# Patient Record
Sex: MALE | Race: WHITE | NOT HISPANIC OR LATINO | Employment: OTHER | URBAN - METROPOLITAN AREA
[De-identification: names, ages, dates, MRNs, and addresses within clinical notes are randomized per-mention and may not be internally consistent; named-entity substitution may affect disease eponyms.]

---

## 2017-03-14 ENCOUNTER — TELEPHONE (OUTPATIENT)
Dept: FAMILY MEDICINE | Facility: CLINIC | Age: 82
End: 2017-03-14

## 2017-03-14 NOTE — TELEPHONE ENCOUNTER
Reason for Call:  Other call back    Detailed comments: Wife calling, patient is having 2 teeth pulled on 3-29-17.  They are calling to get directions for his blood thinner med and the baby Asprin.  Please call to provide direction.    Phone Number Patient can be reached at: Home number on file [phone] (home)    Best Time: any    Can we leave a detailed message on this number? YES    Call taken on 3/14/2017 at 10:31 AM by Alfreda Wilcox

## 2017-03-15 ENCOUNTER — PRE VISIT (OUTPATIENT)
Dept: CARDIOLOGY | Facility: CLINIC | Age: 82
End: 2017-03-15

## 2017-03-15 NOTE — TELEPHONE ENCOUNTER
[facility]  [phone]    Would you like me to call, or would you like to call to review?    Thanks,  Gaurav Mclaughlin RN

## 2017-03-15 NOTE — TELEPHONE ENCOUNTER
HPI:   Mr Blair is a pleasant 87 yo male with history of Parkinsonism with an ILR in place. He is here with spouse    Mr Blair has had prior falls but none in last year. He had previously a documented cardiac pause, but not associated with falls/collapse. Now ILR is not showing anything of concern. Has perhaps 6-8 mos of operational function left.  ILR checked today     No new CV complaints. Denies OH, lightheadedness, CP or SOBE or HF symptoms. Wife here and concurs    PAST MEDICAL HISTORY:  Past Medical History   Diagnosis Date     Anemia      Anemia due to blood loss, acute      CKD (chronic kidney disease) stage 3, GFR 30-59 ml/min 5/31/2010     Essential hypertension, benign      Other and unspecified hyperlipidemia      Other and unspecified hyperlipidemia      Other specified disorder of skin      Pain in joint, shoulder region      Parkinson disease (H) 9/2/2010     Polyp of vocal cord or larynx      Retinal ischemia      right sided thrombotic plaque     Rosacea      Type II or unspecified type diabetes mellitus without mention of complication, not stated as uncontrolled        CURRENT MEDICATIONS:  Current Outpatient Prescriptions   Medication Sig Dispense Refill     blood glucose monitoring (ACCU-CHEK COMPACT STRIPS) test strip 1 strip by In Vitro route daily 100 each 11     atorvastatin (LIPITOR) 10 MG tablet Take 1 tablet (10 mg) by mouth daily Refill when requested 90 tablet 3     Cyanocobalamin (B-12) 1000 MCG TBCR Take 1,000 mcg by mouth daily 100 tablet 3     doxycycline Monohydrate 100 MG CAPS 1 tablet 2 time daily for Rosacea flares 60 capsule 3     irbesartan (AVAPRO) 300 MG tablet Take 1 tablet (300 mg) by mouth At Bedtime 90 tablet 3     dipyridamole (PERSANTINE) 50 MG tablet Take 2 tablets (100 mg) by mouth 4 times daily 720 tablet 3     sertraline (ZOLOFT) 25 MG tablet Take 1 tablet (25 mg) by mouth daily 90 tablet 3     fluticasone (FLONASE) 50 MCG/ACT nasal spray Spray 1-2 sprays into both  nostrils daily 16 g 5     blood glucose monitoring (SOFTCLIX) lancets Check blood sugar once weekly 100 Box 0     Thiamine HCl 50 MG CAPS Take 1 capsule by mouth daily 100 capsule 3     diclofenac (VOLTAREN) 1 % GEL Apply 4 grams to knees or 2 grams to hands four times daily using enclosed dosing card. 100 g 5     amoxicillin (AMOXIL) 500 MG capsule 4 tablets prior to dental appointment. Use for dental appointments 16 capsule 4     metroNIDAZOLE (METROCREAM) 0.75 % cream Apply topically 2 times daily 45 g 3     acetaminophen (TYLENOL) 500 MG tablet Take 2 tablets (1,000 mg) by mouth 3 times daily 100 tablet 0     Cyanocobalamin (VITAMIN B 12 PO) Take 50 mcg by mouth daily        FOLIC ACID PO Take 1 mg by mouth daily       acetaminophen 650 MG TABS Take 650 mg by mouth every 4 hours as needed. 100 tablet 0     Multiple Vitamin (MULTI VITAMIN  MENS) TABS Take  by mouth. Takes one daily         aspirin 81 MG tablet Take 1 tablet by mouth daily.  3     senna-docusate (SENOKOT-S;PERICOLACE) 8.6-50 MG per tablet Take 1-2 tablets by mouth 2 times daily. (Patient taking differently: Take 1-2 tablets by mouth Takes about 2-3 times weekly) 60 tablet 1     Carbidopa-Levodopa  MG TBCR Take  by mouth. 4 times daily  .        VITAMIN D 1000 UNIT OR TABS 1 TABLET DAILY 100 4       PAST SURGICAL HISTORY:  Past Surgical History   Procedure Laterality Date     Cl aff surgical pathology  6-     Dr. Bowers; left hand     Throat surgery       vocal cord polyps     Incise finger tendon sheath  2008     Dr. Bowers; right AND LEFT hand     Arthroplasty knee  1/31/2012     Procedure:ARTHROPLASTY KNEE; LEFT TOTAL KNEE ARTHROPLASTY (IRASEMA)^; Surgeon:SKIP FAIR; Location: OR     Ent surgery       Arthroscopy shoulder, open rotator cuff repair, combined  4/24/2012     Procedure:COMBINED ARTHROSCOPY SHOULDER, OPEN ROTATOR CUFF REPAIR; RIGHT SHOULDER ARTHROSCOPY OPEN ROTATOR CUFF DEBRIDEMENT, SUBCROMIAL DECOMPRESSION, AND  BICEPS TENODESIS REPAIR; Surgeon:SKIP FAIR; Location:[facility]       ALLERGIES:     Allergies   Allergen Reactions     No Known Drug Allergies        FAMILY HISTORY:  Family History   Problem Relation Age of Onset     Family History Negative       unknown family history per patient   Parents , but no illness recorded    SOCIAL HISTORY:  Social History   Substance Use Topics     Smoking status: Never Smoker     Smokeless tobacco: Never Used     Alcohol use Yes      Comment: couple beers everyday       ROS:   Constitutional: No fever, chills, or sweats. Weight stable. Uses cane   ENT: No visual disturbance, ear ache, epistaxis, sore throat.   Cardiovascular: As per HPI.   Respiratory: No cough, hemoptysis.    GI: No nausea, vomiting, hematemesis, melena, or hematochezia.   : No hematuria.   Integument: Negative.   Psychiatric: Negative.   Hematologic:  Easy bruising, no easy bleeding.  Neuro: Negative apart from Parkinsons and its complications   Endocrinology: No significant heat or cold intolerance   Musculoskeletal: Continuing instability related to Parkinsonism    Exam:  There were no vitals taken for this visit.Extended Vitals not filed for this encounter.    GENERAL APPEARANCE: Pleasant healthy, alert and no distress here with spouse  HEENT: no icterus, no xanthelasmas, normal pupil size and reaction, normal palate, mucosa moist, no central cyanosis  NECK: no adenopathy, no asymmetry, masses, or scars, thyroid normal to palpation and no bruits, JVP not elevated  RESPIRATORY: lungs clear to auscultation - no rales, rhonchi or wheezes, no use of accessory muscles, no retractions, respirations are unlabored, normal respiratory rate  CARDIOVASCULAR: regular rhythm, normal S1 with physiologic split S2, no S3 or S4 and no murmur, click or rub, precordium quiet with normal PMI.  ABDOMEN: soft, non tender, without hepatosplenomegaly, no masses palpable, bowel sounds normal, aorta not enlarged by palpation,  no abdominal bruits  EXTREMITIES: peripheral pulses normal, no edema, no bruits.  NEURO: alert and oriented to person/place/time, normal speech, Parkinsonism gait but normal  affect  VASC:  pulses are normal in volumes and symmetric bilaterally. No bruits are heard.  SKIN: no ecchymoses, no rashes    Labs:  CBC RESULTS:   Lab Results   Component Value Date    WBC 8.2 09/07/2016    RBC 3.31 (L) 09/07/2016    HGB 11.3 (L) 09/07/2016    HCT 35.8 (L) 09/07/2016     (H) 09/07/2016    MCH 34.1 (H) 09/07/2016    MCHC 31.6 09/07/2016    RDW 13.3 09/07/2016     09/07/2016       BMP RESULTS:  Lab Results   Component Value Date     09/07/2016    POTASSIUM 4.6 09/07/2016    CHLORIDE 107 09/07/2016    CO2 24 09/07/2016    ANIONGAP 8 09/07/2016     (H) 09/07/2016    BUN 26 09/07/2016    CR 1.11 09/07/2016    GFRESTIMATED 63 09/07/2016    GFRESTBLACK 76 09/07/2016    BLAISE 8.5 09/07/2016        INR RESULTS:  Lab Results   Component Value Date    INR 1.11 01/31/2012       Procedures:  9/7/16: ILR explant, reimplant    3/31/2016: Echocardiogram; \  Interpretation Summary  Technically difficult study.Extremely difficult acoustic windows despite the  use of contrast for endcardial border definition.  Global and regional left ventricular function is normal with an EF of 60-65%.  Global right ventricular function is normal.  ______________________________________________________________________________           Left Ventricle  Global and regional left ventricular function is normal with an EF of 60-65%.  Left ventricular size is normal. Left ventricular wall thickness is normal.     Right Ventricle  The right ventricle is normal size. Global right ventricular function is  normal.  Atria  Both atria appear normal.     Mitral Valve  The mitral valve is normal.     Aortic Valve  Aortic valve is normal in structure and function.     Tricuspid Valve  The tricuspid valve is normal. Trace tricuspid insufficiency is  present. The  peak velocity of the tricuspid regurgitant jet is not obtainable. Pulmonary  artery systolic pressure cannot be assessed.     Pulmonic Valve  The pulmonic valve is normal.     Vessels  The aorta root is normal. The inferior vena cava was normal in size with  preserved respiratory variability.  Pericardium  No pericardial effusion is present. Prominent epicardial fat is noted.     ______________________________________________________________________________  MMode/2D Measurements & Calculations  Ao root diam: 3.7 cm  LA dimension: 5.1 cm  asc Aorta Diam: 3.3 cm  LA/Ao: 1.4  LVOT diam: 2.0 cm  LVOT area: 3.0 cm2           Doppler Measurements & Calculations  MV E max princess: 67.4 cm/sec  MV A max princess: 98.0 cm/sec  MV E/A: 0.69  MV dec time: 0.25 sec  Ao V2 max: 145.0 cm/sec  Ao max P.4 mmHg  Ao V2 mean: 106.3 cm/sec  Ao mean P.1 mmHg  Ao V2 VTI: 30.3 cm  ASIYA(I,D): 2.6 cm2  ASIYA(V,D): 2.3 cm2  LV V1 max: 112.5 cm/sec  LV V1 VTI: 26.2 cm  SV(LVOT): 78.7 ml  ASIYA Index (cm2/m2): 1.2  Tilt/autonomic Study: 2013   DISCUSSION:  - Autonomic function test: consistent with normal physiologic response except for hypotensive response to couh: but no symptoms and not consistent with history  - TILT table test: No vasovagal syncope.   No orthostatic hypotension.   No POTS.   -EEG to be read separately.   - ILR remains in situ   PLAN: No actionable CV findings. Possibly a vasodepressor faint but inadequate evidence to this point. Will await EEG findings and follow-up in clinic.  MRI brain (2013)   1. No acute infarction or intracranial hemorrhage.  2. Mild to moderate nonspecific white matter changes, likely sequela of chronic small vessel ischemic disease. Old small area of encephalomalacia in the posterior right frontal lobe.  3. No abnormal enhancing lesions intracranially.  4. Head MRA demonstrates no definite aneurysm or stenosis of the major intracranial arteries.  5. Neck MRA demonstrates patent  major cervical arteries.    Echocardiogram (6/8/2013): 1. Normal LV size, wall motion, and systolic function. Stage I diastolic dysfunction 2. Normal RV size and function 3. No significant valvular abnormalities 4. Trivial pericardial effusion    Holter monitor (9/2012): occasional PACs and PVCs      Assessment and Plan:   1.         I very much appreciated the opportunity to see and assess Mr Bart Blair in the clinic today.    Please do not hesitate to contact my office if you have any questions or concerns.      Samuel Gorman MD  Cardiac Arrhythmia Service  [facility]  [phone]    CC  Patient Care Team:  Hiro Stewart MD as PCP - General Aggarwal, Rosalee ONEAL, RN as Nurse Coordinator  HIRO STEWART

## 2017-03-15 NOTE — TELEPHONE ENCOUNTER
Please get phone number for dentist to review procedure planned and full need to hold medications. It would be better to continue at least the aspirin if possible.     Quentin Stewart MD

## 2017-03-16 ENCOUNTER — OFFICE VISIT (OUTPATIENT)
Dept: CARDIOLOGY | Facility: CLINIC | Age: 82
End: 2017-03-16
Attending: INTERNAL MEDICINE
Payer: MEDICARE

## 2017-03-16 VITALS
DIASTOLIC BLOOD PRESSURE: 57 MMHG | HEIGHT: 70 IN | OXYGEN SATURATION: 97 % | BODY MASS INDEX: 31.07 KG/M2 | SYSTOLIC BLOOD PRESSURE: 125 MMHG | WEIGHT: 217 LBS | HEART RATE: 80 BPM

## 2017-03-16 DIAGNOSIS — R55 SYNCOPE, UNSPECIFIED SYNCOPE TYPE: Primary | ICD-10-CM

## 2017-03-16 PROCEDURE — 99213 OFFICE O/P EST LOW 20 MIN: CPT | Mod: 25 | Performed by: INTERNAL MEDICINE

## 2017-03-16 PROCEDURE — 99212 OFFICE O/P EST SF 10 MIN: CPT | Mod: 25,ZF

## 2017-03-16 PROCEDURE — 93291 INTERROG DEV EVAL SCRMS IP: CPT | Mod: ZF

## 2017-03-16 PROCEDURE — 93291 INTERROG DEV EVAL SCRMS IP: CPT | Mod: 26 | Performed by: INTERNAL MEDICINE

## 2017-03-16 ASSESSMENT — PAIN SCALES - GENERAL: PAINLEVEL: NO PAIN (0)

## 2017-03-16 NOTE — TELEPHONE ENCOUNTER
Attempt # 1    Called wife at home number.     Was call answered?  No.  Left message on voicemail with information to call me back.     Kayleen Izaguirre RN   March 16, 2017 10:20 AM  [facility]   [phone]

## 2017-03-16 NOTE — LETTER
3/16/2017      RE: Bart Blair  [address] [address]       Dear Colleague,    Thank you for the opportunity to participate in the care of your patient, Bart Blair, at the [facility] HEART [facility]. Please see a copy of my visit note below.    HPI:   Mr Blair is a pleasant 85 yo male with an ILR in place for palpitations and falls. He also has a history of Parkinsonism with but very slow progression. Is seen by Neurology. He is here today with his spouse.\  Nop falls over last year   He had previously a documented cardiac pause, but not associated with falls/collapse.   Now ILR is not showing any gianni of concern. Had one 7-sec episode of PSVT -- asymptomatic. .  ILR checked today   No new CV complaints. Denies OH, lightheadedness, CP or SOBE or HF symptoms. Wife here and concurs  Had Left knee replace with good result    PAST MEDICAL HISTORY:  Past Medical History   Diagnosis Date     Anemia      Anemia due to blood loss, acute      CKD (chronic kidney disease) stage 3, GFR 30-59 ml/min 5/31/2010     Essential hypertension, benign      Other and unspecified hyperlipidemia      Other and unspecified hyperlipidemia      Other specified disorder of skin      Pain in joint, shoulder region      Parkinson disease (H) 9/2/2010     Polyp of vocal cord or larynx      Retinal ischemia      right sided thrombotic plaque     Rosacea      Type II or unspecified type diabetes mellitus without mention of complication, not stated as uncontrolled        CURRENT MEDICATIONS:  Current Outpatient Prescriptions   Medication Sig Dispense Refill     blood glucose monitoring (ACCU-CHEK COMPACT STRIPS) test strip 1 strip by In Vitro route daily 100 each 11     atorvastatin (LIPITOR) 10 MG tablet Take 1 tablet (10 mg) by mouth daily Refill when requested 90 tablet 3     Cyanocobalamin (B-12) 1000 MCG TBCR Take 1,000 mcg by mouth daily 100 tablet 3     doxycycline Monohydrate  100 MG CAPS 1 tablet 2 time daily for Rosacea flares 60 capsule 3     irbesartan (AVAPRO) 300 MG tablet Take 1 tablet (300 mg) by mouth At Bedtime 90 tablet 3     dipyridamole (PERSANTINE) 50 MG tablet Take 2 tablets (100 mg) by mouth 4 times daily 720 tablet 3     sertraline (ZOLOFT) 25 MG tablet Take 1 tablet (25 mg) by mouth daily 90 tablet 3     fluticasone (FLONASE) 50 MCG/ACT nasal spray Spray 1-2 sprays into both nostrils daily 16 g 5     blood glucose monitoring (SOFTCLIX) lancets Check blood sugar once weekly 100 Box 0     diclofenac (VOLTAREN) 1 % GEL Apply 4 grams to knees or 2 grams to hands four times daily using enclosed dosing card. 100 g 5     amoxicillin (AMOXIL) 500 MG capsule 4 tablets prior to dental appointment. Use for dental appointments 16 capsule 4     metroNIDAZOLE (METROCREAM) 0.75 % cream Apply topically 2 times daily 45 g 3     acetaminophen (TYLENOL) 500 MG tablet Take 2 tablets (1,000 mg) by mouth 3 times daily 100 tablet 0     FOLIC ACID PO Take 1 mg by mouth daily       acetaminophen 650 MG TABS Take 650 mg by mouth every 4 hours as needed. 100 tablet 0     Multiple Vitamin (MULTI VITAMIN  MENS) TABS Take  by mouth. Takes one daily         aspirin 81 MG tablet Take 1 tablet by mouth daily.  3     senna-docusate (SENOKOT-S;PERICOLACE) 8.6-50 MG per tablet Take 1-2 tablets by mouth 2 times daily. (Patient taking differently: Take 1-2 tablets by mouth Takes about 2-3 times weekly) 60 tablet 1     Carbidopa-Levodopa  MG TBCR Take  by mouth. 4 times daily  .        VITAMIN D 1000 UNIT OR TABS 1 TABLET DAILY 100 4     Thiamine HCl 50 MG CAPS Take 1 capsule by mouth daily (Patient not taking: Reported on 3/16/2017) 100 capsule 3       PAST SURGICAL HISTORY:  Past Surgical History   Procedure Laterality Date     Cl aff surgical pathology  6-     Dr. Bowers; left hand     Throat surgery       vocal cord polyps     Incise finger tendon sheath  2008     Dr. Bowers; right AND LEFT hand  "    Arthroplasty knee  2012     Procedure:ARTHROPLASTY KNEE; LEFT TOTAL KNEE ARTHROPLASTY (IRASEMA)^; Surgeon:SKIP FAIR; Location: OR     Ent surgery       Arthroscopy shoulder, open rotator cuff repair, combined  2012     Procedure:COMBINED ARTHROSCOPY SHOULDER, OPEN ROTATOR CUFF REPAIR; RIGHT SHOULDER ARTHROSCOPY OPEN ROTATOR CUFF DEBRIDEMENT, SUBCROMIAL DECOMPRESSION, AND BICEPS TENODESIS REPAIR; Surgeon:SKIP FAIR; Location: [place]       ALLERGIES:     Allergies   Allergen Reactions     No Known Drug Allergies        FAMILY HISTORY:  Family History   Problem Relation Age of Onset     Family History Negative       unknown family history per patient   Parents , but no illness recorded    SOCIAL HISTORY:  Social History   Substance Use Topics     Smoking status: Never Smoker     Smokeless tobacco: Never Used     Alcohol use Yes      Comment: couple beers everyday       ROS:   Constitutional: No fever, chills, or sweats. Weight stable. Uses cane   ENT: No visual disturbance, ear ache, epistaxis, sore throat.   Cardiovascular: As per HPI.   Respiratory: No cough, hemoptysis.    GI: No nausea, vomiting, hematemesis, melena, or hematochezia.   : No hematuria.   Integument: Negative.   Psychiatric: Negative.   Hematologic:  Easy bruising, no easy bleeding.  Neuro: Negative apart from Parkinsons and its complications, hardy[ec minor intention tremor  Endocrinology: No significant heat or cold intolerance   Musculoskeletal: Continuing instability related to Parkinsonism    Exam:  /57 (BP Location: Right arm, Patient Position: Chair, Cuff Size: Adult Large)  Pulse 80  Ht 1.765 m (5' 9.5\")  Wt 98.4 kg (217 lb)  SpO2 97%  BMI 31.59 kg/b1Odmduchm Vitals not filed for this encounter.    GENERAL APPEARANCE: Pleasant alert and no distress ---appropriate for age  HEENT:normal pupil size and reaction, normal palate, mucosa moist, no central cyanosis  NECK: no adenopathy, no asymmetry, masses, " or scars, thyroid normal to palpation and no bruits, JVP not elevated  RESPIRATORY: lungs clear to auscultation -  no use of accessory muscles, no retractions, respirations are unlabored, normal respiratory rate  CARDIOVASCULAR: regular rhythm, normal S1 with physiologic split S2, no S3 or S4 and no murmur, click or rub, precordium quiet with normal PMI.  ABDOMEN: soft, non tender, without hepatosplenomegaly, no masses palpable, bowel sounds normal,   EXTREMITIES: peripheral pulses normal, no edema, no bruits.  NEURO: alert and oriented to person/place/time, normal speech, +/- Parkinsonism gait but normal  Affect. Some intention tremor  VASC:  pulses are normal in volumes and symmetric bilaterally. No bruits are heard.  SKIN: no ecchymoses, no rashes    Labs:  CBC RESULTS:   Lab Results   Component Value Date    WBC 8.2 09/07/2016    RBC 3.31 (L) 09/07/2016    HGB 11.3 (L) 09/07/2016    HCT 35.8 (L) 09/07/2016     (H) 09/07/2016    MCH 34.1 (H) 09/07/2016    MCHC 31.6 09/07/2016    RDW 13.3 09/07/2016     09/07/2016       BMP RESULTS:  Lab Results   Component Value Date     09/07/2016    POTASSIUM 4.6 09/07/2016    CHLORIDE 107 09/07/2016    CO2 24 09/07/2016    ANIONGAP 8 09/07/2016     (H) 09/07/2016    BUN 26 09/07/2016    CR 1.11 09/07/2016    GFRESTIMATED 63 09/07/2016    GFRESTBLACK 76 09/07/2016    BLAISE 8.5 09/07/2016        INR RESULTS:  Lab Results   Component Value Date    INR 1.11 01/31/2012       Procedures:  9/7/16: ILR explant, reimplant    3/31/2016: Echocardiogram; \  Interpretation Summary  Technically difficult study.Extremely difficult acoustic windows despite the  use of contrast for endcardial border definition.  Global and regional left ventricular function is normal with an EF of 60-65%.  Global right ventricular function is normal.  ______________________________________________________________________________           Left Ventricle  Global and regional left  ventricular function is normal with an EF of 60-65%.  Left ventricular size is normal. Left ventricular wall thickness is normal.     Right Ventricle  The right ventricle is normal size. Global right ventricular function is  normal.  Atria  Both atria appear normal.     Mitral Valve  The mitral valve is normal.     Aortic Valve  Aortic valve is normal in structure and function.     Tricuspid Valve  The tricuspid valve is normal. Trace tricuspid insufficiency is present. The  peak velocity of the tricuspid regurgitant jet is not obtainable. Pulmonary  artery systolic pressure cannot be assessed.     Pulmonic Valve  The pulmonic valve is normal.     Vessels  The aorta root is normal. The inferior vena cava was normal in size with  preserved respiratory variability.  Pericardium  No pericardial effusion is present. Prominent epicardial fat is noted.     ______________________________________________________________________________  MMode/2D Measurements & Calculations  Ao root diam: 3.7 cm  LA dimension: 5.1 cm  asc Aorta Diam: 3.3 cm  LA/Ao: 1.4  LVOT diam: 2.0 cm  LVOT area: 3.0 cm2           Doppler Measurements & Calculations  MV E max princess: 67.4 cm/sec  MV A max princess: 98.0 cm/sec  MV E/A: 0.69  MV dec time: 0.25 sec  Ao V2 max: 145.0 cm/sec  Ao max P.4 mmHg  Ao V2 mean: 106.3 cm/sec  Ao mean P.1 mmHg  Ao V2 VTI: 30.3 cm  ASIYA(I,D): 2.6 cm2  ASIYA(V,D): 2.3 cm2  LV V1 max: 112.5 cm/sec  LV V1 VTI: 26.2 cm  SV(LVOT): 78.7 ml  ASIYA Index (cm2/m2): 1.2  Tilt/autonomic Study: 2013   DISCUSSION:  - Autonomic function test: consistent with normal physiologic response except for hypotensive response to couh: but no symptoms and not consistent with history  - TILT table test: No vasovagal syncope.   No orthostatic hypotension.   No POTS.   -EEG to be read separately.   - ILR remains in situ   PLAN: No actionable CV findings. Possibly a vasodepressor faint but inadequate evidence to this point. Will await EEG  findings and follow-up in clinic.  MRI brain (6/7/2013)   1. No acute infarction or intracranial hemorrhage.  2. Mild to moderate nonspecific white matter changes, likely sequela of chronic small vessel ischemic disease. Old small area of encephalomalacia in the posterior right frontal lobe.  3. No abnormal enhancing lesions intracranially.  4. Head MRA demonstrates no definite aneurysm or stenosis of the major intracranial arteries.  5. Neck MRA demonstrates patent major cervical arteries.    Echocardiogram (6/8/2013): 1. Normal LV size, wall motion, and systolic function. Stage I diastolic dysfunction 2. Normal RV size and function 3. No significant valvular abnormalities 4. Trivial pericardial effusion    Holter monitor (9/2012): occasional PACs and PVCs      Assessment and Plan:   1. Clinically CV stable...one episode of asympt SVT.   2. Neuro as above    No Cv Rx change. RTC for ILR in 6 mos, and continue remotes    RTC ([place]) 1 year    I very much appreciated the opportunity to see and assess Mr Bart Blair in the clinic today.    Please do not hesitate to contact my office if you have any questions or concerns.      Samuel Gorman MD  Cardiac Arrhythmia Service  [facility]  [phone]    CC  Patient Care Team:  Chuy Stewart MD as PCP - General

## 2017-03-16 NOTE — PATIENT INSTRUCTIONS
You will be scheduled for a follow up visit as instructed.    If you have any questions regarding to your visit please contact your care team:     Cardiology  Telephone Number    Rosalee Aggarwal [phone]   Or send a message to your provider via my chart.   For scheduling appts or procedures:    Catrina Alfaro     [phone] or  [phone]   For the Device Clinic (Pacemakers and ICD's)   RN's :   Esther Burgos  During business hours: [phone]    After business hours:   [phone]- select option 4 and ask for job code [phone].    You can also contact us using My Chart. If you need assistance in setting this up, please contact our office or ask at your follow up visit.     If you need a medication refill please contact your pharmacy. Please allow 3 business days for your refill to be completed.     As always, Thank you for trusting us with your health care needs!

## 2017-03-16 NOTE — MR AVS SNAPSHOT
After Visit Summary   3/16/2017    Bart Bliar    MRN: [id number]           Patient Information     Date Of Birth          2/14/1931        Visit Information        Provider Department      3/16/2017 11:30 AM Samuel Gorman MD [facility]        Care Instructions    You will be scheduled for a follow up visit as instructed.    If you have any questions regarding to your visit please contact your care team:     Cardiology  Telephone Number    Rosalee Alessia HANLEY [phone]   Or send a message to your provider via my chart.   For scheduling appts or procedures:    Catrina Alfaro     [phone] or  [phone]   For the Device Clinic (Pacemakers and ICD's)   RN's :   Esther Burgos  During business hours: [phone]    After business hours:   [phone]- select option 4 and ask for job code [phone].    You can also contact us using My Chart. If you need assistance in setting this up, please contact our office or ask at your follow up visit.     If you need a medication refill please contact your pharmacy. Please allow 3 business days for your refill to be completed.     As always, Thank you for trusting us with your health care needs!        Follow-ups after your visit        Follow-up notes from your care team     Return in about 1 year (around 3/16/2018) for ILR check and kenton. .      Your next 10 appointments already scheduled     Sep 13, 2017 10:00 AM CDT   (Arrive by 9:45 AM)   Implantable Loop Recorder with Uc Cv Device 1   [facility] ([facility])    [address] [address] [phone]              Who to contact     If you have questions or need follow up information about today's clinic visit or your schedule please contact [facility] directly at [phone].  Normal or non-critical lab and imaging results will be communicated to you by MyChart, letter or phone within 4 business days  "after the clinic has received the results. If you do not hear from us within 7 days, please contact the clinic through GameOn or phone. If you have a critical or abnormal lab result, we will notify you by phone as soon as possible.  Submit refill requests through GameOn or call your pharmacy and they will forward the refill request to us. Please allow 3 business days for your refill to be completed.          Additional Information About Your Visit        GameOn Information     GameOn gives you secure access to your electronic health record. If you see a primary care provider, you can also send messages to your care team and make appointments. If you have questions, please call your primary care clinic.  If you do not have a primary care provider, please call [phone] and they will assist you.        Care EveryWhere ID     This is your Care EveryWhere ID. This could be used by other organizations to access your [place] medical records  [id number]        Your Vitals Were     Pulse Height Pulse Oximetry BMI (Body Mass Index)          80 1.765 m (5' 9.5\") 97% 31.59 kg/m2         Blood Pressure from Last 3 Encounters:   03/16/17 125/57   11/22/16 108/50   09/07/16 152/78    Weight from Last 3 Encounters:   03/16/17 98.4 kg (217 lb)   11/22/16 96.6 kg (213 lb)   09/07/16 97.5 kg (215 lb)              Today, you had the following     No orders found for display         Today's Medication Changes          These changes are accurate as of: 3/16/17 11:59 AM.  If you have any questions, ask your nurse or doctor.               These medicines have changed or have updated prescriptions.        Dose/Directions    senna-docusate 8.6-50 MG per tablet   Commonly known as:  SENOKOT-S;PERICOLACE   This may have changed:    - when to take this  - additional instructions   Used for:  S/P total knee replacement        Dose:  1-2 tablet   Take 1-2 tablets by mouth 2 times daily.   Quantity:  60 tablet   Refills:  1             "    Primary Care Provider Office Phone # Fax #    Chuy Stewart [phone]       [address] [address]        Thank you!     Thank you for choosing [facility]  for your care. Our goal is always to provide you with excellent care. Hearing back from our patients is one way we can continue to improve our services. Please take a few minutes to complete the written survey that you may receive in the mail after your visit with us. Thank you!             Your Updated Medication List - Protect others around you: Learn how to safely use, store and throw away your medicines at www.disposemymeds.org.          This list is accurate as of: 3/16/17 11:59 AM.  Always use your most recent med list.                   Brand Name Dispense Instructions for use    * ACETAMINOPHEN 8 HOUR 650 MG 8 hour tablet   Generic drug:  acetaminophen     100 tablet    Take 650 mg by mouth every 4 hours as needed.       * TYLENOL 500 MG tablet   Generic drug:  acetaminophen     100 tablet    Take 2 tablets (1,000 mg) by mouth 3 times daily       amoxicillin 500 MG capsule    AMOXIL    16 capsule    4 tablets prior to dental appointment. Use for dental appointments       aspirin 81 MG tablet      Take 1 tablet by mouth daily.       atorvastatin 10 MG tablet    LIPITOR    90 tablet    Take 1 tablet (10 mg) by mouth daily Refill when requested       B-12 1000 MCG Tbcr     100 tablet    Take 1,000 mcg by mouth daily       blood glucose monitoring lancets     100 Box    Check blood sugar once weekly       blood glucose monitoring test strip    ACCU-CHEK COMPACT STRIPS    100 each    1 strip by In Vitro route daily       carbidopa-levodopa  MG Tbcr      Take  by mouth. 4 times daily  .       cholecalciferol 1000 UNIT tablet    vitamin D    100    1 TABLET DAILY       diclofenac 1 % Gel topical gel    VOLTAREN    100 g    Apply 4 grams to knees or 2 grams to hands  four times daily using enclosed dosing card.       dipyridamole 50 MG tablet    PERSANTINE    720 tablet    Take 2 tablets (100 mg) by mouth 4 times daily       doxycycline Monohydrate 100 MG Caps     60 capsule    1 tablet 2 time daily for Rosacea flares       fluticasone 50 MCG/ACT spray    FLONASE    16 g    Spray 1-2 sprays into both nostrils daily       FOLIC ACID PO      Take 1 mg by mouth daily       irbesartan 300 MG tablet    AVAPRO    90 tablet    Take 1 tablet (300 mg) by mouth At Bedtime       metroNIDAZOLE 0.75 % cream    METROCREAM    45 g    Apply topically 2 times daily       MULTI vitamin  MENS Tabs      Take  by mouth. Takes one daily       senna-docusate 8.6-50 MG per tablet    SENOKOT-S;PERICOLACE    60 tablet    Take 1-2 tablets by mouth 2 times daily.       sertraline 25 MG tablet    ZOLOFT    90 tablet    Take 1 tablet (25 mg) by mouth daily       Thiamine HCl 50 MG Caps     100 capsule    Take 1 capsule by mouth daily       * Notice:  This list has 2 medication(s) that are the same as other medications prescribed for you. Read the directions carefully, and ask your doctor or other care provider to review them with you.

## 2017-03-16 NOTE — PROGRESS NOTES
Pt seen in clinic for evaluation and iterative programming of his Medtronic implanted loop recorder.  He looks well and he does not offer any complaints.  He has a routine follow up to see Dr Gorman today in clinic.  His loop recorder shows 1 tachy episode, 160 bpm that lasted 7 sec, the stored EGM shows a narrow rhythm and pt was asymptomatic, the episode was back in Sept 2017.  No other episodes have been recorded.  His heart rate histograms show fair heart rate variation and his loop recorder battery is good.  We will plan to have him send a transmission in 3 mos and RTC in 6 mos.    Loop recorder

## 2017-03-16 NOTE — PROGRESS NOTES
HPI:   Mr Blair is a pleasant 85 yo male with an ILR in place for palpitations and falls. He also has a history of Parkinsonism with but very slow progression. Is seen by Neurology. He is here today with his spouse.\  Nop falls over last year   He had previously a documented cardiac pause, but not associated with falls/collapse.   Now ILR is not showing any gianni of concern. Had one 7-sec episode of PSVT -- asymptomatic. .  ILR checked today   No new CV complaints. Denies OH, lightheadedness, CP or SOBE or HF symptoms. Wife here and concurs  Had Left knee replace with good result    PAST MEDICAL HISTORY:  Past Medical History   Diagnosis Date     Anemia      Anemia due to blood loss, acute      CKD (chronic kidney disease) stage 3, GFR 30-59 ml/min 5/31/2010     Essential hypertension, benign      Other and unspecified hyperlipidemia      Other and unspecified hyperlipidemia      Other specified disorder of skin      Pain in joint, shoulder region      Parkinson disease (H) 9/2/2010     Polyp of vocal cord or larynx      Retinal ischemia      right sided thrombotic plaque     Rosacea      Type II or unspecified type diabetes mellitus without mention of complication, not stated as uncontrolled        CURRENT MEDICATIONS:  Current Outpatient Prescriptions   Medication Sig Dispense Refill     blood glucose monitoring (ACCU-CHEK COMPACT STRIPS) test strip 1 strip by In Vitro route daily 100 each 11     atorvastatin (LIPITOR) 10 MG tablet Take 1 tablet (10 mg) by mouth daily Refill when requested 90 tablet 3     Cyanocobalamin (B-12) 1000 MCG TBCR Take 1,000 mcg by mouth daily 100 tablet 3     doxycycline Monohydrate 100 MG CAPS 1 tablet 2 time daily for Rosacea flares 60 capsule 3     irbesartan (AVAPRO) 300 MG tablet Take 1 tablet (300 mg) by mouth At Bedtime 90 tablet 3     dipyridamole (PERSANTINE) 50 MG tablet Take 2 tablets (100 mg) by mouth 4 times daily 720 tablet 3     sertraline (ZOLOFT) 25 MG tablet Take 1  tablet (25 mg) by mouth daily 90 tablet 3     fluticasone (FLONASE) 50 MCG/ACT nasal spray Spray 1-2 sprays into both nostrils daily 16 g 5     blood glucose monitoring (SOFTCLIX) lancets Check blood sugar once weekly 100 Box 0     diclofenac (VOLTAREN) 1 % GEL Apply 4 grams to knees or 2 grams to hands four times daily using enclosed dosing card. 100 g 5     amoxicillin (AMOXIL) 500 MG capsule 4 tablets prior to dental appointment. Use for dental appointments 16 capsule 4     metroNIDAZOLE (METROCREAM) 0.75 % cream Apply topically 2 times daily 45 g 3     acetaminophen (TYLENOL) 500 MG tablet Take 2 tablets (1,000 mg) by mouth 3 times daily 100 tablet 0     FOLIC ACID PO Take 1 mg by mouth daily       acetaminophen 650 MG TABS Take 650 mg by mouth every 4 hours as needed. 100 tablet 0     Multiple Vitamin (MULTI VITAMIN  MENS) TABS Take  by mouth. Takes one daily         aspirin 81 MG tablet Take 1 tablet by mouth daily.  3     senna-docusate (SENOKOT-S;PERICOLACE) 8.6-50 MG per tablet Take 1-2 tablets by mouth 2 times daily. (Patient taking differently: Take 1-2 tablets by mouth Takes about 2-3 times weekly) 60 tablet 1     Carbidopa-Levodopa  MG TBCR Take  by mouth. 4 times daily  .        VITAMIN D 1000 UNIT OR TABS 1 TABLET DAILY 100 4     Thiamine HCl 50 MG CAPS Take 1 capsule by mouth daily (Patient not taking: Reported on 3/16/2017) 100 capsule 3       PAST SURGICAL HISTORY:  Past Surgical History   Procedure Laterality Date     Cl aff surgical pathology  6-     Dr. Bowers; left hand     Throat surgery       vocal cord polyps     Incise finger tendon sheath  2008     Dr. Bowers; right AND LEFT hand     Arthroplasty knee  1/31/2012     Procedure:ARTHROPLASTY KNEE; LEFT TOTAL KNEE ARTHROPLASTY (IRASEMA)^; Surgeon:SKIP FAIR; Location:[facility] OR     Ent surgery       Arthroscopy shoulder, open rotator cuff repair, combined  4/24/2012     Procedure:COMBINED ARTHROSCOPY SHOULDER, OPEN ROTATOR CUFF  "REPAIR; RIGHT SHOULDER ARTHROSCOPY OPEN ROTATOR CUFF DEBRIDEMENT, SUBCROMIAL DECOMPRESSION, AND BICEPS TENODESIS REPAIR; Surgeon:SKIP FAIR; Location:[facility]       ALLERGIES:     Allergies   Allergen Reactions     No Known Drug Allergies        FAMILY HISTORY:  Family History   Problem Relation Age of Onset     Family History Negative       unknown family history per patient   Parents , but no illness recorded    SOCIAL HISTORY:  Social History   Substance Use Topics     Smoking status: Never Smoker     Smokeless tobacco: Never Used     Alcohol use Yes      Comment: couple beers everyday       ROS:   Constitutional: No fever, chills, or sweats. Weight stable. Uses cane   ENT: No visual disturbance, ear ache, epistaxis, sore throat.   Cardiovascular: As per HPI.   Respiratory: No cough, hemoptysis.    GI: No nausea, vomiting, hematemesis, melena, or hematochezia.   : No hematuria.   Integument: Negative.   Psychiatric: Negative.   Hematologic:  Easy bruising, no easy bleeding.  Neuro: Negative apart from Parkinsons and its complications, hardy[ec minor intention tremor  Endocrinology: No significant heat or cold intolerance   Musculoskeletal: Continuing instability related to Parkinsonism    Exam:  /57 (BP Location: Right arm, Patient Position: Chair, Cuff Size: Adult Large)  Pulse 80  Ht 1.765 m (5' 9.5\")  Wt 98.4 kg (217 lb)  SpO2 97%  BMI 31.59 kg/o8Qlejppsq Vitals not filed for this encounter.    GENERAL APPEARANCE: Pleasant alert and no distress ---appropriate for age  HEENT:normal pupil size and reaction, normal palate, mucosa moist, no central cyanosis  NECK: no adenopathy, no asymmetry, masses, or scars, thyroid normal to palpation and no bruits, JVP not elevated  RESPIRATORY: lungs clear to auscultation -  no use of accessory muscles, no retractions, respirations are unlabored, normal respiratory rate  CARDIOVASCULAR: regular rhythm, normal S1 with physiologic split S2, no S3 or S4 and no " murmur, click or rub, precordium quiet with normal PMI.  ABDOMEN: soft, non tender, without hepatosplenomegaly, no masses palpable, bowel sounds normal,   EXTREMITIES: peripheral pulses normal, no edema, no bruits.  NEURO: alert and oriented to person/place/time, normal speech, +/- Parkinsonism gait but normal  Affect. Some intention tremor  VASC:  pulses are normal in volumes and symmetric bilaterally. No bruits are heard.  SKIN: no ecchymoses, no rashes    Labs:  CBC RESULTS:   Lab Results   Component Value Date    WBC 8.2 09/07/2016    RBC 3.31 (L) 09/07/2016    HGB 11.3 (L) 09/07/2016    HCT 35.8 (L) 09/07/2016     (H) 09/07/2016    MCH 34.1 (H) 09/07/2016    MCHC 31.6 09/07/2016    RDW 13.3 09/07/2016     09/07/2016       BMP RESULTS:  Lab Results   Component Value Date     09/07/2016    POTASSIUM 4.6 09/07/2016    CHLORIDE 107 09/07/2016    CO2 24 09/07/2016    ANIONGAP 8 09/07/2016     (H) 09/07/2016    BUN 26 09/07/2016    CR 1.11 09/07/2016    GFRESTIMATED 63 09/07/2016    GFRESTBLACK 76 09/07/2016    BLAISE 8.5 09/07/2016        INR RESULTS:  Lab Results   Component Value Date    INR 1.11 01/31/2012       Procedures:  9/7/16: ILR explant, reimplant    3/31/2016: Echocardiogram; \  Interpretation Summary  Technically difficult study.Extremely difficult acoustic windows despite the  use of contrast for endcardial border definition.  Global and regional left ventricular function is normal with an EF of 60-65%.  Global right ventricular function is normal.  ______________________________________________________________________________           Left Ventricle  Global and regional left ventricular function is normal with an EF of 60-65%.  Left ventricular size is normal. Left ventricular wall thickness is normal.     Right Ventricle  The right ventricle is normal size. Global right ventricular function is  normal.  Atria  Both atria appear normal.     Mitral Valve  The mitral valve is  normal.     Aortic Valve  Aortic valve is normal in structure and function.     Tricuspid Valve  The tricuspid valve is normal. Trace tricuspid insufficiency is present. The  peak velocity of the tricuspid regurgitant jet is not obtainable. Pulmonary  artery systolic pressure cannot be assessed.     Pulmonic Valve  The pulmonic valve is normal.     Vessels  The aorta root is normal. The inferior vena cava was normal in size with  preserved respiratory variability.  Pericardium  No pericardial effusion is present. Prominent epicardial fat is noted.     ______________________________________________________________________________  MMode/2D Measurements & Calculations  Ao root diam: 3.7 cm  LA dimension: 5.1 cm  asc Aorta Diam: 3.3 cm  LA/Ao: 1.4  LVOT diam: 2.0 cm  LVOT area: 3.0 cm2           Doppler Measurements & Calculations  MV E max princess: 67.4 cm/sec  MV A max princess: 98.0 cm/sec  MV E/A: 0.69  MV dec time: 0.25 sec  Ao V2 max: 145.0 cm/sec  Ao max P.4 mmHg  Ao V2 mean: 106.3 cm/sec  Ao mean P.1 mmHg  Ao V2 VTI: 30.3 cm  ASIYA(I,D): 2.6 cm2  ASIYA(V,D): 2.3 cm2  LV V1 max: 112.5 cm/sec  LV V1 VTI: 26.2 cm  SV(LVOT): 78.7 ml  ASIYA Index (cm2/m2): 1.2  Tilt/autonomic Study: 2013   DISCUSSION:  - Autonomic function test: consistent with normal physiologic response except for hypotensive response to couh: but no symptoms and not consistent with history  - TILT table test: No vasovagal syncope.   No orthostatic hypotension.   No POTS.   -EEG to be read separately.   - ILR remains in situ   PLAN: No actionable CV findings. Possibly a vasodepressor faint but inadequate evidence to this point. Will await EEG findings and follow-up in clinic.  MRI brain (2013)   1. No acute infarction or intracranial hemorrhage.  2. Mild to moderate nonspecific white matter changes, likely sequela of chronic small vessel ischemic disease. Old small area of encephalomalacia in the posterior right frontal lobe.  3. No abnormal  enhancing lesions intracranially.  4. Head MRA demonstrates no definite aneurysm or stenosis of the major intracranial arteries.  5. Neck MRA demonstrates patent major cervical arteries.    Echocardiogram (6/8/2013): 1. Normal LV size, wall motion, and systolic function. Stage I diastolic dysfunction 2. Normal RV size and function 3. No significant valvular abnormalities 4. Trivial pericardial effusion    Holter monitor (9/2012): occasional PACs and PVCs      Assessment and Plan:   1. Clinically CV stable...one episode of asympt SVT.   2. Neuro as above    No Cv Rx change. RTC for ILR in 6 mos, and continue remotes    RTC ([place]) 1 year    I very much appreciated the opportunity to see and assess Mr Bart Blair in the clinic today.    Please do not hesitate to contact my office if you have any questions or concerns.      Samuel Gorman MD  Cardiac Arrhythmia Service  [facility]  [phone]    CC  Patient Care Team:  Hiro Stewart MD as PCP - General  AlessiaRosalee zambrano RN as Nurse Coordinator  HIRO STEWART

## 2017-03-16 NOTE — MR AVS SNAPSHOT
After Visit Summary   3/16/2017    Bart Blair    MRN: [id number]           Patient Information     Date Of Birth          2/14/1931        Visit Information        Provider Department      3/16/2017 11:00 AM 1, Uc Cv Device [facility]        Today's Diagnoses     Syncope, unspecified syncope type    -  1      Care Instructions    It was a pleasure to see you in clinic today.  Please do not hesitate to call us if you have any questions or concerns.  Please return to clinic in 6 mos for a loop recorder check.    Next remote check from home 6/19/17    Kelley Horton R.N.  Electrophysiology nurse specialist  [facility]  [address] [place] [id number]     Portia Yee  [phone] business hours    After business hours please call [phone], option #4, and ask for Job code [id number].                Follow-ups after your visit        Follow-up notes from your care team     Discussed this visit Return in about 6 months (around 9/16/2017) for Loop recorder.      Your next 10 appointments already scheduled     Sep 13, 2017 10:00 AM CDT   (Arrive by 9:45 AM)   Implantable Loop Recorder with Uc Cv Device 1   [facility] ([facility])    [address] [address] [phone]              Who to contact     If you have questions or need follow up information about today's clinic visit or your schedule please contact [facility] directly at [phone].  Normal or non-critical lab and imaging results will be communicated to you by MyChart, letter or phone within 4 business days after the clinic has received the results. If you do not hear from us within 7 days, please contact the clinic through MyChart or phone. If you have a critical or abnormal lab result, we will notify you by phone as soon as possible.  Submit refill requests through [employer] or call your pharmacy and they will  forward the refill request to us. Please allow 3 business days for your refill to be completed.          Additional Information About Your Visit        [employer] Information     [employer] gives you secure access to your electronic health record. If you see a primary care provider, you can also send messages to your care team and make appointments. If you have questions, please call your primary care clinic.  If you do not have a primary care provider, please call [phone] and they will assist you.        Care EveryWhere ID     This is your Care EveryWhere ID. This could be used by other organizations to access your [facility] medical records  [id number]         Blood Pressure from Last 3 Encounters:   03/16/17 125/57   11/22/16 108/50   09/07/16 152/78    Weight from Last 3 Encounters:   03/16/17 98.4 kg (217 lb)   11/22/16 96.6 kg (213 lb)   09/07/16 97.5 kg (215 lb)              We Performed the Following     INTERR DEVICE EVAL IN PERSON, LOOP RECORDER          Today's Medication Changes          These changes are accurate as of: 3/16/17 11:59 AM.  If you have any questions, ask your nurse or doctor.               These medicines have changed or have updated prescriptions.        Dose/Directions    senna-docusate 8.6-50 MG per tablet   Commonly known as:  SENOKOT-S;PERICOLACE   This may have changed:    - when to take this  - additional instructions   Used for:  S/P total knee replacement        Dose:  1-2 tablet   Take 1-2 tablets by mouth 2 times daily.   Quantity:  60 tablet   Refills:  1                Primary Care Provider Office Phone # Fax #    Chuy Stewart [phone]       [address] [address]        Thank you!     Thank you for choosing [facility]  for your care. Our goal is always to provide you with excellent care. Hearing back from our patients is one way we can continue to improve our services. Please take a few  minutes to complete the written survey that you may receive in the mail after your visit with us. Thank you!             Your Updated Medication List - Protect others around you: Learn how to safely use, store and throw away your medicines at www.disposemymeds.org.          This list is accurate as of: 3/16/17 11:59 AM.  Always use your most recent med list.                   Brand Name Dispense Instructions for use    * ACETAMINOPHEN 8 HOUR 650 MG 8 hour tablet   Generic drug:  acetaminophen     100 tablet    Take 650 mg by mouth every 4 hours as needed.       * TYLENOL 500 MG tablet   Generic drug:  acetaminophen     100 tablet    Take 2 tablets (1,000 mg) by mouth 3 times daily       amoxicillin 500 MG capsule    AMOXIL    16 capsule    4 tablets prior to dental appointment. Use for dental appointments       aspirin 81 MG tablet      Take 1 tablet by mouth daily.       atorvastatin 10 MG tablet    LIPITOR    90 tablet    Take 1 tablet (10 mg) by mouth daily Refill when requested       B-12 1000 MCG Tbcr     100 tablet    Take 1,000 mcg by mouth daily       blood glucose monitoring lancets     100 Box    Check blood sugar once weekly       blood glucose monitoring test strip    ACCU-CHEK COMPACT STRIPS    100 each    1 strip by In Vitro route daily       carbidopa-levodopa  MG Tbcr      Take  by mouth. 4 times daily  .       cholecalciferol 1000 UNIT tablet    vitamin D    100    1 TABLET DAILY       diclofenac 1 % Gel topical gel    VOLTAREN    100 g    Apply 4 grams to knees or 2 grams to hands four times daily using enclosed dosing card.       dipyridamole 50 MG tablet    PERSANTINE    720 tablet    Take 2 tablets (100 mg) by mouth 4 times daily       doxycycline Monohydrate 100 MG Caps     60 capsule    1 tablet 2 time daily for Rosacea flares       fluticasone 50 MCG/ACT spray    FLONASE    16 g    Spray 1-2 sprays into both nostrils daily       FOLIC ACID PO      Take 1 mg by mouth daily        irbesartan 300 MG tablet    AVAPRO    90 tablet    Take 1 tablet (300 mg) by mouth At Bedtime       metroNIDAZOLE 0.75 % cream    METROCREAM    45 g    Apply topically 2 times daily       MULTI vitamin  MENS Tabs      Take  by mouth. Takes one daily       senna-docusate 8.6-50 MG per tablet    SENOKOT-S;PERICOLACE    60 tablet    Take 1-2 tablets by mouth 2 times daily.       sertraline 25 MG tablet    ZOLOFT    90 tablet    Take 1 tablet (25 mg) by mouth daily       Thiamine HCl 50 MG Caps     100 capsule    Take 1 capsule by mouth daily       * Notice:  This list has 2 medication(s) that are the same as other medications prescribed for you. Read the directions carefully, and ask your doctor or other care provider to review them with you.

## 2017-03-16 NOTE — NURSING NOTE
Chief Complaint   Patient presents with     Follow Up For     ILR check,ortho b/p, annual FU (3/31/16)- hx OH, parkinsons- ILR changed 7/12/16

## 2017-03-16 NOTE — TELEPHONE ENCOUNTER
Given his hx of retinal ischemia it would be best if he could continue his low dose aspirin. We could stop the dipyridamole 3 days before the procedure.     Please call and review with the dentist.

## 2017-03-16 NOTE — PATIENT INSTRUCTIONS
It was a pleasure to see you in clinic today.  Please do not hesitate to call us if you have any questions or concerns.  Please return to clinic in 6 mos for a loop recorder check.    Next remote check from home 6/19/17    Kelley Horton R.N.  Electrophysiology nurse specialist  [facility]  [address] [facility] [address]     Portia Yee  [phone] business hours    After business hours please call [phone], option #4, and ask for Job code [id number].

## 2017-04-12 ENCOUNTER — TRANSFERRED RECORDS (OUTPATIENT)
Dept: HEALTH INFORMATION MANAGEMENT | Facility: CLINIC | Age: 82
End: 2017-04-12

## 2017-04-25 ENCOUNTER — OFFICE VISIT (OUTPATIENT)
Dept: FAMILY MEDICINE | Facility: CLINIC | Age: 82
End: 2017-04-25
Payer: COMMERCIAL

## 2017-04-25 VITALS
HEIGHT: 70 IN | HEART RATE: 88 BPM | BODY MASS INDEX: 30.06 KG/M2 | DIASTOLIC BLOOD PRESSURE: 60 MMHG | WEIGHT: 210 LBS | SYSTOLIC BLOOD PRESSURE: 124 MMHG | TEMPERATURE: 98.7 F

## 2017-04-25 DIAGNOSIS — J31.0 CHRONIC RHINITIS: ICD-10-CM

## 2017-04-25 DIAGNOSIS — E11.39 TYPE 2 DIABETES MELLITUS WITH OTHER DIABETIC OPHTHALMIC COMPLICATION (H): Primary | ICD-10-CM

## 2017-04-25 DIAGNOSIS — F34.1 DYSTHYMIA: ICD-10-CM

## 2017-04-25 DIAGNOSIS — M62.838 MUSCLE SPASMS OF BOTH LOWER EXTREMITIES: ICD-10-CM

## 2017-04-25 DIAGNOSIS — I10 HYPERTENSION GOAL BP (BLOOD PRESSURE) < 150/90: ICD-10-CM

## 2017-04-25 DIAGNOSIS — Z13.89 SCREENING FOR DIABETIC PERIPHERAL NEUROPATHY: ICD-10-CM

## 2017-04-25 DIAGNOSIS — Z13.5 SCREENING FOR DIABETIC RETINOPATHY: ICD-10-CM

## 2017-04-25 DIAGNOSIS — M62.838 NECK MUSCLE SPASM: ICD-10-CM

## 2017-04-25 DIAGNOSIS — H35.82 RETINAL ISCHEMIA: ICD-10-CM

## 2017-04-25 DIAGNOSIS — G20.A1 PARKINSON DISEASE (H): ICD-10-CM

## 2017-04-25 DIAGNOSIS — L71.9 ROSACEA: ICD-10-CM

## 2017-04-25 LAB — HBA1C MFR BLD: 6 % (ref 4.3–6)

## 2017-04-25 PROCEDURE — 82043 UR ALBUMIN QUANTITATIVE: CPT | Performed by: FAMILY MEDICINE

## 2017-04-25 PROCEDURE — 83036 HEMOGLOBIN GLYCOSYLATED A1C: CPT | Performed by: FAMILY MEDICINE

## 2017-04-25 PROCEDURE — 99207 C FOOT EXAM  NO CHARGE: CPT | Performed by: FAMILY MEDICINE

## 2017-04-25 PROCEDURE — 36415 COLL VENOUS BLD VENIPUNCTURE: CPT | Performed by: FAMILY MEDICINE

## 2017-04-25 PROCEDURE — 99214 OFFICE O/P EST MOD 30 MIN: CPT | Performed by: FAMILY MEDICINE

## 2017-04-25 RX ORDER — DIPYRIDAMOLE 50 MG
100 TABLET ORAL 4 TIMES DAILY
Qty: 720 TABLET | Refills: 3 | Status: SHIPPED | OUTPATIENT
Start: 2017-04-25 | End: 2018-06-05

## 2017-04-25 RX ORDER — SERTRALINE HYDROCHLORIDE 25 MG/1
25 TABLET, FILM COATED ORAL DAILY
Qty: 90 TABLET | Refills: 3 | Status: SHIPPED | OUTPATIENT
Start: 2017-04-25 | End: 2018-06-05

## 2017-04-25 RX ORDER — FLUTICASONE PROPIONATE 50 MCG
1-2 SPRAY, SUSPENSION (ML) NASAL DAILY
Qty: 16 G | Refills: 5 | Status: SHIPPED | OUTPATIENT
Start: 2017-04-25 | End: 2017-04-27

## 2017-04-25 RX ORDER — DOXYCYCLINE 100 MG/1
CAPSULE ORAL
Qty: 60 CAPSULE | Refills: 3 | Status: SHIPPED | OUTPATIENT
Start: 2017-04-25 | End: 2019-03-25

## 2017-04-25 RX ORDER — IRBESARTAN 300 MG/1
300 TABLET ORAL AT BEDTIME
Qty: 90 TABLET | Refills: 3 | Status: SHIPPED | OUTPATIENT
Start: 2017-04-25 | End: 2018-03-15

## 2017-04-25 NOTE — LETTER
[facility]  [address] [place] [id number] [phone]      April 28, 2017      Bart Blair  [address] [place] [id number]              Dear Bart,    The results of your recent lab tests were within normal limits. Enclosed is a copy of these results.  If you have any further questions or problems, please contact our office.       Sincerely,    Chuy Stewart MD/sd    Results for orders placed or performed in visit on 04/25/17   Hemoglobin A1c   Result Value Ref Range    Hemoglobin A1C 6.0 4.3 - 6.0 %   Albumin Random Urine Quantitative   Result Value Ref Range    Creatinine Urine 207 mg/dL    Albumin Urine mg/L 9 mg/L    Albumin Urine mg/g Cr 4.50 0 - 17 mg/g Cr

## 2017-04-25 NOTE — PROGRESS NOTES
SUBJECTIVE:                                                    Bart Blair is a 86 year old male who presents to clinic today for the following health issues:    Diabetes. Denies symptoms of hypoglycemia.   Lab Results   Component Value Date    A1C 6.0 04/25/2017    A1C 6.3 11/22/2016    A1C 6.4 03/30/2016    A1C 6.3 10/22/2015    A1C 6.1 02/17/2015       Cardiology. Reviewed notes from cardiology dated 3/16. Patient is clinically stable.     Knee pain. Patient reports constant knee pain bilaterally. He notes worsening pain when climbing a flight of stairs, specially when going down to the basement to do his laundry. He states he can't get a knee injection in account of his parkinson's.     Headache. He endorses right temporal headaches when he is on his left lateral recumbent position. He states he has been having these issues ever since he fell and sustained a laceration to his left occipital head.     Eye exam. He sees ophthalmologist on a yearly interval.     Past/recent records reviewed and discussed for -- medications and immunizations.      Diabetes Follow-up      Patient is checking blood sugars: once a week    Diabetic concerns: None     Symptoms of hypoglycemia (low blood sugar): none     Paresthesias (numbness or burning in feet) or sores: Yes     Date of last diabetic eye exam: patient does not remember     Hyperlipidemia Follow-Up      Rate your low fat/cholesterol diet?: not monitoring fat    Taking statin?  Yes, no muscle aches from statin    Other lipid medications/supplements?:  none     Hypertension Follow-up      Outpatient blood pressures are not being checked.    Low Salt Diet: not monitoring salt       Amount of exercise or physical activity: none    Problems taking medications regularly: No    Medication side effects: none    Diet: regular (no restrictions)          Problem list and histories reviewed & adjusted, as indicated.  Additional history: as documented    Labs reviewed in  "EPIC    Reviewed and updated as needed this visit by clinical staff       Reviewed and updated as needed this visit by Provider         ROS:  Constitutional, HEENT, cardiovascular, pulmonary, GI, , musculoskeletal, neuro, skin, endocrine and psych systems are negative, except as otherwise noted.    This document serves as a record of the services and decisions personally performed and made by Quentin Stewart MD. It was created on their behalf by Sami Pelaez, a trained medical scribe. The creation of this document is based the provider's statements to the medical scribe.  Sami Pelaez April 25, 2017 11:30 AM         OBJECTIVE:                                                    /60 (BP Location: Right arm, Cuff Size: Adult Large)  Pulse 88  Temp 98.7  F (37.1  C) (Oral)  Ht 1.765 m (5' 9.5\")  Wt 95.3 kg (210 lb)  BMI 30.57 kg/m2  Body mass index is 30.57 kg/(m^2).  GENERAL: healthy, alert and no distress  HENT: ear canals and TM's normal, nose and mouth without ulcers or lesions  NECK: no adenopathy, no asymmetry, masses, or scars and thyroid normal to palpation  RESP: lungs clear to auscultation - no rales, rhonchi or wheezes  CV: regular rate and rhythm, normal S1 S2, no S3 or S4, no murmur, click or rub, no peripheral edema  MS: no gross musculoskeletal defects noted, no edema -- left perispinal muscle tenderness, tenderness in knee joint space bilaterally consistent with arthritis, no effusion.   SKIN: no suspicious lesions or rashes -- scar on left occipital lobe  NEURO: speech normal, difficulty with balance  PSYCH: mentation appears normal, affect normal/bright  Foot exam: no lesions or ulcerations, 1+ DP pulses.     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  Plan: Hemoglobin A1c, Albumin Random Urine         " Quantitative        -Ok to stop checking blood sugars regularly        -continue present medications        -Follow up in 4-6 weeks    (Z13.5) Screening for diabetic retinopathy  Comment: sees ophthalmology on a yearly interval  Plan:     (Z13.89) Screening for diabetic peripheral neuropathy  Comment: foot exam was normal  Plan: FOOT EXAM  NO CHARGE [68861.114]        -monitor for acute changes    (I10) Hypertension goal BP (blood pressure) < 150/90  Comment: /60. Controlled  Plan: irbesartan (AVAPRO) 300 MG tablet        -continue present medications, medications refilled    (H35.82) Retinal ischemia  Comment: sees ophthalmologist on a yearly interval  Plan: dipyridamole (PERSANTINE) 50 MG tablet        -Continue present medications, medications refilled    (F34.1) Dysthymia  Comment: stable  Plan: sertraline (ZOLOFT) 25 MG tablet        -continue present medications, medications refilled    (J31.0) Chronic rhinitis  Comment: stable  Plan: fluticasone (FLONASE) 50 MCG/ACT spray        -continue present medications, medications refilled    (L71.9) Rosacea  Comment: stable  Plan: doxycycline Monohydrate 100 MG CAPS        -continue present medications, medications refilled    (M62.838) Neck muscle spasm  Comment: left perispinal muscle tenderness noted. I told him that he can try rolling a warm towel behind his neck to help relax the muscle. After further discussion, he is amendable to seeing a physical therapist for this. Likewise, physical therapy would also help with the arthritic changes in his knees bilaterally and parkinson's.   Plan: [facility], [facility] REFERRAL        -Referral number for PT was provided    (M62.838) Muscle spasms of both lower extremities  Comment: see comments above  Plan: [facility], [facility] CHIROPRACTIC REFERRAL        -see plans above    (G20) Parkinson disease (H)  Comment: see comments above  Plan: [facility], [facility] CHIROPRACTIC REFERRAL        See plan  above      Patient Instructions     MY DIABETES TODAY:    1)  Goal A1C is under <7.0  Mine is:      Lab Results   Component Value Date    A1C 6.0 04/25/2017       2)  Goal LDL (bad cholesterol) under 100  (measured at least yearly)- I am currently at:   Lab Results   Component Value Date    LDL 37 11/18/2014       3)  Goal blood pressure under 130/80- mine was 124/60 today    4)  Take aspirin daily yes    5)  No tobacco use          ACTION PLAN CHANGES FROM TODAY:    Care Plan changes:    -you can roll a warm towel behind your neck to help muscles relax.   -ok to stop checking your blood sugars.   -continue present medications    Labs:     Lab Results   Component Value Date    A1C 6.0 04/25/2017    A1C 6.3 11/22/2016    A1C 6.4 03/30/2016    A1C 6.3 10/22/2015    A1C 6.1 02/17/2015         Follow up in-3 months    Your provider has referred you to: [facility], [phone]    refilled medications today    Check to see if  You have had the Shingles vaccine. You can get it at any pharmacy including ours                  The information in this document, created by the medical scribe for me, accurately reflects the services I personally performed and the decisions made by me. I have reviewed and approved this document for accuracy prior to leaving the patient care area.      Chuy Stewart MD, MD  [facility]

## 2017-04-25 NOTE — PATIENT INSTRUCTIONS
MY DIABETES TODAY:    1)  Goal A1C is under <7.0  Mine is:      Lab Results   Component Value Date    A1C 6.0 04/25/2017       2)  Goal LDL (bad cholesterol) under 100  (measured at least yearly)- I am currently at:   Lab Results   Component Value Date    LDL 37 11/18/2014       3)  Goal blood pressure under 130/80- mine was 124/60 today    4)  Take aspirin daily yes    5)  No tobacco use          ACTION PLAN CHANGES FROM TODAY:    Care Plan changes:    -you can roll a warm towel behind your neck to help muscles relax.   -ok to stop checking your blood sugars.   -continue present medications    Labs:     Lab Results   Component Value Date    A1C 6.0 04/25/2017    A1C 6.3 11/22/2016    A1C 6.4 03/30/2016    A1C 6.3 10/22/2015    A1C 6.1 02/17/2015         Follow up in-3 months    Your provider has referred you to: [facility], [phone]    refilled medications today    Check to see if  You have had the Shingles vaccine. You can get it at any pharmacy including ours

## 2017-04-25 NOTE — NURSING NOTE
"No chief complaint on file.      Initial /60 (BP Location: Right arm, Cuff Size: Adult Large)  Pulse 88  Temp 98.7  F (37.1  C) (Oral)  Ht 5' 9.5\" (1.765 m)  Wt 210 lb (95.3 kg)  BMI 30.57 kg/m2 Estimated body mass index is 30.57 kg/(m^2) as calculated from the following:    Height as of this encounter: 5' 9.5\" (1.765 m).    Weight as of this encounter: 210 lb (95.3 kg).  Medication Reconciliation: complete     Martha Balderas MA     "

## 2017-04-25 NOTE — MR AVS SNAPSHOT
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)    -  1    Screening for diabetic retinopathy        Screening for diabetic peripheral neuropathy        Hypertension goal BP (blood pressure) < 150/90        Retinal ischemia        Dysthymia        Chronic rhinitis        Rosacea        Neck muscle spasm        Muscle spasms of both lower extremities        Parkinson disease (H)          Care Instructions    MY DIABETES TODAY:    1)  Goal A1C is under <7.0  Mine is:      Lab Results   Component Value Date    A1C 6.0 04/25/2017       2)  Goal LDL (bad cholesterol) under 100  (measured at least yearly)- I am currently at:   Lab Results   Component Value Date    LDL 37 11/18/2014       3)  Goal blood pressure under 130/80- mine was 124/60 today    4)  Take aspirin daily yes    5)  No tobacco use          ACTION PLAN CHANGES FROM TODAY:    Care Plan changes:    -you can roll a warm towel behind your neck to help muscles relax.   -ok to stop checking your blood sugars.   -continue present medications    Labs:     Lab Results   Component Value Date    A1C 6.0 04/25/2017    A1C 6.3 11/22/2016    A1C 6.4 03/30/2016    A1C 6.3 10/22/2015    A1C 6.1 02/17/2015         Follow up in-3 months    Your provider has referred you to: [facility], [phone]    refilled medications today    Check to see if  You have had the Shingles vaccine. You can get it at any pharmacy including ours                  Follow-ups after your visit        Additional Services     [facility] PT, HAND, AND CHIROPRACTIC REFERRAL       **This order will print in the [facility] Scheduling Office**    Physical Therapy, Hand  Therapy and Chiropractic Care are available through:    *[facility]  *[facility]  *[facility]    Call one number to schedule at any of the above locations: [phone].    Your provider has referred you to: Physical Therapy at [facility] or [facility]    Indication/Reason for Referral: neck pain and leg pain  Onset of Illness: chronic, hx of parkinsons . History of fall with strain to neck 1 year ago  Therapy Orders: Evaluate and Treat  Special Programs: None  Special Request: None    Amber Clark      Additional Comments for the Therapist or Chiropractor: Parkinson's , work on stretching     Please be aware that coverage of these services is subject to the terms and limitations of your health insurance plan.  Call member services at your health plan with any benefit or coverage questions.      Please bring the following to your appointment:    *Your personal calendar for scheduling future appointments  *Comfortable clothing                  Your next 10 appointments already scheduled     Sep 13, 2017 10:00 AM CDT   (Arrive by 9:45 AM)   Implantable Loop Recorder with Uc Cv Device 1   [facility]    [address] [place] [address] [phone]              Who to contact     If you have questions or need follow up information about today's clinic visit or your schedule please contact [facility] directly at [phone].  Normal or non-critical lab and imaging results will be communicated to you by MyChart, letter or phone within 4 business days after the clinic has received the results. If you do not hear from us within 7 days, please contact the clinic through MyChart or phone. If you have a critical or abnormal lab result, we will notify you by phone as soon as possible.  Submit refill requests through [employer] or call your pharmacy and they will forward the refill request to  "us. Please allow 3 business days for your refill to be completed.          Additional Information About Your Visit        [employer] Information     [employer] gives you secure access to your electronic health record. If you see a primary care provider, you can also send messages to your care team and make appointments. If you have questions, please call your primary care clinic.  If you do not have a primary care provider, please call [phone] and they will assist you.        Care EveryWhere ID     This is your Care EveryWhere ID. This could be used by other organizations to access your [place] medical records  [id number]        Your Vitals Were     Pulse Temperature Height BMI (Body Mass Index)          88 98.7  F (37.1  C) (Oral) 5' 9.5\" (1.765 m) 30.57 kg/m2         Blood Pressure from Last 3 Encounters:   04/25/17 124/60   03/16/17 125/57   11/22/16 108/50    Weight from Last 3 Encounters:   04/25/17 210 lb (95.3 kg)   03/16/17 217 lb (98.4 kg)   11/22/16 213 lb (96.6 kg)              We Performed the Following     Albumin Random Urine Quantitative     FOOT EXAM  NO CHARGE [[id number]]     Hemoglobin A1c     [facility] PT, HAND, AND CHIROPRACTIC REFERRAL          Today's Medication Changes          These changes are accurate as of: 4/25/17 11:40 AM.  If you have any questions, ask your nurse or doctor.               These medicines have changed or have updated prescriptions.        Dose/Directions    senna-docusate 8.6-50 MG per tablet   Commonly known as:  SENOKOT-S;PERICOLACE   This may have changed:    - when to take this  - additional instructions   Used for:  S/P total knee replacement        Dose:  1-2 tablet   Take 1-2 tablets by mouth 2 times daily.   Quantity:  60 tablet   Refills:  1            Where to get your medicines      These medications were sent to [facility] - [place], [place] - [address]   [address] [facility] [address], [place] [address]     Phone:  [phone]    "  dipyridamole 50 MG tablet    doxycycline Monohydrate 100 MG Caps    fluticasone 50 MCG/ACT spray    irbesartan 300 MG tablet    sertraline 25 MG tablet                Primary Care Provider Office Phone # Fax #    Chuy Juan A Stewart [phone]       [facility] [address] [facility] [address]        Thank you!     Thank you for choosing [facility]  for your care. Our goal is always to provide you with excellent care. Hearing back from our patients is one way we can continue to improve our services. Please take a few minutes to complete the written survey that you may receive in the mail after your visit with us. Thank you!             Your Updated Medication List - Protect others around you: Learn how to safely use, store and throw away your medicines at www.disposemymeds.org.          This list is accurate as of: 4/25/17 11:40 AM.  Always use your most recent med list.                   Brand Name Dispense Instructions for use    * ACETAMINOPHEN 8 HOUR 650 MG 8 hour tablet   Generic drug:  acetaminophen     100 tablet    Take 650 mg by mouth every 4 hours as needed.       * TYLENOL 500 MG tablet   Generic drug:  acetaminophen     100 tablet    Take 2 tablets (1,000 mg) by mouth 3 times daily       amoxicillin 500 MG capsule    AMOXIL    16 capsule    4 tablets prior to dental appointment. Use for dental appointments       aspirin 81 MG tablet      Take 1 tablet by mouth daily.       atorvastatin 10 MG tablet    LIPITOR    90 tablet    Take 1 tablet (10 mg) by mouth daily Refill when requested       B-12 1000 MCG Tbcr     100 tablet    Take 1,000 mcg by mouth daily       blood glucose monitoring lancets     100 Box    Check blood sugar once weekly       blood glucose monitoring test strip    ACCU-CHEK COMPACT STRIPS    100 each    1 strip by In Vitro route daily       carbidopa-levodopa  MG Tbcr      Take  by mouth. 4 times daily  .        cholecalciferol 1000 UNIT tablet    vitamin D    100    1 TABLET DAILY       diclofenac 1 % Gel topical gel    VOLTAREN    100 g    Apply 4 grams to knees or 2 grams to hands four times daily using enclosed dosing card.       dipyridamole 50 MG tablet    PERSANTINE    720 tablet    Take 2 tablets (100 mg) by mouth 4 times daily       doxycycline Monohydrate 100 MG Caps     60 capsule    1 tablet 2 time daily for Rosacea flares       fluticasone 50 MCG/ACT spray    FLONASE    16 g    Spray 1-2 sprays into both nostrils daily       FOLIC ACID PO      Take 1 mg by mouth daily       irbesartan 300 MG tablet    AVAPRO    90 tablet    Take 1 tablet (300 mg) by mouth At Bedtime       metroNIDAZOLE 0.75 % cream    METROCREAM    45 g    Apply topically 2 times daily       MULTI vitamin  MENS Tabs      Take  by mouth. Takes one daily       senna-docusate 8.6-50 MG per tablet    SENOKOT-S;PERICOLACE    60 tablet    Take 1-2 tablets by mouth 2 times daily.       sertraline 25 MG tablet    ZOLOFT    90 tablet    Take 1 tablet (25 mg) by mouth daily       Thiamine HCl 50 MG Caps     100 capsule    Take 1 capsule by mouth daily       * Notice:  This list has 2 medication(s) that are the same as other medications prescribed for you. Read the directions carefully, and ask your doctor or other care provider to review them with you.

## 2017-04-26 LAB
CREAT UR-MCNC: 207 MG/DL
MICROALBUMIN UR-MCNC: 9 MG/L
MICROALBUMIN/CREAT UR: 4.5 MG/G CR (ref 0–17)

## 2017-04-27 DIAGNOSIS — J31.0 CHRONIC RHINITIS: ICD-10-CM

## 2017-04-27 NOTE — TELEPHONE ENCOUNTER
Medication Detail      Disp Refills Start End CHRISTIAN   fluticasone (FLONASE) 50 MCG/ACT spray 16 g 5 4/25/2017  No   Sig: Spray 1-2 sprays into both nostrils daily   Class: E-Prescribe   Route: Both Nostrils   Order: [id number]         Last Office Visit with G, P or [facility] prescribing provider:  4/24/2017   Future Office Visit:       Date of Last Asthma Action Plan Letter:   There are no preventive care reminders to display for this patient.   Asthma Control Test: No flowsheet data found.    Date of Last Spirometry Test:   No results found for this or any previous visit.    Patient is requesting 90 day supply

## 2017-05-01 RX ORDER — FLUTICASONE PROPIONATE 50 MCG
1-2 SPRAY, SUSPENSION (ML) NASAL DAILY
Qty: 16 G | Refills: 5 | Status: SHIPPED | OUTPATIENT
Start: 2017-05-01 | End: 2019-05-07

## 2017-05-01 NOTE — TELEPHONE ENCOUNTER
Prescription approved per [facility] Refill Protocol.     Kayleen Izaguirre RN

## 2017-05-03 ENCOUNTER — THERAPY VISIT (OUTPATIENT)
Dept: PHYSICAL THERAPY | Facility: CLINIC | Age: 82
End: 2017-05-03
Payer: COMMERCIAL

## 2017-05-03 DIAGNOSIS — M54.2 NECK PAIN: Primary | ICD-10-CM

## 2017-05-03 PROCEDURE — 97110 THERAPEUTIC EXERCISES: CPT | Mod: GP | Performed by: PHYSICAL THERAPIST

## 2017-05-03 PROCEDURE — 97161 PT EVAL LOW COMPLEX 20 MIN: CPT | Mod: GP | Performed by: PHYSICAL THERAPIST

## 2017-05-03 NOTE — MR AVS SNAPSHOT
After Visit Summary   5/3/2017    Bart Blair    MRN: [id number]           Patient Information     Date Of Birth          2/14/1931        Visit Information        Provider Department      5/3/2017 12:40 PM Cesar Felix, PT [facility]        Today's Diagnoses     Neck pain    -  1       Follow-ups after your visit        Your next 10 appointments already scheduled     Sep 13, 2017 10:00 AM CDT   (Arrive by 9:45 AM)   Implantable Loop Recorder with Uc Cv Device 1   [facility] ([facility])    [address] [facility]  [address] [facility] [address] [phone]            Oct 24, 2017 11:00 AM CDT   SHORT with Chuy Stewart MD   [facility]    [address] [facility] [address] [phone]              Who to contact     If you have questions or need follow up information about today's clinic visit or your schedule please contact [facility]THLETIC [facility] PHYSICAL [facility] directly at [phone].  Normal or non-critical lab and imaging results will be communicated to you by [employer], letter or phone within 4 business days after the clinic has received the results. If you do not hear from us within 7 days, please contact the clinic through [employer] or phone. If you have a critical or abnormal lab result, we will notify you by phone as soon as possible.  Submit refill requests through [employer] or call your pharmacy and they will forward the refill request to us. Please allow 3 business days for your refill to be completed.          Additional Information About Your Visit        [employer] Information     [employer] gives you secure access to your electronic health record. If you see a primary care provider, you can also send messages to your care team and make appointments. If you have questions, please call your primary care clinic.   If you do not have a primary care provider, please call [phone] and they will assist you.        Care EveryWhere ID     This is your Care EveryWhere ID. This could be used by other organizations to access your [place] medical records  [id number]         Blood Pressure from Last 3 Encounters:   04/25/17 124/60   03/16/17 125/57   11/22/16 108/50    Weight from Last 3 Encounters:   04/25/17 95.3 kg (210 lb)   03/16/17 98.4 kg (217 lb)   11/22/16 96.6 kg (213 lb)              We Performed the Following     HC PT EVAL, LOW COMPLEXITY     [facility] INITIAL EVAL REPORT     THERAPEUTIC EXERCISES          Today's Medication Changes          These changes are accurate as of: 5/3/17 11:59 PM.  If you have any questions, ask your nurse or doctor.               These medicines have changed or have updated prescriptions.        Dose/Directions    senna-docusate 8.6-50 MG per tablet   Commonly known as:  SENOKOT-S;PERICOLACE   This may have changed:    - when to take this  - additional instructions   Used for:  S/P total knee replacement        Dose:  1-2 tablet   Take 1-2 tablets by mouth 2 times daily.   Quantity:  60 tablet   Refills:  1                Primary Care Provider Office Phone # Fax #    Chuy Stewart [phone]       [address] [address]        Thank you!     Thank you for [facility] PH[facility]  for your care. Our goal is always to provide you with excellent care. Hearing back from our patients is one way we can continue to improve our services. Please take a few minutes to complete the written survey that you may receive in the mail after your visit with us. Thank you!             Your Updated Medication List - Protect others around you: Learn how to safely use, store and throw away your medicines at www.disposemymeds.org.          This list is accurate as of: 5/3/17 11:59 PM.  Always use  your most recent med list.                   Brand Name Dispense Instructions for use    * ACETAMINOPHEN 8 HOUR 650 MG 8 hour tablet   Generic drug:  acetaminophen     100 tablet    Take 650 mg by mouth every 4 hours as needed.       * TYLENOL 500 MG tablet   Generic drug:  acetaminophen     100 tablet    Take 2 tablets (1,000 mg) by mouth 3 times daily       amoxicillin 500 MG capsule    AMOXIL    16 capsule    4 tablets prior to dental appointment. Use for dental appointments       aspirin 81 MG tablet      Take 1 tablet by mouth daily.       atorvastatin 10 MG tablet    LIPITOR    90 tablet    Take 1 tablet (10 mg) by mouth daily Refill when requested       B-12 1000 MCG Tbcr     100 tablet    Take 1,000 mcg by mouth daily       blood glucose monitoring lancets     100 Box    Check blood sugar once weekly       blood glucose monitoring test strip    ACCU-CHEK COMPACT STRIPS    100 each    1 strip by In Vitro route daily       carbidopa-levodopa  MG Tbcr      Take  by mouth. 4 times daily  .       cholecalciferol 1000 UNIT tablet    vitamin D    100    1 TABLET DAILY       diclofenac 1 % Gel topical gel    VOLTAREN    100 g    Apply 4 grams to knees or 2 grams to hands four times daily using enclosed dosing card.       dipyridamole 50 MG tablet    PERSANTINE    720 tablet    Take 2 tablets (100 mg) by mouth 4 times daily       doxycycline Monohydrate 100 MG Caps     60 capsule    1 tablet 2 time daily for Rosacea flares       fluticasone 50 MCG/ACT spray    FLONASE    16 g    Spray 1-2 sprays into both nostrils daily       FOLIC ACID PO      Take 1 mg by mouth daily       irbesartan 300 MG tablet    AVAPRO    90 tablet    Take 1 tablet (300 mg) by mouth At Bedtime       metroNIDAZOLE 0.75 % cream    METROCREAM    45 g    Apply topically 2 times daily       MULTI vitamin  MENS Tabs      Take  by mouth. Takes one daily       senna-docusate 8.6-50 MG per tablet    SENOKOT-S;PERICOLACE    60 tablet    Take 1-2  tablets by mouth 2 times daily.       sertraline 25 MG tablet    ZOLOFT    90 tablet    Take 1 tablet (25 mg) by mouth daily       Thiamine HCl 50 MG Caps     100 capsule    Take 1 capsule by mouth daily       * Notice:  This list has 2 medication(s) that are the same as other medications prescribed for you. Read the directions carefully, and ask your doctor or other care provider to review them with you.

## 2017-05-03 NOTE — PROGRESS NOTES
Physical Therapy Initial Evaluation   5/3/17   Precautions/Restrictions/MD instructions:    Therapist Impression:   Pt is a 87 y/o male. Pt presents with neck and ROM deficits. These impairments limit their ability to rotate neck and sit for long periods of time. Skilled PT services necessary in order to reduce impairments and improve independent function.    Subjective:   Chief Complaint: L sided neck pain, insidious onset, 2 weeks ago   DOI/onset: 04/20/17 DOS:   Location: L sided of neck Quality: aching pain   Frequency: intermittent  Radiates: localized at L side of neck   Pain scale: Rest 0/10 Activity 7/10   Time of day: worse in AM, improves with movement Sleeping: n/a   Exacerbated by: no issues  Relieved by: stretches  Progression: improving   Previous Treatment: n/a Effect of prior treatment: n/a   PMH and/or surgical history:    Imaging:     Occupation:  Job duties:    Current HEP/exercise regimen:  Patient's goals:   Medications:   General health as reported by patient:   Return to MD:     Cervical Spine Examination:    Posture: FHP     Range of Motion:    AROM Pain   Flexion Mild loss -   Right Rotation 10% loss -   Left Rotation 25% loss +   Right Side Bending 35% loss -   Left Side Bending 35% loss +   Extension (with chin tuck) Mild loss -     Repeated motion: Decrease pain with repeated cervical retraction         Subjective:    HPI                    Objective:    System    Physical Exam    General     ROS    Assessment/Plan:      Patient is a 86 year old male with cervical complaints.    Patient has the following significant findings with corresponding treatment plan.                Diagnosis 1:  Neck pain  Pain -  manual therapy, splint/taping/bracing/orthotics, self management, education, directional preference exercise and home program  Decreased ROM/flexibility - manual therapy and therapeutic exercise  Decreased joint mobility - manual therapy and therapeutic exercise  Decreased function -  therapeutic activities  Impaired posture - neuro re-education    Therapy Evaluation Codes:   1) History comprised of:   Personal factors that impact the plan of care:      Age.    Comorbidity factors that impact the plan of care are:      High blood pressure, Implanted device and Parkinsons.     Medications impacting care: High blood pressure and Pain.  2) Examination of Body Systems comprised of:   Body structures and functions that impact the plan of care:      Cervical spine.   Activity limitations that impact the plan of care are:      Reading/Computer work and Sitting.  3) Clinical presentation characteristics are:   Stable/Uncomplicated.  4) Decision-Making    Low complexity using standardized patient assessment instrument and/or measureable assessment of functional outcome.  Cumulative Therapy Evaluation is: Low complexity.    Previous and current functional limitations:  (See Goal Flow Sheet for this information)    Short term and Long term goals: (See Goal Flow Sheet for this information)     Communication ability:  Patient appears to be able to clearly communicate and understand verbal and written communication and follow directions correctly.  Treatment Explanation - The following has been discussed with the patient:   RX ordered/plan of care  Anticipated outcomes  Possible risks and side effects  This patient would benefit from PT intervention to resume normal activities.   Rehab potential is good.    Frequency:  1 X week, once daily  Duration:  for 4 weeks  Discharge Plan:  Achieve all LTG.  Independent in home treatment program.  Reach maximal therapeutic benefit.    Please refer to the daily flowsheet for treatment today, total treatment time and time spent performing 1:1 timed codes.

## 2017-05-05 PROBLEM — M54.2 NECK PAIN: Status: ACTIVE | Noted: 2017-05-05

## 2017-05-05 PROBLEM — M54.2 CERVICALGIA: Status: ACTIVE | Noted: 2017-05-05

## 2017-05-31 PROBLEM — Z95.818 STATUS POST PLACEMENT OF IMPLANTABLE LOOP RECORDER: Status: ACTIVE | Noted: 2017-05-31

## 2017-07-28 ENCOUNTER — TELEPHONE (OUTPATIENT)
Dept: FAMILY MEDICINE | Facility: CLINIC | Age: 82
End: 2017-07-28

## 2017-07-28 NOTE — TELEPHONE ENCOUNTER
Reason for Call:  Other prescription    Detailed comments: Patient was prescribed Prednisone after seeing eye doctor by [facility].  Patient's wife wants PCP to be aware and to discuss lab results from [facility].    Phone Number Patient can be reached at: Home number on file [phone] (home) to speak with patient or wife, Malathi.    Best Time: Any    Can we leave a detailed message on this number? YES    Call taken on 7/28/2017 at 1:26 PM by Kan Vazquez

## 2017-07-28 NOTE — TELEPHONE ENCOUNTER
Patient went to eye doctor a week ago. His vision was getting blurry and lost vision in his eye. 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 Patient could lose his vision or have an aneurysm if not treated. He's taking 20 mg of prednisone in the morning and 20 mg in the afternoon prescribed by neurologist. They called patient with lab values-blood sugar was 190 and his blood pressure was high. He's been checking his blood sugars at home and they are WNL. I advised her that since he's taking prednisone that it can effect his blood sugar. I told her that he should continue to monitor his blood sugars and if he's having symptoms of high blood sugar or if it's very high and won't come down, he should be seen over the weeken. She states that his sed rate and potassium was high on the lab results as well. Neurology wants him to have a consultation with a surgeon so he can have a biopsy so they can confirm that they are treating the correct thing.     Wife is concerned about all that is going on. The neurologist told her/patient that they should consult with PCP. Wife/patient wants to consult with you. Would you like a phone visit or office visit? Would you like to use a same day? She is aware that PCP is out of office until next week.    Gaurav Mclaughlin RN

## 2017-08-01 NOTE — TELEPHONE ENCOUNTER
Wife wanted to let you know that patient is having biopsy done at [facility] tomorrow. Appointment scheduled for follow up.    Gaurav Mclaughlin RN

## 2017-08-01 NOTE — TELEPHONE ENCOUNTER
Patient is returning your call.  Would like to speak to nurse.    Loretta ESCOBAR  [place] Scheduling

## 2017-08-02 ENCOUNTER — ALLIED HEALTH/NURSE VISIT (OUTPATIENT)
Dept: CARDIOLOGY | Facility: CLINIC | Age: 82
End: 2017-08-02
Attending: INTERNAL MEDICINE
Payer: MEDICARE

## 2017-08-02 DIAGNOSIS — R55 SYNCOPE: Primary | ICD-10-CM

## 2017-08-02 PROCEDURE — 93298 REM INTERROG DEV EVAL SCRMS: CPT | Performed by: INTERNAL MEDICINE

## 2017-08-02 PROCEDURE — 93299 HC INTERROGATION DEVICE EVAL REMOTE, LOOP RECORDER: CPT | Mod: ZF

## 2017-08-02 NOTE — MR AVS SNAPSHOT
After Visit Summary   8/2/2017    Bart Blair    MRN: [id number]           Patient Information     Date Of Birth          2/14/1931        Visit Information        Provider Department      8/2/2017 6:00 AM UC ICD REMOTE [facility]        Today's Diagnoses     Syncope    -  1       Follow-ups after your visit        Your next 10 appointments already scheduled     Aug 08, 2017 12:40 PM CDT   [facility] with Chuy Stewart MD   [facility]    [address] [place] [id number]   [phone]            Sep 13, 2017 10:00 AM CDT   (Arrive by 9:45 AM)   Implantable Loop Recorder with  Cv Device 1   [facility] ([facility])    [address]  [address] [facility] [address] [phone]            Oct 24, 2017 11:00 AM CDT   SHORT with Chuy Stewart MD   [facility]    [address] [place] [id number] [phone]              Who to contact     If you have questions or need follow up information about today's clinic visit or your schedule please contact [facility] directly at [phone].  Normal or non-critical lab and imaging results will be communicated to you by [employer], letter or phone within 4 business days after the clinic has received the results. If you do not hear from us within 7 days, please contact the clinic through [employer] or phone. If you have a critical or abnormal lab result, we will notify you by phone as soon as possible.  Submit refill requests through [employer] or call your pharmacy and they will forward the refill request to us. Please allow 3 business days for your refill to be completed.          Additional Information About Your Visit        [employer] Information     [employer] gives you secure access to your electronic health record. If you see a primary care provider, you can also  send messages to your care team and make appointments. If you have questions, please call your primary care clinic.  If you do not have a primary care provider, please call [phone] and they will assist you.        Care EveryWhere ID     This is your Care EveryWhere ID. This could be used by other organizations to access your [facility] medical records  [id number]         Blood Pressure from Last 3 Encounters:   04/25/17 124/60   03/16/17 125/57   11/22/16 108/50    Weight from Last 3 Encounters:   04/25/17 95.3 kg (210 lb)   03/16/17 98.4 kg (217 lb)   11/22/16 96.6 kg (213 lb)              We Performed the Following     REMOTE LOOP/OPTIVOL INTERROGATION          Today's Medication Changes          These changes are accurate as of: 8/2/17 11:59 PM.  If you have any questions, ask your nurse or doctor.               These medicines have changed or have updated prescriptions.        Dose/Directions    senna-docusate 8.6-50 MG per tablet   Commonly known as:  SENOKOT-S;PERICOLACE   This may have changed:    - when to take this  - additional instructions   Used for:  S/P total knee replacement        Dose:  1-2 tablet   Take 1-2 tablets by mouth 2 times daily.   Quantity:  60 tablet   Refills:  1                Primary Care Provider Office Phone # Fax #    Chuy Stewart [phone]       [address] [address]        Equal Access to Services     TIFFANI ABDUL AH: Enma Gruber, waaxda luben, qaybta kaalmada suzy, jd barroso. So [facility] [phone].    ATENCIÓN: Si habla español, tiene a ellis disposición servicios gratuitos de asistencia lingüística. Llame al [phone].    We comply with applicable federal civil rights laws and [place] laws. We do not discriminate on the basis of race, color, national origin, age, disability sex, sexual orientation or gender identity.            Thank you!      Thank you for choosing [facility]  for your care. Our goal is always to provide you with excellent care. Hearing back from our patients is one way we can continue to improve our services. Please take a few minutes to complete the written survey that you may receive in the mail after your visit with us. Thank you!             Your Updated Medication List - Protect others around you: Learn how to safely use, store and throw away your medicines at www.disposemymeds.org.          This list is accurate as of: 8/2/17 11:59 PM.  Always use your most recent med list.                   Brand Name Dispense Instructions for use Diagnosis    * ACETAMINOPHEN 8 HOUR 650 MG 8 hour tablet   Generic drug:  acetaminophen     100 tablet    Take 650 mg by mouth every 4 hours as needed.    Post-op pain       * TYLENOL 500 MG tablet   Generic drug:  acetaminophen     100 tablet    Take 2 tablets (1,000 mg) by mouth 3 times daily        amoxicillin 500 MG capsule    AMOXIL    16 capsule    4 tablets prior to dental appointment. Use for dental appointments    Status post total left knee replacement       aspirin 81 MG tablet      Take 1 tablet by mouth daily.    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 4 times daily  .        cholecalciferol 1000 UNIT tablet    vitamin D    100    1 TABLET DAILY    Contusion of ribs       diclofenac 1 % Gel topical gel    VOLTAREN    100 g    Apply 4 grams to knees or 2 grams to hands four times daily using enclosed dosing card.    Arthritis, Myalgia and myositis       dipyridamole 50 MG tablet    PERSANTINE    720 tablet    Take 2 tablets (100 mg) by mouth 4 times daily    Retinal ischemia       doxycycline Monohydrate 100 MG Caps     60 capsule    1 tablet 2 time daily for Rosacea flares    Rosacea       fluticasone 50 MCG/ACT spray    FLONASE    16 g    Spray 1-2 sprays into both nostrils daily    Chronic rhinitis       FOLIC ACID PO      Take 1 mg by mouth daily        irbesartan 300 MG tablet    AVAPRO    90 tablet    Take 1 tablet (300 mg) by mouth At Bedtime    Hypertension goal BP (blood pressure) < 150/90       metroNIDAZOLE 0.75 % cream    METROCREAM    45 g    Apply topically 2 times daily    Rosacea       MULTI vitamin  MENS Tabs      Take  by mouth. Takes one daily        senna-docusate 8.6-50 MG per tablet    SENOKOT-S;PERICOLACE    60 tablet    Take 1-2 tablets by mouth 2 times daily.    S/P total knee replacement       sertraline 25 MG tablet    ZOLOFT    90 tablet    Take 1 tablet (25 mg) by mouth daily    Dysthymia       Thiamine HCl 50 MG Caps     100 capsule    Take 1 capsule by mouth daily    Alcoholism (H)       * Notice:  This list has 2 medication(s) that are the same as other medications prescribed for you. Read the directions carefully, and ask your doctor or other care provider to review them with you.

## 2017-08-03 NOTE — PROGRESS NOTES
Scheduled Medtronic Carelink remote ILR transmission received and reviewed. Device transmission sent per MD orders. His presenting rhythm is SR 78 bpm. Normal device function. No symptom activations have been recorded. No arrhythmias recorded. Battery status= Good. Pt notified of transmission results. Plan for pt to RTC in 3 months as scheduled.  Remote ILR transmission

## 2017-08-08 ENCOUNTER — OFFICE VISIT (OUTPATIENT)
Dept: FAMILY MEDICINE | Facility: CLINIC | Age: 82
End: 2017-08-08
Payer: COMMERCIAL

## 2017-08-08 VITALS
TEMPERATURE: 98.6 F | DIASTOLIC BLOOD PRESSURE: 72 MMHG | HEIGHT: 70 IN | HEART RATE: 72 BPM | SYSTOLIC BLOOD PRESSURE: 130 MMHG | WEIGHT: 204.38 LBS | BODY MASS INDEX: 29.26 KG/M2

## 2017-08-08 DIAGNOSIS — M31.6 TEMPORAL ARTERITIS (H): Primary | ICD-10-CM

## 2017-08-08 LAB — ERYTHROCYTE [SEDIMENTATION RATE] IN BLOOD BY WESTERGREN METHOD: 31 MM/H (ref 0–20)

## 2017-08-08 PROCEDURE — 36415 COLL VENOUS BLD VENIPUNCTURE: CPT | Performed by: FAMILY MEDICINE

## 2017-08-08 PROCEDURE — 85652 RBC SED RATE AUTOMATED: CPT | Performed by: FAMILY MEDICINE

## 2017-08-08 PROCEDURE — 99213 OFFICE O/P EST LOW 20 MIN: CPT | Performed by: FAMILY MEDICINE

## 2017-08-08 RX ORDER — PREDNISONE 20 MG/1
24.5 TABLET ORAL DAILY
COMMUNITY
End: 2018-05-29 | Stop reason: DRUGHIGH

## 2017-08-08 NOTE — NURSING NOTE
"Chief Complaint   Patient presents with     Hospital F/U       Initial There were no vitals taken for this visit. Estimated body mass index is 30.57 kg/(m^2) as calculated from the following:    Height as of 4/25/17: 5' 9.5\" (1.765 m).    Weight as of 4/25/17: 210 lb (95.3 kg).  Medication Reconciliation: complete   Enma North CMA      "

## 2017-08-08 NOTE — PROGRESS NOTES
SUBJECTIVE:                                                    Bart Blair is a 86 year old male who presents to clinic today for the following health issues:      ED/UC Followup:    Facility:  [facility]  Date of visit: 8/2/17  Reason for visit: blurry vision (biopsy of temporal artery)  Current Status: improved       Vision disturbances. Patient recalls particular episode of left eye blurry vision without complete loss of vision when he was watching TV a few months ago with recurrence of symptoms a couple of weeks later when he tried seeing board at [employer]. He does report mild tenderness to his left temple when he'd put pressure on it. No other neurological symptoms noted. Evaluation by ophthalmology was unremarkable, however he did have elevated ESR (128) on 7/21/2017, consequently started on prednisone. Had biopsy of temporal artery on 8/2/2017 which was negative, but still suspicious for arteritis. He was instructed to continue prednisone until visit with rheumatoligist on the 11th (this Friday)       Problem list and histories reviewed & adjusted, as indicated.  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)     Varicose vein     Neck pain     Implantable loop recorder, Medtronic LINQ     Past Surgical History:   Procedure Laterality Date     ARTHROPLASTY KNEE  1/31/2012    Procedure:ARTHROPLASTY KNEE; LEFT TOTAL KNEE ARTHROPLASTY (IRASEMA)^; Surgeon:MARV  SKIP LUCAS; Location:[facility] OR     ARTHROSCOPY SHOULDER, OPEN ROTATOR CUFF REPAIR, COMBINED  4/24/2012    Procedure:COMBINED ARTHROSCOPY SHOULDER, OPEN ROTATOR CUFF REPAIR; RIGHT SHOULDER ARTHROSCOPY OPEN ROTATOR CUFF DEBRIDEMENT, SUBCROMIAL DECOMPRESSION, AND BICEPS TENODESIS REPAIR; Surgeon:SKIP FAIR; Location:[facility]     CL AFF SURGICAL PATHOLOGY  6-    Dr. Bowers; left hand     ENT SURGERY       INCISE FINGER TENDON SHEATH  2008    Dr. Bowers; right AND LEFT hand     THROAT SURGERY      vocal cord polyps       Social History   Substance Use Topics     Smoking status: Never Smoker     Smokeless tobacco: Never Used     Alcohol use Yes      Comment: couple beers everyday     Family History   Problem Relation Age of Onset     Family History Negative Other      unknown family history per patient         Current Outpatient Prescriptions   Medication Sig Dispense Refill     predniSONE (DELTASONE) 20 MG tablet Take 20 mg by mouth 2 times daily       fluticasone (FLONASE) 50 MCG/ACT spray Spray 1-2 sprays into both nostrils daily 16 g 5     irbesartan (AVAPRO) 300 MG tablet Take 1 tablet (300 mg) by mouth At Bedtime 90 tablet 3     dipyridamole (PERSANTINE) 50 MG tablet Take 2 tablets (100 mg) by mouth 4 times daily 720 tablet 3     sertraline (ZOLOFT) 25 MG tablet Take 1 tablet (25 mg) by mouth daily 90 tablet 3     doxycycline Monohydrate 100 MG CAPS 1 tablet 2 time daily for Rosacea flares 60 capsule 3     blood glucose monitoring (ACCU-CHEK COMPACT STRIPS) test strip 1 strip by In Vitro route daily 100 each 11     atorvastatin (LIPITOR) 10 MG tablet Take 1 tablet (10 mg) by mouth daily Refill when requested 90 tablet 3     Cyanocobalamin (B-12) 1000 MCG TBCR Take 1,000 mcg by mouth daily 100 tablet 3     blood glucose monitoring (SOFTCLIX) lancets Check blood sugar once weekly 100 Box 0     Thiamine HCl 50 MG CAPS Take 1 capsule by mouth daily 100 capsule 3     diclofenac (VOLTAREN) 1 % GEL Apply 4 grams to knees  or 2 grams to hands four times daily using enclosed dosing card. 100 g 5     metroNIDAZOLE (METROCREAM) 0.75 % cream Apply topically 2 times daily 45 g 3     acetaminophen (TYLENOL) 500 MG tablet Take 2 tablets (1,000 mg) by mouth 3 times daily 100 tablet 0     FOLIC ACID PO Take 1 mg by mouth daily       acetaminophen 650 MG TABS Take 650 mg by mouth every 4 hours as needed. 100 tablet 0     Multiple Vitamin (MULTI VITAMIN  MENS) TABS Take  by mouth. Takes one daily         aspirin 81 MG tablet Take 1 tablet by mouth daily.  3     senna-docusate (SENOKOT-S;PERICOLACE) 8.6-50 MG per tablet Take 1-2 tablets by mouth 2 times daily. (Patient taking differently: Take 1-2 tablets by mouth Takes about 2-3 times weekly) 60 tablet 1     Carbidopa-Levodopa  MG TBCR Take  by mouth. 4 times daily  .        VITAMIN D 1000 UNIT OR TABS 1 TABLET DAILY 100 4     amoxicillin (AMOXIL) 500 MG capsule 4 tablets prior to dental appointment. Use for dental appointments 16 capsule 4     Allergies   Allergen Reactions     No Known Drug Allergies      Recent Labs   Lab Test  04/25/17   1044  11/22/16   0948  09/07/16   0755  03/30/16   1404  10/22/15   1532   11/18/14   1238   10/14/13   1508   06/25/13   0829   08/11/12   1102   03/01/12   1508   A1C  6.0  6.3*   --   6.4*  6.3*   < >  6.0   < >   --    < >   --    < >   --    < >   --    LDL   --    --    --    --    --    --   37   --    --    --   52   --    --    --   41   HDL   --    --    --    --    --    --   45   --    --    --   41   --    --    --   42   TRIG   --    --    --    --    --    --   171*   --    --    --   92   --    --    --   137   ALT   --    --    --    --    --    --   10   --   14   --    --    --   <6   --   16   CR   --    --   1.11   --   1.18   < >  1.31*   < >   --    --    --    < >  1.17   < >   --    GFRESTIMATED   --    --   63   --   59*   < >  52*   < >   --    --    --    < >  60*   < >   --    GFRESTBLACK   --    --   76   --   71   < >  63  "  < >   --    --    --    < >  72   < >   --    POTASSIUM   --    --   4.6   --   5.2   < >  4.5   < >   --    --    --    < >  3.9   < >   --    TSH   --   1.73   --    --    --    --   2.42   --    --    --    --    < >   --    --    --     < > = values in this interval not displayed.      BP Readings from Last 3 Encounters:   08/08/17 130/72   04/25/17 124/60   03/16/17 125/57    Wt Readings from Last 3 Encounters:   08/08/17 92.7 kg (204 lb 6 oz)   04/25/17 95.3 kg (210 lb)   03/16/17 98.4 kg (217 lb)                  Labs reviewed in EPIC          Reviewed and updated as needed this visit by clinical staff       Reviewed and updated as needed this visit by Provider         ROS:  Constitutional, HEENT, cardiovascular, pulmonary, GI, , musculoskeletal, neuro, skin, endocrine and psych systems are negative, except as otherwise noted.    This document serves as a record of the services and decisions personally performed and made by Quentin Stewart MD. It was created on their behalf by Sami Pelaez, a trained medical scribe. The creation of this document is based the provider's statements to the medical scribe.  Sami Pelaez August 8, 2017 1:33 PM         OBJECTIVE:   /72 (BP Location: Right arm, Cuff Size: Adult Large)  Pulse 72  Temp 98.6  F (37  C) (Oral)  Ht 1.765 m (5' 9.5\")  Wt 92.7 kg (204 lb 6 oz)  BMI 29.75 kg/m2  Body mass index is 29.75 kg/(m^2).  GENERAL: healthy, alert and no distress  HENT: ear canals and TM's normal, nose and mouth without ulcers or lesions  NECK: no adenopathy, no asymmetry, masses, or scars and thyroid normal to palpation  RESP: lungs clear to auscultation - no rales, rhonchi or wheezes  CV: regular rate and rhythm, normal S1 S2, no S3 or S4, no murmur, click or rub -- mild tenderness over his left temporal artery  NEURO: muscular rigidity, difficulty with balance  PSYCH: mentation appears normal, affect normal/bright    Diagnostic Test Results:  Results for orders placed or " performed in visit on 08/08/17 (from the past 24 hour(s))   ESR: Erythrocyte sedimentation rate   Result Value Ref Range    Sed Rate 31 (H) 0 - 20 mm/h         ASSESSMENT/PLAN:   (M31.6) Temporal arteritis (H)  (primary encounter diagnosis)  Comment: ESR has improved at 31 (previous at 128) and biopsy was negative, but still suspicious for arteritis. I asked them to send me a note when they see rheumatologist in three days. We may need to adjust medications for blood sugars if rheumatology elects to pursue long-term use of prednisone.      Plan: ESR: Erythrocyte sedimentation rate        -f/u with rheumatology on 08/11    The information in this document, created by the medical scribe for me, accurately reflects the services I personally performed and the decisions made by me. I have reviewed and approved this document for accuracy prior to leaving the patient care area.    Chuy Stewart MD, MD  [facility]

## 2017-08-08 NOTE — MR AVS SNAPSHOT
After Visit Summary   8/8/2017    Bart Blair    MRN: [id number]           Patient Information     Date Of Birth          2/14/1931        Visit Information        Provider Department      8/8/2017 12:40 PM Chuy Stewart MD [facility]        Today's Diagnoses     Temporal arteritis (H)    -  1       Follow-ups after your visit        Your next 10 appointments already scheduled     Oct 24, 2017 11:00 AM CDT   SHORT with Chuy Stewart MD   [facility] ([facility])    [address] [facility] [id number] [phone]            Nov 08, 2017 10:30 AM CST   Implantable Loop Recorder with Uc Cv Device 1   [facility] ([facility])    [address]  [address] [facility] [address] [phone]              Who to contact     If you have questions or need follow up information about today's clinic visit or your schedule please contact [facility] directly at [phone].  Normal or non-critical lab and imaging results will be communicated to you by MyChart, letter or phone within 4 business days after the clinic has received the results. If you do not hear from us within 7 days, please contact the clinic through MyChart or phone. If you have a critical or abnormal lab result, we will notify you by phone as soon as possible.  Submit refill requests through ChatID or call your pharmacy and they will forward the refill request to us. Please allow 3 business days for your refill to be completed.          Additional Information About Your Visit        MyChart Information     ChatID gives you secure access to your electronic health record. If you see a primary care provider, you can also send messages to your care team and make appointments. If you have questions, please call your primary care clinic.  If you do not have a primary care provider, please call  "[phone] and they will assist you.        Care EveryWhere ID     This is your Care EveryWhere ID. This could be used by other organizations to access your [place] medical records  [id number]        Your Vitals Were     Pulse Temperature Height BMI (Body Mass Index)          72 98.6  F (37  C) (Oral) 5' 9.5\" (1.765 m) 29.75 kg/m2         Blood Pressure from Last 3 Encounters:   08/08/17 130/72   04/25/17 124/60   03/16/17 125/57    Weight from Last 3 Encounters:   08/08/17 204 lb 6 oz (92.7 kg)   04/25/17 210 lb (95.3 kg)   03/16/17 217 lb (98.4 kg)              We Performed the Following     ESR: Erythrocyte sedimentation rate          Today's Medication Changes          These changes are accurate as of: 8/8/17  2:12 PM.  If you have any questions, ask your nurse or doctor.               These medicines have changed or have updated prescriptions.        Dose/Directions    senna-docusate 8.6-50 MG per tablet   Commonly known as:  SENOKOT-S;PERICOLACE   This may have changed:    - when to take this  - additional instructions   Used for:  S/P total knee replacement        Dose:  1-2 tablet   Take 1-2 tablets by mouth 2 times daily.   Quantity:  60 tablet   Refills:  1                Primary Care Provider Office Phone # Fax #    Chuy Juan A Stewart [phone]       [address] [address]        Equal Access to Services     TIFFANI ABDUL AH: Hadii emmie ramirez hadasho Soomaali, waaxda luqadaha, qaybta kaalmada adeegyada, waxay roderick lyles adecarrie barroso. So [facility] [phone].    ATENCIÓN: Si habla español, tiene a ellis disposición servicios gratuitos de asistencia lingüística. Llame al [phone].    We comply with applicable federal civil rights laws and [place] laws. We do not discriminate on the basis of race, color, national origin, age, disability sex, sexual orientation or gender identity.            Thank you!     Thank you for choosing " [facility]  for your care. Our goal is always to provide you with excellent care. Hearing back from our patients is one way we can continue to improve our services. Please take a few minutes to complete the written survey that you may receive in the mail after your visit with us. Thank you!             Your Updated Medication List - Protect others around you: Learn how to safely use, store and throw away your medicines at www.disposemymeds.org.          This list is accurate as of: 8/8/17  2:12 PM.  Always use your most recent med list.                   Brand Name Dispense Instructions for use Diagnosis    * ACETAMINOPHEN 8 HOUR 650 MG 8 hour tablet   Generic drug:  acetaminophen     100 tablet    Take 650 mg by mouth every 4 hours as needed.    Post-op pain       * TYLENOL 500 MG tablet   Generic drug:  acetaminophen     100 tablet    Take 2 tablets (1,000 mg) by mouth 3 times daily        amoxicillin 500 MG capsule    AMOXIL    16 capsule    4 tablets prior to dental appointment. Use for dental appointments    Status post total left knee replacement       aspirin 81 MG tablet      Take 1 tablet by mouth daily.    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 4 times  daily  .        cholecalciferol 1000 UNIT tablet    vitamin D    100    1 TABLET DAILY    Contusion of ribs       diclofenac 1 % Gel topical gel    VOLTAREN    100 g    Apply 4 grams to knees or 2 grams to hands four times daily using enclosed dosing card.    Arthritis, Myalgia and myositis       dipyridamole 50 MG tablet    PERSANTINE    720 tablet    Take 2 tablets (100 mg) by mouth 4 times daily    Retinal ischemia       doxycycline Monohydrate 100 MG Caps     60 capsule    1 tablet 2 time daily for Rosacea flares    Rosacea       fluticasone 50 MCG/ACT spray    FLONASE    16 g    Spray 1-2 sprays into both nostrils daily    Chronic rhinitis       FOLIC ACID PO      Take 1 mg by mouth daily        irbesartan 300 MG tablet    AVAPRO    90 tablet    Take 1 tablet (300 mg) by mouth At Bedtime    Hypertension goal BP (blood pressure) < 150/90       metroNIDAZOLE 0.75 % cream    METROCREAM    45 g    Apply topically 2 times daily    Rosacea       MULTI vitamin  MENS Tabs      Take  by mouth. Takes one daily        predniSONE 20 MG tablet    DELTASONE     Take 20 mg by mouth 2 times daily        senna-docusate 8.6-50 MG per tablet    SENOKOT-S;PERICOLACE    60 tablet    Take 1-2 tablets by mouth 2 times daily.    S/P total knee replacement       sertraline 25 MG tablet    ZOLOFT    90 tablet    Take 1 tablet (25 mg) by mouth daily    Dysthymia       Thiamine HCl 50 MG Caps     100 capsule    Take 1 capsule by mouth daily    Alcoholism (H)       * Notice:  This list has 2 medication(s) that are the same as other medications prescribed for you. Read the directions carefully, and ask your doctor or other care provider to review them with you.

## 2017-08-11 ENCOUNTER — TRANSFERRED RECORDS (OUTPATIENT)
Dept: HEALTH INFORMATION MANAGEMENT | Facility: CLINIC | Age: 82
End: 2017-08-11

## 2017-08-23 DIAGNOSIS — M19.90 ARTHRITIS: ICD-10-CM

## 2017-08-24 NOTE — TELEPHONE ENCOUNTER
Prescription approved per [facility] Refill Protocol.     Kayleen Izaguirre RN

## 2017-08-24 NOTE — TELEPHONE ENCOUNTER
diclofenac (VOLTAREN) 1 % GEL    5 ordered  Edit     Summary: Apply 4 grams to knees or 2 grams to hands four times daily using enclosed dosing card.  Disp-100 g, R-5  E-Prescribe   Start: 10/22/2015  Ord/Sold: 10/22/2015 (O)  Report  Taking:   Long-term:   [place] [facility] [place] - [address] [place] [address]  Med Dose History       Patient Sig: Apply 4 grams to knees or 2 grams to hands four times daily using enclosed dosing card.       Ordered on: 10/22/2015       Authorized by: HIRO MARQUEZ       Dispense: 100 g       Admin Instructions: Apply 4 grams to knees or 2 grams to hands four times daily using enclosed dosing card.          Last Office Visit with G, P or [facility] prescribing provider: 8-8-2017  Next 5 appointments (look out 90 days)     Oct 24, 2017 11:00 AM CDT   SHORT with Hiro Marquez MD   [facility] ([facility]    [address] [place] [id number] [phone]                   Creatinine   Date Value Ref Range Status   09/07/2016 1.11 0.66 - 1.25 mg/dL Final     Lab Results   Component Value Date    AST 20 11/18/2014     Lab Results   Component Value Date    ALT 10 11/18/2014     BP Readings from Last 3 Encounters:   08/08/17 130/72   04/25/17 124/60   03/16/17 125/57

## 2017-09-13 ENCOUNTER — DOCUMENTATION ONLY (OUTPATIENT)
Dept: OTHER | Facility: CLINIC | Age: 82
End: 2017-09-13

## 2017-09-13 PROBLEM — Z71.89 ADVANCED DIRECTIVES, COUNSELING/DISCUSSION: Chronic | Status: ACTIVE | Noted: 2017-09-13

## 2017-10-24 ENCOUNTER — OFFICE VISIT (OUTPATIENT)
Dept: FAMILY MEDICINE | Facility: CLINIC | Age: 82
End: 2017-10-24
Payer: COMMERCIAL

## 2017-10-24 VITALS
DIASTOLIC BLOOD PRESSURE: 58 MMHG | HEIGHT: 70 IN | WEIGHT: 208 LBS | SYSTOLIC BLOOD PRESSURE: 120 MMHG | BODY MASS INDEX: 29.78 KG/M2 | HEART RATE: 84 BPM | TEMPERATURE: 98.3 F

## 2017-10-24 DIAGNOSIS — M31.6 TEMPORAL ARTERITIS (H): ICD-10-CM

## 2017-10-24 DIAGNOSIS — G20.A1 PARKINSON DISEASE (H): ICD-10-CM

## 2017-10-24 DIAGNOSIS — Z96.652 STATUS POST TOTAL LEFT KNEE REPLACEMENT: ICD-10-CM

## 2017-10-24 DIAGNOSIS — Z23 NEED FOR PROPHYLACTIC VACCINATION AND INOCULATION AGAINST INFLUENZA: ICD-10-CM

## 2017-10-24 DIAGNOSIS — E11.39 TYPE 2 DIABETES MELLITUS WITH OTHER OPHTHALMIC COMPLICATION, WITHOUT LONG-TERM CURRENT USE OF INSULIN (H): Primary | ICD-10-CM

## 2017-10-24 DIAGNOSIS — E78.5 HYPERLIPIDEMIA LDL GOAL <100: ICD-10-CM

## 2017-10-24 LAB
ERYTHROCYTE [SEDIMENTATION RATE] IN BLOOD BY WESTERGREN METHOD: 28 MM/H (ref 0–20)
HBA1C MFR BLD: 6.6 % (ref 4.3–6)

## 2017-10-24 PROCEDURE — 80048 BASIC METABOLIC PNL TOTAL CA: CPT | Performed by: FAMILY MEDICINE

## 2017-10-24 PROCEDURE — G0008 ADMIN INFLUENZA VIRUS VAC: HCPCS | Performed by: FAMILY MEDICINE

## 2017-10-24 PROCEDURE — 90662 IIV NO PRSV INCREASED AG IM: CPT | Performed by: FAMILY MEDICINE

## 2017-10-24 PROCEDURE — 83036 HEMOGLOBIN GLYCOSYLATED A1C: CPT | Performed by: FAMILY MEDICINE

## 2017-10-24 PROCEDURE — 85652 RBC SED RATE AUTOMATED: CPT | Performed by: FAMILY MEDICINE

## 2017-10-24 PROCEDURE — 99214 OFFICE O/P EST MOD 30 MIN: CPT | Mod: 25 | Performed by: FAMILY MEDICINE

## 2017-10-24 PROCEDURE — 80061 LIPID PANEL: CPT | Performed by: FAMILY MEDICINE

## 2017-10-24 PROCEDURE — 36415 COLL VENOUS BLD VENIPUNCTURE: CPT | Performed by: FAMILY MEDICINE

## 2017-10-24 RX ORDER — AMOXICILLIN 500 MG/1
CAPSULE ORAL
Qty: 16 CAPSULE | Refills: 4 | Status: SHIPPED | OUTPATIENT
Start: 2017-10-24 | End: 2018-03-29

## 2017-10-24 RX ORDER — ATORVASTATIN CALCIUM 10 MG/1
10 TABLET, FILM COATED ORAL DAILY
Qty: 90 TABLET | Refills: 3 | Status: SHIPPED | OUTPATIENT
Start: 2017-10-24 | End: 2018-10-09

## 2017-10-24 NOTE — MR AVS SNAPSHOT
After Visit Summary   10/24/2017    Bart Blair    MRN: [id number]           Patient Information     Date Of Birth          2/14/1931        Visit Information        Provider Department      10/24/2017 11:00 AM Chuy Stewart MD [facility]        Today's Diagnoses     Type 2 diabetes mellitus with other ophthalmic complication, without long-term current use of insulin (H)    -  1    Temporal arteritis (H)        Parkinson disease (H)        Hyperlipidemia LDL goal <100        Status post total left knee replacement          Care Instructions    MY DIABETES TODAY:    1)  Goal A1C is under <7.0  Mine is:      Lab Results   Component Value Date    A1C 6.6 10/24/2017       2)  Goal LDL (bad cholesterol) under 100  (measured at least yearly)- I am currently at:   Lab Results   Component Value Date    LDL 37 11/18/2014       3)  Goal blood pressure under 140/90- mine was 104/60 today    4)  Take aspirin daily     5)  No tobacco use          ACTION PLAN CHANGES FROM TODAY:    Care Plan changes:    -continue present medications  -follow up with rheumatology  -ok to get shingles vaccine once you're done with prednisone. You can then check to see if it is covered by your insurance. You can get it at any pharmacy including ours.    -ok to use voltaren gel three times per day and salonpas patches for calf pain      Labs:     Lab Results   Component Value Date    A1C 6.6 10/24/2017    A1C 6.0 04/25/2017    A1C 6.3 11/22/2016    A1C 6.4 03/30/2016    A1C 6.3 10/22/2015         Follow up in 6 months            Follow-ups after your visit        Your next 10 appointments already scheduled     Nov 08, 2017 10:30 AM CST   Implantable Loop Recorder with Uc Cv Device [address] [place] [address] ([facility])    [address] [place] [address]   [phone]            Mar 15, 2018 12:00 PM CDT   (Arrive by 11:45 AM)   RETURN ATRIAL FIBULATION VISIT  "with Samuel Gorman MD   [facility] ([facility])    [address]  3rd Floor  [facility] [address] [phone]              Who to contact     If you have questions or need follow up information about today's clinic visit or your schedule please contact [facility] directly at [phone].  Normal or non-critical lab and imaging results will be communicated to you by MyChart, letter or phone within 4 business days after the clinic has received the results. If you do not hear from us within 7 days, please contact the clinic through [employer] Media[employer] or phone. If you have a critical or abnormal lab result, we will notify you by phone as soon as possible.  Submit refill requests through Predictify or call your pharmacy and they will forward the refill request to us. Please allow 3 business days for your refill to be completed.          Additional Information About Your Visit        [employer] Information     Predictify gives you secure access to your electronic health record. If you see a primary care provider, you can also send messages to your care team and make appointments. If you have questions, please call your primary care clinic.  If you do not have a primary care provider, please call [phone] and they will assist you.        Care EveryWhere ID     This is your Care EveryWhere ID. This could be used by other organizations to access your [place] medical records  [id number]        Your Vitals Were     Pulse Temperature Height BMI (Body Mass Index)          84 98.3  F (36.8  C) (Oral) 5' 9.5\" (1.765 m) 30.28 kg/m2         Blood Pressure from Last 3 Encounters:   10/24/17 120/58   08/08/17 130/72   04/25/17 124/60    Weight from Last 3 Encounters:   10/24/17 208 lb (94.3 kg)   08/08/17 204 lb 6 oz (92.7 kg)   04/25/17 210 lb (95.3 kg)              We Performed the Following     Basic metabolic panel     ESR: Erythrocyte sedimentation rate     HEMOGLOBIN " A1C     Lipid panel reflex to direct LDL Fasting          Today's Medication Changes          These changes are accurate as of: 10/24/17 11:55 AM.  If you have any questions, ask your nurse or doctor.               These medicines have changed or have updated prescriptions.        Dose/Directions    senna-docusate 8.6-50 MG per tablet   Commonly known as:  SENOKOT-S;PERICOLACE   This may have changed:    - when to take this  - additional instructions   Used for:  S/P total knee replacement        Dose:  1-2 tablet   Take 1-2 tablets by mouth 2 times daily.   Quantity:  60 tablet   Refills:  1            Where to get your medicines      [facility] [place] - [address] [place] [address] 106  [address] [facility] [address] [place] [facility] [place] [address]     Phone:  [phone]     amoxicillin 500 MG capsule    atorvastatin 10 MG tablet                Primary Care Provider Office Phone # Fax #    Chuy Stewart [phone]       [facility] [address]        Equal Access to Services     LOULOU [facility] Hospital[facility] : Hadii emmie ku hadasho Soomaali, waaxda luqadaha, qaybta kaalmada adeegyada, waxay roderick haymalaika tsang . So [place] -[phone].    ATENCIÓN: Si habla español, tiene a ellis disposición servicios gratuitos de asistencia lingüística. LlOhioHealth Berger Hospital [phone].    We comply with applicable federal civil rights laws and [place] laws. We do not discriminate on the basis of race, color, national origin, age, disability, sex, sexual orientation, or gender identity.            Thank you!     Thank you for choosing [facility]  for your care. Our goal is always to provide you with excellent care. Hearing back from our patients is one way we can continue to improve our services. Please take a few minutes to complete the written survey that you may receive in the mail after your visit with us. Thank you!             Your  Updated Medication List - Protect others around you: Learn how to safely use, store and throw away your medicines at www.disposemymeds.org.          This list is accurate as of: 10/24/17 11:55 AM.  Always use your most recent med list.                   Brand Name Dispense Instructions for use Diagnosis    * ACETAMINOPHEN 8 HOUR 650 MG 8 hour tablet   Generic drug:  acetaminophen     100 tablet    Take 650 mg by mouth every 4 hours as needed.    Post-op pain       * TYLENOL 500 MG tablet   Generic drug:  acetaminophen     100 tablet    Take 2 tablets (1,000 mg) by mouth 3 times daily        amoxicillin 500 MG capsule    AMOXIL    16 capsule    4 tablets prior to dental appointment. Use for dental appointments    Status post total left knee replacement       aspirin 81 MG tablet      Take 1 tablet by mouth daily.    Hypertension goal BP (blood pressure) < 130/80       atorvastatin 10 MG tablet    LIPITOR    90 tablet    Take 1 tablet (10 mg) by mouth daily Refill when requested    Hyperlipidemia LDL goal <100, Type 2 diabetes mellitus with other ophthalmic complication, without long-term current use of insulin (H)       B-12 1000 MCG Tbcr     100 tablet    Take 1,000 mcg by mouth daily    Type 2 diabetes mellitus with other diabetic ophthalmic complication, Parkinson disease (H)       blood glucose monitoring lancets     100 Box    Check blood sugar once weekly    Type 2 diabetes mellitus with other diabetic ophthalmic complication       blood glucose monitoring test strip    ACCU-CHEK COMPACT STRIPS    100 each    1 strip by In Vitro route daily    Type 2 diabetes mellitus with other diabetic ophthalmic complication       carbidopa-levodopa  MG Tbcr      Take  by mouth. 4 times daily  .        cholecalciferol 1000 UNIT tablet    vitamin D3    100    1 TABLET DAILY    Contusion of ribs       dipyridamole 50 MG tablet    PERSANTINE    720 tablet    Take 2 tablets (100 mg) by mouth 4 times daily    Retinal  ischemia       doxycycline monohydrate 100 MG capsule     60 capsule    1 tablet 2 time daily for Rosacea flares    Rosacea       fluticasone 50 MCG/ACT spray    FLONASE    16 g    Spray 1-2 sprays into both nostrils daily    Chronic rhinitis       FOLIC ACID PO      Take 1 mg by mouth daily        irbesartan 300 MG tablet    AVAPRO    90 tablet    Take 1 tablet (300 mg) by mouth At Bedtime    Hypertension goal BP (blood pressure) < 150/90       metroNIDAZOLE 0.75 % cream    METROCREAM    45 g    Apply topically 2 times daily    Rosacea       MULTI vitamin  MENS Tabs      Take  by mouth. Takes one daily        predniSONE 20 MG tablet    DELTASONE     Take 20 mg by mouth 2 times daily        senna-docusate 8.6-50 MG per tablet    SENOKOT-S;PERICOLACE    60 tablet    Take 1-2 tablets by mouth 2 times daily.    S/P total knee replacement       sertraline 25 MG tablet    ZOLOFT    90 tablet    Take 1 tablet (25 mg) by mouth daily    Dysthymia       VOLTAREN 1 % Gel topical gel   Generic drug:  diclofenac     100 g    APPLY 4 GRAMS TO KNEES OR 2 GRAMS TO HANDS FOUR TIMES A DAY USING ENCLOSED DOSING CARD    Arthritis       * Notice:  This list has 2 medication(s) that are the same as other medications prescribed for you. Read the directions carefully, and ask your doctor or other care provider to review them with you.

## 2017-10-24 NOTE — NURSING NOTE
"Chief Complaint   Patient presents with     Diabetes     Flu Shot     Patient is unsure of he received this vaccination yet.      Medication Question     Should he be taking Thiamine        Initial /60 (BP Location: Right arm, Cuff Size: Adult Regular)  Pulse 84  Temp 98.3  F (36.8  C) (Oral)  Ht 5' 9.5\" (1.765 m)  Wt 208 lb (94.3 kg)  BMI 30.28 kg/m2 Estimated body mass index is 30.28 kg/(m^2) as calculated from the following:    Height as of this encounter: 5' 9.5\" (1.765 m).    Weight as of this encounter: 208 lb (94.3 kg).  Medication Reconciliation: complete     Marilou Mcgee CMA (AAMA)      "

## 2017-10-24 NOTE — PROGRESS NOTES
"  SUBJECTIVE:   Bart Blair is a 86 year old male who presents to clinic today for the following health issues:      Diabetes Follow-up      Patient is checking blood sugars: Checks every Monday morning    Diabetic concerns: None     Symptoms of hypoglycemia (low blood sugar): none     Paresthesias (numbness or burning in feet) or sores: Yes -very painful     Date of last diabetic eye exam: July 2017    Lab Results   Component Value Date    A1C 6.6 10/24/2017    A1C 6.0 04/25/2017    A1C 6.3 11/22/2016    A1C 6.4 03/30/2016    A1C 6.3 10/22/2015             Amount of exercise or physical activity: None    Problems taking medications regularly: No    Medication side effects: none    Diet: regular (no restrictions)        {additional problems for provider to add:918377}    Problem list and histories reviewed & adjusted, as indicated.  Additional history: {NONE - AS DOCUMENTED:096427::\"as documented\"}    {HIST REVIEW/ LINKS 2:830444}    Reviewed and updated as needed this visit by clinical staff     Reviewed and updated as needed this visit by Provider         {PROVIDER CHARTING PREFERENCE:730858}    "

## 2017-10-24 NOTE — NURSING NOTE
Prior to injection verified patient identity using patient's name and date of birth.  Enma North, CMA

## 2017-10-24 NOTE — PROGRESS NOTES
SUBJECTIVE:   Bart Blair is a 86 year old male who presents to clinic today for the following health issues:      Diabetes Follow-up      Patient is checking blood sugars: Checks every Monday morning    Diabetic concerns: None     Symptoms of hypoglycemia (low blood sugar): none     Paresthesias (numbness or burning in feet) or sores: Yes -very painful     Date of last diabetic eye exam: July 2017    Lab Results   Component Value Date    A1C 6.6 10/24/2017    A1C 6.0 04/25/2017    A1C 6.3 11/22/2016    A1C 6.4 03/30/2016    A1C 6.3 10/22/2015             Amount of exercise or physical activity: None    Problems taking medications regularly: No    Medication side effects: none    Diet: regular (no restrictions)      Temporal arteritis. Transfer records from rheumatology were reviewed. 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 Prednisone was started down to 30 gm daily, and has continued to take it since. Headaches have fully resolved. Has f/u appointment with rheumatologist in a week.       Calf pain. Insidious onset of constant right calf pain, worse when standing for prolonged periods of time. He doesn't feel like prednisone has helped with this. Voltaren gel does help.     Has had a few episodes where he stands up too quickly and feels like he's going to fall. No falls or LOC. No chest pain or shortness of breath.         Problem list and histories reviewed & adjusted, as indicated.  Additional history: as documented    Patient Active Problem List   Diagnosis     Retinal ischemia     Polyp of vocal cord or larynx     Other specified disorder of skin     Type 2 diabetes, HbA1c goal < 7% (H)     HYPERLIPIDEMIA LDL GOAL <100     Parkinson disease (H)     S/P total knee replacement     Anemia due to blood loss, acute      Dysthymia     BPH (benign prostatic hyperplasia)     Syncope     Alcoholism (H)     Health Care Home     Hypertension goal BP (blood pressure) < 150/90     Fall     Type 2 diabetes mellitus with other diabetic ophthalmic complication     Varicose vein     Neck pain     Implantable loop recorder, [employer] LINQ     Advance Care Planning     Past Surgical History:   Procedure Laterality Date     ARTHROPLASTY KNEE  1/31/2012    Procedure:ARTHROPLASTY KNEE; LEFT TOTAL KNEE ARTHROPLASTY (IRASEMA)^; Surgeon:SKIP FAIR; Location:[facility] OR     ARTHROSCOPY SHOULDER, OPEN ROTATOR CUFF REPAIR, COMBINED  4/24/2012    Procedure:COMBINED ARTHROSCOPY SHOULDER, OPEN ROTATOR CUFF REPAIR; RIGHT SHOULDER ARTHROSCOPY OPEN ROTATOR CUFF DEBRIDEMENT, SUBCROMIAL DECOMPRESSION, AND BICEPS TENODESIS REPAIR; Surgeon:SKIP FAIR; Location:[facility]     CL AFF SURGICAL PATHOLOGY  6-    Dr. Bowers; left hand     ENT SURGERY       INCISE FINGER TENDON SHEATH  2008    Dr. Bowers; right AND LEFT hand     THROAT SURGERY      vocal cord polyps       Social History   Substance Use Topics     Smoking status: Never Smoker     Smokeless tobacco: Never Used     Alcohol use Yes      Comment: couple beers everyday     Family History   Problem Relation Age of Onset     Family History Negative Other      unknown family history per patient         Current Outpatient Prescriptions   Medication Sig Dispense Refill     VOLTAREN 1 % GEL topical gel APPLY 4 GRAMS TO KNEES OR 2 GRAMS TO HANDS FOUR TIMES A DAY USING ENCLOSED DOSING CARD 100 g 0     predniSONE (DELTASONE) 20 MG tablet Take 20 mg by mouth 2 times daily       fluticasone (FLONASE) 50 MCG/ACT spray Spray 1-2 sprays into both nostrils daily 16 g 5     irbesartan (AVAPRO) 300 MG tablet Take 1 tablet (300 mg) by mouth At Bedtime 90 tablet 3     dipyridamole (PERSANTINE) 50 MG tablet Take 2 tablets (100 mg) by mouth 4 times daily 720 tablet 3     sertraline (ZOLOFT) 25 MG tablet Take 1 tablet (25 mg)  by mouth daily 90 tablet 3     doxycycline Monohydrate 100 MG CAPS 1 tablet 2 time daily for Rosacea flares 60 capsule 3     blood glucose monitoring (ACCU-CHEK COMPACT STRIPS) test strip 1 strip by In Vitro route daily 100 each 11     atorvastatin (LIPITOR) 10 MG tablet Take 1 tablet (10 mg) by mouth daily Refill when requested 90 tablet 3     Cyanocobalamin (B-12) 1000 MCG TBCR Take 1,000 mcg by mouth daily 100 tablet 3     blood glucose monitoring (SOFTCLIX) lancets Check blood sugar once weekly 100 Box 0     metroNIDAZOLE (METROCREAM) 0.75 % cream Apply topically 2 times daily 45 g 3     acetaminophen (TYLENOL) 500 MG tablet Take 2 tablets (1,000 mg) by mouth 3 times daily 100 tablet 0     FOLIC ACID PO Take 1 mg by mouth daily       acetaminophen 650 MG TABS Take 650 mg by mouth every 4 hours as needed. 100 tablet 0     Multiple Vitamin (MULTI VITAMIN  MENS) TABS Take  by mouth. Takes one daily         aspirin 81 MG tablet Take 1 tablet by mouth daily.  3     senna-docusate (SENOKOT-S;PERICOLACE) 8.6-50 MG per tablet Take 1-2 tablets by mouth 2 times daily. (Patient taking differently: Take 1-2 tablets by mouth Takes about 2-3 times weekly) 60 tablet 1     Carbidopa-Levodopa  MG TBCR Take  by mouth. 4 times daily  .        VITAMIN D 1000 UNIT OR TABS 1 TABLET DAILY 100 4     Thiamine HCl 50 MG CAPS Take 1 capsule by mouth daily (Patient not taking: Reported on 10/24/2017) 100 capsule 3     amoxicillin (AMOXIL) 500 MG capsule 4 tablets prior to dental appointment. Use for dental appointments (Patient not taking: Reported on 10/24/2017) 16 capsule 4     Allergies   Allergen Reactions     No Known Drug Allergies      Recent Labs   Lab Test  10/24/17   1050  04/25/17   1044  11/22/16   0948  09/07/16   0755   10/22/15   1532   11/18/14   1238   10/14/13   1508   06/25/13   0829   08/11/12   1102   03/01/12   1508   A1C  6.6*  6.0  6.3*   --    < >  6.3*   < >  6.0   < >   --    < >   --    < >   --    < >    --    LDL   --    --    --    --    --    --    --   37   --    --    --   52   --    --    --   41   HDL   --    --    --    --    --    --    --   45   --    --    --   41   --    --    --   42   TRIG   --    --    --    --    --    --    --   171*   --    --    --   92   --    --    --   137   ALT   --    --    --    --    --    --    --   10   --   14   --    --    --   <6   --   16   CR   --    --    --   1.11   --   1.18   < >  1.31*   < >   --    --    --    < >  1.17   < >   --    GFRESTIMATED   --    --    --   63   --   59*   < >  52*   < >   --    --    --    < >  60*   < >   --    GFRESTBLACK   --    --    --   76   --   71   < >  63   < >   --    --    --    < >  72   < >   --    POTASSIUM   --    --    --   4.6   --   5.2   < >  4.5   < >   --    --    --    < >  3.9   < >   --    TSH   --    --   1.73   --    --    --    --   2.42   --    --    --    --    < >   --    --    --     < > = values in this interval not displayed.      BP Readings from Last 3 Encounters:   10/24/17 104/60   08/08/17 130/72   04/25/17 124/60    Wt Readings from Last 3 Encounters:   10/24/17 94.3 kg (208 lb)   08/08/17 92.7 kg (204 lb 6 oz)   04/25/17 95.3 kg (210 lb)                  Labs reviewed in EPIC          Reviewed and updated as needed this visit by clinical staff     Reviewed and updated as needed this visit by Provider         ROS:  Constitutional, HEENT, cardiovascular, pulmonary, GI, , musculoskeletal, neuro, skin, endocrine and psych systems are negative, except as otherwise noted.    This document serves as a record of the services and decisions personally performed and made by Quentin Stewart MD. It was created on their behalf by Sami Pelaez, a trained medical scribe. The creation of this document is based the provider's statements to the medical scribe.  Sami Pelaez October 24, 2017 11:29 AM         OBJECTIVE:   /58 (BP Location: Right arm, Cuff Size: Adult Regular)  Pulse 84  Temp 98.3  F (36.8  C)  "(Oral)  Ht 1.765 m (5' 9.5\")  Wt 94.3 kg (208 lb)  BMI 30.28 kg/m2  Body mass index is 30.28 kg/(m^2).  GENERAL: healthy, alert and no distress  HENT: ear canals and TM's normal, nose and mouth without ulcers or lesions  NECK: no adenopathy, no asymmetry, masses, or scars and thyroid normal to palpation  RESP: lungs clear to auscultation - no rales, rhonchi or wheezes  CV: regular rate and rhythm, normal S1 S2, no S3 or S4, no murmur, click or rub,  MS: no gross musculoskeletal defects noted, no edema -- calf TTP bilaterally, pain with passive flexion of feet bilaterally  SKIN: no suspicious lesions or rashes  PSYCH: mentation appears normal, affect normal/bright  LYMPH: no cervical, supraclavicular, axillary, or inguinal adenopathy  Diabetic foot exam: diminished pulses bilaterally no trophic changes or ulcerative lesions       Diagnostic Test Results:  Results for orders placed or performed in visit on 10/24/17 (from the past 24 hour(s))   HEMOGLOBIN A1C   Result Value Ref Range    Hemoglobin A1C 6.6 (H) 4.3 - 6.0 %       ASSESSMENT/PLAN:   (E11.39) Type 2 diabetes mellitus with other ophthalmic complication, without long-term current use of insulin (H)  (primary encounter diagnosis)  Comment: A1C at 6.6%; Appreciable increase from previous secondary to steroid therapy, however still well controlled and meeting goal of <7.0%.   Plan: atorvastatin (LIPITOR) 10 MG tablet, Basic         metabolic panel, Lipid panel reflex to direct         LDL Fasting        -continue present medicatiosn    (M31.6) Temporal arteritis (H)  Comment: Improved with prednisone 30 mg daily. Sedimentation rate will be rechecked today.   Plan: ESR: Erythrocyte sedimentation rate        -follow up, pending results        -f/u appointment with rheumatology next week.     (G20) Parkinson disease (H)  Comment: calf TTP most likely related to muscular rigidity. DVT less likely. Voltaren gel has worked well for him in the past, so ok to use for " pain management.   Plan: -Voltaren gel and salonpas patches     (E78.5) Hyperlipidemia LDL goal <100  Comment: stable  Plan: atorvastatin (LIPITOR) 10 MG tablet, Lipid         panel reflex to direct LDL Fasting        -medications refilled; continue present medications    (Z96.652) Status post total left knee replacement  Comment:   Plan: amoxicillin (AMOXIL) 500 MG capsule            (Z23) Need for prophylactic vaccination and inoculation against influenza  Comment:   Plan: FLU VACCINE, INCREASED ANTIGEN, PRESV FREE, AGE        65+ [40914], ADMIN INFLUENZA (For MEDICARE         Patients ONLY) []          Patient Instructions     MY DIABETES TODAY:    1)  Goal A1C is under <7.0  Mine is:      Lab Results   Component Value Date    A1C 6.6 10/24/2017       2)  Goal LDL (bad cholesterol) under 100  (measured at least yearly)- I am currently at:   Lab Results   Component Value Date    LDL 37 11/18/2014       3)  Goal blood pressure under 140/90- mine was 104/60 today    4)  Take aspirin daily     5)  No tobacco use          ACTION PLAN CHANGES FROM TODAY:    Care Plan changes:    -continue present medications  -follow up with rheumatology  -ok to get shingles vaccine once you're done with prednisone. You can then check to see if it is covered by your insurance. You can get it at any pharmacy including ours.    -ok to use voltaren gel three times per day and salonpas patches for calf pain      Labs:     Lab Results   Component Value Date    A1C 6.6 10/24/2017    A1C 6.0 04/25/2017    A1C 6.3 11/22/2016    A1C 6.4 03/30/2016    A1C 6.3 10/22/2015         Follow up in 6 months            The information in this document, created by the medical scribe for me, accurately reflects the services I personally performed and the decisions made by me. I have reviewed and approved this document for accuracy prior to leaving the patient care area.    Chuy Stewart MD, MD  [facility]

## 2017-10-24 NOTE — PATIENT INSTRUCTIONS
MY DIABETES TODAY:    1)  Goal A1C is under <7.0  Mine is:      Lab Results   Component Value Date    A1C 6.6 10/24/2017       2)  Goal LDL (bad cholesterol) under 100  (measured at least yearly)- I am currently at:   Lab Results   Component Value Date    LDL 37 11/18/2014       3)  Goal blood pressure under 140/90- mine was 104/60 today    4)  Take aspirin daily     5)  No tobacco use          ACTION PLAN CHANGES FROM TODAY:    Care Plan changes:    -continue present medications  -follow up with rheumatology  -ok to get shingles vaccine once you're done with prednisone. You can then check to see if it is covered by your insurance. You can get it at any pharmacy including ours.    -ok to use voltaren gel three times per day and salonpas patches for calf pain      Labs:     Lab Results   Component Value Date    A1C 6.6 10/24/2017    A1C 6.0 04/25/2017    A1C 6.3 11/22/2016    A1C 6.4 03/30/2016    A1C 6.3 10/22/2015         Follow up in 6 months

## 2017-10-24 NOTE — PROGRESS NOTES

## 2017-10-25 LAB
ANION GAP SERPL CALCULATED.3IONS-SCNC: 12 MMOL/L (ref 3–14)
BUN SERPL-MCNC: 32 MG/DL (ref 7–30)
CALCIUM SERPL-MCNC: 8.6 MG/DL (ref 8.5–10.1)
CHLORIDE SERPL-SCNC: 104 MMOL/L (ref 94–109)
CHOLEST SERPL-MCNC: 152 MG/DL
CO2 SERPL-SCNC: 22 MMOL/L (ref 20–32)
CREAT SERPL-MCNC: 1.29 MG/DL (ref 0.66–1.25)
GFR SERPL CREATININE-BSD FRML MDRD: 53 ML/MIN/1.7M2
GLUCOSE SERPL-MCNC: 149 MG/DL (ref 70–99)
HDLC SERPL-MCNC: 75 MG/DL
LDLC SERPL CALC-MCNC: 32 MG/DL
NONHDLC SERPL-MCNC: 77 MG/DL
POTASSIUM SERPL-SCNC: 4.9 MMOL/L (ref 3.4–5.3)
SODIUM SERPL-SCNC: 138 MMOL/L (ref 133–144)
TRIGL SERPL-MCNC: 226 MG/DL

## 2017-11-08 ENCOUNTER — ALLIED HEALTH/NURSE VISIT (OUTPATIENT)
Dept: CARDIOLOGY | Facility: CLINIC | Age: 82
End: 2017-11-08
Attending: INTERNAL MEDICINE
Payer: MEDICARE

## 2017-11-08 DIAGNOSIS — R55 SYNCOPE: Primary | ICD-10-CM

## 2017-11-08 DIAGNOSIS — M19.90 ARTHRITIS: ICD-10-CM

## 2017-11-08 PROCEDURE — 93299 HC INTERROGATION DEVICE EVAL REMOTE, LOOP RECORDER: CPT | Mod: ZF

## 2017-11-08 PROCEDURE — 93298 REM INTERROG DEV EVAL SCRMS: CPT | Performed by: INTERNAL MEDICINE

## 2017-11-08 NOTE — MR AVS SNAPSHOT
After Visit Summary   11/8/2017    Bart Blair    MRN: [id number]           Patient Information     Date Of Birth          2/14/1931        Visit Information        Provider Department      11/8/2017 6:00 AM UC ICD REMOTE [facility]        Today's Diagnoses     Syncope    -  1       Follow-ups after your visit        Your next 10 appointments already scheduled     Mar 15, 2018 11:30 AM CDT   (Arrive by 11:15 AM)   Implantable Loop Recorder with Uc Cv Device 1   [facility] ([facility])    [address] [place] [id number] [phone]            Mar 15, 2018 12:00 PM CDT   (Arrive by 11:45 AM)   RETURN ATRIAL FIBULATION VISIT with Samuel Gorman MD   [facility] ([facility])    [address] [place] [id number] [phone]              Who to contact     If you have questions or need follow up information about today's clinic visit or your schedule please contact [facility] directly at [phone].  Normal or non-critical lab and imaging results will be communicated to you by [employer], letter or phone within 4 business days after the clinic has received the results. If you do not hear from us within 7 days, please contact the clinic through [employer] or phone. If you have a critical or abnormal lab result, we will notify you by phone as soon as possible.  Submit refill requests through [employer] or call your pharmacy and they will forward the refill request to us. Please allow 3 business days for your refill to be completed.          Additional Information About Your Visit        [employer] Information     [employer] gives you secure access to your electronic health record. If you see a primary care provider, you can also send messages to your care team and make appointments. If you have questions, please call your primary care clinic.  If you do not have a primary care  provider, please call [phone] and they will assist you.        Care EveryWhere ID     This is your Care EveryWhere ID. This could be used by other organizations to access your [facility] medical records  [id number]         Blood Pressure from Last 3 Encounters:   10/24/17 120/58   08/08/17 130/72   04/25/17 124/60    Weight from Last 3 Encounters:   10/24/17 94.3 kg (208 lb)   08/08/17 92.7 kg (204 lb 6 oz)   04/25/17 95.3 kg (210 lb)              We Performed the Following     REMOTE LOOP/OPTIVOL INTERROGATION          Today's Medication Changes          These changes are accurate as of: 11/8/17 11:59 PM.  If you have any questions, ask your nurse or doctor.               These medicines have changed or have updated prescriptions.        Dose/Directions    senna-docusate 8.6-50 MG per tablet   Commonly known as:  SENOKOT-S;PERICOLACE   This may have changed:    - when to take this  - additional instructions   Used for:  S/P total knee replacement        Dose:  1-2 tablet   Take 1-2 tablets by mouth 2 times daily.   Quantity:  60 tablet   Refills:  1                Primary Care Provider Office Phone # Fax #    Chuy Stewart [phone]       [address] [place] [id number]        Equal Access to Services     TIFFANI ABDUL AH: Hadii emmie peñao Soeliceo, waaxda luqadaha, qaybta kaalmada adeegyada, jd barroso. So [facility] [phone].    ATENCIÓN: Si habla español, tiene a ellis disposición servicios gratuitos de asistencia lingüística. Llame al [phone].    We comply with applicable federal civil rights laws and [place] laws. We do not discriminate on the basis of race, color, national origin, age, disability, sex, sexual orientation, or gender identity.            Thank you!     Thank you for choosing [facility]  for your care. Our goal is always to provide you with excellent care. Hearing back from our patients is one way we can  continue to improve our services. Please take a few minutes to complete the written survey that you may receive in the mail after your visit with us. Thank you!             Your Updated Medication List - Protect others around you: Learn how to safely use, store and throw away your medicines at www.disposemymeds.org.          This list is accurate as of: 11/8/17 11:59 PM.  Always use your most recent med list.                   Brand Name Dispense Instructions for use Diagnosis    * ACETAMINOPHEN 8 HOUR 650 MG 8 hour tablet   Generic drug:  acetaminophen     100 tablet    Take 650 mg by mouth every 4 hours as needed.    Post-op pain       * TYLENOL 500 MG tablet   Generic drug:  acetaminophen     100 tablet    Take 2 tablets (1,000 mg) by mouth 3 times daily        amoxicillin 500 MG capsule    AMOXIL    16 capsule    4 tablets prior to dental appointment. Use for dental appointments    Status post total left knee replacement       aspirin 81 MG tablet      Take 1 tablet by mouth daily.    Hypertension goal BP (blood pressure) < 130/80       atorvastatin 10 MG tablet    LIPITOR    90 tablet    Take 1 tablet (10 mg) by mouth daily Refill when requested    Hyperlipidemia LDL goal <100, Type 2 diabetes mellitus with other ophthalmic complication, without long-term current use of insulin (H)       B-12 1000 MCG Tbcr     100 tablet    Take 1,000 mcg by mouth daily    Type 2 diabetes mellitus with other diabetic ophthalmic complication, Parkinson disease (H)       blood glucose monitoring lancets     100 Box    Check blood sugar once weekly    Type 2 diabetes mellitus with other diabetic ophthalmic complication       blood glucose monitoring test strip    ACCU-CHEK COMPACT STRIPS    100 each    1 strip by In Vitro route daily    Type 2 diabetes mellitus with other diabetic ophthalmic complication       carbidopa-levodopa  MG Tbcr      Take  by mouth. 4 times daily  .        cholecalciferol 1000 UNIT tablet    vitamin  D3    100    1 TABLET DAILY    Contusion of ribs       dipyridamole 50 MG tablet    PERSANTINE    720 tablet    Take 2 tablets (100 mg) by mouth 4 times daily    Retinal ischemia       doxycycline monohydrate 100 MG capsule     60 capsule    1 tablet 2 time daily for Rosacea flares    Rosacea       fluticasone 50 MCG/ACT spray    FLONASE    16 g    Spray 1-2 sprays into both nostrils daily    Chronic rhinitis       FOLIC ACID PO      Take 1 mg by mouth daily        irbesartan 300 MG tablet    AVAPRO    90 tablet    Take 1 tablet (300 mg) by mouth At Bedtime    Hypertension goal BP (blood pressure) < 150/90       metroNIDAZOLE 0.75 % cream    METROCREAM    45 g    Apply topically 2 times daily    Rosacea       MULTI vitamin  MENS Tabs      Take  by mouth. Takes one daily        predniSONE 20 MG tablet    DELTASONE     Take 20 mg by mouth 2 times daily        senna-docusate 8.6-50 MG per tablet    SENOKOT-S;PERICOLACE    60 tablet    Take 1-2 tablets by mouth 2 times daily.    S/P total knee replacement       sertraline 25 MG tablet    ZOLOFT    90 tablet    Take 1 tablet (25 mg) by mouth daily    Dysthymia       VOLTAREN 1 % Gel topical gel   Generic drug:  diclofenac     100 g    APPLY 4 GRAMS TO KNEES OR 2 GRAMS TO HANDS FOUR TIMES A DAY USING ENCLOSED DOSING CARD    Arthritis       * Notice:  This list has 2 medication(s) that are the same as other medications prescribed for you. Read the directions carefully, and ask your doctor or other care provider to review them with you.

## 2017-11-11 NOTE — PROGRESS NOTES
Remote loop recorder transmission received and reviewed.  Device transmission sent per MD orders.  Patient has a [employer] loop recorder.  Normal loop recorder function.  No patient activated episodes recorded.  No arrythmias recorded.  Presenting EGM = NSR @ 94 bpm.  Loop recorder battery status = good.  Patient notified of interrogation results.  Patient reports that he is feeling well and denies specific complaints.  Plan for patient to return to clinic in 3 months as scheduled.    Remote loop recorder transmission

## 2017-11-13 NOTE — TELEPHONE ENCOUNTER
Patient's pharmacy called to check the status of the patient's refill request. Please call back, okay to leave message.    Brigitte ePña, Patient Representative

## 2017-11-16 NOTE — TELEPHONE ENCOUNTER
Requested Prescriptions   Pending Prescriptions Disp Refills     diclofenac (VOLTAREN) 1 % GEL topical gel 100 g 0    There is no refill protocol information for this order        Routing refill request to provider for review/approval because:  Drug not on the [facility] refill protocol     Collin Desouza RN

## 2017-12-22 ENCOUNTER — HOSPITAL ENCOUNTER (EMERGENCY)
Facility: CLINIC | Age: 82
Discharge: HOME | End: 2017-12-22
Attending: EMERGENCY MEDICINE | Admitting: EMERGENCY MEDICINE
Payer: MEDICARE

## 2017-12-22 ENCOUNTER — TELEPHONE (OUTPATIENT)
Dept: FAMILY MEDICINE | Facility: CLINIC | Age: 82
End: 2017-12-22

## 2017-12-22 ENCOUNTER — APPOINTMENT (OUTPATIENT)
Dept: GENERAL RADIOLOGY | Facility: CLINIC | Age: 82
End: 2017-12-22
Attending: EMERGENCY MEDICINE
Payer: MEDICARE

## 2017-12-22 VITALS
SYSTOLIC BLOOD PRESSURE: 148 MMHG | OXYGEN SATURATION: 97 % | BODY MASS INDEX: 31.45 KG/M2 | TEMPERATURE: 98.3 F | RESPIRATION RATE: 16 BRPM | WEIGHT: 216.1 LBS | DIASTOLIC BLOOD PRESSURE: 76 MMHG

## 2017-12-22 DIAGNOSIS — R60.0 PERIPHERAL EDEMA: ICD-10-CM

## 2017-12-22 DIAGNOSIS — J90 PLEURAL EFFUSION: ICD-10-CM

## 2017-12-22 LAB
ALBUMIN SERPL-MCNC: 3 G/DL (ref 3.4–5)
ALP SERPL-CCNC: 52 U/L (ref 40–150)
ALT SERPL W P-5'-P-CCNC: 16 U/L (ref 0–70)
ANION GAP SERPL CALCULATED.3IONS-SCNC: 7 MMOL/L (ref 3–14)
APTT PPP: 28 SEC (ref 22–37)
AST SERPL W P-5'-P-CCNC: 13 U/L (ref 0–45)
BASOPHILS # BLD AUTO: 0 10E9/L (ref 0–0.2)
BASOPHILS NFR BLD AUTO: 0.1 %
BILIRUB SERPL-MCNC: 0.3 MG/DL (ref 0.2–1.3)
BUN SERPL-MCNC: 33 MG/DL (ref 7–30)
CALCIUM SERPL-MCNC: 8.2 MG/DL (ref 8.5–10.1)
CHLORIDE SERPL-SCNC: 106 MMOL/L (ref 94–109)
CO2 SERPL-SCNC: 27 MMOL/L (ref 20–32)
CREAT SERPL-MCNC: 1.13 MG/DL (ref 0.66–1.25)
DIFFERENTIAL METHOD BLD: ABNORMAL
EOSINOPHIL # BLD AUTO: 0 10E9/L (ref 0–0.7)
EOSINOPHIL NFR BLD AUTO: 0 %
ERYTHROCYTE [DISTWIDTH] IN BLOOD BY AUTOMATED COUNT: 13.5 % (ref 10–15)
GFR SERPL CREATININE-BSD FRML MDRD: 61 ML/MIN/1.7M2
GLUCOSE SERPL-MCNC: 197 MG/DL (ref 70–99)
HCT VFR BLD AUTO: 36.8 % (ref 40–53)
HGB BLD-MCNC: 11.7 G/DL (ref 13.3–17.7)
IMM GRANULOCYTES # BLD: 0.2 10E9/L (ref 0–0.4)
IMM GRANULOCYTES NFR BLD: 2 %
INR PPP: 0.93 (ref 0.86–1.14)
LYMPHOCYTES # BLD AUTO: 1.2 10E9/L (ref 0.8–5.3)
LYMPHOCYTES NFR BLD AUTO: 10.1 %
MCH RBC QN AUTO: 33.2 PG (ref 26.5–33)
MCHC RBC AUTO-ENTMCNC: 31.8 G/DL (ref 31.5–36.5)
MCV RBC AUTO: 105 FL (ref 78–100)
MONOCYTES # BLD AUTO: 0.6 10E9/L (ref 0–1.3)
MONOCYTES NFR BLD AUTO: 4.6 %
NEUTROPHILS # BLD AUTO: 9.9 10E9/L (ref 1.6–8.3)
NEUTROPHILS NFR BLD AUTO: 83.2 %
NRBC # BLD AUTO: 0 10*3/UL
NRBC BLD AUTO-RTO: 0 /100
NT-PROBNP SERPL-MCNC: 283 PG/ML (ref 0–1800)
PLATELET # BLD AUTO: 175 10E9/L (ref 150–450)
POTASSIUM SERPL-SCNC: 5.1 MMOL/L (ref 3.4–5.3)
PROT SERPL-MCNC: 6.6 G/DL (ref 6.8–8.8)
RBC # BLD AUTO: 3.52 10E12/L (ref 4.4–5.9)
SODIUM SERPL-SCNC: 140 MMOL/L (ref 133–144)
TROPONIN I BLD-MCNC: 0.02 UG/L (ref 0–0.1)
TROPONIN I SERPL-MCNC: 0.03 UG/L (ref 0–0.04)
WBC # BLD AUTO: 11.9 10E9/L (ref 4–11)

## 2017-12-22 PROCEDURE — 99284 EMERGENCY DEPT VISIT MOD MDM: CPT | Mod: 25 | Performed by: EMERGENCY MEDICINE

## 2017-12-22 PROCEDURE — 83880 ASSAY OF NATRIURETIC PEPTIDE: CPT | Performed by: EMERGENCY MEDICINE

## 2017-12-22 PROCEDURE — 84484 ASSAY OF TROPONIN QUANT: CPT

## 2017-12-22 PROCEDURE — 99285 EMERGENCY DEPT VISIT HI MDM: CPT | Mod: Z6 | Performed by: EMERGENCY MEDICINE

## 2017-12-22 PROCEDURE — 80053 COMPREHEN METABOLIC PANEL: CPT | Performed by: EMERGENCY MEDICINE

## 2017-12-22 PROCEDURE — 87040 BLOOD CULTURE FOR BACTERIA: CPT | Performed by: EMERGENCY MEDICINE

## 2017-12-22 PROCEDURE — 85025 COMPLETE CBC W/AUTO DIFF WBC: CPT | Performed by: EMERGENCY MEDICINE

## 2017-12-22 PROCEDURE — 71020 XR CHEST 2 VW: CPT

## 2017-12-22 PROCEDURE — 85610 PROTHROMBIN TIME: CPT | Performed by: EMERGENCY MEDICINE

## 2017-12-22 PROCEDURE — 85730 THROMBOPLASTIN TIME PARTIAL: CPT | Performed by: EMERGENCY MEDICINE

## 2017-12-22 PROCEDURE — 84484 ASSAY OF TROPONIN QUANT: CPT | Performed by: EMERGENCY MEDICINE

## 2017-12-22 RX ORDER — FUROSEMIDE 20 MG
20 TABLET ORAL DAILY
Qty: 7 TABLET | Refills: 0 | Status: SHIPPED | OUTPATIENT
Start: 2017-12-22 | End: 2017-12-28

## 2017-12-22 ASSESSMENT — ENCOUNTER SYMPTOMS
FREQUENCY: 0
COUGH: 0
CONSTIPATION: 0
VOMITING: 0
SHORTNESS OF BREATH: 0
FEVER: 0
PALPITATIONS: 0
FATIGUE: 0
CHEST TIGHTNESS: 0
DIARRHEA: 0
NAUSEA: 0
DYSURIA: 0
CHILLS: 0
ABDOMINAL PAIN: 0
APPETITE CHANGE: 0
WEAKNESS: 0

## 2017-12-22 NOTE — ED AVS SNAPSHOT
[facility], Emergency Department    [address] [place] [address]    Phone:  [phone]    Fax:  [phone]                                       Bart Blair   MRN: [id number]    Department:  [facility], Emergency Department   Date of Visit:  12/22/2017           Patient Information     Date Of Birth          2/14/1931        Your diagnoses for this visit were:     Pleural effusion     Peripheral edema        You were seen by Jacinto Avendaño MD.      Follow-up Information     Follow up with Chuy Stewart MD In 1 week.    Specialty:  Family Practice    Contact information:    [address] [facility] [address]  [phone]          Discharge Instructions         Leg Swelling in Both Legs    Swelling of the feet, ankles, and legs is called edema. It is caused by excess fluid that has collected in the tissues. Extra fluid in the body settles in the lowest part because of gravity. This is why the legs and feet are most affected.  Some of the causes for edema include:    Disease of the heart like congestive heart failure    Standing or sitting for long periods of time    Infection of the feet or legs    Blood pooling in the veins of your legs (venous insufficiency)    Dilated veins in your lower leg (varicose veins)    Garters or other clothing that is tight on your legs. This will cause blood to pool in your legs because the clothing limits blood flow.    Some medicines such as hormones like birth control pills, some blood pressure medicines like calcium channel blockers (amlodipine) and steroids, some antidepressants like MAO inhibitors and tricyclics    Menstrual periods that cause you to retain fluids    Many types of renal disease    Liver failure or cirrhosis    Pregnancy, some swelling is normal, but a sudden increase in leg swelling or weight gain can be a sign of a dangerous complication of pregnancy    Poor nutrition    Thyroid disease  Medical treatment will  depend on what is causing the swelling in your legs. Your healthcare provider may prescribe water pills (diuretics) to get rid of the extra fluid.  Home care  Follow these guidelines when caring for yourself at home:    Don't wear clothing like garters that is tight on your legs.    Keep your legs up while lying or sitting.    If infection, injury, or recent surgery is causing the swelling, stay off your legs as much as possible until symptoms get better.    If your healthcare provider says that your leg swelling is caused by venous insufficiency or varicose veins, don't sit or  one place for long periods of time. Take breaks and walk about every few hours. Brisk walking is a good exercise. It helps circulate the blood that has collected in your leg. Talk with your provider about using support stockings to stop daytime leg swelling.    If your provider says that heart disease is causing your leg swelling, follow a low-salt diet to stop extra fluid from staying in your body. You may also need medicine.  Follow-up care  Follow up with your healthcare provider, or as advised.  When to seek medical advice  Call your healthcare provider right away if any of these occur:    New shortness of breath or chest pain    Shortness of breath or chest pain that gets worse    Swelling in both legs or ankles that gets worse    Swelling of the abdomen    Redness, warmth, or swelling in one leg    Fever of 100.4 F (38 C) or higher, or as directed by your healthcare provider    Yellow color to your skin or eyes    Rapid, unexplained weight gain    Having to sleep upright or use an increased number of pillows  Date Last Reviewed: 3/31/2016    4563-3165 [employer]. [address] [place] [address], [place] [address]. All rights reserved. This information is not intended as a substitute for professional medical care. Always follow your healthcare professional's instructions.          Future Appointments        Provider  Department Dept Phone Center    3/15/2018 11:30 AM UC CV DEVICE 1              se [facility] [phone] [facility]    3/15/2018 12:00 PM Samuel Gorman MD [facility] [phone] [facility]      24 Hour Appointment Hotline       To make an appointment at any [facility], call [phone] ([phone]). If you don't have a family doctor or clinic, we will help you find one. [facility] are conveniently located to serve the needs of you and your family.             Review of your medicines      START taking        Dose / Directions Last dose taken    furosemide 20 MG tablet   Commonly known as:  LASIX   Dose:  20 mg   Quantity:  7 tablet        Take 1 tablet (20 mg) by mouth daily for 7 days   Refills:  0          Our records show that you are taking the medicines listed below. If these are incorrect, please call your family doctor or clinic.        Dose / Directions Last dose taken    * ACETAMINOPHEN 8 HOUR 650 MG 8 hour tablet   Dose:  650 mg   Quantity:  100 tablet   Generic drug:  acetaminophen        Take 650 mg by mouth every 4 hours as needed.   Refills:  0        * TYLENOL 500 MG tablet   Dose:  1000 mg   Quantity:  100 tablet   Generic drug:  acetaminophen        Take 2 tablets (1,000 mg) by mouth 3 times daily   Refills:  0        amoxicillin 500 MG capsule   Commonly known as:  AMOXIL   Quantity:  16 capsule        4 tablets prior to dental appointment. Use for dental appointments   Refills:  4        aspirin 81 MG tablet   Dose:  1 tablet        Take 1 tablet by mouth daily.   Refills:  3        atorvastatin 10 MG tablet   Commonly known as:  LIPITOR   Dose:  10 mg   Quantity:  90 tablet        Take 1 tablet (10 mg) by mouth daily Refill when requested   Refills:  3        B-12 1000 MCG Tbcr   Dose:  1000 mcg   Quantity:  100 tablet        Take 1,000 mcg by mouth daily   Refills:  3        blood glucose monitoring lancets   Quantity:  100 Box        Check blood sugar once  weekly   Refills:  0        blood glucose monitoring test strip   Commonly known as:  ACCU-CHEK COMPACT STRIPS   Dose:  1 strip   Quantity:  100 each        1 strip by In Vitro route daily   Refills:  11        carbidopa-levodopa  MG Tbcr        Take  by mouth. 4 times daily  .   Refills:  0        cholecalciferol 1000 UNIT tablet   Commonly known as:  vitamin D3   Quantity:  100        1 TABLET DAILY   Refills:  4        diclofenac 1 % Gel topical gel   Commonly known as:  VOLTAREN   Quantity:  300 g        APPLY 4 GRAMS TO KNEES OR 2 GRAMS TO HANDS FOUR TIMES A DAY USING ENCLOSED DOSING CARD   Refills:  3        dipyridamole 50 MG tablet   Commonly known as:  PERSANTINE   Dose:  100 mg   Quantity:  720 tablet        Take 2 tablets (100 mg) by mouth 4 times daily   Refills:  3        doxycycline monohydrate 100 MG capsule   Quantity:  60 capsule        1 tablet 2 time daily for Rosacea flares   Refills:  3        fluticasone 50 MCG/ACT spray   Commonly known as:  FLONASE   Dose:  1-2 spray   Quantity:  16 g        Spray 1-2 sprays into both nostrils daily   Refills:  5        FOLIC ACID PO   Dose:  1 mg        Take 1 mg by mouth daily   Refills:  0        irbesartan 300 MG tablet   Commonly known as:  AVAPRO   Dose:  300 mg   Quantity:  90 tablet        Take 1 tablet (300 mg) by mouth At Bedtime   Refills:  3        metroNIDAZOLE 0.75 % cream   Commonly known as:  METROCREAM   Quantity:  45 g        Apply topically 2 times daily   Refills:  3        MULTI vitamin  MENS Tabs        Take  by mouth. Takes one daily   Refills:  0        predniSONE 20 MG tablet   Commonly known as:  DELTASONE   Dose:  20 mg        Take 20 mg by mouth 2 times daily   Refills:  0        senna-docusate 8.6-50 MG per tablet   Commonly known as:  SENOKOT-S;PERICOLACE   Dose:  1-2 tablet   Quantity:  60 tablet        Take 1-2 tablets by mouth 2 times daily.   Refills:  1        sertraline 25 MG tablet   Commonly known as:  ZOLOFT    Dose:  25 mg   Quantity:  90 tablet        Take 1 tablet (25 mg) by mouth daily   Refills:  3        * Notice:  This list has 2 medication(s) that are the same as other medications prescribed for you. Read the directions carefully, and ask your doctor or other care provider to review them with you.            Prescriptions were sent or printed at these locations (1 Prescription)                   Other Prescriptions                Printed at Department/Unit printer (1 of 1)         furosemide (LASIX) 20 MG tablet                Procedures and tests performed during your visit     CBC with platelets differential    Comprehensive metabolic panel    EKG 12 lead    EKG 12-lead, tracing only    INR    ISTAT troponin nursing POCT    Nt probnp inpatient (BNP)    Partial thromboplastin time    Troponin I    Troponin POCT    XR Chest 2 Views      Orders Needing Specimen Collection     None      Pending Results     No orders found from 12/20/2017 to 12/23/2017.            Pending Culture Results     No orders found from 12/20/2017 to 12/23/2017.            Pending Results Instructions     If you had any lab results that were not finalized at the time of your Discharge, you can call the ED Lab Result RN at [phone]. You will be contacted by this team for any positive Lab results or changes in treatment. The nurses are available 7 days a week from 10A to 6:30P.  You can leave a message 24 hours per day and they will return your call.        Thank you for choosing [facility]       Thank you for choosing [facility] for your care. Our goal is always to provide you with excellent care. Hearing back from our patients is one way we can continue to improve our services. Please take a few minutes to complete the written survey that you may receive in the mail after you visit with us. Thank you!        [employer] Information     [employer] gives you secure access to your electronic health record. If you see a primary care provider, you can  also send messages to your care team and make appointments. If you have questions, please call your primary care clinic.  If you do not have a primary care provider, please call [phone] and they will assist you.        Care EveryWhere ID     This is your Care EveryWhere ID. This could be used by other organizations to access your [facility] medical records  [id number]        Equal Access to Services     TIFFANI ABDUL : Hadii emmie Gruber, waguadalupe purcell, qaimanita kaalmashekhar almonte, jd tsang . So [facility] [place] [phone].    ATENCIÓN: Si habla español, tiene a ellis disposición servicios gratuitos de asistencia lingüística. Alejandro al [phone].    We comply with applicable federal civil rights laws and [place] laws. We do not discriminate on the basis of race, color, national origin, age, disability, sex, sexual orientation, or gender identity.            After Visit Summary       This is your record. Keep this with you and show to your community pharmacist(s) and doctor(s) at your next visit.

## 2017-12-22 NOTE — TELEPHONE ENCOUNTER
Reason for Call:  Other appointment swollen legs and feet     Detailed comments: patient is in the urgent care and was told he needs to see PCP in 5 days and wife would like to know if he could be fit in, please call to discuss    Phone Number Patient can be reached at: Home number on file [phone] (home)    Best Time: any    Can we leave a detailed message on this number? YES    Call taken on 12/22/2017 at 4:32 PM by Annita Key

## 2017-12-22 NOTE — TELEPHONE ENCOUNTER
"Bart Blair is a 86 year old male who calls with foot swelling.    NURSING ASSESSMENT:  Description:  Starting this morning, patient and his family noticed that patient's feet are very swollen. They are slightly painful. There is no discoloration or color change. There is no swelling in any other area of his body. Denies hemoptysis or chest pain. Patient does report shortness of breath, but this has been going on for 3 months and has not worsened.  Onset/duration:  Started today  Precip. factors:  n/a  Associated symptoms:  See description  Improves/worsens symptoms:  Raising legs is helpful  Allergies:   Allergies   Allergen Reactions     No Known Drug Allergies      NURSING PLAN: Nursing advice to patient given patient is diabetic and has other health concerns, patient should be seen today since it is a holiday weekend    RECOMMENDED DISPOSITION:  See in 4 hours, will go to urgent care  Will comply with recommendation: Yes  If further questions/concerns or if symptoms do not improve, worsen or new symptoms develop, call your PCP or [facility] as soon as possible.      Guideline used:  Telephone Triage Protocols for Nurses, Fifth Edition, Esther Spears \"swelling and foot problems\"    Gaurav Mclaughlin RN    "

## 2017-12-22 NOTE — ED AVS SNAPSHOT
[facility], Emergency Department    [address]    [facility] [address]    Phone:  [phone]    Fax:  [phone]                                       Bart Blair   MRN: [id number]    Department:  [facility], Emergency Department   Date of Visit:  12/22/2017           After Visit Summary Signature Page     I have received my discharge instructions, and my questions have been answered. I have discussed any challenges I see with this plan with the nurse or doctor.    ..........................................................................................................................................  Patient/Patient Representative Signature      ..........................................................................................................................................  Patient Representative Print Name and Relationship to Patient    ..................................................               ................................................  Date                                            Time    ..........................................................................................................................................  Reviewed by Signature/Title    ...................................................              ..............................................  Date                                                            Time

## 2017-12-22 NOTE — TELEPHONE ENCOUNTER
Reason for call:  Patient reporting a symptom    Symptom or request: ankles are swollen and wife stated the she thins he is retaining fluid    Duration (how long have symptoms been present): 1 day    Have you been treated for this before? No    Phone Number patient can be reached at:  Home number on file [phone] (home)    Best Time:  any    Can we leave a detailed message on this number:  YES    Call taken on 12/22/2017 at 1:20 PM by Annita Key

## 2017-12-23 NOTE — ED PROVIDER NOTES
"  History     Chief Complaint   Patient presents with     Chest Pain     also having SOB     HPI  A 86-year-old male with history of parkinsonian, chronic kidney disease, anemia and diabetes referred to the emergency department today from urgent care for the patient states as \"I have no idea\".  It was able to review outside records which looks like the patient has had several days of lower extremity edema.  He reports several months of intermittent left-sided sharp chest discomfort with movement.  He reports no chest heaviness or pressure.  He denies exertional chest pain.  He reports minimal shortness of breath which at this time is not present.  He reports no history of lower extremity DVT or unilateral swelling.  He reports no pain in his legs.  He reports no chest trauma.  He denies associated cough, fever, chills.  The patient denies known history of cardiovascular disease or heart failure.  He reports no palpitations.  I have reviewed the Medications, Allergies, Past Medical and Surgical History, and Social History in the Epic system.    Review of Systems   Constitutional: Negative for appetite change, chills, fatigue and fever.   Respiratory: Negative for cough, chest tightness and shortness of breath.    Cardiovascular: Positive for chest pain and leg swelling. Negative for palpitations.   Gastrointestinal: Negative for abdominal pain, constipation, diarrhea, nausea and vomiting.   Genitourinary: Negative for dysuria and frequency.   Skin: Negative for rash.   Neurological: Negative for weakness.   All other systems reviewed and are negative.      Physical Exam   BP: 125/80  Heart Rate: 78  Temp: 98.3  F (36.8  C)  Resp: 20  Weight: 98 kg (216 lb 1.6 oz)  SpO2: 97 %      Physical Exam   Constitutional: He is oriented to person, place, and time. He appears well-developed and well-nourished. No distress.   HENT:   Head: Normocephalic and atraumatic.   Mouth/Throat: Oropharynx is clear and moist.   Eyes: " Conjunctivae are normal. Pupils are equal, round, and reactive to light.   Neck: Normal range of motion.   Cardiovascular: Normal rate, regular rhythm and normal heart sounds.    Pulmonary/Chest: Effort normal. No accessory muscle usage or stridor. No respiratory distress. He has no wheezes. He has rales. He exhibits no tenderness.   Abdominal: Soft. He exhibits no distension. There is no tenderness. There is no rebound.   Musculoskeletal: He exhibits edema. He exhibits no tenderness or deformity.   Neurological: He is alert and oriented to person, place, and time. No cranial nerve deficit.   Skin: Skin is warm. No rash noted. He is not diaphoretic.   Psychiatric: He has a normal mood and affect. His behavior is normal.       ED Course     ED Course     Procedures             EKG Interpretation:      Interpreted by Dr. Avendaño  Time reviewed: 6:43 PM  Symptoms at time of EKG: Chest pain  Rhythm: normal sinus   Rate: Normal  Axis: Normal  Ectopy: none  Conduction: normal  ST Segments/ T Waves: No ST-T wave changes  Q Waves: none  Comparison to prior: Unchanged    Clinical Impression: normal EKG       Labs Ordered and Resulted from Time of ED Arrival Up to the Time of Departure from the ED - No data to display         Assessments & Plan (with Medical Decision Making)     86-year-old male with history of diabetes, anemia, chronic kidney disease arriving today from urgent care for what sounds to be workup of mild heart failure.  On my evaluation the patient is seated upright in bed he appears comfortable and is nontoxic appearing.  Patient has no signs of external chest wall trauma.  He is speaking full sentences without evidence of increased work of breathing.  I do hear scattered crackles in his bases however at this time SPO2 is in the upper 90s on room air.  I am unable to see the chest x-ray from the outside facility and he does not arrive with the CD today.  As it sounds as if this is the request for workup I do  believe it is important to see the lung fields and will repeat his chest x-ray.  He arrives with a CBC obtained which demonstrates no sign of significant anemia or leukocytosis to explain symptoms.  He is a mild elevation in white count however at this time is afebrile with no source of bacterial infection.  The patient does have symmetric lower extremity edema and currently is on no Lasix.  I would obtain further laboratory studies to include a CMP to evaluate for decline in kidney function or possibly liver disease albeit the patient has no history of cirrhosis or heavy alcohol intake.  My suspicion is for mild heart failure.  We did obtain an ambulatory sat on the patient.  He is able to ambulate in the emergency department without signs of shortness of breath or dyspnea on exertion.  Laboratory studies demonstrate no significant renal disease or liver dysfunction.  BNP obtained slightly elevated.  EKG obtained demonstrates no sign of strain or ischemic type pattern there is no interval abnormality or dysrhythmia on my read.  The patient may benefit from outpatient cardiology evaluation with consideration of nonemergent echo.  We discussed initiation of low-dose Lasix with need for follow-up with his primary care physician in 1 week for evaluation of electrolytes or to call or return emergently with increasing chest pain shortness of breath or other concern.    I have reviewed the nursing notes.    I have reviewed the findings, diagnosis, plan and need for follow up with the patient.    New Prescriptions    No medications on file       Final diagnoses:   Pleural effusion   Peripheral edema       12/22/2017   [facility], [place], EMERGENCY DEPARTMENT     Jacinto Avendaño MD  12/22/17 1890

## 2017-12-23 NOTE — DISCHARGE INSTRUCTIONS
Leg Swelling in Both Legs    Swelling of the feet, ankles, and legs is called edema. It is caused by excess fluid that has collected in the tissues. Extra fluid in the body settles in the lowest part because of gravity. This is why the legs and feet are most affected.  Some of the causes for edema include:    Disease of the heart like congestive heart failure    Standing or sitting for long periods of time    Infection of the feet or legs    Blood pooling in the veins of your legs (venous insufficiency)    Dilated veins in your lower leg (varicose veins)    Garters or other clothing that is tight on your legs. This will cause blood to pool in your legs because the clothing limits blood flow.    Some medicines such as hormones like birth control pills, some blood pressure medicines like calcium channel blockers (amlodipine) and steroids, some antidepressants like MAO inhibitors and tricyclics    Menstrual periods that cause you to retain fluids    Many types of renal disease    Liver failure or cirrhosis    Pregnancy, some swelling is normal, but a sudden increase in leg swelling or weight gain can be a sign of a dangerous complication of pregnancy    Poor nutrition    Thyroid disease  Medical treatment will depend on what is causing the swelling in your legs. Your healthcare provider may prescribe water pills (diuretics) to get rid of the extra fluid.  Home care  Follow these guidelines when caring for yourself at home:    Don't wear clothing like garters that is tight on your legs.    Keep your legs up while lying or sitting.    If infection, injury, or recent surgery is causing the swelling, stay off your legs as much as possible until symptoms get better.    If your healthcare provider says that your leg swelling is caused by venous insufficiency or varicose veins, don't sit or  one place for long periods of time. Take breaks and walk about every few hours. Brisk walking is a good exercise. It helps  circulate the blood that has collected in your leg. Talk with your provider about using support stockings to stop daytime leg swelling.    If your provider says that heart disease is causing your leg swelling, follow a low-salt diet to stop extra fluid from staying in your body. You may also need medicine.  Follow-up care  Follow up with your healthcare provider, or as advised.  When to seek medical advice  Call your healthcare provider right away if any of these occur:    New shortness of breath or chest pain    Shortness of breath or chest pain that gets worse    Swelling in both legs or ankles that gets worse    Swelling of the abdomen    Redness, warmth, or swelling in one leg    Fever of 100.4 F (38 C) or higher, or as directed by your healthcare provider    Yellow color to your skin or eyes    Rapid, unexplained weight gain    Having to sleep upright or use an increased number of pillows  Date Last Reviewed: 3/31/2016    2288-3773 [employer]. [address] [place] [address], [place] [address]. All rights reserved. This information is not intended as a substitute for professional medical care. Always follow your healthcare professional's instructions.

## 2017-12-25 LAB — INTERPRETATION ECG - MUSE: NORMAL

## 2017-12-28 ENCOUNTER — OFFICE VISIT (OUTPATIENT)
Dept: FAMILY MEDICINE | Facility: CLINIC | Age: 82
End: 2017-12-28
Payer: COMMERCIAL

## 2017-12-28 VITALS
BODY MASS INDEX: 29.78 KG/M2 | HEART RATE: 80 BPM | OXYGEN SATURATION: 97 % | SYSTOLIC BLOOD PRESSURE: 131 MMHG | DIASTOLIC BLOOD PRESSURE: 71 MMHG | WEIGHT: 208 LBS | TEMPERATURE: 98.2 F | HEIGHT: 70 IN

## 2017-12-28 DIAGNOSIS — M31.6 TEMPORAL ARTERITIS (H): ICD-10-CM

## 2017-12-28 DIAGNOSIS — R60.9 EDEMA, UNSPECIFIED TYPE: Primary | ICD-10-CM

## 2017-12-28 LAB — ERYTHROCYTE [SEDIMENTATION RATE] IN BLOOD BY WESTERGREN METHOD: 19 MM/H (ref 0–20)

## 2017-12-28 PROCEDURE — 80048 BASIC METABOLIC PNL TOTAL CA: CPT | Performed by: FAMILY MEDICINE

## 2017-12-28 PROCEDURE — 99214 OFFICE O/P EST MOD 30 MIN: CPT | Performed by: FAMILY MEDICINE

## 2017-12-28 PROCEDURE — 36415 COLL VENOUS BLD VENIPUNCTURE: CPT | Performed by: FAMILY MEDICINE

## 2017-12-28 PROCEDURE — 85652 RBC SED RATE AUTOMATED: CPT | Performed by: FAMILY MEDICINE

## 2017-12-28 RX ORDER — FUROSEMIDE 20 MG
TABLET ORAL
Qty: 30 TABLET | Refills: 1 | Status: SHIPPED | OUTPATIENT
Start: 2017-12-28 | End: 2018-10-09

## 2017-12-28 NOTE — PATIENT INSTRUCTIONS
Monitor your weight daily with decrease to 0.5 tablet of Lasix (10mg). Goal weight is between 205 and 210. If you weight is around 205, you can stop taking the Lasix. If your weight increases by 3-5 pounds, take 1 tablet (20 mg).     Orders Placed This Encounter     ESR: Erythrocyte sedimentation rate     Basic metabolic panel     furosemide (LASIX) 20 MG tablet     Si/2 tablet per day. If weight increases by 3-5 pounds then increase to 20 mg (1 tablet). If weight decreases to 205 then ok to hold.     Dispense:  30 tablet     Refill:  1

## 2017-12-28 NOTE — PROGRESS NOTES
SUBJECTIVE:   Bart Blair is a 86 year old male who presents to clinic today for the following health issues:      ED/UC Followup:    Facility:  [facility]  Date of visit: 12/22/17  Reason for visit: SOB, chest pain and swelling of lower legs  Current Status: Still has some swelling in lower left leg       Bart presented to Urgent Care, who referred to the ED, with lower extremity edema. Patient relates that shortness of breath and chest was insignificant at the time of ED visit. He reports the swelling is unchanged with daily Lasix that was started in the ED. BNP slightly elevated, otherwise labs were essentially unremarkable. Chest XR showed trace bilateral pleural effusions with associated bibasilar atelectasis. He has lost 7 pounds since ED visit with start of Lasix. He inquires whether fluid retention could be secondary to prednisone. No increased salt intake. He denies shortness of breath.     Temporal arteritis. Followed by rheumatology, has appointment scheduled in 2/2018. No recurrence of vision changes, or headaches. He takes prednisone 20 mg QD, tapering off process.    Problem list and histories reviewed & adjusted, as indicated.  Additional history: as documented    Patient Active Problem List   Diagnosis     Retinal ischemia     Polyp of vocal cord or larynx     Other specified disorder of skin     Type 2 diabetes, HbA1c goal < 7% (H)     HYPERLIPIDEMIA LDL GOAL <100     Parkinson disease (H)     S/P total knee replacement     Anemia due to blood loss, acute     Dysthymia     BPH (benign prostatic hyperplasia)     Syncope     Alcoholism (H)     Health Care Home     Hypertension goal BP (blood pressure) < 150/90     Fall     Type 2 diabetes mellitus with other diabetic ophthalmic complication     Varicose vein     Neck pain     Implantable loop recorder, [employer] LINQ     Advance Care Planning     Temporal arteritis (H)     Past Surgical History:   Procedure  "Laterality Date     ARTHROPLASTY KNEE  1/31/2012    Procedure:ARTHROPLASTY KNEE; LEFT TOTAL KNEE ARTHROPLASTY (IRASEMA)^; Surgeon:SKIP FAIR; Location: OR     ARTHROSCOPY SHOULDER, OPEN ROTATOR CUFF REPAIR, COMBINED  4/24/2012    Procedure:COMBINED ARTHROSCOPY SHOULDER, OPEN ROTATOR CUFF REPAIR; RIGHT SHOULDER ARTHROSCOPY OPEN ROTATOR CUFF DEBRIDEMENT, SUBCROMIAL DECOMPRESSION, AND BICEPS TENODESIS REPAIR; Surgeon:SKIP FAIR; Location: [place]     CL AFF SURGICAL PATHOLOGY  6-    Dr. Bowers; left hand     ENT SURGERY       INCISE FINGER TENDON SHEATH  2008    Dr. Bowers; right AND LEFT hand     THROAT SURGERY      vocal cord polyps       Social History   Substance Use Topics     Smoking status: Never Smoker     Smokeless tobacco: Never Used     Alcohol use Yes      Comment: couple beers everyday     Family History   Problem Relation Age of Onset     Family History Negative Other      unknown family history per patient         Labs reviewed in EPIC      Reviewed and updated as needed this visit by clinical staff  Tobacco  Allergies  Med Hx  Surg Hx  Fam Hx  Soc Hx      Reviewed and updated as needed this visit by Provider         ROS:  Constitutional, HEENT, cardiovascular, pulmonary, GI, , musculoskeletal, neuro, skin, endocrine and psych systems are negative, except as otherwise noted.    This document serves as a record of the services and decisions personally performed and made by Quentin Stewart MD. It was created on his behalf by Radha Rocha, a trained medical scribe. The creation of this document is based on the provider's statements to the medical scribe.  Radha Rocha December 28, 2017 3:34 PM         OBJECTIVE:   /71 (BP Location: Right arm, Cuff Size: Adult Regular)  Pulse 80  Temp 98.2  F (36.8  C) (Oral)  Ht 1.765 m (5' 9.5\")  Wt 94.3 kg (208 lb)  SpO2 97%  BMI 30.28 kg/m2  Body mass index is 30.28 kg/(m^2).  GENERAL: healthy, alert and no distress  RESP: lungs " clear to auscultation - no rales, rhonchi or wheezes  CV: regular rate and rhythm, normal S1 S2, no S3 or S4, no murmur, click or rub, no peripheral edema and peripheral pulses strong  MS: 1+ edema bilaterally, no gross musculoskeletal defects noted  PSYCH: mentation appears normal, affect normal/bright    Diagnostic Test Results:  No results found for this or any previous visit (from the past 24 hour(s)).    ASSESSMENT/PLAN:   (R60.9) Edema, unspecified type  (primary encounter diagnosis)  Comment: Reviewed ED notes and diagnostics. 1+ edema noted bilaterally on exam today.probabley secondary to prednisone. He reports he did not have SOB or chest pain at time of UC visit. His weight has decreased by approximately 7 pounds since start of Lasix one week ago. Recommended to decrease Lasix to 10 mg daily, and monitor weight closely for fluctuation and adjust medications accordingly as outlined in patient instructions (see below)  Plan: Basic metabolic panel, furosemide (LASIX) 20 MG        tablet          (M31.6) Temporal arteritis (H)  Comment: Tapering off of prednisone, currently taking 20 mg. Followed by rheumatology.   Plan: ESR: Erythrocyte sedimentation rate    Patient Instructions     Monitor your weight daily with decrease to 0.5 tablet of Lasix (10mg). Goal weight is between 205 and 210. If you weight is around 205, you can stop taking the Lasix. If your weight increases by 3-5 pounds, take 1 tablet (20 mg).     Orders Placed This Encounter     ESR: Erythrocyte sedimentation rate     Basic metabolic panel     furosemide (LASIX) 20 MG tablet     Si/2 tablet per day. If weight increases by 3-5 pounds then increase to 20 mg (1 tablet). If weight decreases to 205 then ok to hold.     Dispense:  30 tablet     Refill:  1         The information in this document, created by the medical scribe for me, accurately reflects the services I personally performed and the decisions made by me. I have reviewed and  approved this document for accuracy prior to leaving the patient care area.  December 28, 2017 4:03 PM    Chuy Stewart MD, MD  [facility]

## 2017-12-28 NOTE — MR AVS SNAPSHOT
After Visit Summary   2017    Bart Blair    MRN: [id number]           Patient Information     Date Of Birth          1931        Visit Information        Provider Department      2017 3:00 PM Chuy Stewart MD [facility]        Today's Diagnoses     Edema, unspecified type    -  1    Temporal arteritis (H)          Care Instructions    Monitor your weight daily with decrease to 0.5 tablet of Lasix (10mg). Goal weight is between 205 and 210. If you weight is around 205, you can stop taking the Lasix. If your weight increases by 3-5 pounds, take 1 tablet (20 mg).     Orders Placed This Encounter     ESR: Erythrocyte sedimentation rate     Basic metabolic panel     furosemide (LASIX) 20 MG tablet     Si/2 tablet per day. If weight increases by 3-5 pounds then increase to 20 mg (1 tablet). If weight decreases to 205 then ok to hold.     Dispense:  30 tablet     Refill:  1               Follow-ups after your visit        Your next 10 appointments already scheduled     Mar 15, 2018 11:30 AM CDT   (Arrive by 11:15 AM)   Implantable Loop Recorder with Uc Cv Device 1   [facility]    [address] [place] [id number] [phone]            Mar 15, 2018 12:00 PM CDT   (Arrive by 11:45 AM)   RETURN ATRIAL FIBULATION VISIT with Samuel Gorman MD   [facility]    [address] [place] [id number] [phone]              Who to contact     If you have questions or need follow up information about today's clinic visit or your schedule please contact [facility] directly at [phone].  Normal or non-critical lab and imaging results will be communicated to you by MyChart, letter or phone within 4 business days after the clinic has received the results. If you do not hear from us within 7 days, please  "contact the clinic through [employer] or phone. If you have a critical or abnormal lab result, we will notify you by phone as soon as possible.  Submit refill requests through [employer] or call your pharmacy and they will forward the refill request to us. Please allow 3 business days for your refill to be completed.          Additional Information About Your Visit        [employer] Information     [employer] gives you secure access to your electronic health record. If you see a primary care provider, you can also send messages to your care team and make appointments. If you have questions, please call your primary care clinic.  If you do not have a primary care provider, please call [phone] and they will assist you.        Care EveryWhere ID     This is your Care EveryWhere ID. This could be used by other organizations to access your [place] medical records  [id number]        Your Vitals Were     Pulse Temperature Height Pulse Oximetry BMI (Body Mass Index)       80 98.2  F (36.8  C) (Oral) 5' 9.5\" (1.765 m) 97% 30.28 kg/m2        Blood Pressure from Last 3 Encounters:   12/28/17 131/71   12/22/17 148/76   10/24/17 120/58    Weight from Last 3 Encounters:   12/28/17 208 lb (94.3 kg)   12/22/17 216 lb 1.6 oz (98 kg)   10/24/17 208 lb (94.3 kg)              We Performed the Following     Basic metabolic panel     ESR: Erythrocyte sedimentation rate          Today's Medication Changes          These changes are accurate as of: 12/28/17  3:59 PM.  If you have any questions, ask your nurse or doctor.               These medicines have changed or have updated prescriptions.        Dose/Directions    furosemide 20 MG tablet   Commonly known as:  LASIX   This may have changed:    - how much to take  - how to take this  - when to take this  - additional instructions   Used for:  Edema, unspecified type   Changed by:  Chuy Stewart MD        1/2 tablet per day. If weight increases by 3-5 pounds then increase to 20 mg (1 " tablet). If weight decreases to 205 then ok to hold.   Quantity:  30 tablet   Refills:  1       senna-docusate 8.6-50 MG per tablet   Commonly known as:  SENOKOT-S;PERICOLACE   This may have changed:    - when to take this  - additional instructions   Used for:  S/P total knee replacement        Dose:  1-2 tablet   Take 1-2 tablets by mouth 2 times daily.   Quantity:  60 tablet   Refills:  1            Where to get your medicines      These medications were sent to [facility] - [place], [place] - [address] [place] [address].  [address] [facility] [address]     Phone:  [phone]     furosemide 20 MG tablet                Primary Care Provider Office Phone # Fax #    Chuy Stewart [phone]       [address] [place] [address]        Equal Access to Services     TIFFANI ABDUL : Hadii emmie ramirez hadasho Soomaali, waaxda luqadaha, qaybta kaalmada adeegyada, jd tsang . So [facility] [phone].    ATENCIÓN: Si habla español, tiene a ellis disposición servicios gratuitos de asistencia lingüística. Alejandro al [phone].    We comply with applicable federal civil rights laws and [place] laws. We do not discriminate on the basis of race, color, national origin, age, disability, sex, sexual orientation, or gender identity.            Thank you!     Thank you for choosing [facility]  for your care. Our goal is always to provide you with excellent care. Hearing back from our patients is one way we can continue to improve our services. Please take a few minutes to complete the written survey that you may receive in the mail after your visit with us. Thank you!             Your Updated Medication List - Protect others around you: Learn how to safely use, store and throw away your medicines at www.disposemymeds.org.          This list is accurate as of: 12/28/17  3:59 PM.  Always use your most recent med list.                    Brand Name Dispense Instructions for use Diagnosis    * ACETAMINOPHEN 8 HOUR 650 MG 8 hour tablet   Generic drug:  acetaminophen     100 tablet    Take 650 mg by mouth every 4 hours as needed.    Post-op pain       * TYLENOL 500 MG tablet   Generic drug:  acetaminophen     100 tablet    Take 2 tablets (1,000 mg) by mouth 3 times daily        amoxicillin 500 MG capsule    AMOXIL    16 capsule    4 tablets prior to dental appointment. Use for dental appointments    Status post total left knee replacement       aspirin 81 MG tablet      Take 1 tablet by mouth daily.    Hypertension goal BP (blood pressure) < 130/80       atorvastatin 10 MG tablet    LIPITOR    90 tablet    Take 1 tablet (10 mg) by mouth daily Refill when requested    Hyperlipidemia LDL goal <100, Type 2 diabetes mellitus with other ophthalmic complication, without long-term current use of insulin (H)       B-12 1000 MCG Tbcr     100 tablet    Take 1,000 mcg by mouth daily    Type 2 diabetes mellitus with other diabetic ophthalmic complication, Parkinson disease (H)       blood glucose monitoring lancets     100 Box    Check blood sugar once weekly    Type 2 diabetes mellitus with other diabetic ophthalmic complication       blood glucose monitoring test strip    ACCU-CHEK COMPACT STRIPS    100 each    1 strip by In Vitro route daily    Type 2 diabetes mellitus with other diabetic ophthalmic complication       carbidopa-levodopa  MG Tbcr      Take  by mouth. 4 times daily  .        cholecalciferol 1000 UNIT tablet    vitamin D3    100    1 TABLET DAILY    Contusion of ribs       diclofenac 1 % Gel topical gel    VOLTAREN    300 g    APPLY 4 GRAMS TO KNEES OR 2 GRAMS TO HANDS FOUR TIMES A DAY USING ENCLOSED DOSING CARD    Arthritis       dipyridamole 50 MG tablet    PERSANTINE    720 tablet    Take 2 tablets (100 mg) by mouth 4 times daily    Retinal ischemia       doxycycline monohydrate 100 MG capsule     60 capsule    1 tablet 2 time daily  for Rosacea flares    Rosacea       fluticasone 50 MCG/ACT spray    FLONASE    16 g    Spray 1-2 sprays into both nostrils daily    Chronic rhinitis       FOLIC ACID PO      Take 1 mg by mouth daily        furosemide 20 MG tablet    LASIX    30 tablet    1/2 tablet per day. If weight increases by 3-5 pounds then increase to 20 mg (1 tablet). If weight decreases to 205 then ok to hold.    Edema, unspecified type       irbesartan 300 MG tablet    AVAPRO    90 tablet    Take 1 tablet (300 mg) by mouth At Bedtime    Hypertension goal BP (blood pressure) < 150/90       metroNIDAZOLE 0.75 % cream    METROCREAM    45 g    Apply topically 2 times daily    Rosacea       MULTI vitamin  MENS Tabs      Take  by mouth. Takes one daily        predniSONE 20 MG tablet    DELTASONE     Take 20 mg by mouth daily        senna-docusate 8.6-50 MG per tablet    SENOKOT-S;PERICOLACE    60 tablet    Take 1-2 tablets by mouth 2 times daily.    S/P total knee replacement       sertraline 25 MG tablet    ZOLOFT    90 tablet    Take 1 tablet (25 mg) by mouth daily    Dysthymia       * Notice:  This list has 2 medication(s) that are the same as other medications prescribed for you. Read the directions carefully, and ask your doctor or other care provider to review them with you.

## 2017-12-29 LAB
ANION GAP SERPL CALCULATED.3IONS-SCNC: 10 MMOL/L (ref 3–14)
BACTERIA SPEC CULT: NO GROWTH
BUN SERPL-MCNC: 28 MG/DL (ref 7–30)
CALCIUM SERPL-MCNC: 8.8 MG/DL (ref 8.5–10.1)
CHLORIDE SERPL-SCNC: 104 MMOL/L (ref 94–109)
CO2 SERPL-SCNC: 24 MMOL/L (ref 20–32)
CREAT SERPL-MCNC: 1.14 MG/DL (ref 0.66–1.25)
GFR SERPL CREATININE-BSD FRML MDRD: 61 ML/MIN/1.7M2
GLUCOSE SERPL-MCNC: 200 MG/DL (ref 70–99)
Lab: NORMAL
POTASSIUM SERPL-SCNC: 5.1 MMOL/L (ref 3.4–5.3)
SODIUM SERPL-SCNC: 138 MMOL/L (ref 133–144)
SPECIMEN SOURCE: NORMAL

## 2018-01-17 ENCOUNTER — RECORDS - HEALTHEAST (OUTPATIENT)
Dept: LAB | Facility: CLINIC | Age: 83
End: 2018-01-17

## 2018-01-17 LAB
ERYTHROCYTE [DISTWIDTH] IN BLOOD BY AUTOMATED COUNT: 13.3 % (ref 11–14.5)
FERRITIN SERPL-MCNC: 112 NG/ML (ref 27–300)
HCT VFR BLD AUTO: 33 % (ref 40–54)
HGB BLD-MCNC: 10.3 G/DL (ref 14–18)
IRON SATN MFR SERPL: 25 % (ref 20–50)
IRON SERPL-MCNC: 64 UG/DL (ref 42–175)
MCH RBC QN AUTO: 33.3 PG (ref 27–34)
MCHC RBC AUTO-ENTMCNC: 31.2 G/DL (ref 32–36)
MCV RBC AUTO: 107 FL (ref 80–100)
PLATELET # BLD AUTO: 189 THOU/UL (ref 140–440)
PMV BLD AUTO: 10.5 FL (ref 8.5–12.5)
RBC # BLD AUTO: 3.09 MILL/UL (ref 4.4–6.2)
TIBC SERPL-MCNC: 253 UG/DL (ref 313–563)
TRANSFERRIN SERPL-MCNC: 202 MG/DL (ref 212–360)
VIT B12 SERPL-MCNC: 1919 PG/ML (ref 213–816)
WBC: 10.3 THOU/UL (ref 4–11)

## 2018-01-22 ENCOUNTER — MYC MEDICAL ADVICE (OUTPATIENT)
Dept: FAMILY MEDICINE | Facility: CLINIC | Age: 83
End: 2018-01-22

## 2018-01-23 ENCOUNTER — MYC MEDICAL ADVICE (OUTPATIENT)
Dept: FAMILY MEDICINE | Facility: CLINIC | Age: 83
End: 2018-01-23

## 2018-01-31 ENCOUNTER — RECORDS - HEALTHEAST (OUTPATIENT)
Dept: LAB | Facility: CLINIC | Age: 83
End: 2018-01-31

## 2018-02-01 LAB
ALBUMIN SERPL-MCNC: 2.9 G/DL (ref 3.5–5)
ANION GAP SERPL CALCULATED.3IONS-SCNC: 8 MMOL/L (ref 5–18)
BUN SERPL-MCNC: 29 MG/DL (ref 8–28)
CALCIUM SERPL-MCNC: 8.9 MG/DL (ref 8.5–10.5)
CHLORIDE BLD-SCNC: 104 MMOL/L (ref 98–107)
CO2 SERPL-SCNC: 29 MMOL/L (ref 22–31)
CREAT SERPL-MCNC: 1.16 MG/DL (ref 0.7–1.3)
GFR SERPL CREATININE-BSD FRML MDRD: 60 ML/MIN/1.73M2
GLUCOSE BLD-MCNC: 122 MG/DL (ref 70–125)
POTASSIUM BLD-SCNC: 4.6 MMOL/L (ref 3.5–5)
SODIUM SERPL-SCNC: 141 MMOL/L (ref 136–145)

## 2018-02-08 ENCOUNTER — MYC MEDICAL ADVICE (OUTPATIENT)
Dept: FAMILY MEDICINE | Facility: CLINIC | Age: 83
End: 2018-02-08

## 2018-02-08 NOTE — TELEPHONE ENCOUNTER
Asked if patient had further symptoms indicative of a UTI and if the facility has the means to test patient's urine.    Matilde Leblanc RN  [facility]

## 2018-03-05 DIAGNOSIS — G20.A1 PARKINSON DISEASE (H): ICD-10-CM

## 2018-03-05 RX ORDER — CYANOCOBALAMIN (VITAMIN B-12) 1000 MCG
TABLET, EXTENDED RELEASE ORAL
Qty: 60 TABLET | Refills: 4 | Status: SHIPPED | OUTPATIENT
Start: 2018-03-05

## 2018-03-06 NOTE — TELEPHONE ENCOUNTER
Prescription approved per [facility] Refill Protocol.    Gaurav Mclaughlin RN

## 2018-03-06 NOTE — TELEPHONE ENCOUNTER
"Requested Prescriptions   Pending Prescriptions Disp Refills     Cyanocobalamin (VITAMIN B-12 CR) 1000 MCG TBCR [Pharmacy Med Name: VITAMIN B-12 ER 1000MCG TBCR]  Last Written Prescription Date:  11/22/2016  Last Fill Quantity: 100 tabs,  # refills: 3   Last office visit: 12/28/2017 with prescribing provider:  BERKLEY Stewart   Future Office Visit:     60 tablet 4     Sig: TAKE ONE TABLET BY MOUTH EVERY DAY    Vitamin Supplements (Adult) Protocol Passed    3/5/2018  3:28 PM       Passed - High dose Vitamin D not ordered       Passed - Recent (12 mo) or future (30 days) visit within the authorizing provider's specialty    Patient had office visit in the last year or has a visit in the next 30 days with authorizing provider.  See \"Patient Info\" tab in inbasket, or \"Choose Columns\" in Meds & Orders section of the refill encounter.               "

## 2018-03-07 ENCOUNTER — TELEPHONE (OUTPATIENT)
Dept: FAMILY MEDICINE | Facility: CLINIC | Age: 83
End: 2018-03-07

## 2018-03-07 NOTE — TELEPHONE ENCOUNTER
Forms received from: [facility]   Phone number listed: n/a   Fax listed: [phone]  Date received: 3-7-18  Form description: Lancets  Once forms are completed, please return to [facility] via fax.  Is patient requesting to be contacted when forms are completed: n/a  Form placed: Provider folder     Rhonda Alvarado

## 2018-03-15 ENCOUNTER — OFFICE VISIT (OUTPATIENT)
Dept: CARDIOLOGY | Facility: CLINIC | Age: 83
End: 2018-03-15
Attending: INTERNAL MEDICINE
Payer: MEDICARE

## 2018-03-15 ENCOUNTER — TELEPHONE (OUTPATIENT)
Dept: FAMILY MEDICINE | Facility: CLINIC | Age: 83
End: 2018-03-15

## 2018-03-15 ENCOUNTER — PRE VISIT (OUTPATIENT)
Dept: CARDIOLOGY | Facility: CLINIC | Age: 83
End: 2018-03-15

## 2018-03-15 VITALS
OXYGEN SATURATION: 96 % | WEIGHT: 215.2 LBS | HEIGHT: 70 IN | BODY MASS INDEX: 30.81 KG/M2 | HEART RATE: 94 BPM | SYSTOLIC BLOOD PRESSURE: 130 MMHG | DIASTOLIC BLOOD PRESSURE: 55 MMHG

## 2018-03-15 DIAGNOSIS — R55 SYNCOPE: Primary | ICD-10-CM

## 2018-03-15 DIAGNOSIS — R55 SYNCOPE, UNSPECIFIED SYNCOPE TYPE: Primary | ICD-10-CM

## 2018-03-15 DIAGNOSIS — I10 HYPERTENSION GOAL BP (BLOOD PRESSURE) < 150/90: ICD-10-CM

## 2018-03-15 PROCEDURE — 93291 INTERROG DEV EVAL SCRMS IP: CPT | Mod: ZF

## 2018-03-15 PROCEDURE — 99214 OFFICE O/P EST MOD 30 MIN: CPT | Mod: ZP | Performed by: INTERNAL MEDICINE

## 2018-03-15 PROCEDURE — G0463 HOSPITAL OUTPT CLINIC VISIT: HCPCS | Mod: ZF

## 2018-03-15 PROCEDURE — 93291 INTERROG DEV EVAL SCRMS IP: CPT | Mod: 26 | Performed by: INTERNAL MEDICINE

## 2018-03-15 RX ORDER — IRBESARTAN 150 MG/1
150 TABLET ORAL AT BEDTIME
Qty: 90 TABLET | Refills: 1 | Status: SHIPPED | OUTPATIENT
Start: 2018-03-15 | End: 2018-03-29 | Stop reason: DRUGHIGH

## 2018-03-15 ASSESSMENT — PAIN SCALES - GENERAL: PAINLEVEL: NO PAIN (0)

## 2018-03-15 NOTE — PROGRESS NOTES
HPI:   Mr Blair is a pleasant 88 yo male with an ILR in place for palpitations and falls. He also has a history of Parkinsonism with but very slow progression.  He is here today with his spouse and daughter  \  Bart apparently has had multiple apparent falls. His daughter says that if they find him on the floor (he cannot get up himself), they need to get help to pick him up. Fortunately no injuries except for fall in [place] in January. He does not recall much of these events.    In Jan went to [place] to void standing,. . Left walker outside door. Apparently collapsed. WWife thinks that he was down for several hours, but was awake when she found him. He could not get up on own. He did dislocate left shoulder and will need surg fix.  He does not recall details. Had not wet himself so likely was finished voiding, suggesting post-micturition faint, but unclear.  Uses walker at home but sometimes it is only nearby.  Probably has Parkinson + OH altho not demonstrable today. So falls could be due to instability, OH or reflex faint.  No arrhythmia on ILR. I did discuss that if he falls the family should use his ILR remote to send proximate rhythm   Continues on Avapro 300 daily and lasix 10. Will reduce ARB to 150 daily.     12/22/2017: ED visit for chest pain/LE edema: gave lasix, normal echo     [place] [facility] [place], [place] see care everywhere    No new CV complaints. Denies  CP or SOBE or HF symptoms.     PAST MEDICAL HISTORY:  Past Medical History:   Diagnosis Date     Anemia      Anemia due to blood loss, acute      CKD (chronic kidney disease) stage 3, GFR 30-59 ml/min 5/31/2010     Essential hypertension, benign      Other and unspecified hyperlipidemia      Other and unspecified hyperlipidemia      Other specified disorder of skin      Pain in joint, shoulder region      Parkinson disease (H) 9/2/2010     Polyp of vocal cord or larynx      Retinal ischemia     right sided thrombotic plaque     Rosacea      Type  II or unspecified type diabetes mellitus without mention of complication, not stated as uncontrolled        CURRENT MEDICATIONS:  Current Outpatient Prescriptions   Medication Sig Dispense Refill     Cyanocobalamin (VITAMIN B-12 CR) 1000 MCG TBCR TAKE ONE TABLET BY MOUTH EVERY DAY 60 tablet 4     furosemide (LASIX) 20 MG tablet 1/2 tablet per day. If weight increases by 3-5 pounds then increase to 20 mg (1 tablet). If weight decreases to 205 then ok to hold. 30 tablet 1     diclofenac (VOLTAREN) 1 % GEL topical gel APPLY 4 GRAMS TO KNEES OR 2 GRAMS TO HANDS FOUR TIMES A DAY USING ENCLOSED DOSING CARD 300 g 3     atorvastatin (LIPITOR) 10 MG tablet Take 1 tablet (10 mg) by mouth daily Refill when requested 90 tablet 3     amoxicillin (AMOXIL) 500 MG capsule 4 tablets prior to dental appointment. Use for dental appointments 16 capsule 4     predniSONE (DELTASONE) 20 MG tablet Take 20 mg by mouth daily        fluticasone (FLONASE) 50 MCG/ACT spray Spray 1-2 sprays into both nostrils daily 16 g 5     irbesartan (AVAPRO) 300 MG tablet Take 1 tablet (300 mg) by mouth At Bedtime 90 tablet 3     dipyridamole (PERSANTINE) 50 MG tablet Take 2 tablets (100 mg) by mouth 4 times daily 720 tablet 3     sertraline (ZOLOFT) 25 MG tablet Take 1 tablet (25 mg) by mouth daily 90 tablet 3     doxycycline Monohydrate 100 MG CAPS 1 tablet 2 time daily for Rosacea flares 60 capsule 3     blood glucose monitoring (ACCU-CHEK COMPACT STRIPS) test strip 1 strip by In Vitro route daily 100 each 11     blood glucose monitoring (SOFTCLIX) lancets Check blood sugar once weekly 100 Box 0     metroNIDAZOLE (METROCREAM) 0.75 % cream Apply topically 2 times daily 45 g 3     acetaminophen (TYLENOL) 500 MG tablet Take 2 tablets (1,000 mg) by mouth 3 times daily 100 tablet 0     FOLIC ACID PO Take 1 mg by mouth daily       acetaminophen 650 MG TABS Take 650 mg by mouth every 4 hours as needed. 100 tablet 0     Multiple Vitamin (MULTI VITAMIN  MENS) TABS  Take  by mouth. Takes one daily         aspirin 81 MG tablet Take 1 tablet by mouth daily.  3     senna-docusate (SENOKOT-S;PERICOLACE) 8.6-50 MG per tablet Take 1-2 tablets by mouth 2 times daily. (Patient taking differently: Take 1-2 tablets by mouth Takes about 2-3 times weekly) 60 tablet 1     Carbidopa-Levodopa  MG TBCR Take  by mouth. 4 times daily  .        VITAMIN D 1000 UNIT OR TABS 1 TABLET DAILY 100 4       PAST SURGICAL HISTORY:  Past Surgical History:   Procedure Laterality Date     ARTHROPLASTY KNEE  2012    Procedure:ARTHROPLASTY KNEE; LEFT TOTAL KNEE ARTHROPLASTY (IRASEMA)^; Surgeon:SKIP FAIR; Location: OR     ARTHROSCOPY SHOULDER, OPEN ROTATOR CUFF REPAIR, COMBINED  2012    Procedure:COMBINED ARTHROSCOPY SHOULDER, OPEN ROTATOR CUFF REPAIR; RIGHT SHOULDER ARTHROSCOPY OPEN ROTATOR CUFF DEBRIDEMENT, SUBCROMIAL DECOMPRESSION, AND BICEPS TENODESIS REPAIR; Surgeon:SKIP FAIR; Location: [facility]     CL AFF SURGICAL PATHOLOGY  2010    Dr. Bowers; left hand     ENT SURGERY       INCISE FINGER TENDON SHEATH      Dr. Bowers; right AND LEFT hand     THROAT SURGERY      vocal cord polyps       ALLERGIES:     Allergies   Allergen Reactions     No Known Drug Allergies        FAMILY HISTORY:  Family History   Problem Relation Age of Onset     Family History Negative Other      unknown family history per patient   Parents , but no illness recorded    SOCIAL HISTORY:  Social History   Substance Use Topics     Smoking status: Never Smoker     Smokeless tobacco: Never Used     Alcohol use Yes      Comment: couple beers everyday       ROS:   Constitutional: No fever, chills, or sweats. Weight stable. Uses cane   ENT: No visual disturbance, ear ache, epistaxis, sore throat.   Cardiovascular: As per HPI.   Respiratory: No cough, hemoptysis.    GI: No nausea, vomiting, hematemesis, melena, or hematochezia.   : No hematuria.   Integument: Negative.   Psychiatric: Negative.  "  Hematologic:  Easy bruising, no easy bleeding.  Neuro: Negative apart from Parkinsons and its complications, hardy[ec minor intention tremor  Endocrinology: No significant heat or cold intolerance   Musculoskeletal: Continuing instability related to Parkinsonism    Exam:  /63 (BP Location: Right arm, Patient Position: Chair, Cuff Size: Adult Regular)  Pulse 80  Ht 1.765 m (5' 9.5\")  Wt 97.6 kg (215 lb 3.2 oz)  SpO2 96%  BMI 31.32 kg/[id number] Vitals not filed for this encounter.    GENERAL APPEARANCE: Pleasant alert and no distress ---appropriate for age  HEENT:normal pupil size and reaction, normal palate, mucosa moist, no central cyanosis  NECK: no adenopathy, no asymmetry, masses, or scars, thyroid normal to palpation and no bruits, JVP not elevated  RESPIRATORY: lungs clear to auscultation -  no use of accessory muscles, no retractions, respirations are unlabored, normal respiratory rate  CARDIOVASCULAR: regular rhythm, normal S1 with physiologic split S2, no S3 or S4 and no murmur, click or rub, precordium quiet with normal PMI.  ABDOMEN: soft, non tender, without hepatosplenomegaly, no masses palpable, bowel sounds normal,   EXTREMITIES: peripheral pulses normal, no edema, no bruits.  NEURO: alert and oriented to person/place/time, normal speech, +/- Parkinsonism gait but normal  Affect. Some intention tremor  VASC:  pulses are normal in volumes and symmetric bilaterally. No bruits are heard.  SKIN: no ecchymoses, no rashes    Labs:  CBC RESULTS:   Lab Results   Component Value Date    WBC 11.9 (H) 12/22/2017    RBC 3.52 (L) 12/22/2017    HGB 11.7 (L) 12/22/2017    HCT 36.8 (L) 12/22/2017     (H) 12/22/2017    MCH 33.2 (H) 12/22/2017    MCHC 31.8 12/22/2017    RDW 13.5 12/22/2017     12/22/2017       BMP RESULTS:  Lab Results   Component Value Date     12/28/2017    POTASSIUM 5.1 12/28/2017    CHLORIDE 104 12/28/2017    CO2 24 12/28/2017    ANIONGAP 10 12/28/2017     (H) " 12/28/2017    BUN 28 12/28/2017    CR 1.14 12/28/2017    GFRESTIMATED 61 12/28/2017    GFRESTBLACK 74 12/28/2017    BLAISE 8.8 12/28/2017        INR RESULTS:  Lab Results   Component Value Date    INR 0.93 12/22/2017    INR 1.11 01/31/2012       Procedures:  9/7/16: ILR explant, reimplant    3/31/2016: Echocardiogram; \  Interpretation Summary  Technically difficult study.Extremely difficult acoustic windows despite the  use of contrast for endcardial border definition.  Global and regional left ventricular function is normal with an EF of 60-65%.  Global right ventricular function is normal.  ______________________________________________________________________________           Left Ventricle  Global and regional left ventricular function is normal with an EF of 60-65%.  Left ventricular size is normal. Left ventricular wall thickness is normal.     Right Ventricle  The right ventricle is normal size. Global right ventricular function is  normal.  Atria  Both atria appear normal.     Mitral Valve  The mitral valve is normal.     Aortic Valve  Aortic valve is normal in structure and function.     Tricuspid Valve  The tricuspid valve is normal. Trace tricuspid insufficiency is present. The  peak velocity of the tricuspid regurgitant jet is not obtainable. Pulmonary  artery systolic pressure cannot be assessed.     Pulmonic Valve  The pulmonic valve is normal.     Vessels  The aorta root is normal. The inferior vena cava was normal in size with  preserved respiratory variability.  Pericardium  No pericardial effusion is present. Prominent epicardial fat is noted.     ______________________________________________________________________________  MMode/2D Measurements & Calculations  Ao root diam: 3.7 cm  LA dimension: 5.1 cm  asc Aorta Diam: 3.3 cm  LA/Ao: 1.4  LVOT diam: 2.0 cm  LVOT area: 3.0 cm2           Doppler Measurements & Calculations  MV E max princess: 67.4 cm/sec  MV A max princess: 98.0 cm/sec  MV E/A: 0.69  MV dec  time: 0.25 sec  Ao V2 max: 145.0 cm/sec  Ao max P.4 mmHg  Ao V2 mean: 106.3 cm/sec  Ao mean P.1 mmHg  Ao V2 VTI: 30.3 cm  ASIYA(I,D): 2.6 cm2  ASIYA(V,D): 2.3 cm2  LV V1 max: 112.5 cm/sec  LV V1 VTI: 26.2 cm  SV(LVOT): 78.7 ml  ASIYA Index (cm2/m2): 1.2  Tilt/autonomic Study: 2013   DISCUSSION:  - Autonomic function test: consistent with normal physiologic response except for hypotensive response to couh: but no symptoms and not consistent with history  - TILT table test: No vasovagal syncope.   No orthostatic hypotension.   No POTS.   -EEG to be read separately.   - ILR remains in situ   PLAN: No actionable CV findings. Possibly a vasodepressor faint but inadequate evidence to this point. Will await EEG findings and follow-up in clinic.  MRI brain (2013)   1. No acute infarction or intracranial hemorrhage.  2. Mild to moderate nonspecific white matter changes, likely sequela of chronic small vessel ischemic disease. Old small area of encephalomalacia in the posterior right frontal lobe.  3. No abnormal enhancing lesions intracranially.  4. Head MRA demonstrates no definite aneurysm or stenosis of the major intracranial arteries.  5. Neck MRA demonstrates patent major cervical arteries.    Echocardiogram (2013): 1. Normal LV size, wall motion, and systolic function. Stage I diastolic dysfunction 2. Normal RV size and function 3. No significant valvular abnormalities 4. Trivial pericardial effusion    Holter monitor (2012): occasional PACs and PVCs      Assessment and Plan:   1. Falls of uncertain cause. Will reduce anti-hypertensive to diminish OH risk    2. Parkinson's disease    3. Hypertension may now be too aggressively treated      RTC Diandra 6 mos    RTC (Sherif) 1 year    I very much appreciated the opportunity to see and assess Mr Bart Blair in the clinic today.    Please do not hesitate to contact my office if you have any questions or concerns.      Samuel Gorman MD  Cardiac Arrhythmia  Service  [facility]  [phone]    CC  Patient Care Team:  Hiro Marquez MD as PCP - General  Rosalee Aggarwal RN as Nurse Coordinator  HIRO MARQUEZ

## 2018-03-15 NOTE — MR AVS SNAPSHOT
After Visit Summary   3/15/2018    Bart Blair    MRN: [id number]           Patient Information     Date Of Birth          2/14/1931        Visit Information        Provider Department      3/15/2018 11:30 AM 1, Uc Cv Device [facility]        Today's Diagnoses     Syncope    -  1      Care Instructions    It was a pleasure to see you in clinic today. Please do not hesitate to call with any questions or concerns. You are scheduled for remote ILR transmissions on 6/18/18, 9/24/18 and 12/24/18. We will call in 1-2 business days to discuss the results with you. We look forward to seeing you in clinic at your next device check in 1 year.    Esther Perez RN  Electrophysiology Nurse Clinician  [facility]  During business hours call:  [phone]  After business hours please call: [phone]- select option #4 and ask for job code [phone].            Follow-ups after your visit        Additional Services     Follow-Up with Device Clinic - 1 year                 Your next 10 appointments already scheduled     Sep 13, 2018  1:30 PM CDT   (Arrive by 1:15 PM)   Implantable Loop Recorder with Uc Cv Device 1   [facility] ([facility])    [address] [place] [address]  [address] [place] [id number] [phone]            Sep 13, 2018  2:00 PM CDT   (Arrive by 1:45 PM)   RETURN ATRIAL FIBULATION VISIT with JOSEPH [facility])    [address] [place] [address]  [address] [place] [id number] [phone]              Future tests that were ordered for you today     Open Future Orders        Priority Expected Expires Ordered    Follow-Up with EP Cardiac Advanced Practice Provider- 6 Months Routine 9/11/2018 12/10/2018 3/15/2018    Follow-Up with Electrophysiologist - 1 Year Routine 3/15/2019 3/16/2019 3/15/2018    Follow-Up with Device Clinic - 1 year Routine 3/15/2019 3/16/2019  3/15/2018            Who to contact     If you have questions or need follow up information about today's clinic visit or your schedule please contact [facility] directly at [phone].  Normal or non-critical lab and imaging results will be communicated to you by MyChart, letter or phone within 4 business days after the clinic has received the results. If you do not hear from us within 7 days, please contact the clinic through [facility] or phone. If you have a critical or abnormal lab result, we will notify you by phone as soon as possible.  Submit refill requests through [employer] or call your pharmacy and they will forward the refill request to us. Please allow 3 business days for your refill to be completed.          Additional Information About Your Visit        [facility] Information     [employer] gives you secure access to your electronic health record. If you see a primary care provider, you can also send messages to your care team and make appointments. If you have questions, please call your primary care clinic.  If you do not have a primary care provider, please call [phone] and they will assist you.        Care EveryWhere ID     This is your Care EveryWhere ID. This could be used by other organizations to access your [facility] medical records  [id number]         Blood Pressure from Last 3 Encounters:   03/15/18 130/55   12/28/17 131/71   12/22/17 148/76    Weight from Last 3 Encounters:   03/15/18 97.6 kg (215 lb 3.2 oz)   12/28/17 94.3 kg (208 lb)   12/22/17 98 kg (216 lb 1.6 oz)              We Performed the Following     (84287) INTERR DEVICE EVAL IN PERSON, LOOP RECORDER          Today's Medication Changes          These changes are accurate as of 3/15/18 12:46 PM.  If you have any questions, ask your nurse or doctor.               These medicines have changed or have updated prescriptions.        Dose/Directions    irbesartan 150 MG tablet   Commonly known as:  AVAPRO   This may have  changed:    - medication strength  - how much to take   Used for:  Hypertension goal BP (blood pressure) < 150/90   Changed by:  Samuel Gorman MD        Dose:  150 mg   Take 1 tablet (150 mg) by mouth At Bedtime   Quantity:  90 tablet   Refills:  1       senna-docusate 8.6-50 MG per tablet   Commonly known as:  SENOKOT-S;PERICOLACE   This may have changed:    - when to take this  - additional instructions   Used for:  S/P total knee replacement        Dose:  1-2 tablet   Take 1-2 tablets by mouth 2 times daily.   Quantity:  60 tablet   Refills:  1            Where to get your medicines      These medications were sent to [facility] - [place], [place] - [address] [facility]  [address] [facility], [place] [address]     Phone:  [phone]     irbesartan 150 MG tablet                Primary Care Provider Office Phone # Fax #    Chuy Stewart [phone]       [facility] [address]        Equal Access to Services     [facility]: Hadii aad ku hadasho Soomaali, waaxda luqadaha, qaybta kaalmada adeegyada, waxay roderick haymalaika tsang . So [facility] [phone].    ATENCIÓN: Si habla español, tiene a ellis disposición servicios gratuitos de asistencia lingüística. [facility] [phone].    We comply with applicable federal civil rights laws and [place] laws. We do not discriminate on the basis of race, color, national origin, age, disability, sex, sexual orientation, or gender identity.            Thank you!     Thank you for choosing [facility]  for your care. Our goal is always to provide you with excellent care. Hearing back from our patients is one way we can continue to improve our services. Please take a few minutes to complete the written survey that you may receive in the mail after your visit with us. Thank you!             Your Updated Medication List - Protect others around you: Learn how to safely use, store  and throw away your medicines at www.disposemymeds.org.          This list is accurate as of 3/15/18 12:46 PM.  Always use your most recent med list.                   Brand Name Dispense Instructions for use Diagnosis    * ACETAMINOPHEN 8 HOUR 650 MG 8 hour tablet   Generic drug:  acetaminophen     100 tablet    Take 650 mg by mouth every 4 hours as needed.    Post-op pain       * TYLENOL 500 MG tablet   Generic drug:  acetaminophen     100 tablet    Take 2 tablets (1,000 mg) by mouth 3 times daily        amoxicillin 500 MG capsule    AMOXIL    16 capsule    4 tablets prior to dental appointment. Use for dental appointments    Status post total left knee replacement       aspirin 81 MG tablet      Take 1 tablet by mouth daily.    Hypertension goal BP (blood pressure) < 130/80       atorvastatin 10 MG tablet    LIPITOR    90 tablet    Take 1 tablet (10 mg) by mouth daily Refill when requested    Hyperlipidemia LDL goal <100, Type 2 diabetes mellitus with other ophthalmic complication, without long-term current use of insulin (H)       blood glucose monitoring lancets     100 Box    Check blood sugar once weekly    Type 2 diabetes mellitus with other diabetic ophthalmic complication       blood glucose monitoring test strip    ACCU-CHEK COMPACT STRIPS    100 each    1 strip by In Vitro route daily    Type 2 diabetes mellitus with other diabetic ophthalmic complication       carbidopa-levodopa  MG Tbcr      Take  by mouth. 4 times daily  .        cholecalciferol 1000 UNIT tablet    vitamin D3    100    1 TABLET DAILY    Contusion of ribs       diclofenac 1 % Gel topical gel    VOLTAREN    300 g    APPLY 4 GRAMS TO KNEES OR 2 GRAMS TO HANDS FOUR TIMES A DAY USING ENCLOSED DOSING CARD    Arthritis       dipyridamole 50 MG tablet    PERSANTINE    720 tablet    Take 2 tablets (100 mg) by mouth 4 times daily    Retinal ischemia       doxycycline monohydrate 100 MG capsule     60 capsule    1 tablet 2 time daily for  Rosacea flares    Rosacea       fluticasone 50 MCG/ACT spray    FLONASE    16 g    Spray 1-2 sprays into both nostrils daily    Chronic rhinitis       FOLIC ACID PO      Take 1 mg by mouth daily        furosemide 20 MG tablet    LASIX    30 tablet    1/2 tablet per day. If weight increases by 3-5 pounds then increase to 20 mg (1 tablet). If weight decreases to 205 then ok to hold.    Edema, unspecified type       irbesartan 150 MG tablet    AVAPRO    90 tablet    Take 1 tablet (150 mg) by mouth At Bedtime    Hypertension goal BP (blood pressure) < 150/90       metroNIDAZOLE 0.75 % cream    METROCREAM    45 g    Apply topically 2 times daily    Rosacea       MULTI vitamin  MENS Tabs      Take  by mouth. Takes one daily        predniSONE 20 MG tablet    DELTASONE     Take 20 mg by mouth daily        senna-docusate 8.6-50 MG per tablet    SENOKOT-S;PERICOLACE    60 tablet    Take 1-2 tablets by mouth 2 times daily.    S/P total knee replacement       sertraline 25 MG tablet    ZOLOFT    90 tablet    Take 1 tablet (25 mg) by mouth daily    Dysthymia       Vitamin B-12 CR 1000 MCG Tbcr     60 tablet    TAKE ONE TABLET BY MOUTH EVERY DAY    Parkinson disease (H)       * Notice:  This list has 2 medication(s) that are the same as other medications prescribed for you. Read the directions carefully, and ask your doctor or other care provider to review them with you.

## 2018-03-15 NOTE — TELEPHONE ENCOUNTER
HPI:   Mr Blair is a pleasant 88 yo male with an ILR in place for palpitations and falls. He also has a history of Parkinsonism with but very slow progression. Is seen by Neurology. He is here today with his spouse.    12/22/2017: ED visit for chest pain/LE edema: gave lasix, normal echo     1/2018: [facility] for fall, orbital fracture- see care everywhere    No falls over last year   He had previously a documented cardiac pause, but not associated with falls/collapse.    ILR is not showing any gianni of concern. Last year had one 7-sec episode of PSVT -- asymptomatic. .  ILR checked today   No new CV complaints. Denies OH, lightheadedness, CP or SOBE or HF symptoms. Wife here and concurs      PAST MEDICAL HISTORY:  Past Medical History:   Diagnosis Date     Anemia      Anemia due to blood loss, acute      CKD (chronic kidney disease) stage 3, GFR 30-59 ml/min 5/31/2010     Essential hypertension, benign      Other and unspecified hyperlipidemia      Other and unspecified hyperlipidemia      Other specified disorder of skin      Pain in joint, shoulder region      Parkinson disease (H) 9/2/2010     Polyp of vocal cord or larynx      Retinal ischemia     right sided thrombotic plaque     Rosacea      Type II or unspecified type diabetes mellitus without mention of complication, not stated as uncontrolled        CURRENT MEDICATIONS:  Current Outpatient Prescriptions   Medication Sig Dispense Refill     Cyanocobalamin (VITAMIN B-12 CR) 1000 MCG TBCR TAKE ONE TABLET BY MOUTH EVERY DAY 60 tablet 4     furosemide (LASIX) 20 MG tablet 1/2 tablet per day. If weight increases by 3-5 pounds then increase to 20 mg (1 tablet). If weight decreases to 205 then ok to hold. 30 tablet 1     diclofenac (VOLTAREN) 1 % GEL topical gel APPLY 4 GRAMS TO KNEES OR 2 GRAMS TO HANDS FOUR TIMES A DAY USING ENCLOSED DOSING CARD 300 g 3     atorvastatin (LIPITOR) 10 MG tablet Take 1 tablet (10 mg) by mouth daily Refill when requested 90 tablet 3      amoxicillin (AMOXIL) 500 MG capsule 4 tablets prior to dental appointment. Use for dental appointments 16 capsule 4     predniSONE (DELTASONE) 20 MG tablet Take 20 mg by mouth daily        fluticasone (FLONASE) 50 MCG/ACT spray Spray 1-2 sprays into both nostrils daily 16 g 5     irbesartan (AVAPRO) 300 MG tablet Take 1 tablet (300 mg) by mouth At Bedtime 90 tablet 3     dipyridamole (PERSANTINE) 50 MG tablet Take 2 tablets (100 mg) by mouth 4 times daily 720 tablet 3     sertraline (ZOLOFT) 25 MG tablet Take 1 tablet (25 mg) by mouth daily 90 tablet 3     doxycycline Monohydrate 100 MG CAPS 1 tablet 2 time daily for Rosacea flares 60 capsule 3     blood glucose monitoring (ACCU-CHEK COMPACT STRIPS) test strip 1 strip by In Vitro route daily 100 each 11     blood glucose monitoring (SOFTCLIX) lancets Check blood sugar once weekly 100 Box 0     metroNIDAZOLE (METROCREAM) 0.75 % cream Apply topically 2 times daily 45 g 3     acetaminophen (TYLENOL) 500 MG tablet Take 2 tablets (1,000 mg) by mouth 3 times daily 100 tablet 0     FOLIC ACID PO Take 1 mg by mouth daily       acetaminophen 650 MG TABS Take 650 mg by mouth every 4 hours as needed. 100 tablet 0     Multiple Vitamin (MULTI VITAMIN  MENS) TABS Take  by mouth. Takes one daily         aspirin 81 MG tablet Take 1 tablet by mouth daily.  3     senna-docusate (SENOKOT-S;PERICOLACE) 8.6-50 MG per tablet Take 1-2 tablets by mouth 2 times daily. (Patient taking differently: Take 1-2 tablets by mouth Takes about 2-3 times weekly) 60 tablet 1     Carbidopa-Levodopa  MG TBCR Take  by mouth. 4 times daily  .        VITAMIN D 1000 UNIT OR TABS 1 TABLET DAILY 100 4       PAST SURGICAL HISTORY:  Past Surgical History:   Procedure Laterality Date     ARTHROPLASTY KNEE  1/31/2012    Procedure:ARTHROPLASTY KNEE; LEFT TOTAL KNEE ARTHROPLASTY (IRASEMA)^; Surgeon:SKIP FAIR; Location:[facility] OR     ARTHROSCOPY SHOULDER, OPEN ROTATOR CUFF REPAIR, COMBINED  4/24/2012     Procedure:COMBINED ARTHROSCOPY SHOULDER, OPEN ROTATOR CUFF REPAIR; RIGHT SHOULDER ARTHROSCOPY OPEN ROTATOR CUFF DEBRIDEMENT, SUBCROMIAL DECOMPRESSION, AND BICEPS TENODESIS REPAIR; Surgeon:SKIP FAIR; Location:[facility] SURGICAL PATHOLOGY  2010    Dr. Bowers; left hand     ENT SURGERY       INCISE FINGER TENDON SHEATH      Dr. Bowers; right AND LEFT hand     THROAT SURGERY      vocal cord polyps       ALLERGIES:     Allergies   Allergen Reactions     No Known Drug Allergies        FAMILY HISTORY:  Family History   Problem Relation Age of Onset     Family History Negative Other      unknown family history per patient   Parents , but no illness recorded    SOCIAL HISTORY:  Social History   Substance Use Topics     Smoking status: Never Smoker     Smokeless tobacco: Never Used     Alcohol use Yes      Comment: couple beers everyday       ROS:   Constitutional: No fever, chills, or sweats. Weight stable. Uses cane   ENT: No visual disturbance, ear ache, epistaxis, sore throat.   Cardiovascular: As per HPI.   Respiratory: No cough, hemoptysis.    GI: No nausea, vomiting, hematemesis, melena, or hematochezia.   : No hematuria.   Integument: Negative.   Psychiatric: Negative.   Hematologic:  Easy bruising, no easy bleeding.  Neuro: Negative apart from Parkinsons and its complications, hardy[ec minor intention tremor  Endocrinology: No significant heat or cold intolerance   Musculoskeletal: Continuing instability related to Parkinsonism    Exam:  There were no vitals taken for this visit.Extended Vitals not filed for this encounter.    GENERAL APPEARANCE: Pleasant alert and no distress ---appropriate for age  HEENT:normal pupil size and reaction, normal palate, mucosa moist, no central cyanosis  NECK: no adenopathy, no asymmetry, masses, or scars, thyroid normal to palpation and no bruits, JVP not elevated  RESPIRATORY: lungs clear to auscultation -  no use of accessory muscles, no retractions,  respirations are unlabored, normal respiratory rate  CARDIOVASCULAR: regular rhythm, normal S1 with physiologic split S2, no S3 or S4 and no murmur, click or rub, precordium quiet with normal PMI.  ABDOMEN: soft, non tender, without hepatosplenomegaly, no masses palpable, bowel sounds normal,   EXTREMITIES: peripheral pulses normal, no edema, no bruits.  NEURO: alert and oriented to person/place/time, normal speech, +/- Parkinsonism gait but normal  Affect. Some intention tremor  VASC:  pulses are normal in volumes and symmetric bilaterally. No bruits are heard.  SKIN: no ecchymoses, no rashes    Labs:  CBC RESULTS:   Lab Results   Component Value Date    WBC 11.9 (H) 12/22/2017    RBC 3.52 (L) 12/22/2017    HGB 11.7 (L) 12/22/2017    HCT 36.8 (L) 12/22/2017     (H) 12/22/2017    MCH 33.2 (H) 12/22/2017    MCHC 31.8 12/22/2017    RDW 13.5 12/22/2017     12/22/2017       BMP RESULTS:  Lab Results   Component Value Date     12/28/2017    POTASSIUM 5.1 12/28/2017    CHLORIDE 104 12/28/2017    CO2 24 12/28/2017    ANIONGAP 10 12/28/2017     (H) 12/28/2017    BUN 28 12/28/2017    CR 1.14 12/28/2017    GFRESTIMATED 61 12/28/2017    GFRESTBLACK 74 12/28/2017    BLAISE 8.8 12/28/2017        INR RESULTS:  Lab Results   Component Value Date    INR 0.93 12/22/2017    INR 1.11 01/31/2012       Procedures:  9/7/16: ILR explant, reimplant    3/31/2016: Echocardiogram; \  Interpretation Summary  Technically difficult study.Extremely difficult acoustic windows despite the  use of contrast for endcardial border definition.  Global and regional left ventricular function is normal with an EF of 60-65%.  Global right ventricular function is normal.  ______________________________________________________________________________           Left Ventricle  Global and regional left ventricular function is normal with an EF of 60-65%.  Left ventricular size is normal. Left ventricular wall thickness is  normal.     Right Ventricle  The right ventricle is normal size. Global right ventricular function is  normal.  Atria  Both atria appear normal.     Mitral Valve  The mitral valve is normal.     Aortic Valve  Aortic valve is normal in structure and function.     Tricuspid Valve  The tricuspid valve is normal. Trace tricuspid insufficiency is present. The  peak velocity of the tricuspid regurgitant jet is not obtainable. Pulmonary  artery systolic pressure cannot be assessed.     Pulmonic Valve  The pulmonic valve is normal.     Vessels  The aorta root is normal. The inferior vena cava was normal in size with  preserved respiratory variability.  Pericardium  No pericardial effusion is present. Prominent epicardial fat is noted.     ______________________________________________________________________________  MMode/2D Measurements & Calculations  Ao root diam: 3.7 cm  LA dimension: 5.1 cm  asc Aorta Diam: 3.3 cm  LA/Ao: 1.4  LVOT diam: 2.0 cm  LVOT area: 3.0 cm2           Doppler Measurements & Calculations  MV E max princess: 67.4 cm/sec  MV A max princess: 98.0 cm/sec  MV E/A: 0.69  MV dec time: 0.25 sec  Ao V2 max: 145.0 cm/sec  Ao max P.4 mmHg  Ao V2 mean: 106.3 cm/sec  Ao mean P.1 mmHg  Ao V2 VTI: 30.3 cm  ASIYA(I,D): 2.6 cm2  ASIYA(V,D): 2.3 cm2  LV V1 max: 112.5 cm/sec  LV V1 VTI: 26.2 cm  SV(LVOT): 78.7 ml  ASIYA Index (cm2/m2): 1.2  Tilt/autonomic Study: 2013   DISCUSSION:  - Autonomic function test: consistent with normal physiologic response except for hypotensive response to couh: but no symptoms and not consistent with history  - TILT table test: No vasovagal syncope.   No orthostatic hypotension.   No POTS.   -EEG to be read separately.   - ILR remains in situ   PLAN: No actionable CV findings. Possibly a vasodepressor faint but inadequate evidence to this point. Will await EEG findings and follow-up in clinic.  MRI brain (2013)   1. No acute infarction or intracranial hemorrhage.  2. Mild to  moderate nonspecific white matter changes, likely sequela of chronic small vessel ischemic disease. Old small area of encephalomalacia in the posterior right frontal lobe.  3. No abnormal enhancing lesions intracranially.  4. Head MRA demonstrates no definite aneurysm or stenosis of the major intracranial arteries.  5. Neck MRA demonstrates patent major cervical arteries.    Echocardiogram (6/8/2013): 1. Normal LV size, wall motion, and systolic function. Stage I diastolic dysfunction 2. Normal RV size and function 3. No significant valvular abnormalities 4. Trivial pericardial effusion    Holter monitor (9/2012): occasional PACs and PVCs      Assessment and Plan:   1. Clinically CV stable.        RTC ([place]) 1 year    I very much appreciated the opportunity to see and assess Mr Bart Blair in the clinic today.    Please do not hesitate to contact my office if you have any questions or concerns.      Samuel Gorman MD  Cardiac Arrhythmia Service  [facility]  [phone]    CC  Patient Care Team:  Hiro Stewart MD as PCP - General  Rosalee Aggarwal RN as Nurse Coordinator  HIRO STEWART

## 2018-03-15 NOTE — TELEPHONE ENCOUNTER
Forms received from: [facility]   Phone number listed: n/a   Fax listed: [phone]  Date received: 3-15-18  Form description: Medication Order for Accu-chek   Once forms are completed, please return to [facility] via fax.  Is patient requesting to be contacted when forms are completed: n/a  Form placed: Provider folder     Rhonda Alvarado

## 2018-03-15 NOTE — PROGRESS NOTES
Preliminary Device Interrogation Results.  Final physician signed paceart report to be scanned and attached.    Pt seen in the [facility] for evaluation and iterative programming of a Medtronic ILR, per MD orders. He also has an appointment with Dr. Gorman. Today his intrinsic rhythm is SR 78 bpm. Normal device function. Pt states he had a syncopal episode on 1/8/18. There is no symptom/arrhythmia correlation. Plan for pt to send [employer] remote transmissions every 3 months and RTC in 1 year.  Implanted Loop Recorder

## 2018-03-15 NOTE — MR AVS SNAPSHOT
After Visit Summary   3/15/2018    Bart Blair    MRN: [id number]           Patient Information     Date Of Birth          2/14/1931        Visit Information        Provider Department      3/15/2018 12:00 PM Samuel Gorman MD [facility]        Today's Diagnoses     Syncope, unspecified syncope type    -  1    Hypertension goal BP (blood pressure) < 150/90          Care Instructions    You will be scheduled for a follow up visit: 6 months with ILR check and Diandra Monae NP   1 year Dr Gorman.     Instructions from today:   Decrease avapro (ibesartan) to 150 mg once daily. A new prescription was sent to your pharmacy.    We encourage you to use My Chart as your primary form of communication if possible. If you need assistance in setting this up, please contact our office or ask at your follow up visit.     If you need a medication refill please contact your pharmacy. Please allow at least 3 business days for your refill to be completed.       Cardiology  Telephone Number    Rosalee Aggarwal [phone]   Or send a message to your provider via my chart.   For scheduling procedures:    [facility]    Clinic appointments       [phone]   For the Device Clinic (Pacemakers and ICD's)   RN's :   Esther Burgos  During business hours: [phone]    After business hours:   [phone]- select option 4 and ask for job code [id number].          As always, Thank you for trusting us with your health care needs!          Follow-ups after your visit        Additional Services     Follow-Up with EP Cardiac Advanced Practice Provider- 6 Months       ILR check , schd with Diandra Monae            Follow-Up with Electrophysiologist - 1 Year       ILR check kenton kirk                  Your next 10 appointments already scheduled     Sep 13, 2018  1:30 PM CDT   (Arrive by 1:15 PM)   Implantable Loop Recorder with Uc Cv Device 1   [facility] ([facility]  [facility])    [address]  [address]  [place] [address] [phone]            Sep 13, 2018  2:00 PM CDT   (Arrive by 1:45 PM)   RETURN ATRIAL FIBULATION VISIT with JOSEPH Beth CNP   [facility] ([facility])    [address] [place] [address]  [address]  [place] [id number] [phone]              Future tests that were ordered for you today     Open Future Orders        Priority Expected Expires Ordered    Follow-Up with EP Cardiac Advanced Practice Provider- 6 Months Routine 9/11/2018 12/10/2018 3/15/2018    Follow-Up with Electrophysiologist - 1 Year Routine 3/15/2019 3/16/2019 3/15/2018    Follow-Up with Device Clinic - 1 year Routine 3/15/2019 3/16/2019 3/15/2018            Who to contact     If you have questions or need follow up information about today's clinic visit or your schedule please contact [facility] directly at [phone].  Normal or non-critical lab and imaging results will be communicated to you by MyChart, letter or phone within 4 business days after the clinic has received the results. If you do not hear from us within 7 days, please contact the clinic through [facility] [employer] or phone. If you have a critical or abnormal lab result, we will notify you by phone as soon as possible.  Submit refill requests through [employer] or call your pharmacy and they will forward the refill request to us. Please allow 3 business days for your refill to be completed.          Additional Information About Your Visit        [facility] Information     [employer] gives you secure access to your electronic health record. If you see a primary care provider, you can also send messages to your care team and make appointments. If you have questions, please call your primary care clinic.  If you do not have a primary care provider, please call [phone] and they will assist you.        Care EveryWhere ID     This is your Care EveryWhere ID.  "This could be used by other organizations to access your [place] medical records  [id number]        Your Vitals Were     Pulse Height Pulse Oximetry BMI (Body Mass Index)          94 1.765 m (5' 9.5\") 96% 31.32 kg/m2         Blood Pressure from Last 3 Encounters:   03/15/18 130/55   12/28/17 131/71   12/22/17 148/76    Weight from Last 3 Encounters:   03/15/18 97.6 kg (215 lb 3.2 oz)   12/28/17 94.3 kg (208 lb)   12/22/17 98 kg (216 lb 1.6 oz)                 Today's Medication Changes          These changes are accurate as of 3/15/18 12:46 PM.  If you have any questions, ask your nurse or doctor.               These medicines have changed or have updated prescriptions.        Dose/Directions    irbesartan 150 MG tablet   Commonly known as:  AVAPRO   This may have changed:    - medication strength  - how much to take   Used for:  Hypertension goal BP (blood pressure) < 150/90   Changed by:  Samuel Gorman MD        Dose:  150 mg   Take 1 tablet (150 mg) by mouth At Bedtime   Quantity:  90 tablet   Refills:  1       senna-docusate 8.6-50 MG per tablet   Commonly known as:  SENOKOT-S;PERICOLACE   This may have changed:    - when to take this  - additional instructions   Used for:  S/P total knee replacement        Dose:  1-2 tablet   Take 1-2 tablets by mouth 2 times daily.   Quantity:  60 tablet   Refills:  1            Where to get your medicines      These medications [facility] [place] [facility] [address]  [address], [facility] [address]     Phone:  [phone]     irbesartan 150 MG tablet                Primary Care Provider Office Phone # Fax #    Chuy Stewart [phone]       [address] [place] [address]        Equal Access to Services     TIFFANI ABDUL AH: Enma ramirez hadasho Soomaali, waaxda luqadaha, qaybta kaalmada adeegyada, jd tsang ah. So wa " [phone].    ATENCIÓN: Si eh wagner, tiene a ellis disposición servicios gratuitos de asistencia lingüística. Alejandro mayorga [phone].    We comply with applicable federal civil rights laws and [place] laws. We do not discriminate on the basis of race, color, national origin, age, disability, sex, sexual orientation, or gender identity.            Thank you!     Thank you for choosing [facility]  for your care. Our goal is always to provide you with excellent care. Hearing back from our patients is one way we can continue to improve our services. Please take a few minutes to complete the written survey that you may receive in the mail after your visit with us. Thank you!             Your Updated Medication List - Protect others around you: Learn how to safely use, store and throw away your medicines at www.disposemymeds.org.          This list is accurate as of 3/15/18 12:46 PM.  Always use your most recent med list.                   Brand Name Dispense Instructions for use Diagnosis    * ACETAMINOPHEN 8 HOUR 650 MG 8 hour tablet   Generic drug:  acetaminophen     100 tablet    Take 650 mg by mouth every 4 hours as needed.    Post-op pain       * TYLENOL 500 MG tablet   Generic drug:  acetaminophen     100 tablet    Take 2 tablets (1,000 mg) by mouth 3 times daily        amoxicillin 500 MG capsule    AMOXIL    16 capsule    4 tablets prior to dental appointment. Use for dental appointments    Status post total left knee replacement       aspirin 81 MG tablet      Take 1 tablet by mouth daily.    Hypertension goal BP (blood pressure) < 130/80       atorvastatin 10 MG tablet    LIPITOR    90 tablet    Take 1 tablet (10 mg) by mouth daily Refill when requested    Hyperlipidemia LDL goal <100, Type 2 diabetes mellitus with other ophthalmic complication, without long-term current use of insulin (H)       blood glucose monitoring lancets     100 Box    Check blood sugar once weekly    Type 2 diabetes  mellitus with other diabetic ophthalmic complication       blood glucose monitoring test strip    ACCU-CHEK COMPACT STRIPS    100 each    1 strip by In Vitro route daily    Type 2 diabetes mellitus with other diabetic ophthalmic complication       carbidopa-levodopa  MG Tbcr      Take  by mouth. 4 times daily  .        cholecalciferol 1000 UNIT tablet    vitamin D3    100    1 TABLET DAILY    Contusion of ribs       diclofenac 1 % Gel topical gel    VOLTAREN    300 g    APPLY 4 GRAMS TO KNEES OR 2 GRAMS TO HANDS FOUR TIMES A DAY USING ENCLOSED DOSING CARD    Arthritis       dipyridamole 50 MG tablet    PERSANTINE    720 tablet    Take 2 tablets (100 mg) by mouth 4 times daily    Retinal ischemia       doxycycline monohydrate 100 MG capsule     60 capsule    1 tablet 2 time daily for Rosacea flares    Rosacea       fluticasone 50 MCG/ACT spray    FLONASE    16 g    Spray 1-2 sprays into both nostrils daily    Chronic rhinitis       FOLIC ACID PO      Take 1 mg by mouth daily        furosemide 20 MG tablet    LASIX    30 tablet    1/2 tablet per day. If weight increases by 3-5 pounds then increase to 20 mg (1 tablet). If weight decreases to 205 then ok to hold.    Edema, unspecified type       irbesartan 150 MG tablet    AVAPRO    90 tablet    Take 1 tablet (150 mg) by mouth At Bedtime    Hypertension goal BP (blood pressure) < 150/90       metroNIDAZOLE 0.75 % cream    METROCREAM    45 g    Apply topically 2 times daily    Rosacea       MULTI vitamin  MENS Tabs      Take  by mouth. Takes one daily        predniSONE 20 MG tablet    DELTASONE     Take 20 mg by mouth daily        senna-docusate 8.6-50 MG per tablet    SENOKOT-S;PERICOLACE    60 tablet    Take 1-2 tablets by mouth 2 times daily.    S/P total knee replacement       sertraline 25 MG tablet    ZOLOFT    90 tablet    Take 1 tablet (25 mg) by mouth daily    Dysthymia       Vitamin B-12 CR 1000 MCG Tbcr     60 tablet    TAKE ONE TABLET BY MOUTH EVERY DAY     Parkinson disease (H)       * Notice:  This list has 2 medication(s) that are the same as other medications prescribed for you. Read the directions carefully, and ask your doctor or other care provider to review them with you.

## 2018-03-15 NOTE — NURSING NOTE
Chief Complaint   Patient presents with     Follow Up For     ILR check, annual FU (3/16/17)- hx parkinsons, falls.ED visit 12/22 for CP/LE edema.ED [facility] 1/2018- fall.  ILR placed 2016 for bradycardia (hx of one asymptomatic SVT episode)     Vitals were taken and medications were reconciled.  MAGED Willingham  12:03 PM

## 2018-03-15 NOTE — PATIENT INSTRUCTIONS
You will be scheduled for a follow up visit: 6 months with NINI horvath and Diandra Monae NP   1 year Dr Gorman.     Instructions from today:   Decrease avapro (ibesartan) to 150 mg once daily. A new prescription was sent to your pharmacy.    We encourage you to use My Chart as your primary form of communication if possible. If you need assistance in setting this up, please contact our office or ask at your follow up visit.     If you need a medication refill please contact your pharmacy. Please allow at least 3 business days for your refill to be completed.       Cardiology  Telephone Number    Rosalee Aggarwal [phone]   Or send a message to your provider via my chart.   For scheduling procedures:    [facility]    Clinic appointments       [phone]   For the Device Clinic (Pacemakers and ICD's)   RN's :   Esther Burgos  During business hours: [phone]    After business hours:   [phone]- select option 4 and ask for job code [phone].          As always, Thank you for trusting us with your health care needs!

## 2018-03-15 NOTE — PATIENT INSTRUCTIONS
It was a pleasure to see you in clinic today. Please do not hesitate to call with any questions or concerns. You are scheduled for remote ILR transmissions on 6/18/18, 9/24/18 and 12/24/18. We will call in 1-2 business days to discuss the results with you. We look forward to seeing you in clinic at your next device check in 1 year.    Esther Perez, RN  Electrophysiology Nurse Clinician  [facility]  During business hours call:  [phone]  After business hours please call: [phone]- select option #4 and ask for job code [phone].

## 2018-03-15 NOTE — LETTER
3/15/2018      RE: Bart Blair  [address] [id number]       Dear Colleague,    Thank you for the opportunity to participate in the care of your patient, Bart Blair, at the [facility]. Please see a copy of my visit note below.    HPI:   Mr Blair is a pleasant 86 yo male with an ILR in place for palpitations and falls. He also has a history of Parkinsonism with but very slow progression.  He is here today with his spouse and daughter  \  Bart apparently has had multiple apparent falls. His daughter says that if they find him on the floor (he cannot get up himself), they need to get help to pick him up. Fortunately no injuries except for fall in [place] in January. He does not recall much of these events.    In Jan went to [place] to void standing,. . Left walker outside door. Apparently collapsed. WWife thinks that he was down for several hours, but was awake when she found him. He could not get up on own. He did dislocate left shoulder and will need surg fix.  He does not recall details. Had not wet himself so likely was finished voiding, suggesting post-micturition faint, but unclear.  Uses walker at home but sometimes it is only nearby.  Probably has Parkinson + OH altho not demonstrable today. So falls could be due to instability, OH or reflex faint.  No arrhythmia on ILR. I did discuss that if he falls the family should use his ILR remote to send proximate rhythm   Continues on Avapro 300 daily and lasix 10. Will reduce ARB to 150 daily.     12/22/2017: ED visit for chest pain/LE edema: gave lasix, normal echo     [place] [facility] [place], [place]- see care everywhere    No new CV complaints. Denies  CP or SOBE or HF symptoms.     PAST MEDICAL HISTORY:  Past Medical History:   Diagnosis Date     Anemia      Anemia due to blood loss, acute      CKD (chronic kidney disease) stage 3, GFR 30-59 ml/min 5/31/2010     Essential hypertension,  benign      Other and unspecified hyperlipidemia      Other and unspecified hyperlipidemia      Other specified disorder of skin      Pain in joint, shoulder region      Parkinson disease (H) 9/2/2010     Polyp of vocal cord or larynx      Retinal ischemia     right sided thrombotic plaque     Rosacea      Type II or unspecified type diabetes mellitus without mention of complication, not stated as uncontrolled        CURRENT MEDICATIONS:  Current Outpatient Prescriptions   Medication Sig Dispense Refill     Cyanocobalamin (VITAMIN B-12 CR) 1000 MCG TBCR TAKE ONE TABLET BY MOUTH EVERY DAY 60 tablet 4     furosemide (LASIX) 20 MG tablet 1/2 tablet per day. If weight increases by 3-5 pounds then increase to 20 mg (1 tablet). If weight decreases to 205 then ok to hold. 30 tablet 1     diclofenac (VOLTAREN) 1 % GEL topical gel APPLY 4 GRAMS TO KNEES OR 2 GRAMS TO HANDS FOUR TIMES A DAY USING ENCLOSED DOSING CARD 300 g 3     atorvastatin (LIPITOR) 10 MG tablet Take 1 tablet (10 mg) by mouth daily Refill when requested 90 tablet 3     amoxicillin (AMOXIL) 500 MG capsule 4 tablets prior to dental appointment. Use for dental appointments 16 capsule 4     predniSONE (DELTASONE) 20 MG tablet Take 20 mg by mouth daily        fluticasone (FLONASE) 50 MCG/ACT spray Spray 1-2 sprays into both nostrils daily 16 g 5     irbesartan (AVAPRO) 300 MG tablet Take 1 tablet (300 mg) by mouth At Bedtime 90 tablet 3     dipyridamole (PERSANTINE) 50 MG tablet Take 2 tablets (100 mg) by mouth 4 times daily 720 tablet 3     sertraline (ZOLOFT) 25 MG tablet Take 1 tablet (25 mg) by mouth daily 90 tablet 3     doxycycline Monohydrate 100 MG CAPS 1 tablet 2 time daily for Rosacea flares 60 capsule 3     blood glucose monitoring (ACCU-CHEK COMPACT STRIPS) test strip 1 strip by In Vitro route daily 100 each 11     blood glucose monitoring (SOFTCLIX) lancets Check blood sugar once weekly 100 Box 0     metroNIDAZOLE (METROCREAM) 0.75 % cream Apply  topically 2 times daily 45 g 3     acetaminophen (TYLENOL) 500 MG tablet Take 2 tablets (1,000 mg) by mouth 3 times daily 100 tablet 0     FOLIC ACID PO Take 1 mg by mouth daily       acetaminophen 650 MG TABS Take 650 mg by mouth every 4 hours as needed. 100 tablet 0     Multiple Vitamin (MULTI VITAMIN  MENS) TABS Take  by mouth. Takes one daily         aspirin 81 MG tablet Take 1 tablet by mouth daily.  3     senna-docusate (SENOKOT-S;PERICOLACE) 8.6-50 MG per tablet Take 1-2 tablets by mouth 2 times daily. (Patient taking differently: Take 1-2 tablets by mouth Takes about 2-3 times weekly) 60 tablet 1     Carbidopa-Levodopa  MG TBCR Take  by mouth. 4 times daily  .        VITAMIN D 1000 UNIT OR TABS 1 TABLET DAILY 100 4       PAST SURGICAL HISTORY:  Past Surgical History:   Procedure Laterality Date     ARTHROPLASTY KNEE  2012    Procedure:ARTHROPLASTY KNEE; LEFT TOTAL KNEE ARTHROPLASTY (nexTune)^; Surgeon:SKIP FAIR; Location: OR     ARTHROSCOPY SHOULDER, OPEN ROTATOR CUFF REPAIR, COMBINED  2012    Procedure:COMBINED ARTHROSCOPY SHOULDER, OPEN ROTATOR CUFF REPAIR; RIGHT SHOULDER ARTHROSCOPY OPEN ROTATOR CUFF DEBRIDEMENT, SUBCROMIAL DECOMPRESSION, AND BICEPS TENODESIS REPAIR; Surgeon:SKIP FAIR; Location:[facility] SURGICAL PATHOLOGY  2010    Dr. Bowers; left hand     ENT SURGERY       INCISE FINGER TENDON SHEATH      Dr. Bowers; right AND LEFT hand     THROAT SURGERY      vocal cord polyps       ALLERGIES:     Allergies   Allergen Reactions     No Known Drug Allergies        FAMILY HISTORY:  Family History   Problem Relation Age of Onset     Family History Negative Other      unknown family history per patient   Parents , but no illness recorded    SOCIAL HISTORY:  Social History   Substance Use Topics     Smoking status: Never Smoker     Smokeless tobacco: Never Used     Alcohol use Yes      Comment: couple beers everyday       ROS:   Constitutional: No fever,  "chills, or sweats. Weight stable. Uses cane   ENT: No visual disturbance, ear ache, epistaxis, sore throat.   Cardiovascular: As per HPI.   Respiratory: No cough, hemoptysis.    GI: No nausea, vomiting, hematemesis, melena, or hematochezia.   : No hematuria.   Integument: Negative.   Psychiatric: Negative.   Hematologic:  Easy bruising, no easy bleeding.  Neuro: Negative apart from Parkinsons and its complications, hardy[ec minor intention tremor  Endocrinology: No significant heat or cold intolerance   Musculoskeletal: Continuing instability related to Parkinsonism    Exam:  /63 (BP Location: Right arm, Patient Position: Chair, Cuff Size: Adult Regular)  Pulse 80  Ht 1.765 m (5' 9.5\")  Wt 97.6 kg (215 lb 3.2 oz)  SpO2 96%  BMI 31.32 kg/e3Xnzcyigr Vitals not filed for this encounter.    GENERAL APPEARANCE: Pleasant alert and no distress ---appropriate for age  HEENT:normal pupil size and reaction, normal palate, mucosa moist, no central cyanosis  NECK: no adenopathy, no asymmetry, masses, or scars, thyroid normal to palpation and no bruits, JVP not elevated  RESPIRATORY: lungs clear to auscultation -  no use of accessory muscles, no retractions, respirations are unlabored, normal respiratory rate  CARDIOVASCULAR: regular rhythm, normal S1 with physiologic split S2, no S3 or S4 and no murmur, click or rub, precordium quiet with normal PMI.  ABDOMEN: soft, non tender, without hepatosplenomegaly, no masses palpable, bowel sounds normal,   EXTREMITIES: peripheral pulses normal, no edema, no bruits.  NEURO: alert and oriented to person/place/time, normal speech, +/- Parkinsonism gait but normal  Affect. Some intention tremor  VASC:  pulses are normal in volumes and symmetric bilaterally. No bruits are heard.  SKIN: no ecchymoses, no rashes    Labs:  CBC RESULTS:   Lab Results   Component Value Date    WBC 11.9 (H) 12/22/2017    RBC 3.52 (L) 12/22/2017    HGB 11.7 (L) 12/22/2017    HCT 36.8 (L) 12/22/2017    "  (H) 12/22/2017    MCH 33.2 (H) 12/22/2017    MCHC 31.8 12/22/2017    RDW 13.5 12/22/2017     12/22/2017       BMP RESULTS:  Lab Results   Component Value Date     12/28/2017    POTASSIUM 5.1 12/28/2017    CHLORIDE 104 12/28/2017    CO2 24 12/28/2017    ANIONGAP 10 12/28/2017     (H) 12/28/2017    BUN 28 12/28/2017    CR 1.14 12/28/2017    GFRESTIMATED 61 12/28/2017    GFRESTBLACK 74 12/28/2017    BLAISE 8.8 12/28/2017        INR RESULTS:  Lab Results   Component Value Date    INR 0.93 12/22/2017    INR 1.11 01/31/2012       Procedures:  9/7/16: ILR explant, reimplant    3/31/2016: Echocardiogram; \  Interpretation Summary  Technically difficult study.Extremely difficult acoustic windows despite the  use of contrast for endcardial border definition.  Global and regional left ventricular function is normal with an EF of 60-65%.  Global right ventricular function is normal.  ______________________________________________________________________________           Left Ventricle  Global and regional left ventricular function is normal with an EF of 60-65%.  Left ventricular size is normal. Left ventricular wall thickness is normal.     Right Ventricle  The right ventricle is normal size. Global right ventricular function is  normal.  Atria  Both atria appear normal.     Mitral Valve  The mitral valve is normal.     Aortic Valve  Aortic valve is normal in structure and function.     Tricuspid Valve  The tricuspid valve is normal. Trace tricuspid insufficiency is present. The  peak velocity of the tricuspid regurgitant jet is not obtainable. Pulmonary  artery systolic pressure cannot be assessed.     Pulmonic Valve  The pulmonic valve is normal.     Vessels  The aorta root is normal. The inferior vena cava was normal in size with  preserved respiratory variability.  Pericardium  No pericardial effusion is present. Prominent epicardial fat is  noted.     ______________________________________________________________________________  MMode/2D Measurements & Calculations  Ao root diam: 3.7 cm  LA dimension: 5.1 cm  asc Aorta Diam: 3.3 cm  LA/Ao: 1.4  LVOT diam: 2.0 cm  LVOT area: 3.0 cm2           Doppler Measurements & Calculations  MV E max princess: 67.4 cm/sec  MV A max princess: 98.0 cm/sec  MV E/A: 0.69  MV dec time: 0.25 sec  Ao V2 max: 145.0 cm/sec  Ao max P.4 mmHg  Ao V2 mean: 106.3 cm/sec  Ao mean P.1 mmHg  Ao V2 VTI: 30.3 cm  ASIYA(I,D): 2.6 cm2  ASIYA(V,D): 2.3 cm2  LV V1 max: 112.5 cm/sec  LV V1 VTI: 26.2 cm  SV(LVOT): 78.7 ml  ASIYA Index (cm2/m2): 1.2  Tilt/autonomic Study: 2013   DISCUSSION:  - Autonomic function test: consistent with normal physiologic response except for hypotensive response to couh: but no symptoms and not consistent with history  - TILT table test: No vasovagal syncope.   No orthostatic hypotension.   No POTS.   -EEG to be read separately.   - ILR remains in situ   PLAN: No actionable CV findings. Possibly a vasodepressor faint but inadequate evidence to this point. Will await EEG findings and follow-up in clinic.  MRI brain (2013)   1. No acute infarction or intracranial hemorrhage.  2. Mild to moderate nonspecific white matter changes, likely sequela of chronic small vessel ischemic disease. Old small area of encephalomalacia in the posterior right frontal lobe.  3. No abnormal enhancing lesions intracranially.  4. Head MRA demonstrates no definite aneurysm or stenosis of the major intracranial arteries.  5. Neck MRA demonstrates patent major cervical arteries.    Echocardiogram (2013): 1. Normal LV size, wall motion, and systolic function. Stage I diastolic dysfunction 2. Normal RV size and function 3. No significant valvular abnormalities 4. Trivial pericardial effusion    Holter monitor (2012): occasional PACs and PVCs      Assessment and Plan:   1. Falls of uncertain cause. Will reduce anti-hypertensive to  diminish OH risk    2. Parkinson's disease    3. Hypertension may now be too aggressively treated      RTC Diandra 6 mos    RTC (Sherif) 1 year    I very much appreciated the opportunity to see and assess Mr Bart Blair in the clinic today.    Please do not hesitate to contact my office if you have any questions or concerns.      Samuel Gorman MD  Cardiac Arrhythmia Service  [facility]  [phone]    CC  Patient Care Team:  Chuy Stewart MD as PCP - Rosalee Lund RN as Nurse Coordinator

## 2018-03-21 ENCOUNTER — TELEPHONE (OUTPATIENT)
Dept: FAMILY MEDICINE | Facility: CLINIC | Age: 83
End: 2018-03-21

## 2018-03-21 DIAGNOSIS — I10 HYPERTENSION GOAL BP (BLOOD PRESSURE) < 150/90: Primary | ICD-10-CM

## 2018-03-21 RX ORDER — IRBESARTAN 75 MG/1
75 TABLET ORAL DAILY
Qty: 30 TABLET | Refills: 6 | Status: CANCELLED | OUTPATIENT
Start: 2018-03-21

## 2018-03-21 NOTE — TELEPHONE ENCOUNTER
Lynn called and stated she had missed a call from the RN. She asked for a call back at [phone].    Thank you  Josette Willard  Patient Representative

## 2018-03-21 NOTE — TELEPHONE ENCOUNTER
Wife returned phone call, she will attempt to break the pill in to 1/4 (they still have the 300 mg). She has a pill cutter. I told her that this can be very difficult to get accurate dosing, but she wants to try. She will call if there are issues with that. She wanted a follow up appt for patient, scheduled.    Gaurav Mclaughlin RN

## 2018-03-21 NOTE — TELEPHONE ENCOUNTER
He can decrease the Irbesartan to 75 mg. Ok to break in half. If they want we can send in new RX which is pended.     Quentin Stewart MD

## 2018-03-21 NOTE — TELEPHONE ENCOUNTER
Reason for call:  Patient reporting a symptom    Symptom or request: low BP at appointment with PT    Duration (how long have symptoms been present): today    Have you been treated for this before? Yes    Additional comments: Lynn calling from [facility], is with patient now.  Patient had low BP when seen, 92/49.  He was sitting up, had him drink some water.  During PT exercices, he was at 109/59.  After done while seated, he was at 95/59.  Patient reports light dizziness, but no other SX.  Please call to advise.    Phone Number patient can be reached at:  Other phone number:  [phone]    Best Time:  any    Can we leave a detailed message on this number:  YES    Call taken on 3/21/2018 at 12:44 PM by Alfreda Wilcox

## 2018-03-21 NOTE — TELEPHONE ENCOUNTER
"Original message was from Physical therapist, not a nurse. Called to speak with her but she didn't answer. Called and spoke with wife, she states that patient is not currently feeling dizzy. I told her that it might be a good idea for her to call the cardiologist office to let them know what happened, but she said she would rather run it by Dr. Stewart at this time. Patient is currently taking the 150 mg of irbesartan, and furosemide 10 mg daily. They have been checking his weight and it is about \"2-3 pounds in the past month but he's been eating a lot\". Will route to provider to advise. Would you like patient to come in for follow up?    Gaurav Mclaughlin RN    "

## 2018-03-27 ENCOUNTER — TELEPHONE (OUTPATIENT)
Dept: FAMILY MEDICINE | Facility: CLINIC | Age: 83
End: 2018-03-27

## 2018-03-27 NOTE — TELEPHONE ENCOUNTER
Forms received from: [facility]   Phone number listed: n/a   Fax listed: [phone]  Date received: 3-27-18  Form description: Order for Vitamin D   Once forms are completed, please return to [facility] via fax.  Is patient requesting to be contacted when forms are completed: n/a  Form placed: Provider folder    Rhonda Alvarado

## 2018-03-28 ENCOUNTER — TELEPHONE (OUTPATIENT)
Dept: FAMILY MEDICINE | Facility: CLINIC | Age: 83
End: 2018-03-28

## 2018-03-28 NOTE — TELEPHONE ENCOUNTER
Forms received from: [facility]   Phone number listed: n/a   Fax listed: [phone]  Date received: 3-28-18  Form description: Order to take senokot   Once forms are completed, please return to [facility] via fax.  Is patient requesting to be contacted when forms are completed: n/a  Form placed: Provider folder    Rhonda Alvarado

## 2018-03-29 ENCOUNTER — OFFICE VISIT (OUTPATIENT)
Dept: FAMILY MEDICINE | Facility: CLINIC | Age: 83
End: 2018-03-29
Payer: COMMERCIAL

## 2018-03-29 VITALS
DIASTOLIC BLOOD PRESSURE: 63 MMHG | WEIGHT: 215 LBS | HEIGHT: 70 IN | HEART RATE: 85 BPM | BODY MASS INDEX: 30.78 KG/M2 | TEMPERATURE: 97.9 F | OXYGEN SATURATION: 97 % | SYSTOLIC BLOOD PRESSURE: 119 MMHG

## 2018-03-29 DIAGNOSIS — E11.39 TYPE 2 DIABETES MELLITUS WITH OTHER OPHTHALMIC COMPLICATION, WITHOUT LONG-TERM CURRENT USE OF INSULIN (H): Primary | ICD-10-CM

## 2018-03-29 DIAGNOSIS — R42 DIZZINESS: ICD-10-CM

## 2018-03-29 DIAGNOSIS — Z96.652 STATUS POST TOTAL LEFT KNEE REPLACEMENT: ICD-10-CM

## 2018-03-29 DIAGNOSIS — Z13.89 SCREENING FOR DIABETIC PERIPHERAL NEUROPATHY: ICD-10-CM

## 2018-03-29 DIAGNOSIS — I10 HYPERTENSION GOAL BP (BLOOD PRESSURE) < 150/90: ICD-10-CM

## 2018-03-29 LAB — HBA1C MFR BLD: 9.3 % (ref 0–6.4)

## 2018-03-29 PROCEDURE — 99207 C FOOT EXAM  NO CHARGE: CPT | Performed by: FAMILY MEDICINE

## 2018-03-29 PROCEDURE — 99214 OFFICE O/P EST MOD 30 MIN: CPT | Performed by: FAMILY MEDICINE

## 2018-03-29 PROCEDURE — 82043 UR ALBUMIN QUANTITATIVE: CPT | Performed by: FAMILY MEDICINE

## 2018-03-29 PROCEDURE — 80048 BASIC METABOLIC PNL TOTAL CA: CPT | Performed by: FAMILY MEDICINE

## 2018-03-29 PROCEDURE — 83036 HEMOGLOBIN GLYCOSYLATED A1C: CPT | Performed by: FAMILY MEDICINE

## 2018-03-29 PROCEDURE — 36415 COLL VENOUS BLD VENIPUNCTURE: CPT | Performed by: FAMILY MEDICINE

## 2018-03-29 RX ORDER — IRBESARTAN 75 MG/1
37.5 TABLET ORAL DAILY
Qty: 45 TABLET | Refills: 3 | Status: SHIPPED | OUTPATIENT
Start: 2018-03-29 | End: 2018-10-02

## 2018-03-29 RX ORDER — LANCETS
EACH MISCELLANEOUS
Qty: 100 EACH | Refills: 3 | Status: SHIPPED | OUTPATIENT
Start: 2018-03-29 | End: 2018-04-04

## 2018-03-29 RX ORDER — AMOXICILLIN 500 MG/1
CAPSULE ORAL
Qty: 16 CAPSULE | Refills: 4 | Status: SHIPPED | OUTPATIENT
Start: 2018-03-29 | End: 2019-03-25

## 2018-03-29 NOTE — MR AVS SNAPSHOT
After Visit Summary   3/29/2018    Bart Blair    MRN: [id number]           Patient Information     Date Of Birth          2/14/1931        Visit Information        Provider Department      3/29/2018 1:00 PM Chuy Stewart MD [facility]        Today's Diagnoses     Type 2 diabetes mellitus with other ophthalmic complication, without long-term current use of insulin (H)    -  1    Screening for diabetic peripheral neuropathy        Status post total left knee replacement        Hypertension goal BP (blood pressure) < 150/90        Dizziness          Care Instructions    MY DIABETES TODAY:    1)  Goal A1C is under <7.0  Mine is:      Lab Results   Component Value Date    A1C 9.3 03/29/2018       2)  Goal LDL (bad cholesterol) under 100  (measured at least yearly)- I am currently at:   Lab Results   Component Value Date    LDL 32 10/24/2017       3)  Goal blood pressure under 140/90- mine was 119/63 today    4)  Take aspirin daily     5)  No tobacco use          ACTION PLAN CHANGES FROM TODAY:    Care Plan changes:      -monitor blood sugars. Fasting blood sugars in the morning should be <100 mg/dL, and 2-hour postprandial <180 mg/dL  -consider new shingles vaccine, which you can get at our pharmacy.   -Dr. Stewart will call you tomorrow with blood test results. We'll consider starting metformin based on kidney functions.     Labs:     Lab Results   Component Value Date    A1C 9.3 03/29/2018    A1C 6.6 10/24/2017    A1C 6.0 04/25/2017    A1C 6.3 11/22/2016    A1C 6.4 03/30/2016         Follow up in 2-3 months                  Follow-ups after your visit        Your next 10 appointments already scheduled     Sep 13, 2018  1:30 PM CDT   (Arrive by 1:15 PM)   Implantable Loop Recorder with Uc Cv Device [address] [place] [address] ([facility])    [address] [place] [address]  [address] [place] [id number] [phone]            Sep 13, 2018  2:00 PM CDT   (Arrive  "by 1:45 PM)   RETURN ATRIAL FIBULATION VISIT with JOSEPH [facility] ([facility])    [address] [facility]  [address] [place] [id number] [phone]              Who to contact     If you have questions or need follow up information about today's clinic visit or your schedule please contact [facility] directly at [phone].  Normal or non-critical lab and imaging results will be communicated to you by Popbasichart, letter or phone within 4 business days after the clinic has received the results. If you do not hear from us within 7 days, please contact the clinic through [employer] or phone. If you have a critical or abnormal lab result, we will notify you by phone as soon as possible.  Submit refill requests through eVendor Check or call your pharmacy and they will forward the refill request to us. Please allow 3 business days for your refill to be completed.          Additional Information About Your Visit        eVendor Check Information     [employer] Check gives you secure access to your electronic health record. If you see a primary care provider, you can also send messages to your care team and make appointments. If you have questions, please call your primary care clinic.  If you do not have a primary care provider, please call [phone] and they will assist you.        Care EveryWhere ID     This is your Care EveryWhere ID. This could be used by other organizations to access your [facility] medical records  [id number]        Your Vitals Were     Pulse Temperature Height Pulse Oximetry BMI (Body Mass Index)       85 97.9  F (36.6  C) (Oral) 5' 9.5\" (1.765 m) 97% 31.29 kg/m2        Blood Pressure from Last 3 Encounters:   03/29/18 119/63   03/15/18 130/55   12/28/17 131/71    Weight from Last 3 Encounters:   03/29/18 215 lb (97.5 kg)   03/15/18 215 lb 3.2 oz (97.6 kg)   12/28/17 208 lb (94.3 kg)              We Performed the Following     Albumin " Random Urine Quantitative with Creat Ratio     Basic metabolic panel     FOOT EXAM  NO CHARGE [29259.114]     HEMOGLOBIN A1C          Today's Medication Changes          These changes are accurate as of 3/29/18  1:58 PM.  If you have any questions, ask your nurse or doctor.               These medicines have changed or have updated prescriptions.        Dose/Directions    blood glucose monitoring lancets   This may have changed:  how to take this   Used for:  Type 2 diabetes mellitus with other ophthalmic complication, without long-term current use of insulin (H)   Changed by:  Chuy Stewart MD        Check blood sugar once weekly   Quantity:  100 each   Refills:  3       irbesartan 75 MG tablet   Commonly known as:  AVAPRO   This may have changed:    - medication strength  - how much to take  - when to take this   Used for:  Hypertension goal BP (blood pressure) < 150/90   Changed by:  Chuy Stewart MD        Dose:  37.5 mg   Take 0.5 tablets (37.5 mg) by mouth daily   Quantity:  45 tablet   Refills:  3       senna-docusate 8.6-50 MG per tablet   Commonly known as:  SENOKOT-S;PERICOLACE   This may have changed:    - when to take this  - additional instructions   Used for:  S/P total knee replacement        Dose:  1-2 tablet   Take 1-2 tablets by mouth 2 times daily.   Quantity:  60 tablet   Refills:  1            Where to get your medicines      These medications were sent to [facility] - [place], [place] - [address] [place] [address]  [address] [place] [address], [place] [address]     Phone:  [phone]     amoxicillin 500 MG capsule    blood glucose monitoring lancets    blood glucose monitoring test strip    irbesartan 75 MG tablet                Primary Care Provider Office Phone # Fax #    Chuy Stewart [phone]       [facility] [address]        Equal Access to Services     TIFFANI ABDUL AH: Enma Gruber  wayannickshekhar purcell, qaybta kasegun almonte, jd doran hashaye laPankajaaduane ah. So [facility] [phone].    ATENCIÓN: Si eh wagner, tiene a ellis disposición servicios gratuitos de asistencia lingüística. Alejandro al [phone].    We comply with applicable federal civil rights laws and [place] laws. We do not discriminate on the basis of race, color, national origin, age, disability, sex, sexual orientation, or gender identity.            Thank you!     Thank you for choosing [facility]  for your care. Our goal is always to provide you with excellent care. Hearing back from our patients is one way we can continue to improve our services. Please take a few minutes to complete the written survey that you may receive in the mail after your visit with us. Thank you!             Your Updated Medication List - Protect others around you: Learn how to safely use, store and throw away your medicines at www.disposemymeds.org.          This list is accurate as of 3/29/18  1:58 PM.  Always use your most recent med list.                   Brand Name Dispense Instructions for use Diagnosis    * ACETAMINOPHEN 8 HOUR 650 MG 8 hour tablet   Generic drug:  acetaminophen     100 tablet    Take 650 mg by mouth every 4 hours as needed.    Post-op pain       * TYLENOL 500 MG tablet   Generic drug:  acetaminophen     100 tablet    Take 2 tablets (1,000 mg) by mouth 3 times daily        amoxicillin 500 MG capsule    AMOXIL    16 capsule    4 tablets prior to dental appointment. Use for dental appointments    Status post total left knee replacement       aspirin 81 MG tablet      Take 1 tablet by mouth daily.    Hypertension goal BP (blood pressure) < 130/80       atorvastatin 10 MG tablet    LIPITOR    90 tablet    Take 1 tablet (10 mg) by mouth daily Refill when requested    Hyperlipidemia LDL goal <100, Type 2 diabetes mellitus with other ophthalmic complication, without long-term current use of insulin (H)        blood glucose monitoring lancets     100 each    Check blood sugar once weekly    Type 2 diabetes mellitus with other ophthalmic complication, without long-term current use of insulin (H)       blood glucose monitoring test strip    ACCU-CHEK COMPACT STRIPS    100 each    1 strip by In Vitro route daily    Type 2 diabetes mellitus with other ophthalmic complication, without long-term current use of insulin (H)       carbidopa-levodopa  MG Tbcr      Take  by mouth. 4 times daily  .        cholecalciferol 1000 UNIT tablet    vitamin D3    100    1 TABLET DAILY    Contusion of ribs       diclofenac 1 % Gel topical gel    VOLTAREN    300 g    APPLY 4 GRAMS TO KNEES OR 2 GRAMS TO HANDS FOUR TIMES A DAY USING ENCLOSED DOSING CARD    Arthritis       dipyridamole 50 MG tablet    PERSANTINE    720 tablet    Take 2 tablets (100 mg) by mouth 4 times daily    Retinal ischemia       doxycycline monohydrate 100 MG capsule     60 capsule    1 tablet 2 time daily for Rosacea flares    Rosacea       fluticasone 50 MCG/ACT spray    FLONASE    16 g    Spray 1-2 sprays into both nostrils daily    Chronic rhinitis       FOLIC ACID PO      Take 1 mg by mouth daily        furosemide 20 MG tablet    LASIX    30 tablet    1/2 tablet per day. If weight increases by 3-5 pounds then increase to 20 mg (1 tablet). If weight decreases to 205 then ok to hold.    Edema, unspecified type       irbesartan 75 MG tablet    AVAPRO    45 tablet    Take 0.5 tablets (37.5 mg) by mouth daily    Hypertension goal BP (blood pressure) < 150/90       metroNIDAZOLE 0.75 % cream    METROCREAM    45 g    Apply topically 2 times daily    Rosacea       MULTI vitamin  MENS Tabs      Take  by mouth. Takes one daily        predniSONE 20 MG tablet    DELTASONE     Take 24.5 mg by mouth daily        senna-docusate 8.6-50 MG per tablet    SENOKOT-S;PERICOLACE    60 tablet    Take 1-2 tablets by mouth 2 times daily.    S/P total knee replacement       sertraline  25 MG tablet    ZOLOFT    90 tablet    Take 1 tablet (25 mg) by mouth daily    Dysthymia       Vitamin B-12 CR 1000 MCG Tbcr     60 tablet    TAKE ONE TABLET BY MOUTH EVERY DAY    Parkinson disease (H)       * Notice:  This list has 2 medication(s) that are the same as other medications prescribed for you. Read the directions carefully, and ask your doctor or other care provider to review them with you.

## 2018-03-29 NOTE — PROGRESS NOTES
SUBJECTIVE:   Bart Blair is a 87 year old male who presents to clinic today for the following health issues:      Diabetes Follow-up      Patient is checking blood sugars: Checks every Monday.     Diabetic concerns: None and other - elevated blood sugars while on Prednisone, in the 140's      Symptoms of hypoglycemia (low blood sugar): none     Paresthesias (numbness or burning in feet) or sores: No, has had some swelling in his feet.      Date of last diabetic eye exam: Junly 2017    Lab Results   Component Value Date    A1C 9.3 03/29/2018    A1C 6.6 10/24/2017    A1C 6.0 04/25/2017    A1C 6.3 11/22/2016    A1C 6.4 03/30/2016     Wife had noticed that he has suddenly started having a sweet tooth. Has noticed polyuria.     BP Readings from Last 2 Encounters:   03/15/18 130/55   12/28/17 131/71     Hemoglobin A1C (%)   Date Value   10/24/2017 6.6 (H)   04/25/2017 6.0     LDL Cholesterol Calculated (mg/dL)   Date Value   10/24/2017 32   11/18/2014 37     Hypertension Follow-up      Outpatient blood pressures Physical therapist checks 4 days a week     Low Salt Diet: low salt      Amount of exercise or physical activity: None    Problems taking medications regularly: No    Medication side effects: none    Diet: low salt    Patient has had multiple falls, typically when standing up. Cardiology decided to reduce antihypertensive to diminish orthostatic hypotension risk, currently taking Avabro 150 mg at 1/2 tablet (75 mg).       Problem list and histories reviewed & adjusted, as indicated.  Additional history: as documented    Patient Active Problem List   Diagnosis     Retinal ischemia     Polyp of vocal cord or larynx     Other specified disorder of skin     Type 2 diabetes, HbA1c goal < 7% (H)     HYPERLIPIDEMIA LDL GOAL <100     Parkinson disease (H)     S/P total knee replacement     Anemia due to blood loss, acute     Dysthymia     BPH (benign prostatic hyperplasia)     Syncope     Alcoholism (H)     Health Care  Home     Hypertension goal BP (blood pressure) < 150/90     Fall     Type 2 diabetes mellitus with other diabetic ophthalmic complication     Varicose vein     Neck pain     Implantable loop recorder, [employer] LINQ     Advance Care Planning     Temporal arteritis (H)     Past Surgical History:   Procedure Laterality Date     ARTHROPLASTY KNEE  1/31/2012    Procedure:ARTHROPLASTY KNEE; LEFT TOTAL KNEE ARTHROPLASTY (IRASEMA)^; Surgeon:SKIP FAIR; Location:[facility] OR     ARTHROSCOPY SHOULDER, OPEN ROTATOR CUFF REPAIR, COMBINED  4/24/2012    Procedure:COMBINED ARTHROSCOPY SHOULDER, OPEN ROTATOR CUFF REPAIR; RIGHT SHOULDER ARTHROSCOPY OPEN ROTATOR CUFF DEBRIDEMENT, SUBCROMIAL DECOMPRESSION, AND BICEPS TENODESIS REPAIR; Surgeon:SKIP FAIR; Location:[facility] SD     CL AFF SURGICAL PATHOLOGY  6-    Dr. Bowers; left hand     ENT SURGERY       INCISE FINGER TENDON SHEATH  2008    Dr. Bowers; right AND LEFT hand     THROAT SURGERY      vocal cord polyps       Social History   Substance Use Topics     Smoking status: Never Smoker     Smokeless tobacco: Never Used     Alcohol use Yes      Comment: couple beers everyday     Family History   Problem Relation Age of Onset     Family History Negative Other      unknown family history per patient         Current Outpatient Prescriptions   Medication Sig Dispense Refill     irbesartan (AVAPRO) 150 MG tablet Take 1 tablet (150 mg) by mouth At Bedtime (Patient taking differently: Take 75 mg by mouth At Bedtime ) 90 tablet 1     Cyanocobalamin (VITAMIN B-12 CR) 1000 MCG TBCR TAKE ONE TABLET BY MOUTH EVERY DAY 60 tablet 4     furosemide (LASIX) 20 MG tablet 1/2 tablet per day. If weight increases by 3-5 pounds then increase to 20 mg (1 tablet). If weight decreases to 205 then ok to hold. 30 tablet 1     diclofenac (VOLTAREN) 1 % GEL topical gel APPLY 4 GRAMS TO KNEES OR 2 GRAMS TO HANDS FOUR TIMES A DAY USING ENCLOSED DOSING CARD 300 g 3     atorvastatin (LIPITOR) 10 MG tablet Take 1  tablet (10 mg) by mouth daily Refill when requested 90 tablet 3     amoxicillin (AMOXIL) 500 MG capsule 4 tablets prior to dental appointment. Use for dental appointments 16 capsule 4     predniSONE (DELTASONE) 20 MG tablet Take 24.5 mg by mouth daily        fluticasone (FLONASE) 50 MCG/ACT spray Spray 1-2 sprays into both nostrils daily 16 g 5     dipyridamole (PERSANTINE) 50 MG tablet Take 2 tablets (100 mg) by mouth 4 times daily 720 tablet 3     sertraline (ZOLOFT) 25 MG tablet Take 1 tablet (25 mg) by mouth daily 90 tablet 3     doxycycline Monohydrate 100 MG CAPS 1 tablet 2 time daily for Rosacea flares 60 capsule 3     blood glucose monitoring (ACCU-CHEK COMPACT STRIPS) test strip 1 strip by In Vitro route daily 100 each 11     blood glucose monitoring (SOFTCLIX) lancets Check blood sugar once weekly 100 Box 0     metroNIDAZOLE (METROCREAM) 0.75 % cream Apply topically 2 times daily 45 g 3     acetaminophen (TYLENOL) 500 MG tablet Take 2 tablets (1,000 mg) by mouth 3 times daily 100 tablet 0     FOLIC ACID PO Take 1 mg by mouth daily       acetaminophen 650 MG TABS Take 650 mg by mouth every 4 hours as needed. 100 tablet 0     Multiple Vitamin (MULTI VITAMIN  MENS) TABS Take  by mouth. Takes one daily         aspirin 81 MG tablet Take 1 tablet by mouth daily.  3     senna-docusate (SENOKOT-S;PERICOLACE) 8.6-50 MG per tablet Take 1-2 tablets by mouth 2 times daily. (Patient taking differently: Take 1-2 tablets by mouth Takes about 2-3 times weekly) 60 tablet 1     Carbidopa-Levodopa  MG TBCR Take  by mouth. 4 times daily  .        VITAMIN D 1000 UNIT OR TABS 1 TABLET DAILY 100 4     Allergies   Allergen Reactions     No Known Drug Allergies      Recent Labs   Lab Test  03/29/18   1250  12/28/17   1601  12/22/17   1830  10/24/17   1050  04/25/17   1044  11/22/16   0948   11/18/14   1238   10/14/13   1508   06/25/13   0829   A1C  9.3*   --    --   6.6*  6.0  6.3*   < >  6.0   < >   --    < >   --    LDL    "--    --    --   32   --    --    --   37   --    --    --   52   HDL   --    --    --   75   --    --    --   45   --    --    --   41   TRIG   --    --    --   226*   --    --    --   171*   --    --    --   92   ALT   --    --   16   --    --    --    --   10   --   14   --    --    CR   --   1.14  1.13  1.29*   --    --    < >  1.31*   < >   --    --    --    GFRESTIMATED   --   61  61  53*   --    --    < >  52*   < >   --    --    --    GFRESTBLACK   --   74  74  64   --    --    < >  63   < >   --    --    --    POTASSIUM   --   5.1  5.1  4.9   --    --    < >  4.5   < >   --    --    --    TSH   --    --    --    --    --   1.73   --   2.42   --    --    --    --     < > = values in this interval not displayed.      BP Readings from Last 3 Encounters:   03/29/18 119/63   03/15/18 130/55   12/28/17 131/71    Wt Readings from Last 3 Encounters:   03/29/18 97.5 kg (215 lb)   03/15/18 97.6 kg (215 lb 3.2 oz)   12/28/17 94.3 kg (208 lb)                  Labs reviewed in EPIC    Reviewed and updated as needed this visit by clinical staff       Reviewed and updated as needed this visit by Provider         ROS:  Constitutional, HEENT, cardiovascular, pulmonary, GI, , musculoskeletal, neuro, skin, endocrine and psych systems are negative, except as otherwise noted.    This document serves as a record of the services and decisions personally performed and made by Quentin Stewart MD. It was created on their behalf by Sami Pelaez, a trained medical scribe. The creation of this document is based the provider's statements to the medical scribe.  Sami Pelaez March 29, 2018 1:38 PM       OBJECTIVE:     /63 (BP Location: Right arm, Cuff Size: Adult Large)  Pulse 85  Temp 97.9  F (36.6  C) (Oral)  Ht 1.765 m (5' 9.5\")  Wt 97.5 kg (215 lb)  SpO2 97%  BMI 31.29 kg/m2  Body mass index is 31.29 kg/(m^2).  GENERAL: healthy, alert and no distress  HENT: ear canals and TM's normal, nose and mouth without ulcers or " lesions  NECK: no adenopathy, no asymmetry, masses, or scars and thyroid normal to palpation  RESP: lungs clear to auscultation - no rales, rhonchi or wheezes  CV: regular rate and rhythm, normal S1 S2, no S3 or S4, no murmur, click or rub, no peripheral edema and peripheral pulses strong  MS: no gross musculoskeletal defects noted, no edema  SKIN: no suspicious lesions or rashes  PSYCH: mentation appears normal, affect normal/bright  Foot exam: normal peripheral pulses, no trophic changes    Diagnostic Test Results:  Results for orders placed or performed in visit on 03/29/18 (from the past 24 hour(s))   HEMOGLOBIN A1C   Result Value Ref Range    Hemoglobin A1C 9.3 (H) 0 - 6.4 %         ASSESSMENT/PLAN:   (E11.39) Type 2 diabetes mellitus with other ophthalmic complication, without long-term current use of insulin (H)  (primary encounter diagnosis)  Comment: HbA1C at 9.3%, suboptimally controlled. Medication options were discussed at length. Though metformin hasn't been recommended for individuals over the age of 80, it will be our safest option in terms of falls as sulfonylureas vs other insulin promoter medications could precipitate difficulties with hypoglycemia. We'll check renal functions today to see whether he'd be a good candidate for metformin. Re-emphasized importance of portion control and curtailment of caloric intake.   Plan: HEMOGLOBIN A1C, Albumin Random Urine         Quantitative with Creat Ratio, blood glucose         monitoring (ACCU-CHEK COMPACT STRIPS) test         strip, blood glucose monitoring (SOFTCLIX)         lancets, Basic metabolic panel        -follow up, pending results. Considerations for metformin will be applied if BMP today is unrevealing. If there's evidence of renal disease, will move forward with low dose of glipizide.          -encouraged patient to return to wholesome diet that had kept his A1c <7.0% in the past.      (Z13.89) Screening for diabetic peripheral  neuropathy  Comment:   Plan: FOOT EXAM  NO CHARGE [80633.114]            (Z96.652) Status post total left knee replacement  Comment:   Plan: amoxicillin (AMOXIL) 500 MG capsule        -start him on amoxicillin 500 mg for infection prophilaxis    (I10) Hypertension goal BP (blood pressure) < 150/90  Comment: BP of 119/63, not meeting goal of 130-150 range for his systolic blood pressure. Patient taking Avapro 75 mg (1/2 tablet of 150 mg). HTN continues to be too aggressively treated, therefore will reduce antihypertensive.   Plan: irbesartan (AVAPRO) 75 MG tablet        -decrease Avapro from 75 mg to 37.5 mg QD        -follow up in a month for BP recheck    (R42) Dizziness  Comment: probable combination of orthostatic hypotension and parkinsonism   Plan: see above      The information in this document, created by the medical scribe for me, accurately reflects the services I personally performed and the decisions made by me. I have reviewed and approved this document for accuracy prior to leaving the patient care area.    Chuy Stewart MD, MD  [facility]

## 2018-03-29 NOTE — PATIENT INSTRUCTIONS
MY DIABETES TODAY:    1)  Goal A1C is under <7.0  Mine is:      Lab Results   Component Value Date    A1C 9.3 03/29/2018       2)  Goal LDL (bad cholesterol) under 100  (measured at least yearly)- I am currently at:   Lab Results   Component Value Date    LDL 32 10/24/2017       3)  Goal blood pressure under 140/90- mine was 119/63 today    4)  Take aspirin daily     5)  No tobacco use          ACTION PLAN CHANGES FROM TODAY:    Care Plan changes:      -monitor blood sugars. Fasting blood sugars in the morning should be <100 mg/dL, and 2-hour postprandial <180 mg/dL  -consider new shingles vaccine, which you can get at our pharmacy.   -Dr. Stewart will call you tomorrow with blood test results. We'll consider starting metformin based on kidney functions.     Labs:     Lab Results   Component Value Date    A1C 9.3 03/29/2018    A1C 6.6 10/24/2017    A1C 6.0 04/25/2017    A1C 6.3 11/22/2016    A1C 6.4 03/30/2016         Follow up in 2-3 months

## 2018-03-30 ENCOUNTER — TELEPHONE (OUTPATIENT)
Dept: FAMILY MEDICINE | Facility: CLINIC | Age: 83
End: 2018-03-30

## 2018-03-30 DIAGNOSIS — E11.65 TYPE 2 DIABETES MELLITUS WITH HYPERGLYCEMIA, WITHOUT LONG-TERM CURRENT USE OF INSULIN (H): Primary | ICD-10-CM

## 2018-03-30 LAB
ANION GAP SERPL CALCULATED.3IONS-SCNC: 7 MMOL/L (ref 3–14)
BUN SERPL-MCNC: 34 MG/DL (ref 7–30)
CALCIUM SERPL-MCNC: 8.7 MG/DL (ref 8.5–10.1)
CHLORIDE SERPL-SCNC: 103 MMOL/L (ref 94–109)
CO2 SERPL-SCNC: 27 MMOL/L (ref 20–32)
CREAT SERPL-MCNC: 1.19 MG/DL (ref 0.66–1.25)
CREAT UR-MCNC: 124 MG/DL
GFR SERPL CREATININE-BSD FRML MDRD: 58 ML/MIN/1.7M2
GLUCOSE SERPL-MCNC: 250 MG/DL (ref 70–99)
MICROALBUMIN UR-MCNC: 19 MG/L
MICROALBUMIN/CREAT UR: 15.32 MG/G CR (ref 0–17)
POTASSIUM SERPL-SCNC: 4.7 MMOL/L (ref 3.4–5.3)
SODIUM SERPL-SCNC: 137 MMOL/L (ref 133–144)

## 2018-03-30 RX ORDER — METFORMIN HCL 500 MG
500 TABLET, EXTENDED RELEASE 24 HR ORAL
Qty: 90 TABLET | Refills: 1 | Status: SHIPPED | OUTPATIENT
Start: 2018-03-30 | End: 2018-06-01

## 2018-04-04 ENCOUNTER — TELEPHONE (OUTPATIENT)
Dept: FAMILY MEDICINE | Facility: CLINIC | Age: 83
End: 2018-04-04

## 2018-04-04 DIAGNOSIS — E11.39 TYPE 2 DIABETES MELLITUS WITH OTHER OPHTHALMIC COMPLICATION, WITHOUT LONG-TERM CURRENT USE OF INSULIN (H): ICD-10-CM

## 2018-04-04 RX ORDER — LANCETS
EACH MISCELLANEOUS
Qty: 100 EACH | Refills: 3 | Status: SHIPPED | OUTPATIENT
Start: 2018-04-04 | End: 2019-09-05 | Stop reason: ALTCHOICE

## 2018-04-04 NOTE — TELEPHONE ENCOUNTER
Forms received from: [employer]   Phone number listed: [phone]    Date received: 4-4-18  Form description: Clarification Request  Once forms are completed, call [employer] mail order please return to  via phone call.  Is patient requesting to be contacted when forms are completed: n/a  Form placed: Provider folder    Dr. Stewart, can you just write what you want the orders to be and I will call [employer] to clarify for them.    Rhonda Alvarado

## 2018-04-04 NOTE — TELEPHONE ENCOUNTER
New order sent    Orders Placed This Encounter     blood glucose monitoring (SOFTCLIX) lancets     Sig: Check blood sugar once daily     Dispense:  100 each     Refill:  3

## 2018-04-05 ENCOUNTER — TELEPHONE (OUTPATIENT)
Dept: FAMILY MEDICINE | Facility: CLINIC | Age: 83
End: 2018-04-05

## 2018-04-05 NOTE — TELEPHONE ENCOUNTER
Forms received from:[facility]   Phone number listed:n/a  Fax listed:[phone]  Date received: 4-5-18  Form description: Medications Flonase, Daily Multivitamin, Metrocream (x3)  Once forms are completed, please return to [facility] via fax.  Is patient requesting to be contacted when forms are completed: n/a  Form placed: provider folder    .Rhonda Alvarado

## 2018-04-05 NOTE — TELEPHONE ENCOUNTER
Reason for Call:  Medication or medication refill:    Do you use a [facility]?  Name of the pharmacy and phone number for the current request:  [facility], pended    Name of the medication requested: irbesartan (AVAPRO) 75 MG tablet    Other request: Licha calling from  Mail order pharmacy to clarify medication.  They got the order for the irbesartan (AVAPRO) 75 MG tablet, but patient has previously been on the 300 MG.  They do have the 150 MG on file from patients Cardiologist, Dr Gorman.  Is this the correct order (75 MG cut in half)?  Was your prescription to be take along with that?    Please call to clarify.    Can we leave a detailed message on this number? YES    Phone number patient can be reached at: Other phone number:  [phone]    Best Time: any    Call taken on 4/5/2018 at 10:35 AM by Alfreda Wilcox

## 2018-04-05 NOTE — TELEPHONE ENCOUNTER
Chart reviewed. Current dose should be 37.5 mg Avapro daily.  See note copied below from OV with Dr. Stewart 3/29/18.  Pharmacy notified.    Collin Desouza RN    (I10) Hypertension goal BP (blood pressure) < 150/90  Comment: BP of 119/63, not meeting goal of 130-150 range for his systolic blood pressure. Patient taking Avapro 75 mg (1/2 tablet of 150 mg). HTN continues to be too aggressively treated, therefore will reduce antihypertensive.   Plan: irbesartan (AVAPRO) 75 MG tablet        -decrease Avapro from 75 mg to 37.5 mg QD        -follow up in a month for BP recheck

## 2018-04-10 ENCOUNTER — TRANSFERRED RECORDS (OUTPATIENT)
Dept: HEALTH INFORMATION MANAGEMENT | Facility: CLINIC | Age: 83
End: 2018-04-10

## 2018-04-12 ENCOUNTER — TELEPHONE (OUTPATIENT)
Dept: FAMILY MEDICINE | Facility: CLINIC | Age: 83
End: 2018-04-12

## 2018-04-12 NOTE — TELEPHONE ENCOUNTER
Forms received from: [facility]   Phone number listed: n/a   Fax listed: [phone]  Date received: 4-12-18  Form description: Verbal order  Once forms are completed, please return to [facility] via fax.  Is patient requesting to be contacted when forms are completed: n/a  Form placed: Provider folder    Rhonda Alvarado

## 2018-04-16 ENCOUNTER — TELEPHONE (OUTPATIENT)
Dept: FAMILY MEDICINE | Facility: CLINIC | Age: 83
End: 2018-04-16

## 2018-04-16 NOTE — TELEPHONE ENCOUNTER
Forms received from: [facility]   Phone number listed: n/a   Fax listed:[phone]  Date received: 4-16-18  Form description: Order for Medication  Once forms are completed, please return to [facility] via fax.  Is patient requesting to be contacted when forms are completed: n/a  Form placed: Provider folder    Rhonda Alvarado

## 2018-04-17 ENCOUNTER — TELEPHONE (OUTPATIENT)
Dept: FAMILY MEDICINE | Facility: CLINIC | Age: 83
End: 2018-04-17

## 2018-04-19 ENCOUNTER — TELEPHONE (OUTPATIENT)
Dept: FAMILY MEDICINE | Facility: CLINIC | Age: 83
End: 2018-04-19

## 2018-04-19 NOTE — TELEPHONE ENCOUNTER
Persantine is an older anti platelet agent but he should be fine having teeth pulled.  If he wants to hold anything, he could hold this the day before and restart the next day.  But he does not need to hold anything for this type of procedure.    Andrea Cordon MD

## 2018-04-19 NOTE — TELEPHONE ENCOUNTER
Reason for Call:  Other     Detailed comments: Patient is having two teeth pulled next week and would like to discuss if there is any medications that he should stop prior to this procedure.    Phone Number Patient can be reached at: Other phone number:    Rossy Malathi D (Spouse) [phone]         Best Time:     Can we leave a detailed message on this number? Not Applicable    Call taken on 4/19/2018 at 11:27 AM by Lea Brown

## 2018-04-19 NOTE — TELEPHONE ENCOUNTER
Forward to covering providers for review.  No anticoagulants noted on medication list.  Any medications that patient would need to hold prior to tooth extraction?    Collin Desouza RN

## 2018-04-19 NOTE — TELEPHONE ENCOUNTER
Stated nothing needs to be held before his appt but if they have questions, patient/family was instructed to return call to [facility] RN directly on the RN Call back line at [phone].   Kelsey North RN

## 2018-05-03 ENCOUNTER — MEDICAL CORRESPONDENCE (OUTPATIENT)
Dept: HEALTH INFORMATION MANAGEMENT | Facility: CLINIC | Age: 83
End: 2018-05-03

## 2018-05-10 ENCOUNTER — TELEPHONE (OUTPATIENT)
Dept: FAMILY MEDICINE | Facility: CLINIC | Age: 83
End: 2018-05-10

## 2018-05-29 ENCOUNTER — OFFICE VISIT (OUTPATIENT)
Dept: FAMILY MEDICINE | Facility: CLINIC | Age: 83
End: 2018-05-29
Payer: COMMERCIAL

## 2018-05-29 VITALS
BODY MASS INDEX: 30.35 KG/M2 | DIASTOLIC BLOOD PRESSURE: 68 MMHG | WEIGHT: 212 LBS | HEIGHT: 70 IN | HEART RATE: 84 BPM | TEMPERATURE: 98.6 F | SYSTOLIC BLOOD PRESSURE: 126 MMHG

## 2018-05-29 DIAGNOSIS — Z11.1 SCREENING EXAMINATION FOR PULMONARY TUBERCULOSIS: ICD-10-CM

## 2018-05-29 DIAGNOSIS — E11.65 TYPE 2 DIABETES MELLITUS WITH HYPERGLYCEMIA, WITHOUT LONG-TERM CURRENT USE OF INSULIN (H): Primary | ICD-10-CM

## 2018-05-29 DIAGNOSIS — M31.6 TEMPORAL ARTERITIS (H): ICD-10-CM

## 2018-05-29 LAB — HBA1C MFR BLD: 8.5 % (ref 0–5.6)

## 2018-05-29 PROCEDURE — 36415 COLL VENOUS BLD VENIPUNCTURE: CPT | Performed by: FAMILY MEDICINE

## 2018-05-29 PROCEDURE — 99214 OFFICE O/P EST MOD 30 MIN: CPT | Performed by: FAMILY MEDICINE

## 2018-05-29 PROCEDURE — 80048 BASIC METABOLIC PNL TOTAL CA: CPT | Performed by: FAMILY MEDICINE

## 2018-05-29 PROCEDURE — 86580 TB INTRADERMAL TEST: CPT | Performed by: FAMILY MEDICINE

## 2018-05-29 PROCEDURE — 83036 HEMOGLOBIN GLYCOSYLATED A1C: CPT | Performed by: FAMILY MEDICINE

## 2018-05-29 NOTE — PROGRESS NOTES
"  SUBJECTIVE:   Bart Blair is a 87 year old male who presents to clinic today for the following health issues:      Diabetes Follow-up    Patient is checking blood sugars: once daily.  Results are as follows:         am - 127 this morning, before this morning it was \"real high\", more than the 100s    Diabetic concerns: None     Symptoms of hypoglycemia (low blood sugar): none     Paresthesias (numbness or burning in feet) or sores: Yes - unsure if related to diabetes     Date of last diabetic eye exam: has been seeing an eye doctor every other week.    Lab Results   Component Value Date    A1C 9.3 03/29/2018    A1C 6.6 10/24/2017    A1C 6.0 04/25/2017    A1C 6.3 11/22/2016    A1C 6.4 03/30/2016         BP Readings from Last 2 Encounters:   03/29/18 119/63   03/15/18 130/55     Hemoglobin A1C (%)   Date Value   03/29/2018 9.3 (H)   10/24/2017 6.6 (H)     LDL Cholesterol Calculated (mg/dL)   Date Value   10/24/2017 32   11/18/2014 37       Amount of exercise or physical activity:     Problems taking medications regularly: No    Medication side effects: none    Diet: regular (no restrictions)      Patient is also here to follow up on weight.    The Infectious Disease doctor at [facility] said he should have a PPD test done today.     I reviewed notes from neurology dated 05/24/2018 related to evaluation for parkinsonism. It was felt that lower extremity pain was more likely related to lumbar spinal stenosis. It was neurology's impression that patient is not drawing much benefit from antiparkinsonism medications, therefore he decided to lower carbidopa/levodopa by half to see how he responds with plans for follow up with him in 4 weeks' time.          Past/recent records reviewed and discussed for -- immunizations (recommended shingles vaccine).      Problem list and histories reviewed & adjusted, as indicated.  Additional history: as documented    Patient Active Problem List   Diagnosis     Retinal ischemia     " Polyp of vocal cord or larynx     Other specified disorder of skin     HYPERLIPIDEMIA LDL GOAL <100     Parkinson disease (H)     S/P total knee replacement     Anemia due to blood loss, acute     Dysthymia     BPH (benign prostatic hyperplasia)     Syncope     Alcoholism (H)     Health Care Home     Hypertension goal BP (blood pressure) < 150/90     Fall     Type 2 diabetes mellitus with other diabetic ophthalmic complication     Varicose vein     Neck pain     Implantable loop recorder, [employer]     Advance Care Planning     Temporal arteritis (H)     Type 2 diabetes mellitus with hyperglycemia, without long-term current use of insulin (H)     Past Surgical History:   Procedure Laterality Date     ARTHROPLASTY KNEE  1/31/2012    Procedure:ARTHROPLASTY KNEE; LEFT TOTAL KNEE ARTHROPLASTY ([employer])^; Surgeon:SKIP FAIR; Location:[facility] OR     ARTHROSCOPY SHOULDER, OPEN ROTATOR CUFF REPAIR, COMBINED  4/24/2012    Procedure:COMBINED ARTHROSCOPY SHOULDER, OPEN ROTATOR CUFF REPAIR; RIGHT SHOULDER ARTHROSCOPY OPEN ROTATOR CUFF DEBRIDEMENT, SUBCROMIAL DECOMPRESSION, AND BICEPS TENODESIS REPAIR; Surgeon:SKIP FAIR; Location:[facility] SD     CL AFF SURGICAL PATHOLOGY  6-    Dr. Bowers; left hand     ENT SURGERY       INCISE FINGER TENDON SHEATH  2008    Dr. Bowers; right AND LEFT hand     THROAT SURGERY      vocal cord polyps       Social History   Substance Use Topics     Smoking status: Never Smoker     Smokeless tobacco: Never Used     Alcohol use Yes      Comment: couple beers everyday     Family History   Problem Relation Age of Onset     Family History Negative Other      unknown family history per patient         Current Outpatient Prescriptions   Medication Sig Dispense Refill     acetaminophen (TYLENOL) 500 MG tablet Take 2 tablets (1,000 mg) by mouth 3 times daily 100 tablet 0     amoxicillin (AMOXIL) 500 MG capsule 4 tablets prior to dental appointment. Use for dental appointments 16 capsule 4      aspirin 81 MG tablet Take 1 tablet by mouth daily.  3     atorvastatin (LIPITOR) 10 MG tablet Take 1 tablet (10 mg) by mouth daily Refill when requested 90 tablet 3     blood glucose monitoring (ACCU-CHEK COMPACT STRIPS) test strip 1 strip by In Vitro route daily 100 each 11     blood glucose monitoring (SOFTCLIX) lancets Check blood sugar once daily 100 each 3     Carbidopa-Levodopa  MG TBCR Take  by mouth. 4 times daily  .        Cyanocobalamin (VITAMIN B-12 CR) 1000 MCG TBCR TAKE ONE TABLET BY MOUTH EVERY DAY 60 tablet 4     diclofenac (VOLTAREN) 1 % GEL topical gel APPLY 4 GRAMS TO KNEES OR 2 GRAMS TO HANDS FOUR TIMES A DAY USING ENCLOSED DOSING CARD 300 g 3     dipyridamole (PERSANTINE) 50 MG tablet Take 2 tablets (100 mg) by mouth 4 times daily 720 tablet 3     doxycycline Monohydrate 100 MG CAPS 1 tablet 2 time daily for Rosacea flares 60 capsule 3     fluticasone (FLONASE) 50 MCG/ACT spray Spray 1-2 sprays into both nostrils daily 16 g 5     FOLIC ACID PO Take 1 mg by mouth daily       furosemide (LASIX) 20 MG tablet 1/2 tablet per day. If weight increases by 3-5 pounds then increase to 20 mg (1 tablet). If weight decreases to 205 then ok to hold. 30 tablet 1     irbesartan (AVAPRO) 75 MG tablet Take 0.5 tablets (37.5 mg) by mouth daily 45 tablet 3     metFORMIN (GLUCOPHAGE-XR) 500 MG 24 hr tablet Take 1 tablet (500 mg) by mouth daily (with dinner) 90 tablet 1     metroNIDAZOLE (METROCREAM) 0.75 % cream Apply topically 2 times daily 45 g 3     Multiple Vitamin (MULTI VITAMIN  MENS) TABS Take  by mouth. Takes one daily         PREDNISONE PO Take 5 mg by mouth Currently taking 5 tablets (25mg) daily per patient (this medication is being taken care of through [facility]).       senna-docusate (SENOKOT-S;PERICOLACE) 8.6-50 MG per tablet Take 1-2 tablets by mouth 2 times daily. (Patient taking differently: Take 1-2 tablets by mouth Takes about 2-3 times weekly) 60 tablet 1     sertraline (ZOLOFT) 25 MG  tablet Take 1 tablet (25 mg) by mouth daily 90 tablet 3     VITAMIN D 1000 UNIT OR TABS 1 TABLET DAILY 100 4     Allergies   Allergen Reactions     No Known Drug Allergies      Recent Labs   Lab Test  03/29/18   1250  12/28/17   1601  12/22/17   1830  10/24/17   1050  04/25/17   1044  11/22/16   0948   11/18/14   1238   10/14/13   1508   06/25/13   0829   A1C  9.3*   --    --   6.6*  6.0  6.3*   < >  6.0   < >   --    < >   --    LDL   --    --    --   32   --    --    --   37   --    --    --   52   HDL   --    --    --   75   --    --    --   45   --    --    --   41   TRIG   --    --    --   226*   --    --    --   171*   --    --    --   92   ALT   --    --   16   --    --    --    --   10   --   14   --    --    CR  1.19  1.14  1.13  1.29*   --    --    < >  1.31*   < >   --    --    --    GFRESTIMATED  58*  61  61  53*   --    --    < >  52*   < >   --    --    --    GFRESTBLACK  70  74  74  64   --    --    < >  63   < >   --    --    --    POTASSIUM  4.7  5.1  5.1  4.9   --    --    < >  4.5   < >   --    --    --    TSH   --    --    --    --    --   1.73   --   2.42   --    --    --    --     < > = values in this interval not displayed.      BP Readings from Last 3 Encounters:   05/29/18 126/68   03/29/18 119/63   03/15/18 130/55    Wt Readings from Last 3 Encounters:   05/29/18 96.2 kg (212 lb)   03/29/18 97.5 kg (215 lb)   03/15/18 97.6 kg (215 lb 3.2 oz)                  Labs reviewed in EPIC    Reviewed and updated as needed this visit by clinical staff       Reviewed and updated as needed this visit by Provider         ROS:  Constitutional, HEENT, cardiovascular, pulmonary, GI, , musculoskeletal, neuro, skin, endocrine and psych systems are negative, except as otherwise noted.    This document serves as a record of the services and decisions personally performed and made by Quentin Stewart MD. It was created on their behalf by Sami Pelaez, a trained medical scribe. The creation of this document is  "based the provider's statements to the medical scribe.  Sami Pelaez May 29, 2018 12:49 PM       OBJECTIVE:     /68 (BP Location: Right arm, Cuff Size: Adult Large)  Pulse 84  Temp 98.6  F (37  C) (Oral)  Ht 1.765 m (5' 9.5\")  Wt 96.2 kg (212 lb)  BMI 30.86 kg/m2  Body mass index is 30.86 kg/(m^2).  GENERAL: healthy, alert and no distress  HENT: ear canals and TM's normal, nose and mouth without ulcers or lesions  RESP: lungs clear to auscultation - no rales, rhonchi or wheezes  CV: regular rate and rhythm, normal S1 S2, no S3 or S4, no murmur, click or rub  MS: no gross musculoskeletal defects noted, no edema, cogwheel rigidity (L>R)  SKIN: no suspicious lesions or rashes  Neuro:cogwheel rigidit noted upper extremities. L.R  Mild tremor  PSYCH: mentation appears normal, affect normal/bright    Diagnostic Test Results:  Results for orders placed or performed in visit on 05/29/18 (from the past 24 hour(s))   Hemoglobin A1c   Result Value Ref Range    Hemoglobin A1C 8.5 (H) 0 - 5.6 %       ASSESSMENT/PLAN:   (E11.65) Type 2 diabetes mellitus with hyperglycemia, without long-term current use of insulin (H)  (primary encounter diagnosis)  Comment: A1c at 8.5% elevated HbA1c which is reflective of chronic use of prednisone.    Plan: Hemoglobin A1c, Basic metabolic panel        -await BMP to see where his Cr is at, and adjust medications as needed.     (M31.6) Temporal arteritis (H)  Comment: Improved on corticosteroids, currently taking prednisone 25 mg daily.   Plan: continue prednisone    (Z11.1) Screening examination for pulmonary tuberculosis  Comment: Considering that TB quantiferon gold came back \"indeterminate\", patient was advised to get PPD done today.    Plan: TB INTRADERMAL TEST        -follow up in 48-72 hours for interpretation of PPD test.         The information in this document, created by the medical scribe for me, accurately reflects the services I personally performed and the decisions made " by me. I have reviewed and approved this document for accuracy prior to leaving the patient care area.    Chuy Stewart MD, MD  [facility]

## 2018-05-29 NOTE — MR AVS SNAPSHOT
After Visit Summary   5/29/2018    Bart Blair    MRN: [id number]           Patient Information     Date Of Birth          2/14/1931        Visit Information        Provider Department      5/29/2018 12:20 PM Chuy Stewart MD [facility]        Today's Diagnoses     Type 2 diabetes mellitus with hyperglycemia, without long-term current use of insulin (H)    -  1    Temporal arteritis (H)        Screening examination for pulmonary tuberculosis          Care Instructions    -consider new shingles vaccine, which you can get at our pharmacy. Check to see if it's covered by your insurance.           Follow-ups after your visit        Follow-up notes from your care team     Return in about 3 days (around 6/1/2018) for interpretation of PPD test.      Your next 10 appointments already scheduled     Sep 13, 2018  1:30 PM CDT   (Arrive by 1:15 PM)   Implantable Loop Recorder with Uc Cv Device 1   [facility])    [address] [place] [address]  [address] [place] [address] [phone]            Sep 13, 2018  2:00 PM CDT   (Arrive by 1:45 PM)   RETURN ATRIAL FIBULATION VISIT with [facility])    [address] [place] [address]  [address] [place] [address] [phone]              Who to contact     If you have questions or need follow up information about today's clinic visit or your schedule please contact [facility] directly at [phone].  Normal or non-critical lab and imaging results will be communicated to you by MyChart, letter or phone within 4 business days after the clinic has received the results. If you do not hear from us within 7 days, please contact the clinic through MyChart or phone. If you have a critical or abnormal lab result, we will notify you by phone as soon as possible.  Submit refill requests through [employer] or call  "your pharmacy and they will forward the refill request to us. Please allow 3 business days for your refill to be completed.          Additional Information About Your Visit        [employer] Information     [employer] gives you secure access to your electronic health record. If you see a primary care provider, you can also send messages to your care team and make appointments. If you have questions, please call your primary care clinic.  If you do not have a primary care provider, please call [phone] and they will assist you.        Care EveryWhere ID     This is your Care EveryWhere ID. This could be used by other organizations to access your [facility] medical records  [id number]        Your Vitals Were     Pulse Temperature Height BMI (Body Mass Index)          84 98.6  F (37  C) (Oral) 5' 9.5\" (1.765 m) 30.86 kg/m2         Blood Pressure from Last 3 Encounters:   05/29/18 126/68   03/29/18 119/63   03/15/18 130/55    Weight from Last 3 Encounters:   05/29/18 212 lb (96.2 kg)   03/29/18 215 lb (97.5 kg)   03/15/18 215 lb 3.2 oz (97.6 kg)              We Performed the Following     Basic metabolic panel     Hemoglobin A1c     TB INTRADERMAL TEST          Today's Medication Changes          These changes are accurate as of 5/29/18 12:53 PM.  If you have any questions, ask your nurse or doctor.               These medicines have changed or have updated prescriptions.        Dose/Directions    PREDNISONE PO   This may have changed:  Another medication with the same name was removed. Continue taking this medication, and follow the directions you see here.   Changed by:  Chuy Stewart MD        Dose:  5 mg   Take 5 mg by mouth Currently taking 5 tablets (25mg) daily per patient (this medication is being taken care of through [facility]).   Refills:  0       senna-docusate 8.6-50 MG per tablet   Commonly known as:  SENOKOT-S;PERICOLACE   This may have changed:    - when to take this  - additional " instructions   Used for:  S/P total knee replacement        Dose:  1-2 tablet   Take 1-2 tablets by mouth 2 times daily.   Quantity:  60 tablet   Refills:  1       TYLENOL 500 MG tablet   This may have changed:  Another medication with the same name was removed. Continue taking this medication, and follow the directions you see here.   Generic drug:  acetaminophen   Changed by:  Chuy Stewart MD        Dose:  1000 mg   Take 2 tablets (1,000 mg) by mouth 3 times daily   Quantity:  100 tablet   Refills:  0                Primary Care Provider Office Phone # Fax #    Chuy Stewart [phone]       [address] [facility] [address]        Equal Access to Services     [facility]: Hadii emmie ramirez hadasho Soomaali, waaxda luqadaha, qaybta kaalmada adeegyada, waxsteve tsang . So [facility] [phone].    ATENCIÓN: Si habla español, tiene a ellis disposición servicios gratuitos de asistencia lingüística. Llame al [phone].    We comply with applicable federal civil rights laws and [place] laws. We do not discriminate on the basis of race, color, national origin, age, disability, sex, sexual orientation, or gender identity.            Thank you!     Thank you for choosing [facility]  for your care. Our goal is always to provide you with excellent care. Hearing back from our patients is one way we can continue to improve our services. Please take a few minutes to complete the written survey that you may receive in the mail after your visit with us. Thank you!             Your Updated Medication List - Protect others around you: Learn how to safely use, store and throw away your medicines at www.disposemymeds.org.          This list is accurate as of 5/29/18 12:53 PM.  Always use your most recent med list.                   Brand Name Dispense Instructions for use Diagnosis    amoxicillin 500 MG capsule    AMOXIL    16 capsule    4 tablets  prior to dental appointment. Use for dental appointments    Status post total left knee replacement       aspirin 81 MG tablet      Take 1 tablet by mouth daily.    Hypertension goal BP (blood pressure) < 130/80       atorvastatin 10 MG tablet    LIPITOR    90 tablet    Take 1 tablet (10 mg) by mouth daily Refill when requested    Hyperlipidemia LDL goal <100, Type 2 diabetes mellitus with other ophthalmic complication, without long-term current use of insulin (H)       blood glucose monitoring lancets     100 each    Check blood sugar once daily    Type 2 diabetes mellitus with other ophthalmic complication, without long-term current use of insulin (H)       blood glucose monitoring test strip    ACCU-CHEK COMPACT STRIPS    100 each    1 strip by In Vitro route daily    Type 2 diabetes mellitus with other ophthalmic complication, without long-term current use of insulin (H)       carbidopa-levodopa  MG Tbcr      Take  by mouth. 4 times daily  .        cholecalciferol 1000 UNIT tablet    vitamin D3    100    1 TABLET DAILY    Contusion of ribs       diclofenac 1 % Gel topical gel    VOLTAREN    300 g    APPLY 4 GRAMS TO KNEES OR 2 GRAMS TO HANDS FOUR TIMES A DAY USING ENCLOSED DOSING CARD    Arthritis       dipyridamole 50 MG tablet    PERSANTINE    720 tablet    Take 2 tablets (100 mg) by mouth 4 times daily    Retinal ischemia       doxycycline monohydrate 100 MG capsule     60 capsule    1 tablet 2 time daily for Rosacea flares    Rosacea       fluticasone 50 MCG/ACT spray    FLONASE    16 g    Spray 1-2 sprays into both nostrils daily    Chronic rhinitis       FOLIC ACID PO      Take 1 mg by mouth daily        furosemide 20 MG tablet    LASIX    30 tablet    1/2 tablet per day. If weight increases by 3-5 pounds then increase to 20 mg (1 tablet). If weight decreases to 205 then ok to hold.    Edema, unspecified type       irbesartan 75 MG tablet    AVAPRO    45 tablet    Take 0.5 tablets (37.5 mg) by mouth  daily    Hypertension goal BP (blood pressure) < 150/90       metFORMIN 500 MG 24 hr tablet    GLUCOPHAGE-XR    90 tablet    Take 1 tablet (500 mg) by mouth daily (with dinner)    Type 2 diabetes mellitus with hyperglycemia, without long-term current use of insulin (H)       metroNIDAZOLE 0.75 % cream    METROCREAM    45 g    Apply topically 2 times daily    Rosacea       MULTI vitamin  MENS Tabs      Take  by mouth. Takes one daily        PREDNISONE PO      Take 5 mg by mouth Currently taking 5 tablets (25mg) daily per patient (this medication is being taken care of through [facility]).        senna-docusate 8.6-50 MG per tablet    SENOKOT-S;PERICOLACE    60 tablet    Take 1-2 tablets by mouth 2 times daily.    S/P total knee replacement       sertraline 25 MG tablet    ZOLOFT    90 tablet    Take 1 tablet (25 mg) by mouth daily    Dysthymia       TYLENOL 500 MG tablet   Generic drug:  acetaminophen     100 tablet    Take 2 tablets (1,000 mg) by mouth 3 times daily        Vitamin B-12 CR 1000 MCG Tbcr     60 tablet    TAKE ONE TABLET BY MOUTH EVERY DAY    Parkinson disease (H)

## 2018-05-29 NOTE — LETTER
June 6, 2018      To Dr Bunny Hawkins   [facility]     RE:Bart Rossy   [address] [facility] [address]       Dear Dr Hawkins     The results of Bart Blair recent PPD was negative. 0.0 mm of induration. He had asked me to forward the results to you.     Sincerely,       Chuy Stewart MD       Results for orders placed or performed in visit on 05/29/18   TB INTRADERMAL TEST   Result Value Ref Range    PPD Induration 0 0 - 5 mm    PPD Redness 0 mm    Impression    Mantoux results: No induration.  No swelling.  No redness.  Wendy Clark,Clinic Rn  [place]

## 2018-05-29 NOTE — NURSING NOTE
The patient is asked the following questions today and these are his answers:    -Have you had a mantoux administered in the past 30 days?    No  -Have you had a previous positive Mantoux.  No  -Have you received BCG in the past.  No  -Have you had a live vaccine  (MMR, Varicella, OPV, Yellow Fever) in the last 6 weeks.  No  -Have you had and active  viral or bacterial infection in the past 6 weeks.  No  -Have you received corticosteroids or immunosuppressive agents in the past 6 weeks.  No  -Have you been diagnosed with HIV?  No  -Do you have a maglinancy?  No   Enma North, [facility]

## 2018-05-29 NOTE — PATIENT INSTRUCTIONS
-consider new shingles vaccine, which you can get at our pharmacy. Check to see if it's covered by your insurance.     Orders Placed This Encounter     TB INTRADERMAL TEST     Hemoglobin A1c     Last Lab Result: Hemoglobin A1C (%)       Date                     Value                 03/29/2018               9.3 (H)          ----------     Basic metabolic panel     PREDNISONE PO     Sig: Take 5 mg by mouth Currently taking 5 tablets (25mg) daily per patient (this medication is being taken care of through [facility]).

## 2018-05-30 LAB
ANION GAP SERPL CALCULATED.3IONS-SCNC: 10 MMOL/L (ref 3–14)
BUN SERPL-MCNC: 41 MG/DL (ref 7–30)
CALCIUM SERPL-MCNC: 9.2 MG/DL (ref 8.5–10.1)
CHLORIDE SERPL-SCNC: 107 MMOL/L (ref 94–109)
CO2 SERPL-SCNC: 25 MMOL/L (ref 20–32)
CREAT SERPL-MCNC: 1.23 MG/DL (ref 0.66–1.25)
GFR SERPL CREATININE-BSD FRML MDRD: 56 ML/MIN/1.7M2
GLUCOSE SERPL-MCNC: 247 MG/DL (ref 70–99)
POTASSIUM SERPL-SCNC: 5.5 MMOL/L (ref 3.4–5.3)
SODIUM SERPL-SCNC: 142 MMOL/L (ref 133–144)

## 2018-05-31 ENCOUNTER — ALLIED HEALTH/NURSE VISIT (OUTPATIENT)
Dept: NURSING | Facility: CLINIC | Age: 83
End: 2018-05-31
Payer: COMMERCIAL

## 2018-05-31 DIAGNOSIS — Z11.1 SCREENING EXAMINATION FOR PULMONARY TUBERCULOSIS: Primary | ICD-10-CM

## 2018-05-31 LAB
PPDINDURATION: 0 MM (ref 0–5)
PPDREDNESS: 0 MM

## 2018-05-31 PROCEDURE — 99207 ZZC NO CHARGE NURSE ONLY: CPT

## 2018-05-31 NOTE — MR AVS SNAPSHOT
After Visit Summary   5/31/2018    Bart Blair    MRN: [id number]           Patient Information     Date Of Birth          2/14/1931        Visit Information        Provider Department      5/31/2018 3:00 PM NE RN [facility]        Today's Diagnoses     Screening examination for pulmonary tuberculosis    -  1       Follow-ups after your visit        Your next 10 appointments already scheduled     Sep 13, 2018  1:30 PM CDT   (Arrive by 1:15 PM)   Implantable Loop Recorder with Uc Cv Device 1   [facility])    [address] [place] [address]  [address] [place] [id number] [phone]            Sep 13, 2018  2:00 PM CDT   (Arrive by 1:45 PM)   RETURN ATRIAL FIBULATION VISIT with JOSEPH Beth CNP   [facility])    [address] [place] [address]  [address] [place] [id number] [phone]              Who to contact     If you have questions or need follow up information about today's clinic visit or your schedule please contact [facility] directly at [phone].  Normal or non-critical lab and imaging results will be communicated to you by MyChart, letter or phone within 4 business days after the clinic has received the results. If you do not hear from us within 7 days, please contact the clinic through MyChart or phone. If you have a critical or abnormal lab result, we will notify you by phone as soon as possible.  Submit refill requests through [employer] or call your pharmacy and they will forward the refill request to us. Please allow 3 business days for your refill to be completed.          Additional Information About Your Visit        MyChart Information     [employer] gives you secure access to your electronic health record. If you see a primary care provider, you can also send messages to your care team and make appointments. If you have questions, please call your  primary care clinic.  If you do not have a primary care provider, please call [phone] and they will assist you.        Care EveryWhere ID     This is your Care EveryWhere ID. This could be used by other organizations to access your [place] medical records  [id number]         Blood Pressure from Last 3 Encounters:   05/29/18 126/68   03/29/18 119/63   03/15/18 130/55    Weight from Last 3 Encounters:   05/29/18 212 lb (96.2 kg)   03/29/18 215 lb (97.5 kg)   03/15/18 215 lb 3.2 oz (97.6 kg)              Today, you had the following     No orders found for display         Today's Medication Changes          These changes are accurate as of 5/31/18  3:19 PM.  If you have any questions, ask your nurse or doctor.               These medicines have changed or have updated prescriptions.        Dose/Directions    senna-docusate 8.6-50 MG per tablet   Commonly known as:  SENOKOT-S;PERICOLACE   This may have changed:    - when to take this  - additional instructions   Used for:  S/P total knee replacement        Dose:  1-2 tablet   Take 1-2 tablets by mouth 2 times daily.   Quantity:  60 tablet   Refills:  1                Primary Care Provider Office Phone # Fax #    Chuy Juan A Stewart [phone]       [address] [place] [id number]        Equal Access to Services     TIFFANI ABDUL AH: Hadgelacio ramirez hadpiao Soeliceo, waaxda luqadaha, qaybta kaalmajd chand. So [facility] [phone].    ATENCIÓN: Si habla español, tiene a ellis disposición servicios gratuitos de asistencia lingüística. Alejandro al [phone].    We comply with applicable federal civil rights laws and [place] laws. We do not discriminate on the basis of race, color, national origin, age, disability, sex, sexual orientation, or gender identity.            Thank you!     Thank you for choosing [facility]  for your care. Our goal is always to provide you with excellent  care. Hearing back from our patients is one way we can continue to improve our services. Please take a few minutes to complete the written survey that you may receive in the mail after your visit with us. Thank you!             Your Updated Medication List - Protect others around you: Learn how to safely use, store and throw away your medicines at www.disposemymeds.org.          This list is accurate as of 5/31/18  3:19 PM.  Always use your most recent med list.                   Brand Name Dispense Instructions for use Diagnosis    amoxicillin 500 MG capsule    AMOXIL    16 capsule    4 tablets prior to dental appointment. Use for dental appointments    Status post total left knee replacement       aspirin 81 MG tablet      Take 1 tablet by mouth daily.    Hypertension goal BP (blood pressure) < 130/80       atorvastatin 10 MG tablet    LIPITOR    90 tablet    Take 1 tablet (10 mg) by mouth daily Refill when requested    Hyperlipidemia LDL goal <100, Type 2 diabetes mellitus with other ophthalmic complication, without long-term current use of insulin (H)       blood glucose monitoring lancets     100 each    Check blood sugar once daily    Type 2 diabetes mellitus with other ophthalmic complication, without long-term current use of insulin (H)       blood glucose monitoring test strip    ACCU-CHEK COMPACT STRIPS    100 each    1 strip by In Vitro route daily    Type 2 diabetes mellitus with other ophthalmic complication, without long-term current use of insulin (H)       carbidopa-levodopa  MG Tbcr      Take  by mouth. 4 times daily  .        cholecalciferol 1000 UNIT tablet    vitamin D3    100    1 TABLET DAILY    Contusion of ribs       diclofenac 1 % Gel topical gel    VOLTAREN    300 g    APPLY 4 GRAMS TO KNEES OR 2 GRAMS TO HANDS FOUR TIMES A DAY USING ENCLOSED DOSING CARD    Arthritis       dipyridamole 50 MG tablet    PERSANTINE    720 tablet    Take 2 tablets (100 mg) by mouth 4 times daily     Retinal ischemia       doxycycline monohydrate 100 MG capsule     60 capsule    1 tablet 2 time daily for Rosacea flares    Rosacea       fluticasone 50 MCG/ACT spray    FLONASE    16 g    Spray 1-2 sprays into both nostrils daily    Chronic rhinitis       FOLIC ACID PO      Take 1 mg by mouth daily        furosemide 20 MG tablet    LASIX    30 tablet    1/2 tablet per day. If weight increases by 3-5 pounds then increase to 20 mg (1 tablet). If weight decreases to 205 then ok to hold.    Edema, unspecified type       irbesartan 75 MG tablet    AVAPRO    45 tablet    Take 0.5 tablets (37.5 mg) by mouth daily    Hypertension goal BP (blood pressure) < 150/90       metFORMIN 500 MG 24 hr tablet    GLUCOPHAGE-XR    90 tablet    Take 1 tablet (500 mg) by mouth daily (with dinner)    Type 2 diabetes mellitus with hyperglycemia, without long-term current use of insulin (H)       metroNIDAZOLE 0.75 % cream    METROCREAM    45 g    Apply topically 2 times daily    Rosacea       MULTI vitamin  MENS Tabs      Take  by mouth. Takes one daily        PREDNISONE PO      Take 5 mg by mouth Currently taking 5 tablets (25mg) daily per patient (this medication is being taken care of through [facility]).        senna-docusate 8.6-50 MG per tablet    SENOKOT-S;PERICOLACE    60 tablet    Take 1-2 tablets by mouth 2 times daily.    S/P total knee replacement       sertraline 25 MG tablet    ZOLOFT    90 tablet    Take 1 tablet (25 mg) by mouth daily    Dysthymia       TYLENOL 500 MG tablet   Generic drug:  acetaminophen     100 tablet    Take 2 tablets (1,000 mg) by mouth 3 times daily        Vitamin B-12 CR 1000 MCG Tbcr     60 tablet    TAKE ONE TABLET BY MOUTH EVERY DAY    Parkinson disease (H)

## 2018-05-31 NOTE — PROGRESS NOTES
Mantoux results: No induration.  No swelling.  No redness.  Wendy Clark,Clinic Rn  [place]

## 2018-06-01 ENCOUNTER — TELEPHONE (OUTPATIENT)
Dept: FAMILY MEDICINE | Facility: CLINIC | Age: 83
End: 2018-06-01

## 2018-06-01 ENCOUNTER — ALLIED HEALTH/NURSE VISIT (OUTPATIENT)
Dept: PHARMACY | Facility: CLINIC | Age: 83
End: 2018-06-01
Payer: COMMERCIAL

## 2018-06-01 DIAGNOSIS — G89.29 OTHER CHRONIC PAIN: ICD-10-CM

## 2018-06-01 DIAGNOSIS — K59.00 CONSTIPATION, UNSPECIFIED CONSTIPATION TYPE: ICD-10-CM

## 2018-06-01 DIAGNOSIS — F43.21 ADJUSTMENT DISORDER WITH DEPRESSED MOOD: ICD-10-CM

## 2018-06-01 DIAGNOSIS — G20.A1 PARKINSON DISEASE (H): Primary | ICD-10-CM

## 2018-06-01 DIAGNOSIS — M31.6 TEMPORAL ARTERITIS (H): ICD-10-CM

## 2018-06-01 DIAGNOSIS — I10 HYPERTENSION GOAL BP (BLOOD PRESSURE) < 150/90: ICD-10-CM

## 2018-06-01 DIAGNOSIS — H35.82 RETINAL ISCHEMIA: ICD-10-CM

## 2018-06-01 DIAGNOSIS — R60.9 EDEMA, UNSPECIFIED TYPE: ICD-10-CM

## 2018-06-01 DIAGNOSIS — E11.65 TYPE 2 DIABETES MELLITUS WITH HYPERGLYCEMIA, WITHOUT LONG-TERM CURRENT USE OF INSULIN (H): ICD-10-CM

## 2018-06-01 PROCEDURE — 99607 MTMS BY PHARM ADDL 15 MIN: CPT | Performed by: PHARMACIST

## 2018-06-01 PROCEDURE — 99605 MTMS BY PHARM NP 15 MIN: CPT | Performed by: PHARMACIST

## 2018-06-01 RX ORDER — METFORMIN HCL 500 MG
500 TABLET, EXTENDED RELEASE 24 HR ORAL 2 TIMES DAILY WITH MEALS
Qty: 180 TABLET | Refills: 1 | Status: SHIPPED | OUTPATIENT
Start: 2018-06-01 | End: 2018-10-10 | Stop reason: ALTCHOICE

## 2018-06-01 NOTE — TELEPHONE ENCOUNTER
Faustino Stewart,    This is Bruce, a pharmacist on Doris's team.  I'm covering her messages today because she is out of the office, returning Tuesday.  Would you like for us to reach out today? I'm happy to do that in Doris's absence.  Otherwise, I will leave this for Doris to address when she returns on Tuesday.    Bruce Jenkins, Pharm.D., [facility]  Medication Therapy Management Pharmacist  Page/VM:  [phone]

## 2018-06-01 NOTE — TELEPHONE ENCOUNTER
If you could reach out that would be great. He is on prednisone for temporal arteritis. They are wanting to taper but it will be a long taper. I am ok with metformin given his good Cr but he is 87. The glipizide is ok but he has had syncopal and dizzy episodes in the past due to other medical concerns so would be at risk for falls with hypoglycemia.    Thanks  Quentin Stewart MD

## 2018-06-01 NOTE — PROGRESS NOTES
SUBJECTIVE/OBJECTIVE:                           Bart Blair is a 87 year old male called for an initial visit for Medication Therapy Management.  He was referred to me from Dr. Stewart. Initially, I speak to Dionisio.  When he answers the phone, he asks me to call back around 3:00 and visit with his wife.  I called back at 3:00 and visit with his wife, Malathi.  There is a consent to communicate on file.    Chief Complaint: Uncontrolled diabetes.    Allergies/ADRs: Reviewed in Epic  Tobacco: No tobacco use  Alcohol: not currently using  Caffeine: 1-2 cups/day of coffee  PMH: Reviewed in Epic    Medication Adherence/Access:  no issues reported -his wife is responsible for setting up his medication boxes.    Diabetes:  Pt currently taking Metformin  mg daily. Pt is not experiencing side effects.  His recent increases in blood sugar largely attributed to long-term steroid use.  SMBG: one time daily.   Ranges (patient reported):     FPG: they have been in the 160's but it was 127 mg/dL this morning  Patient is not experiencing hypoglycemia  Recent symptoms of high blood sugar? none  Eye exam: up to date  Foot exam: up to date  Microalbumin is < 30 mg/g. Pt is taking an ACEi/ARB.  Aspirin: Taking 81mg daily and denies side effects    Chronic pain: APAP 2 500 mg tablets twice daily (AM and PM) and Votlaren gel 2-4 times daily.  Both of these therapies work well to control his ongoing pain issues.  They have no concerns or questions about these medications today.    Parkinson's Disease: Current medications include carbidopa levodopa  mg 1 tablet twice daily.  They recently visited with a new neurologist at [facility] the new evaluations demonstrate that he may not have Parkinson's disease.  Subsequently, they were asked to reduce his carbid extensive physical therapy appointments.  So it is hard to determine whether carbidopa levodopa dose from 4 times daily to twice daily.  His wife also mentions that he had a bad  fall this winter with subsequent his leg stiffness is actually being caused by the change in carbidopa levodopa dosing or just recovery from his fall.  They plan to continue to follow with this group.     Retinal Ischemia: Current medications include dipyridamole 100 mg 2 tablets four times daily.  They use a pill box that helps to make sure they get all of theses doses in.  The patient's wife was unaware of this medication was doing with the patient.  We discussed this today.    Edema:  Current medications include Furosemide 10-20 mg as directed.  They do daily weights.  Recently increased the dose was increased and the patient is having increased frequency.  However this is not bothersome at night.  The patient only raises to use the bathroom once or twice.  His wife is careful to give frusemide in the morning when it is needed.      Hypertension: Current medications include irbesartan 75 mg daily.  This is apparently incorrect in the medicine list.  It was a miscommunication between the family and Dr. Stewart.  They are taking 75 mg daily.  Patient does self-monitor BP.  Patient reports no current medication side effects.    Constipation: Uses Senna-docusate he uses twice daily.  This works well and has no concerns.    Depression:  Current medications include: Sertraline 25 mg once daily. Pt reports that depression symptoms are stable.  PHQ-9 SCORE 5/1/2013 1/15/2014 11/22/2016   Total Score 6 0 -   Total Score - - 3     Temporal arteritis: Current medications include prednisone 25 mg daily area the patient denies side effects today.  He notes that they are working hard to reduce the dose but he is quite stable where he is right now.    Current labs include:  Today's Vitals: There were no vitals taken for this visit. - telemed     BP Readings from Last 3 Encounters:   05/29/18 126/68   03/29/18 119/63   03/15/18 130/55     Lab Results   Component Value Date    A1C 8.5 05/29/2018    A1C 9.3 03/29/2018    A1C 6.6  10/24/2017    A1C 6.0 04/25/2017    A1C 6.3 11/22/2016     Recent Labs   Lab Test  10/24/17   1050  11/18/14   1238  06/25/13   0829   CHOL  152  116  112   HDL  75  45  41   LDL  32  37  52   TRIG  226*  171*  92   CHOLHDLRATIO   --   2.6  2.7     Liver Function Studies -   Recent Labs   Lab Test  12/22/17   1830   PROTTOTAL  6.6*   ALBUMIN  3.0*   BILITOTAL  0.3   ALKPHOS  52   AST  13   ALT  16     Lab Results   Component Value Date    UCRR 124 03/29/2018    MICROL 19 03/29/2018    UMALCR 15.32 03/29/2018     Last Basic Metabolic Panel:  Lab Results   Component Value Date     05/29/2018      Lab Results   Component Value Date    POTASSIUM 5.5 05/29/2018     Lab Results   Component Value Date    CHLORIDE 107 05/29/2018     Lab Results   Component Value Date    BUN 41 05/29/2018     Lab Results   Component Value Date    CR 1.23 05/29/2018     GFR Estimate   Date Value Ref Range Status   05/29/2018 56 (L) >60 mL/min/1.7m2 Final     Comment:     Non  GFR Calc   03/29/2018 58 (L) >60 mL/min/1.7m2 Final     Comment:     Non  GFR Calc   12/28/2017 61 >60 mL/min/1.7m2 Final     Comment:     Non  GFR Calc     eCrCL ~ 45-55 ml/min    Most Recent Immunizations   Administered Date(s) Administered     Influenza (H1N1) 12/31/2009     Influenza (High Dose) 3 valent vaccine 10/24/2017     Influenza (IIV3) PF 10/02/2012     Influenza Vaccine IM 3yrs+ 4 Valent IIV4 10/21/2014     Mantoux Tuberculin Skin Test 05/29/2018     Pneumo Conj 13-V (2010&after) 02/17/2015     Pneumococcal 23 valent 06/04/2013     TD (ADULT, 7+) 01/24/2012       ASSESSMENT:                             Current medications were reviewed today. Medicare Part D topics discussed:Medication changes    Medication Adherence: excellent, no issues identified    Diabetes: Needs improvement.  Patient is not meeting A1c goal less than 8%.  Patient would benefit from increasing metformin to 500 mg twice daily to help  combat steroid-induced hyperglycemia. Kidney function appears stable. Use caution increasing dose past 500 mg twice daily d/t renal function.     Chronic pain: Stable.    Parkinson's disease: Plan in place for further evaluation with Jayme.    Retinal ischemia: Stable.    Edema: Stable.    Hypertension: Stable.    Constipation: Stable.    Depression: Stable.    Temporal arteritis: Stable.  Chronic use of steroids is likely causing hyperglycemia.  Without a long-term plan to reduce steroids to physiologic levels, increase in therapy as needed to control hyperglycemia.    PLAN:                            1.  Increase metformin to 1 tablet twice daily.    I spent 30 minutes with this patient today (an extra 15 minutes was spent creating the Medication Action Plan). All changes were made via collaborative practice agreement with Chuy Stewart. A copy of the visit note was provided to the patient's primary care provider.    Will have Doris Ron Pharm.D., follow-up in 2 weeks to assess for side effects and efficacy..    The patient was sent via [employer] a summary of these recommendations as an after visit summary.     Diana Jenkins Pharm.D., BCACP  Medication Therapy Management Pharmacist  Page/VM:  [phone]

## 2018-06-01 NOTE — TELEPHONE ENCOUNTER
Sounds good. I will call today and get this started. Will route chart when I am finished.     Bruce

## 2018-06-01 NOTE — LETTER
"     [facility] MEDICATION THERAPY MANAGEMENT     Date: 2018    Bart Blair  [address] [address]    Dear Mr. Blair,    Thank you for talking with me on  about your health and medications. Medicare s MTM (Medication Therapy Management) program helps you understand your medications and use them safely.      This letter includes an action plan (Medication Action Plan) and medication list (Personal Medication List). The action plan has steps you should take to help you get the best results from your medications. The medication list will help you keep track of your medications and how to use them the right way.       Have your action plan and medication list with you when you talk with your doctors, pharmacists, and other healthcare providers in your care team.     Ask your doctors, pharmacists, and other healthcare providers to update the action plan and medication list at every visit.     Take your medication list with you if you go to the hospital or emergency room.     Give a copy of the action plan and medication list to your family or caregivers.     If you want to talk about this letter or any of the papers with it, please call   [phone].   We look forward to working with you, your doctors, and other healthcare providers to help you stay healthy.     Sincerely,    Diana Jenkins      Enclosed: Medication Action Plan and Personal Medication List    MEDICATION ACTION PLAN FOR Bart Blair,  1931     This action plan will help you get the best results from your medications if you:   1. Read \"What we talked about.\"   2. Take the steps listed in the \"What I need to do\" boxes.   3. Fill in \"What I did and when I did it.\"   4. Fill in \"My follow-up plan\" and \"Questions I want to ask.\"     Have this action plan with you when you talk with your doctors, pharmacists, and other healthcare providers in your care team. Share this with your family or caregivers too.  DATE " PREPARED: 2018  What we talked about: Steroid-induced high blood sugars                                                  What I need to do: increase metformin to 1 tablet twice daily       What I did and when I did it:                                              My follow-up plan:                 Questions I want to ask:              If you have any questions about your action plan, call Diana Jenkins, Phone: [phone] , Monday-Friday 8-4:30pm.           MEDICATION LIST FOR KEVIN Jimenez 1931     This medication list was made for you after we talked. We also used information from your doctor's chart.      Use blank rows to add new medications. Then fill in the dates you started using them.    Cross out medications when you no longer use them. Then write the date and why you stopped using them.    Ask your doctors, pharmacists, and other healthcare providers to update this list at every visit. Keep this list up-to-date with:       Prescription medications    Over the counter drugs     Herbals    Vitamins    Minerals      If you go to the hospital or emergency room, take this list with you. Share this with your family or caregivers too.     DATE PREPARED: 2018  Allergies or side effects: No known drug allergies     Medication:  ACETAMINOPHEN 500 MG PO TABS      How I use it:  Take 2 tablets (1,000 mg) by mouth 3 times daily      Why I use it:  Pain    Prescriber:  Chuy Stewart MD      Date I started using it:       Date I stopped using it:         Why I stopped using it:            Medication:  AMOXICILLIN 500 MG PO CAPS      How I use it:  4 tablets prior to dental appointment. Use for dental appointments      Why I use it: Protect against infection    Prescriber:  Chuy Stewart MD      Date I started using it:       Date I stopped using it:         Why I stopped using it:            Medication:  ASPIRIN 81 MG PO TABS      How I use it:  Take 1 tablet by mouth daily.      Why I  use it: Protect against stroke    Prescriber:  Chuy Stewart MD      Date I started using it:       Date I stopped using it:         Why I stopped using it:            Medication:  ATORVASTATIN CALCIUM 10 MG PO TABS      How I use it:  Take 1 tablet (10 mg) by mouth daily Refill when requested      Why I use it: Cholesterol    Prescriber:  Chuy Stewart MD      Date I started using it:       Date I stopped using it:         Why I stopped using it:            Medication:  CARBIDOPA-LEVODOPA  MG PO TBCR      How I use it:  2 times daily      Why I use it: Possible Parkinsons     Prescriber:  Patient Reported      Date I started using it:       Date I stopped using it:         Why I stopped using it:            Medication:  DICLOFENAC SODIUM 1 % TD GEL      How I use it:  APPLY 4 GRAMS TO KNEES OR 2 GRAMS TO HANDS FOUR TIMES A DAY USING ENCLOSED DOSING CARD      Why I use it: Arthritis    Prescriber:  Chuy Stewart MD      Date I started using it:       Date I stopped using it:         Why I stopped using it:            Medication:  DIPYRIDAMOLE 50 MG PO TABS      How I use it:  Take 2 tablets (100 mg) by mouth 4 times daily      Why I use it: Retinal ischemia    Prescriber:  Chuy Stewart MD      Date I started using it:       Date I stopped using it:         Why I stopped using it:            Medication:  DOXYCYCLINE MONOHYDRATE 100 MG PO CAPS      How I use it:  1 tablet 2 time daily for Rosacea flares      Why I use it: Rosacea    Prescriber:  Chuy Stewart MD      Date I started using it:       Date I stopped using it:         Why I stopped using it:            Medication:  FLUTICASONE PROPIONATE 50 MCG/ACT NA SUSP      How I use it:  Spray 1-2 sprays into both nostrils daily      Why I use it: Chronic congestion    Prescriber:  Chuy Stewart MD      Date I started using it:       Date I stopped using it:         Why I stopped using it:            Medication:   FOLIC ACID PO      How I use it:  Take 1 mg by mouth daily      Why I use it:  General health    Prescriber:  Patient Reported      Date I started using it:       Date I stopped using it:         Why I stopped using it:            Medication:  FUROSEMIDE 20 MG PO TABS      How I use it:  1/2 tablet per day. If weight increases by 3-5 pounds then increase to 20 mg (1 tablet). If weight decreases to 205 then ok to hold.      Why I use it: Swelling    Prescriber:  Chuy Stewart MD      Date I started using it:       Date I stopped using it:         Why I stopped using it:            Medication:  IRBESARTAN 75 MG PO TABS      How I use it:  Take 0.5 tablets (37.5 mg) by mouth daily      Why I use it: Blood pressure    Prescriber:  Chuy Stewart MD      Date I started using it:       Date I stopped using it:         Why I stopped using it:            Medication:  METFORMIN HCL  MG PO TB24      How I use it:  Take 1 tablet (500 mg) by mouth 2 times daily (with meals)      Why I use it: Type 2 diabetes     Prescriber:  Chuy Stewart MD      Date I started using it:       Date I stopped using it:         Why I stopped using it:            Medication:  METRONIDAZOLE 0.75 % EX CREA      How I use it:  Apply topically 2 times daily      Why I use it: Rosacea    Prescriber:  Chuy Stewart MD      Date I started using it:       Date I stopped using it:         Why I stopped using it:            Medication:  MULTI VITAMIN MENS PO TABS      How I use it:  Take  by mouth. Takes one daily        Why I use it:  General health    Prescriber:  Patient Reported      Date I started using it:       Date I stopped using it:         Why I stopped using it:            Medication:  PREDNISONE PO      How I use it:  Take 5 mg by mouth Currently taking 5 tablets (25mg) daily per patient (this medication is being taken care of through [facility]).      Why I use it:  Temporal arteritis    Prescriber:   Patient Reported      Date I started using it:       Date I stopped using it:         Why I stopped using it:            Medication:  SENNOSIDES-DOCUSATE SODIUM 8.6-50 MG PO TABS      How I use it:  Take 1-2 tablets by mouth 2 times daily.      Why I use it: Constipation    Prescriber:  Sara Crump PA-C      Date I started using it:       Date I stopped using it:         Why I stopped using it:            Medication:  SERTRALINE HCL 25 MG PO TABS      How I use it:  Take 1 tablet (25 mg) by mouth daily      Why I use it: Dysthymia    Prescriber:  Chuy Stewart MD      Date I started using it:       Date I stopped using it:         Why I stopped using it:            Medication:  VITAMIN B-12 ER 1000 MCG PO TBCR      How I use it:  TAKE ONE TABLET BY MOUTH EVERY DAY      Why I use it: General Health    Prescriber:  Chuy Stewart MD      Date I started using it:       Date I stopped using it:         Why I stopped using it:            Medication:  VITAMIN D 1000 UNIT OR TABS      How I use it:  1 TABLET DAILY      Why I use it: General Health    Prescriber:  Chuy Stewart MD      Date I started using it:       Date I stopped using it:         Why I stopped using it:                 Other Information:     If you have any questions about your action plan, call [phone].    According to the Paperwork Reduction Act of 1995, no persons are required to respond to a collection of information unless it displays a valid OMB control number. The valid OMB number for this information collection is [id number]. The time required to complete this information collection is estimated to average 40 minutes per response, including the time to review instructions, searching existing data resources, gather the data needed, and complete and review the information collection. If you have any comments concerning the accuracy of the time estimate(s) or suggestions for improving this form, please write  to: CMS, Attn: [employer] Reports Clearance Officer, [address] [place] [address], [place], [place] [address].

## 2018-06-01 NOTE — TELEPHONE ENCOUNTER
MTM can you call patient and his wife.Please call patient. His potassium was slightly elevated. I would like him to come in next week for a recheck of his labs.probably normal variation. Creatinine is ok. He can do a lab only visit. He is also on prednisone and this is driving his blood sugars. Possible change of metformin to BID or add glipizide.    Quentin Stewart MD

## 2018-06-01 NOTE — MR AVS SNAPSHOT
After Visit Summary   6/1/2018    Bart Blair    MRN: [id number]           Patient Information     Date Of Birth          2/14/1931        Visit Information        Provider Department      6/1/2018 1:00 PM Diana Jenkins RPH [facility] Medication Therapy Management        Today's Diagnoses     Parkinson disease (H)    -  1    Type 2 diabetes mellitus with hyperglycemia, without long-term current use of insulin (H)        Temporal arteritis (H)        Hypertension goal BP (blood pressure) < 150/90        Retinal ischemia        Other chronic pain        Edema, unspecified type        Adjustment disorder with depressed mood        Constipation, unspecified constipation type           Follow-ups after your visit        Your next 10 appointments already scheduled     Sep 13, 2018  1:30 PM CDT   (Arrive by 1:15 PM)   Implantable Loop Recorder with Uc Cv Device 1   [facility])    [address] [place] [address]  [address] [place] [id number] [phone]            Sep 13, 2018  2:00 PM CDT   (Arrive by 1:45 PM)   RETURN ATRIAL FIBULATION VISIT with [facility])    [address] [place] [address]  [address] [place] [id number] [phone]              Who to contact     If you have questions or need follow up information about today's clinic visit or your schedule please contact [facility] MEDICATION THERAPY MANAGEMENT directly at [phone].  Normal or non-critical lab and imaging results will be communicated to you by MyChart, letter or phone within 4 business days after the clinic has received the results. If you do not hear from us within 7 days, please contact the clinic through MyChart or phone. If you have a critical or abnormal lab result, we will notify you by phone as soon as possible.  Submit refill requests through [employer] or call your pharmacy and they will forward the  refill request to us. Please allow 3 business days for your refill to be completed.          Additional Information About Your Visit        [employer] Information     meets gives you secure access to your electronic health record. If you see a primary care provider, you can also send messages to your care team and make appointments. If you have questions, please call your primary care clinic.  If you do not have a primary care provider, please call [phone] and they will assist you.        Care EveryWhere ID     This is your Care EveryWhere ID. This could be used by other organizations to access your [place] medical records  [id number]         Blood Pressure from Last 3 Encounters:   05/29/18 126/68   03/29/18 119/63   03/15/18 130/55    Weight from Last 3 Encounters:   05/29/18 212 lb (96.2 kg)   03/29/18 215 lb (97.5 kg)   03/15/18 215 lb 3.2 oz (97.6 kg)              Today, you had the following     No orders found for display         Today's Medication Changes          These changes are accurate as of 6/1/18 11:59 PM.  If you have any questions, ask your nurse or doctor.               These medicines have changed or have updated prescriptions.        Dose/Directions    metFORMIN 500 MG 24 hr tablet   Commonly known as:  GLUCOPHAGE-XR   This may have changed:  when to take this   Used for:  Type 2 diabetes mellitus with hyperglycemia, without long-term current use of insulin (H)   Changed by:  Diana Jenkins RPH        Dose:  500 mg   Take 1 tablet (500 mg) by mouth 2 times daily (with meals)   Quantity:  180 tablet   Refills:  1       senna-docusate 8.6-50 MG per tablet   Commonly known as:  SENOKOT-S;PERICOLACE   This may have changed:    - when to take this  - additional instructions   Used for:  S/P total knee replacement        Dose:  1-2 tablet   Take 1-2 tablets by mouth 2 times daily.   Quantity:  60 tablet   Refills:  1            Where to get your medicines      These medications were sent  to [facility] - [place], [place] - [address] [facility] [address]  [address] [address], [place] [address]     Phone:  [phone]     metFORMIN 500 MG 24 hr tablet                Primary Care Provider Office Phone # Fax #    Chuy Stewart [phone]       [address] [facility] [address]        Equal Access to Services     TIFFANI ABDUL : Hadii aad ku hadasho Soomaali, waaxda luqadaha, qaybta kaalmada adeegyada, waxay idiin hayaan adeeg kharash la'malaika . So [facility] [phone].    ATENCIÓN: Si waqasla espbarbara, tiene a ellis disposición servicios gratuitos de asistencia lingüística. [facility] [phone].    We comply with applicable federal civil rights laws and [place] laws. We do not discriminate on the basis of race, color, national origin, age, disability, sex, sexual orientation, or gender identity.            Thank you!     Thank you for choosing [facility] MEDICATION THERAPY MANAGEMENT  for your care. Our goal is always to provide you with excellent care. Hearing back from our patients is one way we can continue to improve our services. Please take a few minutes to complete the written survey that you may receive in the mail after your visit with us. Thank you!             Your Updated Medication List - Protect others around you: Learn how to safely use, store and throw away your medicines at www.disposemymeds.org.          This list is accurate as of 6/1/18 11:59 PM.  Always use your most recent med list.                   Brand Name Dispense Instructions for use Diagnosis    amoxicillin 500 MG capsule    AMOXIL    16 capsule    4 tablets prior to dental appointment. Use for dental appointments    Status post total left knee replacement       aspirin 81 MG tablet      Take 1 tablet by mouth daily.    Hypertension goal BP (blood pressure) < 130/80       atorvastatin 10 MG tablet    LIPITOR    90 tablet    Take 1 tablet (10 mg) by mouth daily Refill when  requested    Hyperlipidemia LDL goal <100, Type 2 diabetes mellitus with other ophthalmic complication, without long-term current use of insulin (H)       blood glucose monitoring lancets     100 each    Check blood sugar once daily    Type 2 diabetes mellitus with other ophthalmic complication, without long-term current use of insulin (H)       blood glucose monitoring test strip    ACCU-CHEK COMPACT STRIPS    100 each    1 strip by In Vitro route daily    Type 2 diabetes mellitus with other ophthalmic complication, without long-term current use of insulin (H)       carbidopa-levodopa  MG Tbcr      2 times daily        cholecalciferol 1000 UNIT tablet    vitamin D3    100    1 TABLET DAILY    Contusion of ribs       diclofenac 1 % Gel topical gel    VOLTAREN    300 g    APPLY 4 GRAMS TO KNEES OR 2 GRAMS TO HANDS FOUR TIMES A DAY USING ENCLOSED DOSING CARD    Arthritis       dipyridamole 50 MG tablet    PERSANTINE    720 tablet    Take 2 tablets (100 mg) by mouth 4 times daily    Retinal ischemia       doxycycline monohydrate 100 MG capsule     60 capsule    1 tablet 2 time daily for Rosacea flares    Rosacea       fluticasone 50 MCG/ACT spray    FLONASE    16 g    Spray 1-2 sprays into both nostrils daily    Chronic rhinitis       FOLIC ACID PO      Take 1 mg by mouth daily        furosemide 20 MG tablet    LASIX    30 tablet    1/2 tablet per day. If weight increases by 3-5 pounds then increase to 20 mg (1 tablet). If weight decreases to 205 then ok to hold.    Edema, unspecified type       irbesartan 75 MG tablet    AVAPRO    45 tablet    Take 0.5 tablets (37.5 mg) by mouth daily    Hypertension goal BP (blood pressure) < 150/90       metFORMIN 500 MG 24 hr tablet    GLUCOPHAGE-XR    180 tablet    Take 1 tablet (500 mg) by mouth 2 times daily (with meals)    Type 2 diabetes mellitus with hyperglycemia, without long-term current use of insulin (H)       metroNIDAZOLE 0.75 % cream    METROCREAM    45 g     Apply topically 2 times daily    Rosacea       MULTI vitamin  MENS Tabs      Take  by mouth. Takes one daily        PREDNISONE PO      Take 5 mg by mouth Currently taking 5 tablets (25mg) daily per patient (this medication is being taken care of through [facility]).        senna-docusate 8.6-50 MG per tablet    SENOKOT-S;PERICOLACE    60 tablet    Take 1-2 tablets by mouth 2 times daily.    S/P total knee replacement       sertraline 25 MG tablet    ZOLOFT    90 tablet    Take 1 tablet (25 mg) by mouth daily    Dysthymia       TYLENOL 500 MG tablet   Generic drug:  acetaminophen     100 tablet    Take 2 tablets (1,000 mg) by mouth 3 times daily        Vitamin B-12 CR 1000 MCG Tbcr     60 tablet    TAKE ONE TABLET BY MOUTH EVERY DAY    Parkinson disease (H)

## 2018-06-01 NOTE — Clinical Note
Adriane Stewart asked me to call this augusto on Friday. Can you follow-up on the metformin dose change in 2 weeks or so?

## 2018-06-05 ENCOUNTER — TELEPHONE (OUTPATIENT)
Dept: FAMILY MEDICINE | Facility: CLINIC | Age: 83
End: 2018-06-05

## 2018-06-05 DIAGNOSIS — F34.1 DYSTHYMIA: ICD-10-CM

## 2018-06-05 DIAGNOSIS — H35.82 RETINAL ISCHEMIA: ICD-10-CM

## 2018-06-05 RX ORDER — SERTRALINE HYDROCHLORIDE 25 MG/1
TABLET, FILM COATED ORAL
Qty: 90 TABLET | Refills: 3 | Status: SHIPPED | OUTPATIENT
Start: 2018-06-05 | End: 2019-05-07

## 2018-06-05 RX ORDER — DIPYRIDAMOLE 50 MG
TABLET ORAL
Qty: 720 TABLET | Refills: 3 | Status: SHIPPED | OUTPATIENT
Start: 2018-06-05 | End: 2018-10-02 | Stop reason: ALTCHOICE

## 2018-06-05 NOTE — TELEPHONE ENCOUNTER
Ok to refill. His depression has been stable. You could call and do a PHQ 9     Orders Placed This Encounter     sertraline (ZOLOFT) 25 MG tablet     Sig: TAKE ONE TABLET BY MOUTH EVERY DAY     Dispense:  90 tablet     Refill:  3     dipyridamole (PERSANTINE) 50 MG tablet     Sig: TAKE TWO TABLETS BY MOUTH FOUR TIMES A DAY     Dispense:  720 tablet     Refill:  3

## 2018-06-05 NOTE — TELEPHONE ENCOUNTER
"Called and spoke with patient's wife. She states he's not good on the phone and that he doesn't use MyChart. She states \"well I don't think that he's depressed at all.\" Route to provider as FYI, this may need to wait until next OV.    Gaurav Mclaughlin RN    "

## 2018-06-05 NOTE — TELEPHONE ENCOUNTER
Routing refill request to provider for review/approval because:  See below.     Gaurav Mclaughlin RN

## 2018-06-05 NOTE — TELEPHONE ENCOUNTER
"Medication Detail      Disp Refills Start End      dipyridamole (PERSANTINE) 50 MG tablet 720 tablet 3 4/25/2017     Sig - Route: Take 2 tablets (100 mg) by mouth 4 times daily - Oral    Class: E-Prescribe    Order: [id number]    E-Prescribing Status: Receipt confirmed by pharmacy (4/25/2017 11:28 AM CDT)        Last Office Visit: 5/29/2018  Future Office visit:       Routing refill request to provider for review/approval because:  Drug not on the [facility] or [facility] refill protocol or controlled substance    Requested Prescriptions   Pending Prescriptions Disp Refills     sertraline (ZOLOFT) 25 MG tablet [Pharmacy Med Name: SERTRALINE HCL 25MG TABS]  Last Written Prescription Date:  4/25/2017  Last Fill Quantity: 90 tabs,  # refills: 3   Last office visit: 5/29/2018 with prescribing provider:  Terry   Future Office Visit:     90 tablet 3     Sig: TAKE ONE TABLET BY MOUTH EVERY DAY    SSRIs Protocol Failed    6/5/2018  8:55 AM       Failed - PHQ-9 score less than 5 in past 6 months    Please review last PHQ-9 score.          Passed - Patient is age 18 or older       Passed - Recent (6 mo) or future (30 days) visit within the authorizing provider's specialty    Patient had office visit in the last 6 months or has a visit in the next 30 days with authorizing provider or within the authorizing provider's specialty.  See \"Patient Info\" tab in inbasket, or \"Choose Columns\" in Meds & Orders section of the refill encounter.                                  "

## 2018-06-19 ENCOUNTER — ALLIED HEALTH/NURSE VISIT (OUTPATIENT)
Dept: PHARMACY | Facility: CLINIC | Age: 83
End: 2018-06-19
Payer: COMMERCIAL

## 2018-06-19 DIAGNOSIS — E11.65 TYPE 2 DIABETES MELLITUS WITH HYPERGLYCEMIA, WITHOUT LONG-TERM CURRENT USE OF INSULIN (H): Primary | ICD-10-CM

## 2018-06-19 DIAGNOSIS — G20.A1 PARKINSON DISEASE (H): ICD-10-CM

## 2018-06-19 PROCEDURE — 99606 MTMS BY PHARM EST 15 MIN: CPT | Performed by: PHARMACIST

## 2018-06-19 NOTE — PROGRESS NOTES
SUBJECTIVE/OBJECTIVE:                           Bart Blair is a 87 year old male called for a follow-up visit for Medication Therapy Management.  He was referred to me from Dr. Stewart.     Chief Complaint: Uncontrolled diabetes.    Tobacco: No tobacco use  Alcohol: not currently using    Medication Adherence/Access:  no issues reported -his wife is responsible for setting up his medication boxes.    Diabetes:  Pt currently taking Metformin  mg 2 tablet daily.  Dose was increased 6/1/2018.  Pt is not experiencing side effects.  His recent increases in blood sugar largely attributed to long-term steroid use.  SMBG: one time daily, fasting.   Ranges (patient reported): 116mg/dL  the last 3 mornings in a row  Patient is not experiencing hypoglycemia  Recent symptoms of high blood sugar? none he continues on    Parkinson's Disease: He continues on reduced dose of carbidopa levodopa  mg 1 tablet twice daily, as prescribed by new neurologist at [facility].  He has not had a worsening of any symptoms. Evaluations from [facility] demonstrate that he may not have Parkinson's disease.  He is scheduled for a follow-up appointment next Tuesday.    Current labs include:  Today's Vitals: There were no vitals taken for this visit. - telemed     BP Readings from Last 3 Encounters:   05/29/18 126/68   03/29/18 119/63   03/15/18 130/55     Lab Results   Component Value Date    A1C 8.5 05/29/2018    A1C 9.3 03/29/2018    A1C 6.6 10/24/2017    A1C 6.0 04/25/2017    A1C 6.3 11/22/2016     Lab Results   Component Value Date    CR 1.23 05/29/2018     GFR Estimate   Date Value Ref Range Status   05/29/2018 56 (L) >60 mL/min/1.7m2 Final     Comment:     Non  GFR Calc   03/29/2018 58 (L) >60 mL/min/1.7m2 Final     Comment:     Non  GFR Calc   12/28/2017 61 >60 mL/min/1.7m2 Final     Comment:     Non  GFR Calc     eCrCL ~ 45-55 ml/min      ASSESSMENT:                             Current  medications were reviewed today. Medicare Part D topics discussed:Medications reviewed-no issues identified or changes needed    Medication Adherence: excellent, no issues identified    Diabetes: Needs improvement.  Patient is not meeting A1c goal less than 8% -  will be due for recheck in August. Fasting blood sugar is at goal  mg/dL indicating recent metformin dose increase, is effective.      Parkinson's disease: Plan in place for further evaluation with Jay[facility].      PLAN:                            1.  Continue present medications.    I spent 15 minutes with this patient today. All changes were made via collaborative practice agreement with Chuy Stewart. A copy of the visit note was provided to the patient's primary care provider.    Will follow-up as needed.  Patient will schedule follow-up with Dr. Stewart in August.    The patient declined a summary of these recommendations as an after visit summary.     Doris Ron, Pharm.D., [facility]  Medication Therapy Management Pharmacist  [phone]

## 2018-06-19 NOTE — MR AVS SNAPSHOT
After Visit Summary   6/19/2018    Bart Blair    MRN: [id number]           Patient Information     Date Of Birth          2/14/1931        Visit Information        Provider Department      6/19/2018 3:30 PM Doris Ron RPH [facility]        Today's Diagnoses     Type 2 diabetes mellitus with hyperglycemia, without long-term current use of insulin (H)    -  1    Parkinson disease (H)           Follow-ups after your visit        Your next 10 appointments already scheduled     Sep 13, 2018  1:30 PM CDT   (Arrive by 1:15 PM)   Implantable Loop Recorder with Uc Cv Device 1   [facility] ([facility])    [address] [place] [address]  [address] [place] [address] [phone]            Sep 13, 2018  2:00 PM CDT   (Arrive by 1:45 PM)   RETURN ATRIAL FIBULATION VISIT with JOSEPH Beth [facility])    [address] [place] [address]  [address] [place] [address] [phone]              Who to contact     If you have questions or need follow up information about today's clinic visit or your schedule please contact [facility] directly at [phone].  Normal or non-critical lab and imaging results will be communicated to you by MyChart, letter or phone within 4 business days after the clinic has received the results. If you do not hear from us within 7 days, please contact the clinic through MyChart or phone. If you have a critical or abnormal lab result, we will notify you by phone as soon as possible.  Submit refill requests through Moku or call your pharmacy and they will forward the refill request to us. Please allow 3 business days for your refill to be completed.          Additional Information About Your Visit        MyChart Information     Moku gives you secure access to your electronic health record. If you see a primary care provider, you can also  send messages to your care team and make appointments. If you have questions, please call your primary care clinic.  If you do not have a primary care provider, please call [phone] and they will assist you.        Care EveryWhere ID     This is your Care EveryWhere ID. This could be used by other organizations to access your [facility] medical records  [id number]         Blood Pressure from Last 3 Encounters:   05/29/18 126/68   03/29/18 119/63   03/15/18 130/55    Weight from Last 3 Encounters:   05/29/18 212 lb (96.2 kg)   03/29/18 215 lb (97.5 kg)   03/15/18 215 lb 3.2 oz (97.6 kg)              Today, you had the following     No orders found for display         Today's Medication Changes          These changes are accurate as of 6/19/18  4:04 PM.  If you have any questions, ask your nurse or doctor.               These medicines have changed or have updated prescriptions.        Dose/Directions    senna-docusate 8.6-50 MG per tablet   Commonly known as:  SENOKOT-S;PERICOLACE   This may have changed:    - when to take this  - additional instructions   Used for:  S/P total knee replacement        Dose:  1-2 tablet   Take 1-2 tablets by mouth 2 times daily.   Quantity:  60 tablet   Refills:  1                Primary Care Provider Office Phone # Fax #    Chuy Juan A Stewart [phone]       [address] [place] [address]        Equal Access to Services     TIFFANI ABDUL AH: Hadii emmie peñao Soeliceo, waaxda luqadaha, qaybta kaalmada suzy, jd barroso. So [facility] [phone].    ATENCIÓN: Si habla español, tiene a ellis disposición servicios gratuitos de asistencia lingüística. Llame al [phone].    We comply with applicable federal civil rights laws and [place] laws. We do not discriminate on the basis of race, color, national origin, age, disability, sex, sexual orientation, or gender identity.            Thank you!     Thank you for choosing  [facility]  for your care. Our goal is always to provide you with excellent care. Hearing back from our patients is one way we can continue to improve our services. Please take a few minutes to complete the written survey that you may receive in the mail after your visit with us. Thank you!             Your Updated Medication List - Protect others around you: Learn how to safely use, store and throw away your medicines at www.disposemymeds.org.          This list is accurate as of 6/19/18  4:04 PM.  Always use your most recent med list.                   Brand Name Dispense Instructions for use Diagnosis    amoxicillin 500 MG capsule    AMOXIL    16 capsule    4 tablets prior to dental appointment. Use for dental appointments    Status post total left knee replacement       aspirin 81 MG tablet      Take 1 tablet by mouth daily.    Hypertension goal BP (blood pressure) < 130/80       atorvastatin 10 MG tablet    LIPITOR    90 tablet    Take 1 tablet (10 mg) by mouth daily Refill when requested    Hyperlipidemia LDL goal <100, Type 2 diabetes mellitus with other ophthalmic complication, without long-term current use of insulin (H)       blood glucose monitoring lancets     100 each    Check blood sugar once daily    Type 2 diabetes mellitus with other ophthalmic complication, without long-term current use of insulin (H)       blood glucose monitoring test strip    ACCU-CHEK COMPACT STRIPS    100 each    1 strip by In Vitro route daily    Type 2 diabetes mellitus with other ophthalmic complication, without long-term current use of insulin (H)       carbidopa-levodopa  MG Tbcr      2 times daily        cholecalciferol 1000 UNIT tablet    vitamin D3    100    1 TABLET DAILY    Contusion of ribs       diclofenac 1 % Gel topical gel    VOLTAREN    300 g    APPLY 4 GRAMS TO KNEES OR 2 GRAMS TO HANDS FOUR TIMES A DAY USING ENCLOSED DOSING CARD    Arthritis       dipyridamole 50 MG tablet     PERSANTINE    720 tablet    TAKE TWO TABLETS BY MOUTH FOUR TIMES A DAY    Retinal ischemia       doxycycline monohydrate 100 MG capsule     60 capsule    1 tablet 2 time daily for Rosacea flares    Rosacea       fluticasone 50 MCG/ACT spray    FLONASE    16 g    Spray 1-2 sprays into both nostrils daily    Chronic rhinitis       FOLIC ACID PO      Take 1 mg by mouth daily        furosemide 20 MG tablet    LASIX    30 tablet    1/2 tablet per day. If weight increases by 3-5 pounds then increase to 20 mg (1 tablet). If weight decreases to 205 then ok to hold.    Edema, unspecified type       irbesartan 75 MG tablet    AVAPRO    45 tablet    Take 0.5 tablets (37.5 mg) by mouth daily    Hypertension goal BP (blood pressure) < 150/90       metFORMIN 500 MG 24 hr tablet    GLUCOPHAGE-XR    180 tablet    Take 1 tablet (500 mg) by mouth 2 times daily (with meals)    Type 2 diabetes mellitus with hyperglycemia, without long-term current use of insulin (H)       metroNIDAZOLE 0.75 % cream    METROCREAM    45 g    Apply topically 2 times daily    Rosacea       MULTI vitamin  MENS Tabs      Take  by mouth. Takes one daily        PREDNISONE PO      Take 5 mg by mouth Currently taking 5 tablets (25mg) daily per patient (this medication is being taken care of through [facility]).        senna-docusate 8.6-50 MG per tablet    SENOKOT-S;PERICOLACE    60 tablet    Take 1-2 tablets by mouth 2 times daily.    S/P total knee replacement       sertraline 25 MG tablet    ZOLOFT    90 tablet    TAKE ONE TABLET BY MOUTH EVERY DAY    Dysthymia       TYLENOL 500 MG tablet   Generic drug:  acetaminophen     100 tablet    Take 2 tablets (1,000 mg) by mouth 3 times daily        Vitamin B-12 CR 1000 MCG Tbcr     60 tablet    TAKE ONE TABLET BY MOUTH EVERY DAY    Parkinson disease (H)

## 2018-09-13 ENCOUNTER — ALLIED HEALTH/NURSE VISIT (OUTPATIENT)
Dept: CARDIOLOGY | Facility: CLINIC | Age: 83
End: 2018-09-13
Attending: NURSE PRACTITIONER
Payer: MEDICARE

## 2018-09-13 ENCOUNTER — PRE VISIT (OUTPATIENT)
Dept: CARDIOLOGY | Facility: CLINIC | Age: 83
End: 2018-09-13

## 2018-09-13 ENCOUNTER — OFFICE VISIT (OUTPATIENT)
Dept: CARDIOLOGY | Facility: CLINIC | Age: 83
End: 2018-09-13
Attending: NURSE PRACTITIONER
Payer: COMMERCIAL

## 2018-09-13 VITALS
WEIGHT: 207.8 LBS | HEIGHT: 69 IN | BODY MASS INDEX: 30.78 KG/M2 | HEART RATE: 97 BPM | SYSTOLIC BLOOD PRESSURE: 149 MMHG | DIASTOLIC BLOOD PRESSURE: 85 MMHG | OXYGEN SATURATION: 96 %

## 2018-09-13 DIAGNOSIS — Z98.890 HISTORY OF LOOP RECORDER: ICD-10-CM

## 2018-09-13 DIAGNOSIS — M79.661 BILATERAL CALF PAIN: ICD-10-CM

## 2018-09-13 DIAGNOSIS — M79.605 BILATERAL LOWER EXTREMITY PAIN: ICD-10-CM

## 2018-09-13 DIAGNOSIS — R09.89 OTHER SPECIFIED SYMPTOMS AND SIGNS INVOLVING THE CIRCULATORY AND RESPIRATORY SYSTEMS: ICD-10-CM

## 2018-09-13 DIAGNOSIS — R55 SYNCOPE, UNSPECIFIED SYNCOPE TYPE: Primary | ICD-10-CM

## 2018-09-13 DIAGNOSIS — M79.662 BILATERAL CALF PAIN: ICD-10-CM
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
## 2018-09-13 DIAGNOSIS — R55 SYNCOPE: ICD-10-CM

## 2018-09-13 DIAGNOSIS — M79.604 BILATERAL LOWER EXTREMITY PAIN: ICD-10-CM

## 2018-09-13 PROCEDURE — G0463 HOSPITAL OUTPT CLINIC VISIT: HCPCS

## 2018-09-13 PROCEDURE — 99214 OFFICE O/P EST MOD 30 MIN: CPT | Mod: ZP | Performed by: NURSE PRACTITIONER

## 2018-09-13 ASSESSMENT — PAIN SCALES - GENERAL: PAINLEVEL: NO PAIN (0)

## 2018-09-13 NOTE — PROGRESS NOTES
Preliminary Device Interrogation Results.  Final physician signed paceart report to be scanned and attached.    Patient seen in clinic for evaluation and iterative programming of his Medtronic loop recorder per MD orders.  Patient is scheduled to see Diandra Monae NP today. Normal loop recorder function.  No patient activated episodes recorded.  1 narrow complex tachycardia episode recorded on 8/12/18 @ 0855 - 6 sec, 188 bpm.  Intrinsic rhythm = NSR @ 88 bpm.  Loop recorder battery status = good.  Patient reports that he is feeling pretty well and denies specific complaints.  Patient reports he has not had a syncopal episode since January.  No arrhythmias recorded on date of syncopal event.  Plan for patient to send next scheduled remote transmission every 3 months and return to clinic in 1 year.    Loop recorder interrogation

## 2018-09-13 NOTE — MR AVS SNAPSHOT
After Visit Summary   9/13/2018    Bart Blair    MRN: [id number]           Patient Information     Date Of Birth          2/14/1931        Visit Information        Provider Department      9/13/2018 2:00 PM Diandra Monae APRN CNP [facility]        Today's Diagnoses     Syncope, unspecified syncope type    -  1    Bilateral lower extremity pain        Fall, subsequent encounter        History of loop recorder        Bilateral calf pain        Other specified symptoms and signs involving the circulatory and respiratory systems           Care Instructions    Cardiology Provider you saw in clinic today: Diandra RUIZ, NP-C    Medication Changes:  None today    Labs/Tests needed:  Test for pain in your legs.      Follow-up Visit:  Follow up in 6 months    Further Instructions:      You will receive all normal lab and testing results via Kirondohart or mail if not reviewed in clinic today. Please contact our office if you need assistance with setting up Kirondohart.    If you need a medication refill please contact your pharmacy. Please allow 3 business days for your refill to be completed.     As always, thank you for trusting us with your health care needs!    If you have any questions regarding your visit please contact your care team:   Cardiology  Telephone Number    EP RN  Electrophysiology Nurse Coordinator [phone]     Call for EP procedure scheduling concerns  LISSETT Barnes  [phone]           Device Clinic (Pacemakers, ICDs, Loop)   RN's : Kelley Santana Connie, Dawn During business hours: [phone]    After business hours:   [phone]- select option 4 and ask for job code [id number].                  Follow-ups after your visit        Additional Services     Follow-Up with EP Cardiac Advanced Practice Provider- 6 Months                 Your next 10 appointments already scheduled     Sep 19, 2018 10:30 AM CDT   US VIOLET DOPPLER NO EXERCISE 1-2 LVLS BILAT with  UCUSV1    [facility])    [address]  1st Floor  [facility] [address] [phone]           How do I prepare for my exam? (Food and drink instructions) No caffeine or tobacco for 1 hour prior to exam.  What should I wear: Wear comfortable clothes.  How long does the exam take: Most ultrasounds take 30 to 60 minutes.  What should I bring: Bring a list of your medicines, including vitamins, minerals and over-the-counter drugs. It is safest to leave personal items at home.  Do I need a :  No  is needed.  What do I need to tell my doctor: Tell your doctor about any allergies you may have.  What should I do after the exam: No restrictions, You may resume normal activities.  What is this test: An ultrasound uses sound waves to make pictures of the body. Sound waves do not cause pain. The only discomfort may be the pressure of the wand against your skin or full bladder.  Who should I call with questions: If you have any questions, please call the Imaging Department where you will have your exam. Directions, parking instructions, and other information is available on our website, [email or web address].            Oct 09, 2018 10:20 AM CDT   [facility]uy [facility]    [address] [place] [address] [phone]           Bring a current list of meds and any records pertaining to this visit. For Physicals, please bring immunization records and any forms needing to be filled out. Please arrive 10 minutes early to complete paperwork.            Mar 14, 2019 10:30 AM CDT   (Arrive by 10:15 AM)   Implantable Loop Recorder with Uc Cv Device 1   [facility] ([facility])    [address]  3rd Floor  [address]               Mar 14, 2019 11:00 AM CDT   (Arrive by 10:45 AM)   RETURN ARRHYTHMIA with Diandra Sharpe  "JOSEPH Monae CNP   [facility] ([facility])    [address]  [address] [place] [address] [phone]              Future tests that were ordered for you today     Open Future Orders        Priority Expected Expires Ordered    US VIOLET Doppler No Exercise Routine 9/13/2018 9/13/2019 9/13/2018    Follow-Up with EP Cardiac Advanced Practice Provider- 6 Months Routine 3/12/2019 6/10/2019 9/13/2018    Follow-Up with Device Clinic - 1 year Routine 9/13/2019 9/14/2019 9/13/2018            Who to contact     If you have questions or need follow up information about today's clinic visit or your schedule please contact [facility] directly at [phone].  Normal or non-critical lab and imaging results will be communicated to you by MyChart, letter or phone within 4 business days after the clinic has received the results. If you do not hear from us within 7 days, please contact the clinic through Flyzikhart or phone. If you have a critical or abnormal lab result, we will notify you by phone as soon as possible.  Submit refill requests through ePrivateHire or call your pharmacy and they will forward the refill request to us. Please allow 3 business days for your refill to be completed.          Additional Information About Your Visit        ePrivateHire Information     ePrivateHire gives you secure access to your electronic health record. If you see a primary care provider, you can also send messages to your care team and make appointments. If you have questions, please call your primary care clinic.  If you do not have a primary care provider, please call [phone] and they will assist you.        Care EveryWhere ID     This is your Care EveryWhere ID. This could be used by other organizations to access your [facility] medical records  [id number]        Your Vitals Were     Pulse Height Pulse Oximetry BMI (Body Mass Index)          97 1.753 m (5' 9\") 96% 30.69 kg/m2         Blood " Pressure from Last 3 Encounters:   09/13/18 149/85   05/29/18 126/68   03/29/18 119/63    Weight from Last 3 Encounters:   09/13/18 94.3 kg (207 lb 12.8 oz)   05/29/18 96.2 kg (212 lb)   03/29/18 97.5 kg (215 lb)              We Performed the Following     Follow-Up with EP Cardiac Advanced Practice Provider- 6 Months          Today's Medication Changes          These changes are accurate as of 9/13/18  2:44 PM.  If you have any questions, ask your nurse or doctor.               These medicines have changed or have updated prescriptions.        Dose/Directions    irbesartan 75 MG tablet   Commonly known as:  AVAPRO   This may have changed:  how much to take   Used for:  Hypertension goal BP (blood pressure) < 150/90        Dose:  37.5 mg   Take 0.5 tablets (37.5 mg) by mouth daily   Quantity:  45 tablet   Refills:  3       senna-docusate 8.6-50 MG per tablet   Commonly known as:  SENOKOT-S;PERICOLACE   This may have changed:    - when to take this  - additional instructions   Used for:  S/P total knee replacement        Dose:  1-2 tablet   Take 1-2 tablets by mouth 2 times daily.   Quantity:  60 tablet   Refills:  1                Primary Care Provider Office Phone # Fax #    Chuy Juan A Stewart [phone]       [address] [place] [address]        Equal Access to Services     TIFFANI ABDUL AH: Enma Gruber, waaxda luben, qaybta kaaledwin almonte, jd barroso. So [facility] [phone].    ATENCIÓN: Si habla español, tiene a ellis disposición servicios gratuitos de asistencia lingüística. Alejandro al [phone].    We comply with applicable federal civil rights laws and [place] laws. We do not discriminate on the basis of race, color, national origin, age, disability, sex, sexual orientation, or gender identity.            Thank you!     Thank you for choosing [facility]  for your care. Our goal is always to provide you with excellent  care. Hearing back from our patients is one way we can continue to improve our services. Please take a few minutes to complete the written survey that you may receive in the mail after your visit with us. Thank you!             Your Updated Medication List - Protect others around you: Learn how to safely use, store and throw away your medicines at www.disposemymeds.org.          This list is accurate as of 9/13/18  2:44 PM.  Always use your most recent med list.                   Brand Name Dispense Instructions for use Diagnosis    amoxicillin 500 MG capsule    AMOXIL    16 capsule    4 tablets prior to dental appointment. Use for dental appointments    Status post total left knee replacement       aspirin 81 MG tablet      Take 1 tablet by mouth daily.    Hypertension goal BP (blood pressure) < 130/80       atorvastatin 10 MG tablet    LIPITOR    90 tablet    Take 1 tablet (10 mg) by mouth daily Refill when requested    Hyperlipidemia LDL goal <100, Type 2 diabetes mellitus with other ophthalmic complication, without long-term current use of insulin (H)       blood glucose monitoring lancets     100 each    Check blood sugar once daily    Type 2 diabetes mellitus with other ophthalmic complication, without long-term current use of insulin (H)       blood glucose monitoring test strip    ACCU-CHEK COMPACT STRIPS    100 each    1 strip by In Vitro route daily    Type 2 diabetes mellitus with other ophthalmic complication, without long-term current use of insulin (H)       carbidopa-levodopa  MG Tbcr      2 times daily        cholecalciferol 1000 UNIT tablet    vitamin D3    100    1 TABLET DAILY    Contusion of ribs       diclofenac 1 % Gel topical gel    VOLTAREN    300 g    APPLY 4 GRAMS TO KNEES OR 2 GRAMS TO HANDS FOUR TIMES A DAY USING ENCLOSED DOSING CARD    Arthritis       dipyridamole 50 MG tablet    PERSANTINE    720 tablet    TAKE TWO TABLETS BY MOUTH FOUR TIMES A DAY    Retinal ischemia        doxycycline monohydrate 100 MG capsule     60 capsule    1 tablet 2 time daily for Rosacea flares    Rosacea       fluticasone 50 MCG/ACT spray    FLONASE    16 g    Spray 1-2 sprays into both nostrils daily    Chronic rhinitis       FOLIC ACID PO      Take 1 mg by mouth daily        furosemide 20 MG tablet    LASIX    30 tablet    1/2 tablet per day. If weight increases by 3-5 pounds then increase to 20 mg (1 tablet). If weight decreases to 205 then ok to hold.    Edema, unspecified type       irbesartan 75 MG tablet    AVAPRO    45 tablet    Take 0.5 tablets (37.5 mg) by mouth daily    Hypertension goal BP (blood pressure) < 150/90       metFORMIN 500 MG 24 hr tablet    GLUCOPHAGE-XR    180 tablet    Take 1 tablet (500 mg) by mouth 2 times daily (with meals)    Type 2 diabetes mellitus with hyperglycemia, without long-term current use of insulin (H)       metroNIDAZOLE 0.75 % cream    METROCREAM    45 g    Apply topically 2 times daily    Rosacea       MULTI vitamin  MENS Tabs      Take  by mouth. Takes one daily        PREDNISONE PO      Take 5 mg by mouth Currently taking 5 tablets (25mg) daily per patient (this medication is being taken care of through [facility]).        senna-docusate 8.6-50 MG per tablet    SENOKOT-S;PERICOLACE    60 tablet    Take 1-2 tablets by mouth 2 times daily.    S/P total knee replacement       sertraline 25 MG tablet    ZOLOFT    90 tablet    TAKE ONE TABLET BY MOUTH EVERY DAY    Dysthymia       TYLENOL 500 MG tablet   Generic drug:  acetaminophen     100 tablet    Take 2 tablets (1,000 mg) by mouth 3 times daily        Vitamin B-12 CR 1000 MCG Tbcr     60 tablet    TAKE ONE TABLET BY MOUTH EVERY DAY    Parkinson disease (H)

## 2018-09-13 NOTE — PATIENT INSTRUCTIONS
Cardiology Provider you saw in clinic today: Diandra RUIZ, NP-C    Medication Changes:  None today    Labs/Tests needed:  Test for pain in your legs.      Follow-up Visit:  Follow up in 6 months    Further Instructions:      You will receive all normal lab and testing results via [facility] or mail if not reviewed in clinic today. Please contact our office if you need assistance with setting up MyChart.    If you need a medication refill please contact your pharmacy. Please allow 3 business days for your refill to be completed.     As always, thank you for trusting us with your health care needs!    If you have any questions regarding your visit please contact your care team:   Cardiology  Telephone Number    EP RN  Electrophysiology Nurse Coordinator [phone]     Call for EP procedure scheduling concerns  LISSETT Barnes  [phone]           Device Clinic (Pacemakers, ICDs, Loop)   RN's : Kelley Santana Connie, Dawn During business hours: [phone]    After business hours:   [phone]- select option 4 and ask for job code [phone].

## 2018-09-13 NOTE — PROGRESS NOTES
"    Clinical Cardiac Electrophysiology      HPI: Bart Blair is a 87 year old male who presents for follow up of syncope and possible OH.  The patient has a past medical history significant for parkinsonism, HTN, DMII, and syncope. The patient has experienced syncope and falls since 2007.  He had an ILR placed 6/2013 and replaced 9/2016. Patient was hospitalized after a fall 1/2018 and ILR showed no arrhythmia correlation. Patient's hypertensive medication was reduced 3/2018 as fall were thought likely to be r/t possible OH r/t Parkinsonism.     Reviewed current interval: Echocardiogram summary 3/2016 showed normal LV function, EF 60-65%, and no significant valvular pathologies. Tilt 12/2013 summary shows normal physiologic response.    He is currently on irbesartan 75 mg daily and lasix 20 mg daily.   Today's Device check: \"Normal loop recorder function.  No patient activated episodes recorded.  1 narrow complex tachycardia episode recorded on 8/12/18 @ 0855 - 6 sec, 188 bpm.  Intrinsic rhythm = NSR @ 88 bpm.  Loop recorder battery status = good.  Patient reports that he is feeling pretty well and denies specific complaints.  Patient reports he has not had a syncopal episode since January.  No arrhythmias recorded on date of syncopal event.\"   Orthostatic BPs today: supine 125/76 HR 77, sitting 114/75  HR89, standing 134/74 HR 99, standing 1 minute 175/88 HR 95, standing 3 minutes 149/85 HR 97, no symptoms.     Current interval history:  States that the calves in both legs have have a constant pain for years worse in the past 9 months, worse with walking.  Patient states that he has episodes of dizziness which are worse with standing or position changes and improve with sitting or laying down less than once a month that lasts a minute or two, improved with reduction in BP medication in March.  States that he has not had episodes of syncope since January. He has had pedal edema for the past several months for which he " takes lasix. No falls since January, he is also using his walker consistently. Denies chest pain or pressure, palpitations, abdominal edema, PND, or orthopnea.     Current Outpatient Prescriptions   Medication Sig Dispense Refill     acetaminophen (TYLENOL) 500 MG tablet Take 2 tablets (1,000 mg) by mouth 3 times daily 100 tablet 0     amoxicillin (AMOXIL) 500 MG capsule 4 tablets prior to dental appointment. Use for dental appointments 16 capsule 4     aspirin 81 MG tablet Take 1 tablet by mouth daily.  3     atorvastatin (LIPITOR) 10 MG tablet Take 1 tablet (10 mg) by mouth daily Refill when requested 90 tablet 3     blood glucose monitoring (ACCU-CHEK COMPACT STRIPS) test strip 1 strip by In Vitro route daily 100 each 11     blood glucose monitoring (SOFTCLIX) lancets Check blood sugar once daily 100 each 3     Cyanocobalamin (VITAMIN B-12 CR) 1000 MCG TBCR TAKE ONE TABLET BY MOUTH EVERY DAY 60 tablet 4     diclofenac (VOLTAREN) 1 % GEL topical gel APPLY 4 GRAMS TO KNEES OR 2 GRAMS TO HANDS FOUR TIMES A DAY USING ENCLOSED DOSING CARD 300 g 3     dipyridamole (PERSANTINE) 50 MG tablet TAKE TWO TABLETS BY MOUTH FOUR TIMES A  tablet 3     doxycycline Monohydrate 100 MG CAPS 1 tablet 2 time daily for Rosacea flares 60 capsule 3     fluticasone (FLONASE) 50 MCG/ACT spray Spray 1-2 sprays into both nostrils daily 16 g 5     FOLIC ACID PO Take 1 mg by mouth daily       furosemide (LASIX) 20 MG tablet 1/2 tablet per day. If weight increases by 3-5 pounds then increase to 20 mg (1 tablet). If weight decreases to 205 then ok to hold. 30 tablet 1     irbesartan (AVAPRO) 75 MG tablet Take 0.5 tablets (37.5 mg) by mouth daily (Patient taking differently: Take 75 mg by mouth daily ) 45 tablet 3     metFORMIN (GLUCOPHAGE-XR) 500 MG 24 hr tablet Take 1 tablet (500 mg) by mouth 2 times daily (with meals) 180 tablet 1     metroNIDAZOLE (METROCREAM) 0.75 % cream Apply topically 2 times daily 45 g 3     Multiple Vitamin  (MULTI VITAMIN  MENS) TABS Take  by mouth. Takes one daily         PREDNISONE PO Take 5 mg by mouth Currently taking 5 tablets (25mg) daily per patient (this medication is being taken care of through [facility]).       senna-docusate (SENOKOT-S;PERICOLACE) 8.6-50 MG per tablet Take 1-2 tablets by mouth 2 times daily. (Patient taking differently: Take 1-2 tablets by mouth Takes about 2-3 times weekly) 60 tablet 1     sertraline (ZOLOFT) 25 MG tablet TAKE ONE TABLET BY MOUTH EVERY DAY 90 tablet 3     VITAMIN D 1000 UNIT OR TABS 1 TABLET DAILY 100 4     Carbidopa-Levodopa  MG TBCR 2 times daily         Past Medical History:   Diagnosis Date     Anemia      Anemia due to blood loss, acute      CKD (chronic kidney disease) stage 3, GFR 30-59 ml/min 5/31/2010     Essential hypertension, benign      Other and unspecified hyperlipidemia      Other and unspecified hyperlipidemia      Other specified disorder of skin      Pain in joint, shoulder region      Parkinson disease (H) 9/2/2010     Polyp of vocal cord or larynx      Retinal ischemia     right sided thrombotic plaque     Rosacea      Type II or unspecified type diabetes mellitus without mention of complication, not stated as uncontrolled        Past Surgical History:   Procedure Laterality Date     ARTHROPLASTY KNEE  1/31/2012    Procedure:ARTHROPLASTY KNEE; LEFT TOTAL KNEE ARTHROPLASTY ([employer])^; Surgeon:SKIP FAIR; Location: OR     ARTHROSCOPY SHOULDER, OPEN ROTATOR CUFF REPAIR, COMBINED  4/24/2012    Procedure:COMBINED ARTHROSCOPY SHOULDER, OPEN ROTATOR CUFF REPAIR; RIGHT SHOULDER ARTHROSCOPY OPEN ROTATOR CUFF DEBRIDEMENT, SUBCROMIAL DECOMPRESSION, AND BICEPS TENODESIS REPAIR; Surgeon:SKIP FAIR; Location: [facility]     [facility] SURGICAL PATHOLOGY  6-    Dr. Bowers; left hand     ENT SURGERY       INCISE FINGER TENDON SHEATH  2008    Dr. Bowers; right AND LEFT hand     THROAT SURGERY      vocal cord polyps       Family History   Problem  "Relation Age of Onset     Family History Negative Other      unknown family history per patient       Social History   Substance Use Topics     Smoking status: Never Smoker     Smokeless tobacco: Never Used     Alcohol use Yes      Comment: couple beers everyday       Allergies   Allergen Reactions     No Known Drug Allergies          ROS:   A complete review of systems was performed and is negative except as noted in the HPI.     Physical Examination:  Vitals: /85 (BP Location: Left arm, Patient Position: Standing, Cuff Size: Adult Regular)  Pulse 97  Ht 1.753 m (5' 9\")  Wt 94.3 kg (207 lb 12.8 oz)  SpO2 96%  BMI 30.69 kg/m2  BMI= Body mass index is 30.69 kg/(m^2).    GENERAL APPEARANCE: healthy, alert, and no acute distress  HEENT: no icterus, no xanthelasmas, normal pupil size and reaction, no cyanosis.  NECK: no asymmetry, no cervical bruits, no JVD   RESPIRATORY: lungs clear to auscultation - no rales, rhonchi or wheezes, no use of accessory muscles, no retractions, respirations are unlabored, normal respiratory rate  CARDIOVASCULAR: regular rhythm, normal S1 with physiologic split S2, no S3 or S4 and no murmur, click or rub  GI:  no abdominal bruits, soft, non-tender  EXTREMITIES: no clubbing  NEURO: alert and oriented to person/place/time, normal speech, affect. Walks with a walker.  VASC: Radial and posterior tibialis pulses +2 and symmetric bilaterally. No cyanosis.  Trace bilateral pitting pedal edema   SKIN: no ecchymoses    Laboratory:  Lab Results   Component Value Date    WBC 11.9 (H) 12/22/2017    RBC 3.52 (L) 12/22/2017    HGB 11.7 (L) 12/22/2017    HCT 36.8 (L) 12/22/2017     (H) 12/22/2017    MCH 33.2 (H) 12/22/2017    MCHC 31.8 12/22/2017    RDW 13.5 12/22/2017     12/22/2017     Lab Results   Component Value Date     05/29/2018    POTASSIUM 5.5 (H) 05/29/2018     (H) 05/29/2018    BUN 41 (H) 05/29/2018    CR 1.23 05/29/2018    GFRESTIMATED 56 (L) 05/29/2018 "    GFRESTBLACK 67 05/29/2018    BLAISE 9.2 05/29/2018      Lab Results   Component Value Date    INR 0.93 12/22/2017    INR 1.11 01/31/2012     No results found for: CKTOTAL, CKMB, TROPN  Cholesterol (mg/dL)   Date Value   10/24/2017 152   11/18/2014 116   06/25/2013 112   03/01/2012 111     Cholesterol/HDL Ratio (no units)   Date Value   11/18/2014 2.6   06/25/2013 2.7   03/01/2012 2.6   03/02/2011 2.9     HDL Cholesterol (mg/dL)   Date Value   10/24/2017 75   11/18/2014 45   06/25/2013 41   03/01/2012 42     LDL Cholesterol Calculated (mg/dL)   Date Value   10/24/2017 32   11/18/2014 37   06/25/2013 52   03/01/2012 41       Assessment and recommendations:    #1 & 2. Falls/syncope: May be OH r/t Parkinsonism. Orthostats today show 25 point drop in systolic pressure with standing at 3 minutes. He reports no falls or syncope since January.  No arrhythmia corralation with falls or syncope on his ILR. His lightheadedness has improved on current dose of irbesartan.   1. No med changes today.    #3. ILR in situ:   1. Normal device function.   2. Keep scheduled follow ups with Device Clinic     #4. Bilateral lower extremity pain: He has had constant pain in both calves for years worse in the past 9 months, worse with walking.   1. ABIs without exercise    FOLLOW UP:  Follow up in 6 months or follow up sooner as needed for symptoms.    Patient expresses understanding and agreement with the plan.    I appreciate the chance to help with Bart Blair's care. Please let me know if you have any questions or concerns.    Diandra RUIZ, CNP

## 2018-09-13 NOTE — NURSING NOTE
Chief Complaint   Patient presents with     Follow Up For     6 month follow up, orthostats     Vitals were taken and medications were reconciled.     Catherine Johnson RMA  2:01 PM

## 2018-09-13 NOTE — LETTER
"9/13/2018      RE: Bart Blair  [address] [address]       Dear Colleague,    Thank you for the opportunity to participate in the care of your patient, Bart Blair, at the [facility]. Please see a copy of my visit note below.        Clinical Cardiac Electrophysiology      HPI: Bart Blair is a 87 year old male who presents for follow up of syncope and possible OH.  The patient has a past medical history significant for parkinsonism, HTN, DMII, and syncope. The patient has experienced syncope and falls since 2007.  He had an ILR placed 6/2013 and replaced 9/2016. Patient was hospitalized after a fall 1/2018 and ILR showed no arrhythmia correlation. Patient's hypertensive medication was reduced 3/2018 as fall were thought likely to be r/t possible OH r/t Parkinsonism.     Reviewed current interval: Echocardiogram summary 3/2016 showed normal LV function, EF 60-65%, and no significant valvular pathologies. Tilt 12/2013 summary shows normal physiologic response.    He is currently on irbesartan 75 mg daily and lasix 20 mg daily.   Today's Device check: \"Normal loop recorder function.  No patient activated episodes recorded.  1 narrow complex tachycardia episode recorded on 8/12/18 @ 0855 - 6 sec, 188 bpm.  Intrinsic rhythm = NSR @ 88 bpm.  Loop recorder battery status = good.  Patient reports that he is feeling pretty well and denies specific complaints.  Patient reports he has not had a syncopal episode since January.  No arrhythmias recorded on date of syncopal event.\"   Orthostatic BPs today: supine 125/76 HR 77, sitting 114/75  HR89, standing 134/74 HR 99, standing 1 minute 175/88 HR 95, standing 3 minutes 149/85 HR 97, no symptoms.     Current interval history:  States that the calves in both legs have have a constant pain for years worse in the past 9 months, worse with walking.  Patient states that he has episodes of dizziness which " are worse with standing or position changes and improve with sitting or laying down less than once a month that lasts a minute or two, improved with reduction in BP medication in March.  States that he has not had episodes of syncope since January. He has had pedal edema for the past several months for which he takes lasix. No falls since January, he is also using his walker consistently. Denies chest pain or pressure, palpitations, abdominal edema, PND, or orthopnea.     Current Outpatient Prescriptions   Medication Sig Dispense Refill     acetaminophen (TYLENOL) 500 MG tablet Take 2 tablets (1,000 mg) by mouth 3 times daily 100 tablet 0     amoxicillin (AMOXIL) 500 MG capsule 4 tablets prior to dental appointment. Use for dental appointments 16 capsule 4     aspirin 81 MG tablet Take 1 tablet by mouth daily.  3     atorvastatin (LIPITOR) 10 MG tablet Take 1 tablet (10 mg) by mouth daily Refill when requested 90 tablet 3     blood glucose monitoring (ACCU-CHEK COMPACT STRIPS) test strip 1 strip by In Vitro route daily 100 each 11     blood glucose monitoring (SOFTCLIX) lancets Check blood sugar once daily 100 each 3     Cyanocobalamin (VITAMIN B-12 CR) 1000 MCG TBCR TAKE ONE TABLET BY MOUTH EVERY DAY 60 tablet 4     diclofenac (VOLTAREN) 1 % GEL topical gel APPLY 4 GRAMS TO KNEES OR 2 GRAMS TO HANDS FOUR TIMES A DAY USING ENCLOSED DOSING CARD 300 g 3     dipyridamole (PERSANTINE) 50 MG tablet TAKE TWO TABLETS BY MOUTH FOUR TIMES A  tablet 3     doxycycline Monohydrate 100 MG CAPS 1 tablet 2 time daily for Rosacea flares 60 capsule 3     fluticasone (FLONASE) 50 MCG/ACT spray Spray 1-2 sprays into both nostrils daily 16 g 5     FOLIC ACID PO Take 1 mg by mouth daily       furosemide (LASIX) 20 MG tablet 1/2 tablet per day. If weight increases by 3-5 pounds then increase to 20 mg (1 tablet). If weight decreases to 205 then ok to hold. 30 tablet 1     irbesartan (AVAPRO) 75 MG tablet Take 0.5 tablets (37.5 mg)  by mouth daily (Patient taking differently: Take 75 mg by mouth daily ) 45 tablet 3     metFORMIN (GLUCOPHAGE-XR) 500 MG 24 hr tablet Take 1 tablet (500 mg) by mouth 2 times daily (with meals) 180 tablet 1     metroNIDAZOLE (METROCREAM) 0.75 % cream Apply topically 2 times daily 45 g 3     Multiple Vitamin (MULTI VITAMIN  MENS) TABS Take  by mouth. Takes one daily         PREDNISONE PO Take 5 mg by mouth Currently taking 5 tablets (25mg) daily per patient (this medication is being taken care of through [facility]).       senna-docusate (SENOKOT-S;PERICOLACE) 8.6-50 MG per tablet Take 1-2 tablets by mouth 2 times daily. (Patient taking differently: Take 1-2 tablets by mouth Takes about 2-3 times weekly) 60 tablet 1     sertraline (ZOLOFT) 25 MG tablet TAKE ONE TABLET BY MOUTH EVERY DAY 90 tablet 3     VITAMIN D 1000 UNIT OR TABS 1 TABLET DAILY 100 4     Carbidopa-Levodopa  MG TBCR 2 times daily         Past Medical History:   Diagnosis Date     Anemia      Anemia due to blood loss, acute      CKD (chronic kidney disease) stage 3, GFR 30-59 ml/min 5/31/2010     Essential hypertension, benign      Other and unspecified hyperlipidemia      Other and unspecified hyperlipidemia      Other specified disorder of skin      Pain in joint, shoulder region      Parkinson disease (H) 9/2/2010     Polyp of vocal cord or larynx      Retinal ischemia     right sided thrombotic plaque     Rosacea      Type II or unspecified type diabetes mellitus without mention of complication, not stated as uncontrolled        Past Surgical History:   Procedure Laterality Date     ARTHROPLASTY KNEE  1/31/2012    Procedure:ARTHROPLASTY KNEE; LEFT TOTAL KNEE ARTHROPLASTY ([employer])^; Surgeon:SKIP FAIR; Location:[facility]     ARTHROSCOPY SHOULDER, OPEN ROTATOR CUFF REPAIR, COMBINED  4/24/2012    Procedure:COMBINED ARTHROSCOPY SHOULDER, OPEN ROTATOR CUFF REPAIR; RIGHT SHOULDER ARTHROSCOPY OPEN ROTATOR CUFF DEBRIDEMENT, SUBCROMIAL  "DECOMPRESSION, AND BICEPS TENODESIS REPAIR; Surgeon:SKIP FAIR; Location: [facility]     [facility] AFF SURGICAL PATHOLOGY  6-    Dr. Bowers; left hand     ENT SURGERY       INCISE FINGER TENDON SHEATH  2008    Dr. Bowers; right AND LEFT hand     THROAT SURGERY      vocal cord polyps       Family History   Problem Relation Age of Onset     Family History Negative Other      unknown family history per patient       Social History   Substance Use Topics     Smoking status: Never Smoker     Smokeless tobacco: Never Used     Alcohol use Yes      Comment: couple beers everyday       Allergies   Allergen Reactions     No Known Drug Allergies          ROS:   A complete review of systems was performed and is negative except as noted in the HPI.     Physical Examination:  Vitals: /85 (BP Location: Left arm, Patient Position: Standing, Cuff Size: Adult Regular)  Pulse 97  Ht 1.753 m (5' 9\")  Wt 94.3 kg (207 lb 12.8 oz)  SpO2 96%  BMI 30.69 kg/m2  BMI= Body mass index is 30.69 kg/(m^2).    GENERAL APPEARANCE: healthy, alert, and no acute distress  HEENT: no icterus, no xanthelasmas, normal pupil size and reaction, no cyanosis.  NECK: no asymmetry, no cervical bruits, no JVD   RESPIRATORY: lungs clear to auscultation - no rales, rhonchi or wheezes, no use of accessory muscles, no retractions, respirations are unlabored, normal respiratory rate  CARDIOVASCULAR: regular rhythm, normal S1 with physiologic split S2, no S3 or S4 and no murmur, click or rub  GI:  no abdominal bruits, soft, non-tender  EXTREMITIES: no clubbing  NEURO: alert and oriented to person/place/time, normal speech, affect. Walks with a walker.  VASC: Radial and posterior tibialis pulses +2 and symmetric bilaterally. No cyanosis.  Trace bilateral pitting pedal edema   SKIN: no ecchymoses    Laboratory:  Lab Results   Component Value Date    WBC 11.9 (H) 12/22/2017    RBC 3.52 (L) 12/22/2017    HGB 11.7 (L) 12/22/2017    HCT 36.8 (L) 12/22/2017     " (H) 12/22/2017    MCH 33.2 (H) 12/22/2017    MCHC 31.8 12/22/2017    RDW 13.5 12/22/2017     12/22/2017     Lab Results   Component Value Date     05/29/2018    POTASSIUM 5.5 (H) 05/29/2018     (H) 05/29/2018    BUN 41 (H) 05/29/2018    CR 1.23 05/29/2018    GFRESTIMATED 56 (L) 05/29/2018    GFRESTBLACK 67 05/29/2018    BLAISE 9.2 05/29/2018      Lab Results   Component Value Date    INR 0.93 12/22/2017    INR 1.11 01/31/2012     No results found for: CKTOTAL, CKMB, TROPN  Cholesterol (mg/dL)   Date Value   10/24/2017 152   11/18/2014 116   06/25/2013 112   03/01/2012 111     Cholesterol/HDL Ratio (no units)   Date Value   11/18/2014 2.6   06/25/2013 2.7   03/01/2012 2.6   03/02/2011 2.9     HDL Cholesterol (mg/dL)   Date Value   10/24/2017 75   11/18/2014 45   06/25/2013 41   03/01/2012 42     LDL Cholesterol Calculated (mg/dL)   Date Value   10/24/2017 32   11/18/2014 37   06/25/2013 52   03/01/2012 41       Assessment and recommendations:    #1 & 2. Falls/syncope: May be OH r/t Parkinsonism. Orthostats today show 25 point drop in systolic pressure with standing at 3 minutes. He reports no falls or syncope since January.  No arrhythmia corralation with falls or syncope on his ILR. His lightheadedness has improved on current dose of irbesartan.   1. No med changes today.    #3. ILR in situ:   1. Normal device function.   2. Keep scheduled follow ups with Device Clinic     #4. Bilateral lower extremity pain: He has had constant pain in both calves for years worse in the past 9 months, worse with walking.   1. ABIs without exercise    FOLLOW UP:  Follow up in 6 months or follow up sooner as needed for symptoms.    Patient expresses understanding and agreement with the plan.    I appreciate the chance to help with Bart Blair's care. Please let me know if you have any questions or concerns.    Diandra RUIZ, CNP

## 2018-09-13 NOTE — MR AVS SNAPSHOT
MRN:[id number]                      After Visit Summary   9/13/2018    Bart Blair    MRN: [id number]           Visit Information        Provider Department      9/13/2018 1:30 PM Mithcell, Janes [facility] Device [facility]        Your next 10 appointments already scheduled     Sep 13, 2018  2:00 PM CDT   (Arrive by 1:45 PM)   RETURN ATRIAL FIBULATION VISIT with JOSEPH Beth CNP   [facility] ([facility])    [address]  [address] [place] [id number] [phone]            Oct 09, 2018 10:20 AM CDT   [facility]    [address] [address] [phone]           Bring a current list of meds and any records pertaining to this visit. For Physicals, please bring immunization records and any forms needing to be filled out. Please arrive 10 minutes early to complete paperwork.              Care Instructions    It was a pleasure to see you in clinic today.  Please do not hesitate to call with any questions or concerns.  You are scheduled for a remote transmission on 12/13/18, 3/18/19 and 6/20/19.  We look forward to seeing you in clinic at your next device check in 6 months.    Julia Gilbert, RN, MS, CCRN  Electrophysiology Nurse Clinician  [facility]    During Business Hours Please Call:  [phone]  After Hours Please Call:  [phone] - select option #4 and ask for job code [phone]                       [employer] Information     [employer] gives you secure access to your electronic health record. If you see a primary care provider, you can also send messages to your care team and make appointments. If you have questions, please call your primary care clinic.  If you do not have a primary care provider, please call [phone] and they will assist you.      [employer] is an electronic gateway that provides  easy, online access to your medical records. With [employer] you can request a clinic appointment, read your test results, renew a prescription or communicate with your care team.     To access your existing account, please contact your [facility] or call [phone] for assistance.        Care EveryWhere ID     This is your Care EveryWhere ID. This could be used by other organizations to access your [facility] medical records  [id number]        Equal Access to Services     TIFFANI ABDUL : Enma Gruber, kylah purcell, marco almonte, jd barroso. [facility] [phone].    ATENCIÓN: Si habla español, tiene a ellis disposición servicios gratuitos de asistencia lingüística. Llame al [phone].    We comply with applicable federal civil rights laws and [place] laws. We do not discriminate on the basis of race, color, national origin, age, disability, sex, sexual orientation, or gender identity.

## 2018-09-13 NOTE — PATIENT INSTRUCTIONS
It was a pleasure to see you in clinic today.  Please do not hesitate to call with any questions or concerns.  You are scheduled for a remote transmission on 12/13/18, 3/18/19 and 6/20/19.  We look forward to seeing you in clinic at your next device check in 6 months.    Julia Gilbert, RN, MS, CCRN  Electrophysiology Nurse Clinician  [facility]    During Business Hours Please Call:  [phone]  After Hours Please Call:  [phone] - select option #4 and ask for job code [phone]

## 2018-09-19 ENCOUNTER — RADIANT APPOINTMENT (OUTPATIENT)
Dept: ULTRASOUND IMAGING | Facility: CLINIC | Age: 83
End: 2018-09-19
Attending: NURSE PRACTITIONER
Payer: COMMERCIAL

## 2018-09-19 DIAGNOSIS — M79.604 BILATERAL LOWER EXTREMITY PAIN: ICD-10-CM

## 2018-09-19 DIAGNOSIS — R09.89 OTHER SPECIFIED SYMPTOMS AND SIGNS INVOLVING THE CIRCULATORY AND RESPIRATORY SYSTEMS: ICD-10-CM

## 2018-09-19 DIAGNOSIS — M79.605 BILATERAL LOWER EXTREMITY PAIN: ICD-10-CM

## 2018-09-26 DIAGNOSIS — R68.89 ABNORMAL ANKLE BRACHIAL INDEX (ABI): Primary | ICD-10-CM

## 2018-10-01 NOTE — TELEPHONE ENCOUNTER
FUTURE VISIT INFORMATION:    Date: 10/2/18     Time: 1300    Location:  Cardiology  REFERRAL INFORMATION:    Referring provider:  JOSEPH Doyle CNP    Referring providers clinic:   Cardiology    Reason for visit/diagnosis:  Abnormal exercise VIOLET

## 2018-10-02 ENCOUNTER — OFFICE VISIT (OUTPATIENT)
Dept: CARDIOLOGY | Facility: CLINIC | Age: 83
End: 2018-10-02
Attending: INTERNAL MEDICINE
Payer: COMMERCIAL

## 2018-10-02 ENCOUNTER — PATIENT OUTREACH (OUTPATIENT)
Dept: CARE COORDINATION | Facility: CLINIC | Age: 83
End: 2018-10-02

## 2018-10-02 ENCOUNTER — PRE VISIT (OUTPATIENT)
Dept: CARDIOLOGY | Facility: CLINIC | Age: 83
End: 2018-10-02

## 2018-10-02 VITALS
SYSTOLIC BLOOD PRESSURE: 124 MMHG | DIASTOLIC BLOOD PRESSURE: 76 MMHG | WEIGHT: 230 LBS | HEART RATE: 99 BPM | OXYGEN SATURATION: 96 % | HEIGHT: 65 IN | BODY MASS INDEX: 38.32 KG/M2

## 2018-10-02 DIAGNOSIS — M79.662 BILATERAL CALF PAIN: ICD-10-CM

## 2018-10-02 DIAGNOSIS — I70.213 ATHEROSCLEROSIS OF NATIVE ARTERY OF BOTH LOWER EXTREMITIES WITH INTERMITTENT CLAUDICATION (H): Primary | ICD-10-CM

## 2018-10-02 DIAGNOSIS — I87.393 CHRONIC VENOUS HYPERTENSION (IDIOPATHIC) WITH OTHER COMPLICATIONS OF BILATERAL LOWER EXTREMITY: ICD-10-CM

## 2018-10-02 DIAGNOSIS — M79.605 BILATERAL LOWER EXTREMITY PAIN: ICD-10-CM

## 2018-10-02 DIAGNOSIS — M79.604 BILATERAL LOWER EXTREMITY PAIN: ICD-10-CM

## 2018-10-02 DIAGNOSIS — I10 HYPERTENSION GOAL BP (BLOOD PRESSURE) < 150/90: ICD-10-CM

## 2018-10-02 DIAGNOSIS — R68.89 ABNORMAL ANKLE BRACHIAL INDEX (ABI): ICD-10-CM

## 2018-10-02 DIAGNOSIS — M31.6 GIANT CELL ARTERITIS (H): ICD-10-CM

## 2018-10-02 DIAGNOSIS — M79.661 BILATERAL CALF PAIN: ICD-10-CM

## 2018-10-02 PROCEDURE — 99215 OFFICE O/P EST HI 40 MIN: CPT | Mod: GC | Performed by: INTERNAL MEDICINE

## 2018-10-02 PROCEDURE — G0463 HOSPITAL OUTPT CLINIC VISIT: HCPCS | Mod: ZF

## 2018-10-02 RX ORDER — CILOSTAZOL 50 MG/1
50 TABLET ORAL 2 TIMES DAILY
Qty: 60 TABLET | Refills: 3 | Status: SHIPPED | OUTPATIENT
Start: 2018-10-02 | End: 2018-10-09

## 2018-10-02 RX ORDER — AMOXICILLIN 250 MG
1-2 CAPSULE ORAL DAILY PRN
COMMUNITY
Start: 2018-10-02 | End: 2020-11-04

## 2018-10-02 RX ORDER — IRBESARTAN 75 MG/1
75 TABLET ORAL DAILY
COMMUNITY
Start: 2018-10-02 | End: 2019-03-25

## 2018-10-02 ASSESSMENT — PAIN SCALES - GENERAL: PAINLEVEL: NO PAIN (0)

## 2018-10-02 NOTE — PROGRESS NOTES
Vascular Cardiology Consultation        HPI: Bart Blair is a 87 year old year old patient who presents to clinic for evaluation of lower extremity pain. He is followed by our colleague Dr. Gorman for history of loss of consciousness; receives primary care from Dr. Chuy Stewart and is followed by a rheumatologist at [facility] for giant cell arteritis.    Mr. Blair complains of aching in both calves from the knees to the feet for many years. The pain is induced with exercise and improves with rest. He can do his daily activities around the house but gets pain if he has to walk from the parking lot to the grocery store. He does not do more activity than that. It varies which leg is more affected but they are nearly equal. He has been treated with lower back injections without improvement. His medical history is pertinent for a diagnosis of Parkinson's disease which was recently called into question and ultimately determined he did not have the disease. About 1 year ago he was diagnosed with giant cell arteritis confirmed by temporal artery biopsy when he noticed blurry vision while watching TV. He has been on a slow prednisone taper since then. No improvement in his leg pain.     Other leg symptoms include cold feet and BLE edema since June. He still has sensation in the feet and he denies any wounds on them. No fevers or chills. He has T2DM which was diet controlled until he started steroids.     No orthopnea, PND.  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     PAST MEDICAL HISTORY  Past Medical History:   Diagnosis Date     Anemia      Anemia due to blood loss, acute      CKD (chronic kidney disease) stage 3, GFR 30-59 ml/min 5/31/2010     Essential hypertension, benign      Other and unspecified hyperlipidemia      Other and  unspecified hyperlipidemia      Other specified disorder of skin      Pain in joint, shoulder region      Parkinson disease (H) 9/2/2010     Polyp of vocal cord or larynx      Retinal ischemia     right sided thrombotic plaque     Rosacea      Type II or unspecified type diabetes mellitus without mention of complication, not stated as uncontrolled        CURRENT MEDICATIONS  Current Outpatient Prescriptions   Medication Sig Dispense Refill     acetaminophen (TYLENOL) 500 MG tablet Take 2 tablets (1,000 mg) by mouth 3 times daily 100 tablet 0     amoxicillin (AMOXIL) 500 MG capsule 4 tablets prior to dental appointment. Use for dental appointments 16 capsule 4     aspirin 81 MG tablet Take 1 tablet by mouth daily.  3     atorvastatin (LIPITOR) 10 MG tablet Take 1 tablet (10 mg) by mouth daily Refill when requested 90 tablet 3     blood glucose monitoring (ACCU-CHEK COMPACT STRIPS) test strip 1 strip by In Vitro route daily 100 each 11     blood glucose monitoring (SOFTCLIX) lancets Check blood sugar once daily 100 each 3     Cyanocobalamin (VITAMIN B-12 CR) 1000 MCG TBCR TAKE ONE TABLET BY MOUTH EVERY DAY 60 tablet 4     diclofenac (VOLTAREN) 1 % GEL topical gel APPLY 4 GRAMS TO KNEES OR 2 GRAMS TO HANDS FOUR TIMES A DAY USING ENCLOSED DOSING CARD 300 g 3     dipyridamole (PERSANTINE) 50 MG tablet TAKE TWO TABLETS BY MOUTH FOUR TIMES A  tablet 3     doxycycline Monohydrate 100 MG CAPS 1 tablet 2 time daily for Rosacea flares 60 capsule 3     fluticasone (FLONASE) 50 MCG/ACT spray Spray 1-2 sprays into both nostrils daily 16 g 5     FOLIC ACID PO Take 1 mg by mouth daily       furosemide (LASIX) 20 MG tablet 1/2 tablet per day. If weight increases by 3-5 pounds then increase to 20 mg (1 tablet). If weight decreases to 205 then ok to hold. 30 tablet 1     irbesartan (AVAPRO) 75 MG tablet Take 0.5 tablets (37.5 mg) by mouth daily (Patient taking differently: Take 75 mg by mouth daily ) 45 tablet 3     metFORMIN  (GLUCOPHAGE-XR) 500 MG 24 hr tablet Take 1 tablet (500 mg) by mouth 2 times daily (with meals) 180 tablet 1     metroNIDAZOLE (METROCREAM) 0.75 % cream Apply topically 2 times daily 45 g 3     Multiple Vitamin (MULTI VITAMIN  MENS) TABS Take  by mouth. Takes one daily         PREDNISONE PO Take 5 mg by mouth Currently taking 5 tablets (25mg) daily per patient (this medication is being taken care of through [facility]).       senna-docusate (SENOKOT-S;PERICOLACE) 8.6-50 MG per tablet Take 1-2 tablets by mouth 2 times daily. (Patient taking differently: Take 1-2 tablets by mouth Takes about 2-3 times weekly) 60 tablet 1     sertraline (ZOLOFT) 25 MG tablet TAKE ONE TABLET BY MOUTH EVERY DAY 90 tablet 3     VITAMIN D 1000 UNIT OR TABS 1 TABLET DAILY 100 4     Carbidopa-Levodopa  MG TBCR 2 times daily         PAST SURGICAL HISTORY:  Past Surgical History:   Procedure Laterality Date     ARTHROPLASTY KNEE  1/31/2012    Procedure:ARTHROPLASTY KNEE; LEFT TOTAL KNEE ARTHROPLASTY ([facility] On-Line)^; Surgeon:SKIP FAIR; Location: OR     ARTHROSCOPY SHOULDER, OPEN ROTATOR CUFF REPAIR, COMBINED  4/24/2012    Procedure:COMBINED ARTHROSCOPY SHOULDER, OPEN ROTATOR CUFF REPAIR; RIGHT SHOULDER ARTHROSCOPY OPEN ROTATOR CUFF DEBRIDEMENT, SUBCROMIAL DECOMPRESSION, AND BICEPS TENODESIS REPAIR; Surgeon:SKIP FAIR; Location: [facility]     [facility] AFF SURGICAL PATHOLOGY  6-    Dr. Bowers; left hand     ENT SURGERY       INCISE FINGER TENDON SHEATH  2008    Dr. Bowers; right AND LEFT hand     THROAT SURGERY      vocal cord polyps       ALLERGIES     Allergies   Allergen Reactions     No Known Drug Allergies        FAMILY HISTORY  Family History   Problem Relation Age of Onset     Family History Negative Other      unknown family history per patient       VASCULAR FAMILY HISTORY  1st order relative with atherosclerotic PAD: No  1st order relative with AAA: No    SOCIAL HISTORY  Social History     Social History     Marital status:  "     Spouse name: ford     Number of children: 6     Years of education: 12     Occupational History     printing Retired     Social History Main Topics     Smoking status: Never Smoker     Smokeless tobacco: Never Used     Alcohol use Yes      Comment: couple beers everyday     Drug use: No     Sexual activity: Not Currently     Partners: Female     Other Topics Concern     Parent/Sibling W/ Cabg, Mi Or Angioplasty Before 65f 55m? No     Social History Narrative       ROS:   A complete ROS is negative except as noted above.     EXAM:  /76 (BP Location: Left arm, Patient Position: Chair, Cuff Size: Adult Regular)  Pulse 99  Ht 1.651 m (5' 5\")  Wt 104.3 kg (230 lb)  SpO2 96%  BMI 38.27 kg/m2  In general, the patient is a pleasant male in no apparent distress.    HEENT: NC/AT.  EOMI.  Sclerae white, not injected.  Nares clear.  Pharynx without erythema or exudate.  Dentition intact.    Neck: No thyromegaly. Carotids +2/2 bilaterally without bruits.  No jugular venous distension.   Heart: RRR. Normal S1, S2; No murmur, rub, click, or gallop.   Lungs: CTA.  No ronchi, wheezes, rales.   Abdomen: Soft, nontender, nondistended. No bruits.   Extremities: 2-3+ BLE edema to the knee; abrasion over left calf; mild superficial vein distension    Vascular: No bruits are noted.   Radial Femoral Popliteal DP PT   Left 2/2 2/2 2/2 1/2 2/2   Right 2/2 2/2 2/2 1/2 2/2     Labs:  LIPID RESULTS:  Lab Results   Component Value Date    CHOL 152 10/24/2017    HDL 75 10/24/2017    LDL 32 10/24/2017    TRIG 226 (H) 10/24/2017    CHOLHDLRATIO 2.6 11/18/2014    NHDL 77 10/24/2017       LIVER ENZYME RESULTS:  Lab Results   Component Value Date    AST 13 12/22/2017    ALT 16 12/22/2017       CBC RESULTS:  Lab Results   Component Value Date    WBC 11.9 (H) 12/22/2017    RBC 3.52 (L) 12/22/2017    HGB 11.7 (L) 12/22/2017    HCT 36.8 (L) 12/22/2017     (H) 12/22/2017    MCH 33.2 (H) 12/22/2017    MCHC 31.8 12/22/2017 "    RDW 13.5 12/22/2017     12/22/2017       BMP RESULTS:  Lab Results   Component Value Date     05/29/2018    POTASSIUM 5.5 (H) 05/29/2018    CHLORIDE 107 05/29/2018    CO2 25 05/29/2018    ANIONGAP 10 05/29/2018     (H) 05/29/2018    BUN 41 (H) 05/29/2018    CR 1.23 05/29/2018    GFRESTIMATED 56 (L) 05/29/2018    GFRESTBLACK 67 05/29/2018    BLAISE 9.2 05/29/2018        A1C RESULTS:  Lab Results   Component Value Date    A1C 8.5 (H) 05/29/2018       Procedures:  9/19/18 VIOLET  Impression:      Right leg: Resting VIOLET is 0.69, using the PT which is abnormal and  suggestive of moderate peripheral vascular disease, confirmed by  abnormal TBI of 0.42. Waveform analysis may be inaccurate for  localization of stenoses due to noncompressibility.     Left leg: Resting VIOLET was not obtained in the setting of  noncompressible vessels. TBI is abnormal at 0.41, suggestive of  moderate peripheral vascular disease. Waveform analysis may be  inaccurate for localization of stenoses due to noncompressibility.    3/31/16 TTE    Interpretation Summary  Technically difficult study.Extremely difficult acoustic windows despite the  use of contrast for endcardial border definition.  Global and regional left ventricular function is normal with an EF of 60-65%.  Global right ventricular function is normal.    Assessment and Plan:   Mr. Blair is an 86 yo male who present with clinic with classic claudication pain. TBI's are low in both LE and waveform suggests infrapopliteal disease. He has no CAD history but does have a history of retinal artery embolus. Atherosclerotic disease is most likely. Last year he was diagnosed with giant cell arteritis so vasculitis is also on the differential, although his leg pain precedes the GCA diagnosis and did not improve with steroids. We will enroll him in PAD rehab and start cilostazol. He is already on a statin, ARB and 2 antiplatelet agents. We will stop his dipyridamole. We will obtain an  echo to evaluate for aortic root dilation and LV dysfunction.    The lower extremity edema is new. He denies other heart failure symptoms, but his exercise is limited by leg pain and fatigue. There are some signs of veinous insufficiency. Will obtain venous competency study.     Recommendations:    1. Stop dipyridamole.  2. Start cilostazol 50 mg BID  3. Schedule a venous competence study at your convenience.  4. Schedule a cardiac ultrasound at your convenience.  5. Start peripheral artery disease rehab    Follow up with Dr. Meade in 4 months    It was a pleasure to see Mr. Blair in clinic today with Dr. Meade.  Please do not hesitate to call with any further questions or concerns.     Todd Barry MD  Cardiovascular Fellow, PGY-7  [phone]      ATTENDING ATTESTATION    Patient was seen and evaluated in clinic today with the cardiology fellow. The note has been edited to reflect the history taking performed by me, my personal review of imaging, EKGs, labs and procedures, and our joint assessment and plan.     Total time spent 60 minutes, of which >50% was spent in face-to-face patient evaluation, reviewing data with patient, prolonged counseling of lifestyle modifications, any necessary genetic testing and screening of family members, and coordination of care.       Azul Meade MD MSc    Division of Cardiology  Vascular Section  [facility]

## 2018-10-02 NOTE — NURSING NOTE
Cardiac/Vascular Testing: Patient given instructions regarding venous competency duplex and echo. Discussed purpose, preparation, procedure and when to expect results reported back to the patient. Patient demonstrated understanding of this information and agreed to call with further questions or concerns.  Med Reconcile: Reviewed and verified all current medications with the patient. The updated medication list was printed and given to the patient.  New Medication: Cilostazol 50 mg po bid.  Patient was educated regarding newly prescribed medication, including discussion of  the indication, administration, side effects, and when to report to MD or RN. Patient demonstrated understanding of this information and agreed to call with further questions or concerns.  Return Appointment: 4 months with Dr. Meade.  Patient given instructions regarding scheduling next clinic visit. Patient demonstrated understanding of this information and agreed to call with further questions or concerns.  Medication Change: discontinue persantine.  Patient was educated regarding prescribed medication change, including discussion of the indication, administration, side effects, and when to report to MD or RN. Patient demonstrated understanding of this information and agreed to call with further questions or concerns.  Patient stated he understood all health information given and agreed to call with further questions or concerns.

## 2018-10-02 NOTE — PATIENT INSTRUCTIONS
You were seen today in the Cardiovascular Clinic at the [facility].      Cardiology Providers you saw during your visit:   Dr. Meade    Diagnosis:   (I10) Hypertension goal BP (blood pressure) < 150/90      Results:  None today.    Recommendations:    1. Stop dipyridamole.  2. Start cilostazol 50 mg BID  3. Schedule a venous competence study at your convenience.  4. Schedule a cardiac ultrasound at your convenience.  5. Start peripheral artery disease rehab    Follow-up:  4 months with Dr. Meade      For emergencies call [phone].    For any scheduling needs, please call [phone]. Option 1 then option 3    Thank you for your visit today!     Please call if you have any questions or concerns.  Brock Hinojosa RN

## 2018-10-02 NOTE — LETTER
10/2/2018      RE: Bart Blair  [address] [facility] [id number]       Dear Colleague,    Thank you for the opportunity to participate in the care of your patient, Bart Blair, at the [facility] at [facility]. Please see a copy of my visit note below.    HPI: Bart Blair is a 87 year old year old patient who presents to clinic for evaluation of lower extremity pain. He is followed by our colleague Dr. Gorman for history of loss of consciousness; receives primary care from Dr. Chuy Stewart and [facility].    Mr. Blair complains of aching in both calves from the knees to the feet for many years. The pain is induced with exercise and improves with rest. He can do his daily activities around the house but gets pain if he has to walk from the parking lot to the grocery store. He does not do more activity than that. It varies which leg is more affected but they are nearly equal. He has been treated with lower back injections without improvement. His medical history is pertinent for a diagnosis of Parkinson's disease which was recently called into question and ultimately determined he did not have the disease. About 1 year ago he was diagnosed with giant cell arteritis confirmed by temporal artery biopsy when he noticed blurry vision while watching TV. He has been on a slow prednisone taper since then. No improvement in his leg pain.     Other leg symptoms include cold feet and BLE edema since June. He still has sensation in the feet and he denies any wounds on them. No fevers or chills. He has T2DM which was diet controlled until he started steroids.     No orthopnea, PND.  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     PAST MEDICAL  HISTORY  Past Medical History:   Diagnosis Date     Anemia      Anemia due to blood loss, acute      CKD (chronic kidney disease) stage 3, GFR 30-59 ml/min 5/31/2010     Essential hypertension, benign      Other and unspecified hyperlipidemia      Other and unspecified hyperlipidemia      Other specified disorder of skin      Pain in joint, shoulder region      Parkinson disease (H) 9/2/2010     Polyp of vocal cord or larynx      Retinal ischemia     right sided thrombotic plaque     Rosacea      Type II or unspecified type diabetes mellitus without mention of complication, not stated as uncontrolled        CURRENT MEDICATIONS  Current Outpatient Prescriptions   Medication Sig Dispense Refill     acetaminophen (TYLENOL) 500 MG tablet Take 2 tablets (1,000 mg) by mouth 3 times daily 100 tablet 0     amoxicillin (AMOXIL) 500 MG capsule 4 tablets prior to dental appointment. Use for dental appointments 16 capsule 4     aspirin 81 MG tablet Take 1 tablet by mouth daily.  3     atorvastatin (LIPITOR) 10 MG tablet Take 1 tablet (10 mg) by mouth daily Refill when requested 90 tablet 3     blood glucose monitoring (ACCU-CHEK COMPACT STRIPS) test strip 1 strip by In Vitro route daily 100 each 11     blood glucose monitoring (SOFTCLIX) lancets Check blood sugar once daily 100 each 3     Cyanocobalamin (VITAMIN B-12 CR) 1000 MCG TBCR TAKE ONE TABLET BY MOUTH EVERY DAY 60 tablet 4     diclofenac (VOLTAREN) 1 % GEL topical gel APPLY 4 GRAMS TO KNEES OR 2 GRAMS TO HANDS FOUR TIMES A DAY USING ENCLOSED DOSING CARD 300 g 3     dipyridamole (PERSANTINE) 50 MG tablet TAKE TWO TABLETS BY MOUTH FOUR TIMES A  tablet 3     doxycycline Monohydrate 100 MG CAPS 1 tablet 2 time daily for Rosacea flares 60 capsule 3     fluticasone (FLONASE) 50 MCG/ACT spray Spray 1-2 sprays into both nostrils daily 16 g 5     FOLIC ACID PO Take 1 mg by mouth daily       furosemide (LASIX) 20 MG tablet 1/2 tablet per day. If weight increases by 3-5  pounds then increase to 20 mg (1 tablet). If weight decreases to 205 then ok to hold. 30 tablet 1     irbesartan (AVAPRO) 75 MG tablet Take 0.5 tablets (37.5 mg) by mouth daily (Patient taking differently: Take 75 mg by mouth daily ) 45 tablet 3     metFORMIN (GLUCOPHAGE-XR) 500 MG 24 hr tablet Take 1 tablet (500 mg) by mouth 2 times daily (with meals) 180 tablet 1     metroNIDAZOLE (METROCREAM) 0.75 % cream Apply topically 2 times daily 45 g 3     Multiple Vitamin (MULTI VITAMIN  MENS) TABS Take  by mouth. Takes one daily         PREDNISONE PO Take 5 mg by mouth Currently taking 5 tablets (25mg) daily per patient (this medication is being taken care of through [facility]).       senna-docusate (SENOKOT-S;PERICOLACE) 8.6-50 MG per tablet Take 1-2 tablets by mouth 2 times daily. (Patient taking differently: Take 1-2 tablets by mouth Takes about 2-3 times weekly) 60 tablet 1     sertraline (ZOLOFT) 25 MG tablet TAKE ONE TABLET BY MOUTH EVERY DAY 90 tablet 3     VITAMIN D 1000 UNIT OR TABS 1 TABLET DAILY 100 4     Carbidopa-Levodopa  MG TBCR 2 times daily         PAST SURGICAL HISTORY:  Past Surgical History:   Procedure Laterality Date     ARTHROPLASTY KNEE  1/31/2012    Procedure:ARTHROPLASTY KNEE; LEFT TOTAL KNEE ARTHROPLASTY ([employer])^; Surgeon:SKIP FAIR; Location: OR     ARTHROSCOPY SHOULDER, OPEN ROTATOR CUFF REPAIR, COMBINED  4/24/2012    Procedure:COMBINED ARTHROSCOPY SHOULDER, OPEN ROTATOR CUFF REPAIR; RIGHT SHOULDER ARTHROSCOPY OPEN ROTATOR CUFF DEBRIDEMENT, SUBCROMIAL DECOMPRESSION, AND BICEPS TENODESIS REPAIR; Surgeon:SKIP FAIR; Location:[facility]     CL AFF SURGICAL PATHOLOGY  6-    Dr. Bowers; left hand     ENT SURGERY       INCISE FINGER TENDON SHEATH  2008    Dr. Bowers; right AND LEFT hand     THROAT SURGERY      vocal cord polyps       ALLERGIES     Allergies   Allergen Reactions     No Known Drug Allergies        FAMILY HISTORY  Family History   Problem Relation Age of Onset  "    Family History Negative Other      unknown family history per patient       VASCULAR FAMILY HISTORY  1st order relative with atherosclerotic PAD: No  1st order relative with AAA: No    SOCIAL HISTORY  Social History     Social History     Marital status:      Spouse name: ford     Number of children: 6     Years of education: 12     Occupational History     printing Retired     Social History Main Topics     Smoking status: Never Smoker     Smokeless tobacco: Never Used     Alcohol use Yes      Comment: couple beers everyday     Drug use: No     Sexual activity: Not Currently     Partners: Female     Other Topics Concern     Parent/Sibling W/ Cabg, Mi Or Angioplasty Before 65f 55m? No     Social History Narrative     EXAM:  /76 (BP Location: Left arm, Patient Position: Chair, Cuff Size: Adult Regular)  Pulse 99  Ht 1.651 m (5' 5\")  Wt 104.3 kg (230 lb)  SpO2 96%  BMI 38.27 kg/m2  In general, the patient is a pleasant male in no apparent distress.    HEENT: NC/AT.  EOMI.  Sclerae white, not injected.  Nares clear.  Pharynx without erythema or exudate.  Dentition intact.    Neck: No thyromegaly. Carotids +2/2 bilaterally without bruits.  No jugular venous distension.   Heart: RRR. Normal S1, S2; No murmur, rub, click, or gallop.   Lungs: CTA.  No ronchi, wheezes, rales.   Abdomen: Soft, nontender, nondistended. No bruits.   Extremities: 2-3+ BLE edema to the knee; abrasion over left calf; mild superficial vein distension    Vascular: No bruits are noted.   Radial Femoral Popliteal DP PT   Left 2/2 2/2 2/2 1/2 2/2   Right 2/2 2/2 2/2 1/2 2/2     Labs:  LIPID RESULTS:  Lab Results   Component Value Date    CHOL 152 10/24/2017    HDL 75 10/24/2017    LDL 32 10/24/2017    TRIG 226 (H) 10/24/2017    CHOLHDLRATIO 2.6 11/18/2014    NHDL 77 10/24/2017       LIVER ENZYME RESULTS:  Lab Results   Component Value Date    AST 13 12/22/2017    ALT 16 12/22/2017       CBC RESULTS:  Lab Results   Component " Value Date    WBC 11.9 (H) 12/22/2017    RBC 3.52 (L) 12/22/2017    HGB 11.7 (L) 12/22/2017    HCT 36.8 (L) 12/22/2017     (H) 12/22/2017    MCH 33.2 (H) 12/22/2017    MCHC 31.8 12/22/2017    RDW 13.5 12/22/2017     12/22/2017       BMP RESULTS:  Lab Results   Component Value Date     05/29/2018    POTASSIUM 5.5 (H) 05/29/2018    CHLORIDE 107 05/29/2018    CO2 25 05/29/2018    ANIONGAP 10 05/29/2018     (H) 05/29/2018    BUN 41 (H) 05/29/2018    CR 1.23 05/29/2018    GFRESTIMATED 56 (L) 05/29/2018    GFRESTBLACK 67 05/29/2018    BLAISE 9.2 05/29/2018        A1C RESULTS:  Lab Results   Component Value Date    A1C 8.5 (H) 05/29/2018       Procedures:  9/19/18 VIOLET  Impression:      Right leg: Resting VIOLET is 0.69, using the PT which is abnormal and  suggestive of moderate peripheral vascular disease, confirmed by  abnormal TBI of 0.42. Waveform analysis may be inaccurate for  localization of stenoses due to noncompressibility.     Left leg: Resting VIOLET was not obtained in the setting of  noncompressible vessels. TBI is abnormal at 0.41, suggestive of  moderate peripheral vascular disease. Waveform analysis may be  inaccurate for localization of stenoses due to noncompressibility.    3/31/16 TTE    Interpretation Summary  Technically difficult study.Extremely difficult acoustic windows despite the  use of contrast for endcardial border definition.  Global and regional left ventricular function is normal with an EF of 60-65%.  Global right ventricular function is normal.    Assessment and Plan:   Mr. Blair is an 86 yo male who present with clinic with classic claudication pain. TBI's are low in both LE and waveform suggests infrapopliteal disease. He has no CAD history but does have a history of retinal artery embolus. Atherosclerotic disease is most likely. Last year he was diagnosed with giant cell arteritis so vasculitis is also on the differential, although his leg pain precedes the GCA diagnosis  and did not improve with steroids. We will enroll him in PAD rehab and start cilostazol. He is already on a statin, ARB and 2 antiplatelet agents. We will stop his dipyridamole. We will obtain an echo to evaluate for aortic root dilation and LV dysfunction.    The lower extremity edema is new. He denies other heart failure symptoms, but his exercise is limited by leg pain and fatigue. There are some signs of veinous insufficiency. Will obtain venous competency study.     Recommendations:    1. Stop dipyridamole.  2. Start cilostazol 50 mg BID  3. Schedule a venous competence study at your convenience.  4. Schedule a cardiac ultrasound at your convenience.  5. Start peripheral artery disease rehab    Follow up with Dr. Meade in 4 months    It was a pleasure to see Mr. Blair in clinic today with Dr. Meade.  Please do not hesitate to call with any further questions or concerns.     Todd Barry MD  Cardiovascular Fellow, PGY-7  [phone]      ATTENDING ATTESTATION    Patient was seen and evaluated in clinic today with the cardiology fellow. The note has been edited to reflect the history taking performed by me, my personal review of imaging, EKGs, labs and procedures, and our joint assessment and plan.     Total time spent 60 minutes, of which >50% was spent in face-to-face patient evaluation, reviewing data with patient, prolonged counseling of lifestyle modifications, any necessary genetic testing and screening of family members, and coordination of care.       Azul Meade MD MSc    Division of Cardiology  Vascular Section  [facility]

## 2018-10-02 NOTE — PROGRESS NOTES
HPI: Bart Blair is a 87 year old year old patient who receives primary care from Chuy Stewart.  {CARD VISIT SOURCE:286726}    PAST MEDICAL HISTORY  Past Medical History:   Diagnosis Date     Anemia      Anemia due to blood loss, acute      CKD (chronic kidney disease) stage 3, GFR 30-59 ml/min 5/31/2010     Essential hypertension, benign      Other and unspecified hyperlipidemia      Other and unspecified hyperlipidemia      Other specified disorder of skin      Pain in joint, shoulder region      Parkinson disease (H) 9/2/2010     Polyp of vocal cord or larynx      Retinal ischemia     right sided thrombotic plaque     Rosacea      Type II or unspecified type diabetes mellitus without mention of complication, not stated as uncontrolled        CURRENT MEDICATIONS  Current Outpatient Prescriptions   Medication Sig Dispense Refill     acetaminophen (TYLENOL) 500 MG tablet Take 2 tablets (1,000 mg) by mouth 3 times daily 100 tablet 0     amoxicillin (AMOXIL) 500 MG capsule 4 tablets prior to dental appointment. Use for dental appointments 16 capsule 4     aspirin 81 MG tablet Take 1 tablet by mouth daily.  3     atorvastatin (LIPITOR) 10 MG tablet Take 1 tablet (10 mg) by mouth daily Refill when requested 90 tablet 3     blood glucose monitoring (ACCU-CHEK COMPACT STRIPS) test strip 1 strip by In Vitro route daily 100 each 11     blood glucose monitoring (SOFTCLIX) lancets Check blood sugar once daily 100 each 3     Cyanocobalamin (VITAMIN B-12 CR) 1000 MCG TBCR TAKE ONE TABLET BY MOUTH EVERY DAY 60 tablet 4     diclofenac (VOLTAREN) 1 % GEL topical gel APPLY 4 GRAMS TO KNEES OR 2 GRAMS TO HANDS FOUR TIMES A DAY USING ENCLOSED DOSING CARD 300 g 3     dipyridamole (PERSANTINE) 50 MG tablet TAKE TWO TABLETS BY MOUTH FOUR TIMES A  tablet 3     doxycycline Monohydrate 100 MG CAPS 1 tablet 2 time daily for Rosacea flares 60 capsule 3     fluticasone (FLONASE) 50 MCG/ACT spray Spray 1-2 sprays into both  nostrils daily 16 g 5     FOLIC ACID PO Take 1 mg by mouth daily       furosemide (LASIX) 20 MG tablet 1/2 tablet per day. If weight increases by 3-5 pounds then increase to 20 mg (1 tablet). If weight decreases to 205 then ok to hold. 30 tablet 1     irbesartan (AVAPRO) 75 MG tablet Take 0.5 tablets (37.5 mg) by mouth daily (Patient taking differently: Take 75 mg by mouth daily ) 45 tablet 3     metFORMIN (GLUCOPHAGE-XR) 500 MG 24 hr tablet Take 1 tablet (500 mg) by mouth 2 times daily (with meals) 180 tablet 1     metroNIDAZOLE (METROCREAM) 0.75 % cream Apply topically 2 times daily 45 g 3     Multiple Vitamin (MULTI VITAMIN  MENS) TABS Take  by mouth. Takes one daily         PREDNISONE PO Take 5 mg by mouth Currently taking 5 tablets (25mg) daily per patient (this medication is being taken care of through [facility]).       senna-docusate (SENOKOT-S;PERICOLACE) 8.6-50 MG per tablet Take 1-2 tablets by mouth 2 times daily. (Patient taking differently: Take 1-2 tablets by mouth Takes about 2-3 times weekly) 60 tablet 1     sertraline (ZOLOFT) 25 MG tablet TAKE ONE TABLET BY MOUTH EVERY DAY 90 tablet 3     VITAMIN D 1000 UNIT OR TABS 1 TABLET DAILY 100 4     Carbidopa-Levodopa  MG TBCR 2 times daily         PAST SURGICAL HISTORY:  Past Surgical History:   Procedure Laterality Date     ARTHROPLASTY KNEE  1/31/2012    Procedure:ARTHROPLASTY KNEE; LEFT TOTAL KNEE ARTHROPLASTY ([employer])^; Surgeon:SKIP FAIR; Location: OR     ARTHROSCOPY SHOULDER, OPEN ROTATOR CUFF REPAIR, COMBINED  4/24/2012    Procedure:COMBINED ARTHROSCOPY SHOULDER, OPEN ROTATOR CUFF REPAIR; RIGHT SHOULDER ARTHROSCOPY OPEN ROTATOR CUFF DEBRIDEMENT, SUBCROMIAL DECOMPRESSION, AND BICEPS TENODESIS REPAIR; Surgeon:SKIP FAIR; Location: [facility]     CL AFF SURGICAL PATHOLOGY  6-    Dr. Bowres; left hand     ENT SURGERY       INCISE FINGER TENDON SHEATH  2008    Dr. Bowers; right AND LEFT hand     THROAT SURGERY      vocal cord  "polyps       ALLERGIES     Allergies   Allergen Reactions     No Known Drug Allergies        FAMILY HISTORY  Family History   Problem Relation Age of Onset     Family History Negative Other      unknown family history per patient       VASCULAR FAMILY HISTORY  1st order relative with atherosclerotic PAD: {YES / NO:647635::\"Yes\"}  1st order relative with AAA: {YES / NO:177679::\"Yes\"}    SOCIAL HISTORY  Social History     Social History     Marital status:      Spouse name: ford     Number of children: 6     Years of education: 12     Occupational History     printing Retired     Social History Main Topics     Smoking status: Never Smoker     Smokeless tobacco: Never Used     Alcohol use Yes      Comment: couple beers everyday     Drug use: No     Sexual activity: Not Currently     Partners: Female     Other Topics Concern     Parent/Sibling W/ Cabg, Mi Or Angioplasty Before 65f 55m? No     Social History Narrative       ROS:   Constitutional: No fever, chills, or sweats. No weight gain/loss   ENT: No visual disturbance, ear ache, epistaxis, sore throat  Allergies/Immunologic: Negative  Respiratory: No cough, hemoptysia  Cardiovascular: As per HPI  GI: No nausea, vomiting, hematemesis, melena, or hematochezia  : No urinary frequency, dysuria, or hematuria  Integument: Negative  Psychiatric: Negative  Neuro: Negative  Endocrinology: Negative   Musculoskeletal: Negative  Vascular: No walking impairment, claudication, ischemic rest pain or nonhealing wounds    EXAM:  /76 (BP Location: Left arm, Patient Position: Chair, Cuff Size: Adult Regular)  Pulse 99  Ht 1.651 m (5' 5\")  Wt 104.3 kg (230 lb)  SpO2 96%  BMI 38.27 kg/m2  In general, the patient is a pleasant male in no apparent distress.    HEENT: NC/AT.  PERRLA.  EOMI.  Sclerae white, not injected.  Nares clear.  Pharynx without erythema or exudate.  Dentition intact.    Neck: No adenopathy.  No thyromegaly. Carotids +2/2 bilaterally without " bruits.  No jugular venous distension.   Heart: RRR. Normal S1, S2 splits physiologically. No murmur, rub, click, or gallop. The PMI is in the 5th ICS in the midclavicular line. There is no heave.    Lungs: CTA.  No ronchi, wheezes, rales.  No dullness to percussion.   Abdomen: Soft, nontender, nondistended. No organomegaly. No AAA.  No bruits.   Extremities: No clubbing, cyanosis, or edema.  No wounds. No varicose veins signs of chronic venous insufficiency.   Vascular: No bruits are noted.   Brachial Radial Ulnar Femoral Popliteal DP PT   Left ***/2 ***/2 ***/2 ***/2 ***/2 ***/2 ***/2   Right ***/2 ***/2 ***/2 ***/2 ***/2 ***/2 ***/2     Labs:  LIPID RESULTS:  Lab Results   Component Value Date    CHOL 152 10/24/2017    HDL 75 10/24/2017    LDL 32 10/24/2017    TRIG 226 (H) 10/24/2017    CHOLHDLRATIO 2.6 11/18/2014    NHDL 77 10/24/2017       LIVER ENZYME RESULTS:  Lab Results   Component Value Date    AST 13 12/22/2017    ALT 16 12/22/2017       CBC RESULTS:  Lab Results   Component Value Date    WBC 11.9 (H) 12/22/2017    RBC 3.52 (L) 12/22/2017    HGB 11.7 (L) 12/22/2017    HCT 36.8 (L) 12/22/2017     (H) 12/22/2017    MCH 33.2 (H) 12/22/2017    MCHC 31.8 12/22/2017    RDW 13.5 12/22/2017     12/22/2017       BMP RESULTS:  Lab Results   Component Value Date     05/29/2018    POTASSIUM 5.5 (H) 05/29/2018    CHLORIDE 107 05/29/2018    CO2 25 05/29/2018    ANIONGAP 10 05/29/2018     (H) 05/29/2018    BUN 41 (H) 05/29/2018    CR 1.23 05/29/2018    GFRESTIMATED 56 (L) 05/29/2018    GFRESTBLACK 67 05/29/2018    BLAISE 9.2 05/29/2018        A1C RESULTS:  Lab Results   Component Value Date    A1C 8.5 (H) 05/29/2018       Procedures:      Assessment and Plan: ***

## 2018-10-02 NOTE — NURSING NOTE
Chief Complaint   Patient presents with     New Patient     reason for visit: 86 y/o for initial evaluation of abnormal exercise VIOLET     Vitals were taken and medications were reconciled.     MAGED Styles  12:48 PM

## 2018-10-02 NOTE — MR AVS SNAPSHOT
After Visit Summary   10/2/2018    Bart Blair    MRN: [id number]           Patient Information     Date Of Birth          2/14/1931        Visit Information        Provider Department      10/2/2018 1:00 PM Azul Meade MD [facility]        Today's Diagnoses     Atherosclerosis of native artery of both lower extremities with intermittent claudication (H)    -  1    Hypertension goal BP (blood pressure) < 150/90        Giant cell arteritis (H)        Chronic venous hypertension (idiopathic) with other complications of bilateral lower extremity         Bilateral calf pain        Abnormal ankle brachial index (VIOLET)        Bilateral lower extremity pain          Care Instructions    You were seen today in the Cardiovascular Clinic at the [facility].      Cardiology Providers you saw during your visit:   Dr. Meade    Diagnosis:   (I10) Hypertension goal BP (blood pressure) < 150/90      Results:  None today.    Recommendations:    1. Stop dipyridamole.  2. Start cilostazol 50 mg BID  3. Schedule a venous competence study at your convenience.  4. Schedule a cardiac ultrasound at your convenience.  5. Start peripheral artery disease rehab    Follow-up:  4 months with Dr. Meade      For emergencies call 223.    For any scheduling needs, please call [phone]. Option 1 then option 3    Thank you for your visit today!     Please call if you have any questions or concerns.  Brock Hinojosa RN              Follow-ups after your visit        Additional Services     PAD REHAB REFERRAL       Your provider has referred you to the supervised exercise therapy program.    If you have not heard from the scheduling office within 2 business days, please call [phone] for all locations, with the exception of [place], please call [phone] and [place], please call [phone].    Please be aware that coverage of these services is subject to the terms and limitations of your health  insurance plan.  Call member services at your health plan with any benefit or coverage questions.                  Your next 10 appointments already scheduled     Oct 09, 2018 10:20 AM CDT   Office Visit with Chuy Stewart MD   [facility]    [address] [id number] [phone]           Bring a current list of meds and any records pertaining to this visit. For Physicals, please bring immunization records and any forms needing to be filled out. Please arrive 10 minutes early to complete paperwork.            Oct 17, 2018 11:00 AM CDT   Ech Complete with UCECHCR2   [facility] ([facility])    [address]  3rd Floor  [facility] [id number] [phone]           1.  Please bring or wear a comfortable two-piece outfit. 2.  You may eat, drink and take your normal medicines. 3.  For any questions that cannot be answered, please contact the ordering physician 4.  Please do not wear perfumes or scented lotions on the day of your exam.            Oct 17, 2018 12:00 PM CDT   US LOWER EXTREMITY VENOUS COMPETENCY BILATERAL with UCUSV1   [facility] US ([facility])    [address]  1st Floor  [facility] [id number] [phone]           How do I prepare for my exam? (Food and drink instructions) No Food and Drink Restrictions.  How do I prepare for my exam? (Other instructions) You do not need to do anything special to prepare for your exam.  What should I wear: Wear comfortable clothes.  How long does the exam take: Most ultrasounds take 30 to 60 minutes.  What should I bring: Bring a list of your medicines, including vitamins, minerals and over-the-counter drugs. It is safest to leave personal items at home.  Do I need a :  No  is needed.  What do I need to tell my doctor: Tell your doctor about any allergies you may have.  What should I  do after the exam: No restrictions, You may resume normal activities.  What is this test: An ultrasound uses sound waves to make pictures of the body. Sound waves do not cause pain. The only discomfort may be the pressure of the wand against your skin or full bladder.  Who should I call with questions: If you have any questions, please call the Imaging Department where you will have your exam. Directions, parking instructions, and other information is available on our website, [email or web address].            Feb 05, 2019 11:30 AM CST   (Arrive by 11:15 AM)   Return Vascular Visit with Auzl Meade MD   [facility] ([facility]    [address]  [address]  [facility] [address] [phone]            Mar 14, 2019 10:30 AM CDT   (Arrive by 10:15 AM)   Implantable Loop Recorder with Uc Cv Device 1   [facility]    [address] [place] [address]  3rd Floor  [id number]               Mar 14, 2019 11:00 AM CDT   (Arrive by 10:45 AM)   RETURN ARRHYTHMIA with JOSEPH Beth CNP   [facility] ([facility])    [address] [place] [address]  [address] [place] [id number] [phone]              Future tests that were ordered for you today     Open Future Orders        Priority Expected Expires Ordered    US Venous Competency Bilateral Routine 10/2/2018 12/31/2018 10/2/2018    Echocardiogram Routine 10/2/2018 12/1/2018 10/2/2018    PAD REHAB REFERRAL Routine 10/2/2018 10/2/2019 10/2/2018            Who to contact     If you have questions or need follow up information about today's clinic visit or your schedule please contact [facility] directly at [phone].  Normal or non-critical lab and imaging results will be communicated to you by MyChart, letter or phone within 4 business days after the clinic has received the results. If you do not hear from us within 7 days, please contact  "the clinic through [employer] or phone. If you have a critical or abnormal lab result, we will notify you by phone as soon as possible.  Submit refill requests through [employer] or call your pharmacy and they will forward the refill request to us. Please allow 3 business days for your refill to be completed.          Additional Information About Your Visit        Ideedockhart Information     [employer] gives you secure access to your electronic health record. If you see a primary care provider, you can also send messages to your care team and make appointments. If you have questions, please call your primary care clinic.  If you do not have a primary care provider, please call [phone] and they will assist you.        Care EveryWhere ID     This is your Care EveryWhere ID. This could be used by other organizations to access your [place] medical records  [id number]        Your Vitals Were     Pulse Height Pulse Oximetry BMI (Body Mass Index)          99 1.651 m (5' 5\") 96% 38.27 kg/m2         Blood Pressure from Last 3 Encounters:   10/02/18 124/76   09/13/18 149/85   05/29/18 126/68    Weight from Last 3 Encounters:   10/02/18 104.3 kg (230 lb)   09/13/18 94.3 kg (207 lb 12.8 oz)   05/29/18 96.2 kg (212 lb)                 Today's Medication Changes          These changes are accurate as of 10/2/18  2:38 PM.  If you have any questions, ask your nurse or doctor.               Start taking these medicines.        Dose/Directions    cilostazol 50 MG tablet   Commonly known as:  PLETAL   Used for:  Atherosclerosis of native artery of both lower extremities with intermittent claudication (H)   Started by:  Azul Meade MD        Dose:  50 mg   Take 1 tablet (50 mg) by mouth 2 times daily   Quantity:  60 tablet   Refills:  3         These medicines have changed or have updated prescriptions.        Dose/Directions    senna-docusate 8.6-50 MG per tablet   Commonly known as:  SENOKOT-S;PERICOLACE   This may have changed:  "   - when to take this  - additional instructions   Changed by:  Azul Meade MD        Dose:  1-2 tablet   Take 1-2 tablets by mouth Takes about 2-3 times weekly   Refills:  0         Stop taking these medicines if you haven't already. Please contact your care team if you have questions.     dipyridamole 50 MG tablet   Commonly known as:  PERSANTINE   Stopped by:  Azul Meade MD                Where to get your medicines      These medications were sent to [facility] - [place], [place] - [address]  [address], [place] [address]     Phone:  [phone]     cilostazol 50 MG tablet                Primary Care Provider Office Phone # Fax #    Chuy Stewart [phone]       [facility] [facility] [address]        Equal Access to Services     [facility]: Hadii emmie ramirez hadasho Soeliceo, waaxda luqadaha, qaybta kaalmada adecarrieyada, jd tsang . So [facility] [phone].    ATENCIÓN: Si habla español, tiene a ellis disposición servicios gratuitos de asistencia lingüística. [facility] [phone].    We comply with applicable federal civil rights laws and [place] laws. We do not discriminate on the basis of race, color, national origin, age, disability, sex, sexual orientation, or gender identity.            Thank you!     Thank you for choosing [facility]  for your care. Our goal is always to provide you with excellent care. Hearing back from our patients is one way we can continue to improve our services. Please take a few minutes to complete the written survey that you may receive in the mail after your visit with us. Thank you!             Your Updated Medication List - Protect others around you: Learn how to safely use, store and throw away your medicines at www.disposemymeds.org.          This list is accurate as of 10/2/18  2:38 PM.  Always use your most recent med list.                    Brand Name Dispense Instructions for use Diagnosis    amoxicillin 500 MG capsule    AMOXIL    16 capsule    4 tablets prior to dental appointment. Use for dental appointments    Status post total left knee replacement       aspirin 81 MG tablet      Take 1 tablet by mouth daily.    Hypertension goal BP (blood pressure) < 130/80       atorvastatin 10 MG tablet    LIPITOR    90 tablet    Take 1 tablet (10 mg) by mouth daily Refill when requested    Hyperlipidemia LDL goal <100, Type 2 diabetes mellitus with other ophthalmic complication, without long-term current use of insulin (H)       blood glucose monitoring lancets     100 each    Check blood sugar once daily    Type 2 diabetes mellitus with other ophthalmic complication, without long-term current use of insulin (H)       blood glucose monitoring test strip    ACCU-CHEK COMPACT STRIPS    100 each    1 strip by In Vitro route daily    Type 2 diabetes mellitus with other ophthalmic complication, without long-term current use of insulin (H)       cholecalciferol 1000 UNIT tablet    vitamin D3    100    1 TABLET DAILY    Contusion of ribs       cilostazol 50 MG tablet    PLETAL    60 tablet    Take 1 tablet (50 mg) by mouth 2 times daily    Atherosclerosis of native artery of both lower extremities with intermittent claudication (H)       diclofenac 1 % Gel topical gel    VOLTAREN    300 g    APPLY 4 GRAMS TO KNEES OR 2 GRAMS TO HANDS FOUR TIMES A DAY USING ENCLOSED DOSING CARD    Arthritis       doxycycline monohydrate 100 MG capsule     60 capsule    1 tablet 2 time daily for Rosacea flares    Rosacea       fluticasone 50 MCG/ACT spray    FLONASE    16 g    Spray 1-2 sprays into both nostrils daily    Chronic rhinitis       FOLIC ACID PO      Take 1 mg by mouth daily        furosemide 20 MG tablet    LASIX    30 tablet    1/2 tablet per day. If weight increases by 3-5 pounds then increase to 20 mg (1 tablet). If weight decreases to 205 then ok to hold.     Edema, unspecified type       irbesartan 75 MG tablet    AVAPRO     Take 1 tablet (75 mg) by mouth daily    Hypertension goal BP (blood pressure) < 150/90       metFORMIN 500 MG 24 hr tablet    GLUCOPHAGE-XR    180 tablet    Take 1 tablet (500 mg) by mouth 2 times daily (with meals)    Type 2 diabetes mellitus with hyperglycemia, without long-term current use of insulin (H)       metroNIDAZOLE 0.75 % cream    METROCREAM    45 g    Apply topically 2 times daily    Rosacea       MULTI vitamin  MENS Tabs      Take  by mouth. Takes one daily        PREDNISONE PO      Take 5 mg by mouth Currently taking 5 tablets (25mg) daily per patient (this medication is being taken care of through [facility]).        senna-docusate 8.6-50 MG per tablet    SENOKOT-S;PERICOLACE     Take 1-2 tablets by mouth Takes about 2-3 times weekly        sertraline 25 MG tablet    ZOLOFT    90 tablet    TAKE ONE TABLET BY MOUTH EVERY DAY    Dysthymia       TYLENOL 500 MG tablet   Generic drug:  acetaminophen     100 tablet    Take 2 tablets (1,000 mg) by mouth 3 times daily        Vitamin B-12 CR 1000 MCG Tbcr     60 tablet    TAKE ONE TABLET BY MOUTH EVERY DAY    Parkinson disease (H)

## 2018-10-09 ENCOUNTER — OFFICE VISIT (OUTPATIENT)
Dept: FAMILY MEDICINE | Facility: CLINIC | Age: 83
End: 2018-10-09
Payer: COMMERCIAL

## 2018-10-09 VITALS
DIASTOLIC BLOOD PRESSURE: 64 MMHG | HEART RATE: 60 BPM | BODY MASS INDEX: 34.16 KG/M2 | HEIGHT: 65 IN | SYSTOLIC BLOOD PRESSURE: 130 MMHG | WEIGHT: 205 LBS | TEMPERATURE: 98 F

## 2018-10-09 DIAGNOSIS — R60.9 EDEMA, UNSPECIFIED TYPE: ICD-10-CM

## 2018-10-09 DIAGNOSIS — Z23 NEED FOR PROPHYLACTIC VACCINATION AND INOCULATION AGAINST INFLUENZA: ICD-10-CM

## 2018-10-09 DIAGNOSIS — E11.39 TYPE 2 DIABETES MELLITUS WITH OTHER OPHTHALMIC COMPLICATION, WITHOUT LONG-TERM CURRENT USE OF INSULIN (H): Primary | ICD-10-CM

## 2018-10-09 DIAGNOSIS — I70.213 ATHEROSCLEROSIS OF NATIVE ARTERY OF BOTH LOWER EXTREMITIES WITH INTERMITTENT CLAUDICATION (H): ICD-10-CM

## 2018-10-09 DIAGNOSIS — G20.A1 PARKINSON DISEASE (H): ICD-10-CM

## 2018-10-09 DIAGNOSIS — E78.5 HYPERLIPIDEMIA LDL GOAL <100: ICD-10-CM

## 2018-10-09 DIAGNOSIS — F10.21 ALCOHOLISM IN REMISSION (H): ICD-10-CM

## 2018-10-09 LAB — HBA1C MFR BLD: 8.3 % (ref 0–5.6)

## 2018-10-09 PROCEDURE — 90662 IIV NO PRSV INCREASED AG IM: CPT | Performed by: FAMILY MEDICINE

## 2018-10-09 PROCEDURE — 83036 HEMOGLOBIN GLYCOSYLATED A1C: CPT | Performed by: FAMILY MEDICINE

## 2018-10-09 PROCEDURE — 36415 COLL VENOUS BLD VENIPUNCTURE: CPT | Performed by: FAMILY MEDICINE

## 2018-10-09 PROCEDURE — G0008 ADMIN INFLUENZA VIRUS VAC: HCPCS | Performed by: FAMILY MEDICINE

## 2018-10-09 PROCEDURE — 80048 BASIC METABOLIC PNL TOTAL CA: CPT | Performed by: FAMILY MEDICINE

## 2018-10-09 PROCEDURE — 80061 LIPID PANEL: CPT | Performed by: FAMILY MEDICINE

## 2018-10-09 PROCEDURE — 99214 OFFICE O/P EST MOD 30 MIN: CPT | Mod: 25 | Performed by: FAMILY MEDICINE

## 2018-10-09 RX ORDER — CILOSTAZOL 50 MG/1
50 TABLET ORAL 2 TIMES DAILY
Qty: 180 TABLET | Refills: 3 | Status: SHIPPED | OUTPATIENT
Start: 2018-10-09 | End: 2019-01-23

## 2018-10-09 RX ORDER — METFORMIN HCL 500 MG
500 TABLET, EXTENDED RELEASE 24 HR ORAL 2 TIMES DAILY WITH MEALS
Qty: 180 TABLET | Refills: 1 | Status: CANCELLED | OUTPATIENT
Start: 2018-10-09

## 2018-10-09 RX ORDER — ATORVASTATIN CALCIUM 10 MG/1
10 TABLET, FILM COATED ORAL DAILY
Qty: 90 TABLET | Refills: 3 | Status: SHIPPED | OUTPATIENT
Start: 2018-10-09 | End: 2019-05-07

## 2018-10-09 RX ORDER — FUROSEMIDE 20 MG
TABLET ORAL
Qty: 90 TABLET | Refills: 3 | Status: SHIPPED | OUTPATIENT
Start: 2018-10-09 | End: 2019-04-25

## 2018-10-09 RX ORDER — CILOSTAZOL 50 MG/1
50 TABLET ORAL 2 TIMES DAILY
Qty: 60 TABLET | Refills: 3 | Status: SHIPPED | OUTPATIENT
Start: 2018-10-09 | End: 2018-10-09

## 2018-10-09 NOTE — PATIENT INSTRUCTIONS
MY DIABETES TODAY:    1)  Goal A1C is under <8.0  Mine is:      Lab Results   Component Value Date    A1C 8.3 10/09/2018       2)  Goal LDL (bad cholesterol) under 100  (measured at least yearly)- I am currently at:   Lab Results   Component Value Date    LDL 32 10/24/2017       3)  Goal blood pressure under 130/80- mine was 130/64 today    4)  Take aspirin daily     5)  No tobacco use          ACTION PLAN CHANGES FROM TODAY:    Care Plan changes:    -we'll wait for kidney tests to come back before we make any readjustments. Dr. Stewart will call you.     Consider new shingles vaccine, which you can get at any pharmacy including ours. Pharmacist can check insurance coverage.       Labs:     Lab Results   Component Value Date    A1C 8.3 10/09/2018    A1C 8.5 05/29/2018    A1C 9.3 03/29/2018    A1C 6.6 10/24/2017    A1C 6.0 04/25/2017         Follow up in 2-3 months

## 2018-10-09 NOTE — MR AVS SNAPSHOT
After Visit Summary   10/9/2018    Bart Blair    MRN: [id number]           Patient Information     Date Of Birth          2/14/1931        Visit Information        Provider Department      10/9/2018 10:20 AM Chuy Stewart MD [facility]        Today's Diagnoses     Type 2 diabetes mellitus with other ophthalmic complication, without long-term current use of insulin (H)    -  1    Hyperlipidemia LDL goal <100        Atherosclerosis of native artery of both lower extremities with intermittent claudication (H)        Need for prophylactic vaccination and inoculation against influenza        Alcoholism (H)        Parkinson disease (H)        Supraventricular tachycardia (H)        Edema, unspecified type          Care Instructions    MY DIABETES TODAY:    1)  Goal A1C is under <8.0  Mine is:      Lab Results   Component Value Date    A1C 8.3 10/09/2018       2)  Goal LDL (bad cholesterol) under 100  (measured at least yearly)- I am currently at:   Lab Results   Component Value Date    LDL 32 10/24/2017       3)  Goal blood pressure under 130/80- mine was 130/64 today    4)  Take aspirin daily     5)  No tobacco use          ACTION PLAN CHANGES FROM TODAY:    Care Plan changes:    -we'll wait for kidney tests to come back before we make any readjustments.     Consider new shingles vaccine, which you can get at any pharmacy including ours. Pharmacist can check insurance coverage.       Labs:     Lab Results   Component Value Date    A1C 8.3 10/09/2018    A1C 8.5 05/29/2018    A1C 9.3 03/29/2018    A1C 6.6 10/24/2017    A1C 6.0 04/25/2017         Follow up in 2-3 months            Follow-ups after your visit        Your next 10 appointments already scheduled     Oct 17, 2018 11:00 AM CDT   Ech Complete with UCECHCR2   [facility] ([facility])    [address] [place] [address] [phone]           1.  Please bring or wear a  comfortable two-piece outfit. 2.  You may eat, drink and take your normal medicines. 3.  For any questions that cannot be answered, please contact the ordering physician 4.  Please do not wear perfumes or scented lotions on the day of your exam.            Oct 17, 2018 12:00 PM CDT   US LOWER EXTREMITY VENOUS COMPETENCY BILATERAL with UCUSV1   [facility] ([facility])    [address]  1st Floor  [facility] [id number] [phone]           How do I prepare for my exam? (Food and drink instructions) No Food and Drink Restrictions.  How do I prepare for my exam? (Other instructions) You do not need to do anything special to prepare for your exam.  What should I wear: Wear comfortable clothes.  How long does the exam take: Most ultrasounds take 30 to 60 minutes.  What should I bring: Bring a list of your medicines, including vitamins, minerals and over-the-counter drugs. It is safest to leave personal items at home.  Do I need a :  No  is needed.  What do I need to tell my doctor: Tell your doctor about any allergies you may have.  What should I do after the exam: No restrictions, You may resume normal activities.  What is this test: An ultrasound uses sound waves to make pictures of the body. Sound waves do not cause pain. The only discomfort may be the pressure of the wand against your skin or full bladder.  Who should I call with questions: If you have any questions, please call the Imaging Department where you will have your exam. Directions, parking instructions, and other information is available on our website, [email or web address].            Oct 19, 2018 11:00 AM CDT   PAD-SET Evaluation with  PULMONARY REHAB 2   [facility] ([facility])    [address]st [address]  [facility] [id number] [phone]            Feb 05, 2019 11:30 AM CST    (Arrive by 11:15 AM)   Return Vascular Visit with Azul Meade MD   [facility] ([facility])    [address]  [address]  [facility] [address] [phone]            Mar 14, 2019 10:30 AM CDT   (Arrive by 10:15 AM)   Implantable Loop Recorder with Uc Cv Device 1   [facility] ([facility])    [address]  3rd Floor  [address]               Mar 14, 2019 11:00 AM CDT   (Arrive by 10:45 AM)   RETURN ARRHYTHMIA with JOSEPH Beth CNP   [facility] ([facility])    [address]  [address]  [facility] [address] [phone]              Who to contact     If you have questions or need follow up information about today's clinic visit or your schedule please contact [facility] directly at [phone].  Normal or non-critical lab and imaging results will be communicated to you by [employer], letter or phone within 4 business days after the clinic has received the results. If you do not hear from us within 7 days, please contact the clinic through [facility] or phone. If you have a critical or abnormal lab result, we will notify you by phone as soon as possible.  Submit refill requests through [employer] or call your pharmacy and they will forward the refill request to us. Please allow 3 business days for your refill to be completed.          Additional Information About Your Visit        [employer] Information     [employer] gives you secure access to your electronic health record. If you see a primary care provider, you can also send messages to your care team and make appointments. If you have questions, please call your primary care clinic.  If you do not have a primary care provider, please call [phone] and they will assist you.        Care EveryWhere ID     This is your Care EveryWhere ID. This could be used by other organizations to access your [facility]  "medical records  [id number]        Your Vitals Were     Pulse Temperature Height BMI (Body Mass Index)          60 98  F (36.7  C) (Oral) 5' 5\" (1.651 m) 34.11 kg/m2         Blood Pressure from Last 3 Encounters:   10/09/18 130/64   10/02/18 124/76   09/13/18 149/85    Weight from Last 3 Encounters:   10/09/18 205 lb (93 kg)   10/02/18 230 lb (104.3 kg)   09/13/18 207 lb 12.8 oz (94.3 kg)              We Performed the Following     Basic metabolic panel     FLU VACCINE, INCREASED ANTIGEN, PRESV FREE, AGE 65+ [09526]     Hemoglobin A1c     Lipid panel reflex to direct LDL Fasting     Vaccine Administration, Initial [24366]          Today's Medication Changes          These changes are accurate as of 10/9/18 11:04 AM.  If you have any questions, ask your nurse or doctor.               Start taking these medicines.        Dose/Directions    cilostazol 50 MG tablet   Commonly known as:  PLETAL   Used for:  Atherosclerosis of native artery of both lower extremities with intermittent claudication (H)   Started by:  Chuy Stewart MD        Dose:  50 mg   Take 1 tablet (50 mg) by mouth 2 times daily   Quantity:  180 tablet   Refills:  3            Where to get your medicines      These medications were sent to [facility] - [place], [place] - [address] [place] [address]  [address] [place] [address], [place] [facility] [place] [address]     Phone:  [phone]     atorvastatin 10 MG tablet    cilostazol 50 MG tablet    furosemide 20 MG tablet                Primary Care Provider Office Phone # Fax #    Chuy Stewart [phone]       [facility] [facility] [address]        Equal Access to Services     LOULOU ABDUL AH: Hadii emmie Gruber, waaxda luqadaha, qaybta kaalmada suzy, jd barroso. So [facility] [phone].    ATENCIÓN: Si habla español, tiene a ellis disposición servicios gratuitos de asistencia lingüística. Llame al [phone].    We " comply with applicable federal civil rights laws and [place] laws. We do not discriminate on the basis of race, color, national origin, age, disability, sex, sexual orientation, or gender identity.            Thank you!     Thank you for choosing [facility]  for your care. Our goal is always to provide you with excellent care. Hearing back from our patients is one way we can continue to improve our services. Please take a few minutes to complete the written survey that you may receive in the mail after your visit with us. Thank you!             Your Updated Medication List - Protect others around you: Learn how to safely use, store and throw away your medicines at www.disposemymeds.org.          This list is accurate as of 10/9/18 11:04 AM.  Always use your most recent med list.                   Brand Name Dispense Instructions for use Diagnosis    amoxicillin 500 MG capsule    AMOXIL    16 capsule    4 tablets prior to dental appointment. Use for dental appointments    Status post total left knee replacement       aspirin 81 MG tablet      Take 1 tablet by mouth daily.    Hypertension goal BP (blood pressure) < 130/80       atorvastatin 10 MG tablet    LIPITOR    90 tablet    Take 1 tablet (10 mg) by mouth daily Refill when requested    Hyperlipidemia LDL goal <100, Type 2 diabetes mellitus with other ophthalmic complication, without long-term current use of insulin (H)       blood glucose monitoring lancets     100 each    Check blood sugar once daily    Type 2 diabetes mellitus with other ophthalmic complication, without long-term current use of insulin (H)       blood glucose monitoring test strip    ACCU-CHEK COMPACT STRIPS    100 each    1 strip by In Vitro route daily    Type 2 diabetes mellitus with other ophthalmic complication, without long-term current use of insulin (H)       cholecalciferol 1000 UNIT tablet    vitamin D3    100    1 TABLET DAILY    Contusion of ribs        cilostazol 50 MG tablet    PLETAL    180 tablet    Take 1 tablet (50 mg) by mouth 2 times daily    Atherosclerosis of native artery of both lower extremities with intermittent claudication (H)       diclofenac 1 % Gel topical gel    VOLTAREN    300 g    APPLY 4 GRAMS TO KNEES OR 2 GRAMS TO HANDS FOUR TIMES A DAY USING ENCLOSED DOSING CARD    Arthritis       doxycycline monohydrate 100 MG capsule     60 capsule    1 tablet 2 time daily for Rosacea flares    Rosacea       fluticasone 50 MCG/ACT spray    FLONASE    16 g    Spray 1-2 sprays into both nostrils daily    Chronic rhinitis       FOLIC ACID PO      Take 1 mg by mouth daily        furosemide 20 MG tablet    LASIX    90 tablet    1/2 tablet per day. If weight increases by 3-5 pounds then increase to 20 mg (1 tablet). If weight decreases to 205 then ok to hold.    Edema, unspecified type       irbesartan 75 MG tablet    AVAPRO     Take 1 tablet (75 mg) by mouth daily    Hypertension goal BP (blood pressure) < 150/90       metFORMIN 500 MG 24 hr tablet    GLUCOPHAGE-XR    180 tablet    Take 1 tablet (500 mg) by mouth 2 times daily (with meals)    Type 2 diabetes mellitus with hyperglycemia, without long-term current use of insulin (H)       metroNIDAZOLE 0.75 % cream    METROCREAM    45 g    Apply topically 2 times daily    Rosacea       MULTI vitamin  MENS Tabs      Take  by mouth. Takes one daily        PREDNISONE PO      Take 5 mg by mouth Currently taking 5 tablets (25mg) daily per patient (this medication is being taken care of through [facility]).        senna-docusate 8.6-50 MG per tablet    SENOKOT-S;PERICOLACE     Take 1-2 tablets by mouth Takes about 2-3 times weekly        sertraline 25 MG tablet    ZOLOFT    90 tablet    TAKE ONE TABLET BY MOUTH EVERY DAY    Dysthymia       TYLENOL 500 MG tablet   Generic drug:  acetaminophen     100 tablet    Take 2 tablets (1,000 mg) by mouth 3 times daily        Vitamin B-12 CR 1000 MCG Tbcr     60 tablet     TAKE ONE TABLET BY MOUTH EVERY DAY    Parkinson disease (H)

## 2018-10-09 NOTE — NURSING NOTE
Prior to injection verified patient identity using patient's name and date of birth.  Due to injection administration, patient instructed to remain in clinic for 15 minutes  afterwards, and to report any adverse reaction to me immediately.    Enma North, CMA

## 2018-10-09 NOTE — PROGRESS NOTES
SUBJECTIVE:   Bart Blair is a 87 year old male who presents to clinic today for the following health issues:    Has been at the [facility] often recently. Has taken him off of medications and replaced with others.     Diabetes Follow-up      Patient is checking blood sugars: every day, has had many higher readings recently. One good result this AM at 120.     Diabetic concerns:this month has been really bad, 120 this morning though. Other results have been high     Symptoms of hypoglycemia (low blood sugar): none     Paresthesias (numbness or burning in feet) or sores: No    Lab Results   Component Value Date    A1C 8.3 10/09/2018    A1C 8.5 05/29/2018    A1C 9.3 03/29/2018    A1C 6.6 10/24/2017    A1C 6.0 04/25/2017       PVD. I reviewed notes from cardiology dated 10/02/2018. Pt has been enrolled in PAD rehab and he was started on cilostazol. His dipyridamole was stopped. He continues to complain of constant pain in his calves.     Neurology.  Patient has had medication resistant parkinsonism. Neurology wanted to get a CT scan of the brain to rule out normal-pressure hydrocephalus. No falls or EtOH intake.     Alcoholism.  Pt has been abstaining from drinking any alcohol.        BP Readings from Last 2 Encounters:   10/02/18 124/76   09/13/18 149/85     Hemoglobin A1C (%)   Date Value   05/29/2018 8.5 (H)   03/29/2018 9.3 (H)     LDL Cholesterol Calculated (mg/dL)   Date Value   10/24/2017 32   11/18/2014 37       Diabetes Management Resources  Hypertension Follow-up      Outpatient blood pressures are being checked at home.     Low Salt Diet: no added salt      PHQ 11/22/2016   PHQ-9 Total Score 3   Q9: Suicide Ideation Not at all     No flowsheet data found.    PHQ-9  English  PHQ-9   Any Language  RENATE-7  Suicide Assessment Five-step Evaluation and Treatment (SAFE-T)              Problem list and histories reviewed & adjusted, as indicated.  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)     Varicose vein     Neck pain     Implantable loop recorder, [employer]     Advance Care Planning     Temporal arteritis (H)     Type 2 diabetes mellitus with hyperglycemia, without long-term current use of insulin (H)     Past Surgical History:   Procedure Laterality Date     ARTHROPLASTY KNEE  1/31/2012    Procedure:ARTHROPLASTY KNEE; LEFT TOTAL KNEE ARTHROPLASTY ([employer])^; Surgeon:SKIP FAIR; Location: OR     ARTHROSCOPY SHOULDER, OPEN ROTATOR CUFF REPAIR, COMBINED  4/24/2012    Procedure:COMBINED ARTHROSCOPY SHOULDER, OPEN ROTATOR CUFF REPAIR; RIGHT SHOULDER ARTHROSCOPY OPEN ROTATOR CUFF DEBRIDEMENT, SUBCROMIAL DECOMPRESSION, AND BICEPS TENODESIS REPAIR; Surgeon:SKIP FAIR; Location: [facility]     [facility] AFF SURGICAL PATHOLOGY  6-    Dr. Bowers; left hand     ENT SURGERY       INCISE FINGER TENDON SHEATH  2008    Dr. Bowers; right AND LEFT hand     THROAT SURGERY      vocal cord polyps       Social History   Substance Use Topics     Smoking status: Never Smoker     Smokeless tobacco: Never Used     Alcohol use Yes      Comment: couple beers everyday     Family History   Problem Relation Age of Onset     Family History Negative Other      unknown family history per patient         Current Outpatient Prescriptions   Medication Sig Dispense Refill     acetaminophen (TYLENOL) 500 MG tablet Take 2 tablets (1,000 mg) by mouth 3 times daily 100 tablet 0     amoxicillin (AMOXIL) 500 MG capsule 4 tablets prior to dental appointment. Use for  dental appointments 16 capsule 4     aspirin 81 MG tablet Take 1 tablet by mouth daily.  3     atorvastatin (LIPITOR) 10 MG tablet Take 1 tablet (10 mg) by mouth daily Refill when requested 90 tablet 3     blood glucose monitoring (ACCU-CHEK COMPACT STRIPS) test strip 1 strip by In Vitro route daily 100 each 11     blood glucose monitoring (SOFTCLIX) lancets Check blood sugar once daily 100 each 3     cilostazol (PLETAL) 50 MG tablet Take 1 tablet (50 mg) by mouth 2 times daily 180 tablet 3     Cyanocobalamin (VITAMIN B-12 CR) 1000 MCG TBCR TAKE ONE TABLET BY MOUTH EVERY DAY 60 tablet 4     diclofenac (VOLTAREN) 1 % GEL topical gel APPLY 4 GRAMS TO KNEES OR 2 GRAMS TO HANDS FOUR TIMES A DAY USING ENCLOSED DOSING CARD 300 g 3     doxycycline Monohydrate 100 MG CAPS 1 tablet 2 time daily for Rosacea flares 60 capsule 3     fluticasone (FLONASE) 50 MCG/ACT spray Spray 1-2 sprays into both nostrils daily 16 g 5     FOLIC ACID PO Take 1 mg by mouth daily       furosemide (LASIX) 20 MG tablet 1/2 tablet per day. If weight increases by 3-5 pounds then increase to 20 mg (1 tablet). If weight decreases to 205 then ok to hold. 90 tablet 3     irbesartan (AVAPRO) 75 MG tablet Take 1 tablet (75 mg) by mouth daily       metFORMIN (GLUCOPHAGE-XR) 500 MG 24 hr tablet Take 1 tablet (500 mg) by mouth 2 times daily (with meals) 180 tablet 1     metroNIDAZOLE (METROCREAM) 0.75 % cream Apply topically 2 times daily 45 g 3     Multiple Vitamin (MULTI VITAMIN  MENS) TABS Take  by mouth. Takes one daily         PREDNISONE PO Take 5 mg by mouth Currently taking 5 tablets (25mg) daily per patient (this medication is being taken care of through [facility]).       senna-docusate (SENOKOT-S;PERICOLACE) 8.6-50 MG per tablet Take 1-2 tablets by mouth Takes about 2-3 times weekly       sertraline (ZOLOFT) 25 MG tablet TAKE ONE TABLET BY MOUTH EVERY DAY 90 tablet 3     VITAMIN D 1000 UNIT OR TABS 1 TABLET DAILY 100 4     [DISCONTINUED]  atorvastatin (LIPITOR) 10 MG tablet Take 1 tablet (10 mg) by mouth daily Refill when requested 90 tablet 3     [DISCONTINUED] cilostazol (PLETAL) 50 MG tablet Take 1 tablet (50 mg) by mouth 2 times daily 60 tablet 3     [DISCONTINUED] cilostazol (PLETAL) 50 MG tablet Take 1 tablet (50 mg) by mouth 2 times daily 60 tablet 3     [DISCONTINUED] furosemide (LASIX) 20 MG tablet 1/2 tablet per day. If weight increases by 3-5 pounds then increase to 20 mg (1 tablet). If weight decreases to 205 then ok to hold. 30 tablet 1     Allergies   Allergen Reactions     No Known Drug Allergies      Recent Labs   Lab Test  10/09/18   1031  05/29/18   1310  03/29/18   1250   12/22/17   1830  10/24/17   1050   11/22/16   0948   11/18/14   1238   10/14/13   1508   06/25/13   0829   A1C  8.3*  8.5*  9.3*   --    --   6.6*   < >  6.3*   < >  6.0   < >   --    < >   --    LDL   --    --    --    --    --   32   --    --    --   37   --    --    --   52   HDL   --    --    --    --    --   75   --    --    --   45   --    --    --   41   TRIG   --    --    --    --    --   226*   --    --    --   171*   --    --    --   92   ALT   --    --    --    --   16   --    --    --    --   10   --   14   --    --    CR   --   1.23  1.19   < >  1.13  1.29*   --    --    < >  1.31*   < >   --    --    --    GFRESTIMATED   --   56*  58*   < >  61  53*   --    --    < >  52*   < >   --    --    --    GFRESTBLACK   --   67  70   < >  74  64   --    --    < >  63   < >   --    --    --    POTASSIUM   --   5.5*  4.7   < >  5.1  4.9   --    --    < >  4.5   < >   --    --    --    TSH   --    --    --    --    --    --    --   1.73   --   2.42   --    --    --    --     < > = values in this interval not displayed.      BP Readings from Last 3 Encounters:   10/09/18 130/64   10/02/18 124/76   09/13/18 149/85    Wt Readings from Last 3 Encounters:   10/09/18 93 kg (205 lb)   10/02/18 104.3 kg (230 lb)   09/13/18 94.3 kg (207 lb 12.8 oz)                  Labs  "reviewed in EPIC    Reviewed and updated as needed this visit by clinical staff       Reviewed and updated as needed this visit by Provider         ROS:  Constitutional, HEENT, cardiovascular, pulmonary, GI, , musculoskeletal, neuro, skin, endocrine and psych systems are negative, except as otherwise noted.    This document serves as a record of the services and decisions personally performed and made by Quentin Stewart MD. It was created on their behalf by Sami Pelaez, a trained medical scribe. The creation of this document is based the provider's statements to the medical scribe.  Sami Pelaez October 9, 2018 11:10 AM       OBJECTIVE:     /64  Pulse 60  Temp 98  F (36.7  C) (Oral)  Ht 1.651 m (5' 5\")  Wt 93 kg (205 lb)  BMI 34.11 kg/m2  Body mass index is 34.11 kg/(m^2).  GENERAL: healthy, alert and no distress  HENT: ear canals and TM's normal, nose and mouth without ulcers or lesions  RESP: lungs clear to auscultation - no rales, rhonchi or wheezes  CV: regular rate and rhythm, normal S1 S2, no S3 or S4, no murmur, click or rub  MS: no gross musculoskeletal defects noted, 1+ edema in LE bilaterally  SKIN: no suspicious lesions or rashes  PSYCH: mentation appears normal, affect normal/bright    Diagnostic Test Results:  Results for orders placed or performed in visit on 10/09/18 (from the past 24 hour(s))   Hemoglobin A1c   Result Value Ref Range    Hemoglobin A1C 8.3 (H) 0 - 5.6 %       ASSESSMENT/PLAN:   (E11.39) Type 2 diabetes mellitus with other ophthalmic complication, without long-term current use of insulin (H)  (primary encounter diagnosis)  Comment: HbA1c at 8.3%, not meeting goal of <8.0%. Patient would benefit from a more aggressive medication therapy. I'd like to get a BMP to confirm stable nephropathy before we make any changes in medications.   Plan: Lipid panel reflex to direct LDL Fasting,         atorvastatin (LIPITOR) 10 MG tablet, metFORMIN         (GLUCOPHAGE-XR) 500 MG 24 hr tablet, " Hemoglobin        A1c, Basic metabolic panel        -follow up, pending results.     Addendum:  Labs obtained and Creat-1.5 need to discontinue metformin. Will change to TRAJENTA(januvia not covered by insurance but this may not be adequate. Reviewed with MTM and will do trial for 1 month.    .     (E78.5) Hyperlipidemia LDL goal <100  Comment: stable  Plan: Lipid panel reflex to direct LDL Fasting,         atorvastatin (LIPITOR) 10 MG tablet        -continue present medications, medications refilled     (I70.213) Atherosclerosis of native artery of both lower extremities with intermittent claudication (H)  Comment: cardiology making several changes to pt's med regimen. Pt will soon start PAD rehab and there's plans to do a venous competence study. I did discuss recommendation of continuing ASA therapy to prevent CVD and CRC in the setting of DM type II and known vascular disease.    Plan: cilostazol (PLETAL) 50 MG tablet, DISCONTINUED:        cilostazol (PLETAL) 50 MG tablet        -medications refilled, continue present medications        -continue to follow up with cardiology        -continue ASA 81 mg every day.          -start PAD rehab as planned      (F10.21) Alcoholism in Remission(H)  Comment: pt has been abstaining. No falls noted since weaning away from alcohol intake.   Plan: continue state of sobriety    (G20) Parkinson disease (H)  Comment: medication resistant parkinsonism. Neurology recommended CT scan of brain to evaluate for normal-pressure hydrocephalus.   Plan: -order for CT scan of brain is in place.     (R60.9) Edema, unspecified type  Comment: mild hypervolemia on exam but weight trends have been stable.   Plan: furosemide (LASIX) 20 MG tablet        -Reminded patient to continue furosemide 10 mg every day and then titrate to higher doses depending on weight fluctuations.     (Z23) Need for prophylactic vaccination and inoculation against influenza  Comment: given to patient today  Plan: FLU  VACCINE, INCREASED ANTIGEN, PRESV FREE, AGE        65+ [81835], Vaccine Administration, Initial         [[id number]]            The information in this document, created by the medical scribe for me, accurately reflects the services I personally performed and the decisions made by me. I have reviewed and approved this document for accuracy prior to leaving the patient care area.    Chuy Stewart MD, MD  [facility]      Injectable Influenza Immunization Documentation    1.  Is the person to be vaccinated sick today?   No    2. Does the person to be vaccinated have an allergy to a component   of the vaccine?   No  Egg Allergy Algorithm Link    3. Has the person to be vaccinated ever had a serious reaction   to influenza vaccine in the past?   No    4. Has the person to be vaccinated ever had Guillain-Barré syndrome?   No    Form completed by patient       janet

## 2018-10-09 NOTE — Clinical Note
Review with me when in clinic next time. I have not made changes to his medicatons but need to. Creat now 1.5-new. Considering changing to Januvia but may not be covered by insurance. His formulary appears to cover Trajenta but I hesitate due to his problems with edema that may be cardiac   Quentin

## 2018-10-10 LAB
ANION GAP SERPL CALCULATED.3IONS-SCNC: 13 MMOL/L (ref 3–14)
BUN SERPL-MCNC: 40 MG/DL (ref 7–30)
CALCIUM SERPL-MCNC: 9.3 MG/DL (ref 8.5–10.1)
CHLORIDE SERPL-SCNC: 102 MMOL/L (ref 94–109)
CHOLEST SERPL-MCNC: 127 MG/DL
CO2 SERPL-SCNC: 23 MMOL/L (ref 20–32)
CREAT SERPL-MCNC: 1.55 MG/DL (ref 0.66–1.25)
GFR SERPL CREATININE-BSD FRML MDRD: 43 ML/MIN/1.7M2
GLUCOSE SERPL-MCNC: 183 MG/DL (ref 70–99)
HDLC SERPL-MCNC: 49 MG/DL
LDLC SERPL CALC-MCNC: 21 MG/DL
NONHDLC SERPL-MCNC: 78 MG/DL
POTASSIUM SERPL-SCNC: 5.1 MMOL/L (ref 3.4–5.3)
SODIUM SERPL-SCNC: 138 MMOL/L (ref 133–144)
TRIGL SERPL-MCNC: 287 MG/DL

## 2018-10-11 DIAGNOSIS — E11.39 TYPE 2 DIABETES MELLITUS WITH OTHER OPHTHALMIC COMPLICATION, WITHOUT LONG-TERM CURRENT USE OF INSULIN (H): ICD-10-CM

## 2018-10-11 NOTE — TELEPHONE ENCOUNTER
"linagliptin (TRADJENTA) 5 MG TABS tablet     WANTS 3 MONTH SUPPLY INS PURPOSES  Requested Prescriptions   Pending Prescriptions Disp Refills     linagliptin (TRADJENTA) 5 MG TABS tablet 30 tablet 3    Last Written Prescription Date:  10/11/18  Last Fill Quantity: 30,  # refills: 3   Last office visit: 10/9/2018 with prescribing provider:  Terry GOODEN   Future Office Visit:   Next 5 appointments (look out 90 days)     Dec 12, 2018 11:00 AM CST   SHORT with Chuy Stewart MD   [facility]    [address] [place] [id number] [phone]                  Sig: Take 1 tablet (5 mg) by mouth daily    DPP4 Inhibitors Protocol Failed    10/11/2018 12:51 PM       Failed - Normal serum creatinine in past 12 months    Recent Labs   Lab Test  10/09/18   1031   CR  1.55*            Passed - Blood pressure less than 140/90 in past 6 months    BP Readings from Last 3 Encounters:   10/09/18 130/64   10/02/18 124/76   09/13/18 149/85                Passed - LDL on file in past 12 months    Recent Labs   Lab Test  10/09/18   1031   LDL  21            Passed - Microalbumin on file in past 12 months    Recent Labs   Lab Test  03/29/18   1259   MICROL  19   UMALCR  15.32            Passed - HgbA1C in past 3 or 6 months    If HgbA1C is 8 or greater, it needs to be on file within the past 3 months.  If less than 8, must be on file within the past 6 months.     Recent Labs   Lab Test  10/09/18   1031   A1C  8.3*            Passed - Patient is age 18 or older       Passed - Recent (6 mo) or future (30 days) visit within the authorizing provider's specialty    Patient had office visit in the last 6 months or has a visit in the next 30 days with authorizing provider.  See \"Patient Info\" tab in inbasket, or \"Choose Columns\" in Meds & Orders section of the refill encounter.              "

## 2018-10-17 ENCOUNTER — RADIANT APPOINTMENT (OUTPATIENT)
Dept: CARDIOLOGY | Facility: CLINIC | Age: 83
End: 2018-10-17
Payer: COMMERCIAL

## 2018-10-17 ENCOUNTER — ALLIED HEALTH/NURSE VISIT (OUTPATIENT)
Dept: CARDIOLOGY | Facility: CLINIC | Age: 83
End: 2018-10-17
Payer: COMMERCIAL

## 2018-10-17 ENCOUNTER — RADIANT APPOINTMENT (OUTPATIENT)
Dept: ULTRASOUND IMAGING | Facility: CLINIC | Age: 83
End: 2018-10-17
Attending: INTERNAL MEDICINE
Payer: COMMERCIAL

## 2018-10-17 DIAGNOSIS — I10 HYPERTENSION GOAL BP (BLOOD PRESSURE) < 150/90: ICD-10-CM

## 2018-10-17 DIAGNOSIS — I70.213 ATHEROSCLEROSIS OF NATIVE ARTERY OF BOTH LOWER EXTREMITIES WITH INTERMITTENT CLAUDICATION (H): ICD-10-CM

## 2018-10-17 DIAGNOSIS — R55 SYNCOPE: Primary | ICD-10-CM

## 2018-10-17 DIAGNOSIS — M31.6 GIANT CELL ARTERITIS (H): ICD-10-CM

## 2018-10-17 DIAGNOSIS — I87.393 CHRONIC VENOUS HYPERTENSION (IDIOPATHIC) WITH OTHER COMPLICATIONS OF BILATERAL LOWER EXTREMITY: ICD-10-CM

## 2018-10-17 NOTE — PROGRESS NOTES
Preliminary Device Interrogation Results.  Final physician signed paceart report to be scanned and attached.    [place] [facility] [place] and iterative programming of a [employer] ILR, per MD orders in follow up to a remote transmission we received today. The remote transmission was an alert for AF on 10/16/18 lasting 14 minutes. It was unclear from the available tracing what the rhythm was. Today his intrinsic rhythm is SR 64 bpm. Normal device function. No symptom activations have been recorded. EGMs reviewed with Kelley Yoder NP. Her assessment is NSR with frequent PACs and continue to monitor. Pt reports he is feeling ok, but continues to have lower extremity pain. Plan for pt to send a [employer] remote transmission on 1/21/18 and RTC in 6 months.  Implanted Loop Recorder

## 2018-10-17 NOTE — MR AVS SNAPSHOT
MRN:[id number]                      After Visit Summary   10/17/2018    Bart Blair    MRN: [id number]           Visit Information        Provider Department      10/17/2018 9:30 AM 1, Janes Cv Device [facility]        Your next 10 appointments already scheduled     Oct 17, 2018 11:00 AM CDT   Ech Complete with [id number]   [facility] Echo ([facility])    [address]  3rd Floor  [facility] [address]   [phone]           1.  Please bring or wear a comfortable two-piece outfit. 2.  You may eat, drink and take your normal medicines. 3.  For any questions that cannot be answered, please contact the ordering physician 4.  Please do not wear perfumes or scented lotions on the day of your exam.            Oct 17, 2018 12:00 PM CDT   US LOWER EXTREMITY VENOUS COMPETENCY BILATERAL with UCUSV1   [facility] US ([facility])    [address]  [address]st [facility] [place] [facility] [id number] [phone]           How do I prepare for my exam? (Food and drink instructions) No Food and Drink Restrictions.  How do I prepare for my exam? (Other instructions) You do not need to do anything special to prepare for your exam.  What should I wear: Wear comfortable clothes.  How long does the exam take: Most ultrasounds take 30 to 60 minutes.  What should I bring: Bring a list of your medicines, including vitamins, minerals and over-the-counter drugs. It is safest to leave personal items at home.  Do I need a :  No  is needed.  What do I need to tell my doctor: Tell your doctor about any allergies you may have.  What should I do after the exam: No restrictions, You may resume normal activities.  What is this test: An ultrasound uses sound waves to make pictures of the body. Sound waves do not cause pain. The only discomfort may be the pressure of the wand against your skin or full bladder.  Who should I call with questions: If you  have any questions, please call the Imaging Department where you will have your exam. Directions, parking instructions, and other information is available on our website, [email or web address].            Oct 19, 2018 11:00 AM CDT   PAD-SET Evaluation with UR PULMONARY REHAB 2   [facility] ([facility])    [address]  [facility] [address]   [phone]            Nov 14, 2018  1:40 PM CST   MyChart Long with Chuy Stewart MD   [facility]    [address] [place] [address]   [phone]            Dec 12, 2018 11:00 AM CST   SHORT with Chuy Stewart MD   [facility] ([facility])    [address] [place] [address]   [phone]            Feb 05, 2019 11:30 AM CST   (Arrive by 11:15 AM)   Return Vascular Visit with Azul Meade MD   [facility] ([facility])    [address] [place] [address]  [address] [place] [id number] [phone]            Mar 14, 2019 10:30 AM CDT   (Arrive by 10:15 AM)   Implantable Loop Recorder with Uc Cv Device 1   [facility])    [address] [place] [address]  3rd Floor  [phone]               Mar 14, 2019 11:00 AM CDT   (Arrive by 10:45 AM)   RETURN ARRHYTHMIA with JO[facility])    [address] [place] [address]  [address] [place] [id number] [phone]              MyChart Information     [employer] gives you secure access to your electronic health record. If you see a primary care provider, you can also send messages to your care team and make appointments. If you have questions, please call your primary care clinic.  If you do not have a primary care provider, please call  [phone] and they will assist you.      [employer] is an electronic gateway that provides easy, online access to your medical records. With [employer], you can request a clinic appointment, read your test results, renew a prescription or communicate with your care team.     To access your existing account, please contact your [facility] or call [phone] for assistance.        Care EveryWhere ID     This is your Care EveryWhere ID. This could be used by other organizations to access your [facility] medical records  [id number]        Equal Access to Services     TIFFANI ABDUL : Enma Gruber, kylah purcell, marco almonte, jd barroso. So [facility] [phone].    ATENCIÓN: Si habla español, tiene a ellis disposición servicios gratuitos de asistencia lingüística. Llame al [phone].    We comply with applicable federal civil rights laws and [place] laws. We do not discriminate on the basis of race, color, national origin, age, disability, sex, sexual orientation, or gender identity.

## 2018-10-19 ENCOUNTER — HOSPITAL ENCOUNTER (OUTPATIENT)
Dept: CARDIAC REHAB | Facility: CLINIC | Age: 83
Setting detail: THERAPIES SERIES
End: 2018-10-19
Attending: INTERNAL MEDICINE
Payer: MEDICARE

## 2018-10-19 DIAGNOSIS — I70.213 ATHEROSCLEROSIS OF NATIVE ARTERY OF BOTH LOWER EXTREMITIES WITH INTERMITTENT CLAUDICATION (H): ICD-10-CM

## 2018-10-25 ENCOUNTER — CARE COORDINATION (OUTPATIENT)
Dept: CARDIOLOGY | Facility: CLINIC | Age: 83
End: 2018-10-25

## 2018-10-25 DIAGNOSIS — R26.81 GAIT INSTABILITY: ICD-10-CM

## 2018-10-25 DIAGNOSIS — I73.9 PERIPHERAL ARTERY DISEASE (H): Primary | ICD-10-CM

## 2018-10-25 NOTE — PROGRESS NOTES
Date: 10/25/2018    Time of Call: 9:21 AM     Diagnosis:  Peripheral artery disease.  Gait instability     [ TORB ] Ordering provider: Azul Meade MD  Order: bilateral lower extremity arterial duplex  Physical therapy referral     Order received by: Brock Hinojosa RN     Follow-up/additional notes: Left voicemail and sent [employer] message to determine patients interest in physical therapy and instructions on scheduling of lower extremity arterial duplex.  Patient can contact this office with any questions.

## 2018-10-26 ENCOUNTER — ALLIED HEALTH/NURSE VISIT (OUTPATIENT)
Dept: CARDIOLOGY | Facility: CLINIC | Age: 83
End: 2018-10-26
Attending: INTERNAL MEDICINE
Payer: MEDICARE

## 2018-10-26 DIAGNOSIS — R55 SYNCOPE: Primary | ICD-10-CM

## 2018-10-26 PROCEDURE — 93299 HC INTERROGATION DEVICE EVAL REMOTE, LOOP RECORDER: CPT | Mod: ZF

## 2018-10-26 PROCEDURE — 93298 REM INTERROG DEV EVAL SCRMS: CPT | Performed by: INTERNAL MEDICINE

## 2018-10-26 NOTE — PROGRESS NOTES
Preliminary Device Interrogation Results.  Final physician signed paceart report to be scanned and attached.    Medtronic ILR remote transmission received and reviewed. Device transmission sent per MD orders. 40 episodes have been recorded as AF, since 10/16/18. It is hard to unable to determine if rhythm is SR with PACs vs AF. Pt's wife reports the following medication changes recently: d/c metformin, started tradjenta and pletal. He has not been symptomatic with the episodes. His presenting rhythm appears to be SR with PACs vs AF. Pt takes aspirin 81 mg daily. EGMs reviewed with Kelley Yoder NP. Pt has been scheduled to see EP NP on 11/29/18 to discuss rhythms and if AC is needed. Pt's wife verbalized understanding.  Remote ILR transmission

## 2018-10-26 NOTE — MR AVS SNAPSHOT
After Visit Summary   10/26/2018    Bart Blair    MRN: [id number]           Patient Information     Date Of Birth          2/14/1931        Visit Information        Provider Department      10/26/2018 6:00 AM UC ICD REMOTE [facility]        Today's Diagnoses     Syncope    -  1       Follow-ups after your visit        Your next 10 appointments already scheduled     Oct 29, 2018 10:00 AM CDT   (Arrive by 9:45 AM)   RETURN ARRHYTHMIA with JOSEPH Beth CNP   [facility] ([facility])    [address]  [address]  [facility] [address] [phone]            Nov 12, 2018  2:15 PM CST   Neuro Eval with Masha Sherwood, PT   [facility] [place], Physical Therapy - Outpatient ([facility])    [address], [address]  [place] [address]   [phone]            Nov 14, 2018  1:40 PM CST   MyChart Long with Chuy Stewart MD   [facility]    [address] [place] [id number] [phone]            Dec 12, 2018 11:00 AM CST   SHORT with Chuy Stewart MD   [facility] ([facility])    [address] [place] [id number] [phone]            Feb 05, 2019 11:30 AM CST   (Arrive by 11:15 AM)   Return Vascular Visit with Azul Meade MD   [facility] ([facility])    [address]  [address] [place] [address] [phone]            Mar 14, 2019 10:30 AM CDT   (Arrive by 10:15 AM)   Implantable Loop Recorder with Janes Cv Device 1   [facility] ([facility])    [address]  3rd Floor  [address]               Mar 14, 2019 11:00 AM CDT   (Arrive by 10:45 AM)   RETURN ARRHYTHMIA with JOSEPH Beth CNP   [facility] ([facility])     [address] [id number] [phone]              Future tests that were ordered for you today     Open Future Orders        Priority Expected Expires Ordered    US Lower Extremity Arterial Duplex Bilateral Routine 10/25/2018 12/25/2018 10/25/2018    PHYSICAL THERAPY REFERRAL Routine 10/25/2018 10/25/2019 10/25/2018            Who to contact     If you have questions or need follow up information about today's clinic visit or your schedule please contact [facility] directly at [phone].  Normal or non-critical lab and imaging results will be communicated to you by Bedloohart, letter or phone within 4 business days after the clinic has received the results. If you do not hear from us within 7 days, please contact the clinic through [employer] or phone. If you have a critical or abnormal lab result, we will notify you by phone as soon as possible.  Submit refill requests through [employer] or call your pharmacy and they will forward the refill request to us. Please allow 3 business days for your refill to be completed.          Additional Information About Your Visit        [employer] Information     [employer] gives you secure access to your electronic health record. If you see a primary care provider, you can also send messages to your care team and make appointments. If you have questions, please call your primary care clinic.  If you do not have a primary care provider, please call [phone] and they will assist you.        Care EveryWhere ID     This is your Care EveryWhere ID. This could be used by other organizations to access your [facility] medical records  [id number]         Blood Pressure from Last 3 Encounters:   10/09/18 130/64   10/02/18 124/76   09/13/18 149/85    Weight from Last 3 Encounters:   10/09/18 93 kg (205 lb)   10/02/18 104.3 kg (230 lb)   09/13/18 94.3 kg (207 lb 12.8 oz)              We Performed the Following     REMOTE LOOP/OPTIVOL INTERROGATION         Primary Care Provider Office Phone # Fax #    Chuy Stewart [phone] [address] [address]        Equal Access to Services     TIFFANI ABDUL : Hadii aad ku hadjocelin Soeliceo, waaxda luqadaha, qaybta kaalmada suzy, jd urbano laPankajmalaika barroso. So [place] [phone].    ATENCIÓN: Si habla español, tiene a ellis disposición servicios gratuitos de asistencia lingüística. Llame al [phone].    We comply with applicable federal civil rights laws and [place] laws. We do not discriminate on the basis of race, color, national origin, age, disability, sex, sexual orientation, or gender identity.            Thank you!     Thank you for choosing [facility]  for your care. Our goal is always to provide you with excellent care. Hearing back from our patients is one way we can continue to improve our services. Please take a few minutes to complete the written survey that you may receive in the mail after your visit with us. Thank you!             Your Updated Medication List - Protect others around you: Learn how to safely use, store and throw away your medicines at www.disposemymeds.org.          This list is accurate as of 10/26/18 11:08 AM.  Always use your most recent med list.                   Brand Name Dispense Instructions for use Diagnosis    amoxicillin 500 MG capsule    AMOXIL    16 capsule    4 tablets prior to dental appointment. Use for dental appointments    Status post total left knee replacement       aspirin 81 MG tablet      Take 1 tablet by mouth daily.    Hypertension goal BP (blood pressure) < 130/80       atorvastatin 10 MG tablet    LIPITOR    90 tablet    Take 1 tablet (10 mg) by mouth daily Refill when requested    Hyperlipidemia LDL goal <100, Type 2 diabetes mellitus with other ophthalmic complication, without long-term current use of insulin (H)       blood glucose monitoring lancets     100 each    Check blood sugar  once daily    Type 2 diabetes mellitus with other ophthalmic complication, without long-term current use of insulin (H)       blood glucose monitoring test strip    ACCU-CHEK COMPACT STRIPS    100 each    1 strip by In Vitro route daily    Type 2 diabetes mellitus with other ophthalmic complication, without long-term current use of insulin (H)       cholecalciferol 1000 UNIT tablet    vitamin D3    100    1 TABLET DAILY    Contusion of ribs       cilostazol 50 MG tablet    PLETAL    180 tablet    Take 1 tablet (50 mg) by mouth 2 times daily    Atherosclerosis of native artery of both lower extremities with intermittent claudication (H)       diclofenac 1 % Gel topical gel    VOLTAREN    300 g    APPLY 4 GRAMS TO KNEES OR 2 GRAMS TO HANDS FOUR TIMES A DAY USING ENCLOSED DOSING CARD    Arthritis       doxycycline monohydrate 100 MG capsule     60 capsule    1 tablet 2 time daily for Rosacea flares    Rosacea       fluticasone 50 MCG/ACT spray    FLONASE    16 g    Spray 1-2 sprays into both nostrils daily    Chronic rhinitis       FOLIC ACID PO      Take 1 mg by mouth daily        furosemide 20 MG tablet    LASIX    90 tablet    1/2 tablet per day. If weight increases by 3-5 pounds then increase to 20 mg (1 tablet). If weight decreases to 205 then ok to hold.    Edema, unspecified type       irbesartan 75 MG tablet    AVAPRO     Take 1 tablet (75 mg) by mouth daily    Hypertension goal BP (blood pressure) < 150/90       linagliptin 5 MG Tabs tablet    TRADJENTA    30 tablet    Take 1 tablet (5 mg) by mouth daily    Type 2 diabetes mellitus with other ophthalmic complication, without long-term current use of insulin (H)       metroNIDAZOLE 0.75 % cream    METROCREAM    45 g    Apply topically 2 times daily    Rosacea       MULTI vitamin  MENS Tabs      Take  by mouth. Takes one daily        PREDNISONE PO      Take 5 mg by mouth Currently taking 5 tablets (25mg) daily per patient (this medication is being taken care of  through [facility]).        senna-docusate 8.6-50 MG per tablet    SENOKOT-S;PERICOLACE     Take 1-2 tablets by mouth Takes about 2-3 times weekly        sertraline 25 MG tablet    ZOLOFT    90 tablet    TAKE ONE TABLET BY MOUTH EVERY DAY    Dysthymia       TYLENOL 500 MG tablet   Generic drug:  acetaminophen     100 tablet    Take 2 tablets (1,000 mg) by mouth 3 times daily        Vitamin B-12 CR 1000 MCG Tbcr     60 tablet    TAKE ONE TABLET BY MOUTH EVERY DAY    Parkinson disease (H)

## 2018-10-28 ASSESSMENT — ENCOUNTER SYMPTOMS
DIFFICULTY URINATING: 0
FLANK PAIN: 0
HEMATURIA: 0
DYSURIA: 0

## 2018-10-29 ENCOUNTER — OFFICE VISIT (OUTPATIENT)
Dept: CARDIOLOGY | Facility: CLINIC | Age: 83
End: 2018-10-29
Attending: NURSE PRACTITIONER
Payer: MEDICARE

## 2018-10-29 VITALS
WEIGHT: 212 LBS | SYSTOLIC BLOOD PRESSURE: 128 MMHG | HEART RATE: 97 BPM | HEIGHT: 68 IN | BODY MASS INDEX: 32.13 KG/M2 | DIASTOLIC BLOOD PRESSURE: 66 MMHG | OXYGEN SATURATION: 95 %

## 2018-10-29 DIAGNOSIS — Z98.890 HISTORY OF LOOP RECORDER: Primary | ICD-10-CM

## 2018-10-29 PROCEDURE — 99213 OFFICE O/P EST LOW 20 MIN: CPT | Mod: ZP | Performed by: NURSE PRACTITIONER

## 2018-10-29 PROCEDURE — G0463 HOSPITAL OUTPT CLINIC VISIT: HCPCS | Mod: ZF

## 2018-10-29 ASSESSMENT — PAIN SCALES - GENERAL: PAINLEVEL: NO PAIN (0)

## 2018-10-29 NOTE — LETTER
"10/29/2018      RE: Bart Blair  [address] [address]       Dear Colleague,    Thank you for the opportunity to participate in the care of your patient, Bart Blair, at the [facility]. Please see a copy of my visit note below.    Clinical Cardiac Electrophysiology      HPI: Bart Blair is a 87 year old male who presents for follow up of possible AF.  The patient has a past medical history significant for HTN, DMII, and syncope. The patient has experienced syncope and falls since 2007.  He had an ILR placed 6/2013 and replaced 9/2016. Patient was hospitalized after a fall 1/2018 and ILR showed no arrhythmia correlation. Patient's hypertensive medication was reduced 3/2018 as fall were thought likely to be r/t possible OH. Echocardiogram summary 3/2016 showed normal LV function, EF 60-65%, and no significant valvular pathologies. Tilt 12/2013 summary shows normal physiologic response.      Review current interval:  Device check: \"[employer] ILR remote transmission received and reviewed. Device transmission sent per MD orders. 40 episodes have been recorded as AF, since 10/16/18. It is hard to unable to determine if rhythm is SR with PACs vs AF. Pt's wife reports the following medication changes recently: d/c metformin, started tradjenta and pletal. He has not been symptomatic with the episodes. His presenting rhythm appears to be SR with PACs vs AF. Pt takes aspirin 81 mg daily. EGMs reviewed with Kelley Yoder NP. Pt has been scheduled to see EP NP on 11/29/18 to discuss rhythms and if AC is needed. Pt's wife verbalized understanding.\"    Current interval history:   States that he has not had episodes of syncope since January. Is undergoing testing for his leg edema and pain. No falls since January, he is also using his walker consistently. Denies chest pain or pressure, palpitations, abdominal edema, PND, or orthopnea.     Current " Outpatient Prescriptions   Medication Sig Dispense Refill     acetaminophen (TYLENOL) 500 MG tablet Take 2 tablets (1,000 mg) by mouth 3 times daily 100 tablet 0     amoxicillin (AMOXIL) 500 MG capsule 4 tablets prior to dental appointment. Use for dental appointments 16 capsule 4     aspirin 81 MG tablet Take 1 tablet by mouth daily.  3     atorvastatin (LIPITOR) 10 MG tablet Take 1 tablet (10 mg) by mouth daily Refill when requested 90 tablet 3     blood glucose monitoring (ACCU-CHEK COMPACT STRIPS) test strip 1 strip by In Vitro route daily 100 each 11     blood glucose monitoring (SOFTCLIX) lancets Check blood sugar once daily 100 each 3     cilostazol (PLETAL) 50 MG tablet Take 1 tablet (50 mg) by mouth 2 times daily 180 tablet 3     Cyanocobalamin (VITAMIN B-12 CR) 1000 MCG TBCR TAKE ONE TABLET BY MOUTH EVERY DAY 60 tablet 4     fluticasone (FLONASE) 50 MCG/ACT spray Spray 1-2 sprays into both nostrils daily 16 g 5     FOLIC ACID PO Take 1 mg by mouth daily       furosemide (LASIX) 20 MG tablet 1/2 tablet per day. If weight increases by 3-5 pounds then increase to 20 mg (1 tablet). If weight decreases to 205 then ok to hold. 90 tablet 3     irbesartan (AVAPRO) 75 MG tablet Take 1 tablet (75 mg) by mouth daily       linagliptin (TRADJENTA) 5 MG TABS tablet Take 1 tablet (5 mg) by mouth daily 30 tablet 3     Multiple Vitamin (MULTI VITAMIN  MENS) TABS Take  by mouth. Takes one daily         PREDNISONE PO Take 5 mg by mouth Currently taking 5 tablets (25mg) daily per patient (this medication is being taken care of through [facility]).       sertraline (ZOLOFT) 25 MG tablet TAKE ONE TABLET BY MOUTH EVERY DAY 90 tablet 3     VITAMIN D 1000 UNIT OR TABS 1 TABLET DAILY 100 4     diclofenac (VOLTAREN) 1 % GEL topical gel APPLY 4 GRAMS TO KNEES OR 2 GRAMS TO HANDS FOUR TIMES A DAY USING ENCLOSED DOSING CARD 300 g 3     doxycycline Monohydrate 100 MG CAPS 1 tablet 2 time daily for Rosacea flares 60 capsule 3      metroNIDAZOLE (METROCREAM) 0.75 % cream Apply topically 2 times daily 45 g 3     senna-docusate (SENOKOT-S;PERICOLACE) 8.6-50 MG per tablet Take 1-2 tablets by mouth Takes about 2-3 times weekly         Past Medical History:   Diagnosis Date     Anemia      Anemia due to blood loss, acute      CKD (chronic kidney disease) stage 3, GFR 30-59 ml/min (H) 5/31/2010     Essential hypertension, benign      Other and unspecified hyperlipidemia      Other and unspecified hyperlipidemia      Other specified disorder of skin      Pain in joint, shoulder region      Parkinson disease (H) 9/2/2010     Polyp of vocal cord or larynx      Retinal ischemia     right sided thrombotic plaque     Rosacea      Type II or unspecified type diabetes mellitus without mention of complication, not stated as uncontrolled        Past Surgical History:   Procedure Laterality Date     ARTHROPLASTY KNEE  1/31/2012    Procedure:ARTHROPLASTY KNEE; LEFT TOTAL KNEE ARTHROPLASTY ([employer])^; Surgeon:SKIP FAIR; Location: OR     ARTHROSCOPY SHOULDER, OPEN ROTATOR CUFF REPAIR, COMBINED  4/24/2012    Procedure:COMBINED ARTHROSCOPY SHOULDER, OPEN ROTATOR CUFF REPAIR; RIGHT SHOULDER ARTHROSCOPY OPEN ROTATOR CUFF DEBRIDEMENT, SUBCROMIAL DECOMPRESSION, AND BICEPS TENODESIS REPAIR; Surgeon:SKIP FAIR; Location: [facility]     [facility] AFF SURGICAL PATHOLOGY  6-    Dr. Bowers; left hand     ENT SURGERY       INCISE FINGER TENDON SHEATH  2008    Dr. Bowers; right AND LEFT hand     THROAT SURGERY      vocal cord polyps       Family History   Problem Relation Age of Onset     Family History Negative Other      unknown family history per patient       Social History   Substance Use Topics     Smoking status: Never Smoker     Smokeless tobacco: Never Used     Alcohol use Yes      Comment: couple beers everyday       Allergies   Allergen Reactions     No Known Drug Allergies          ROS:   A complete review of systems was performed and is negative except as  "noted in the HPI.     Physical Examination:  Vitals: /66 (BP Location: Right arm, Patient Position: Chair, Cuff Size: Adult Regular)  Pulse 97  Ht 1.727 m (5' 8\")  Wt 96.2 kg (212 lb)  SpO2 95%  BMI 32.23 kg/m2  BMI= Body mass index is 32.23 kg/(m^2).    GENERAL APPEARANCE: healthy, alert, and no acute distress  HEENT: no icterus, no xanthelasmas, normal pupil size and reaction, no cyanosis.  NECK: no asymmetry, no cervical bruits, no JVD   RESPIRATORY: lungs clear to auscultation - no rales, rhonchi or wheezes, no use of accessory muscles, no retractions, respirations are unlabored, normal respiratory rate  CARDIOVASCULAR: regular rhythm, normal S1 with physiologic split S2, no S3 or S4 and no murmur, click or rub  GI:  no abdominal bruits, soft, non-tender  EXTREMITIES: no clubbing  NEURO: alert and oriented to person/place/time, normal speech, affect. Walks with a walker.  VASC: Radial and posterior tibialis pulses +2 and symmetric bilaterally. No cyanosis.  1+ bilateral pitting pedal edema   SKIN: no ecchymoses    Laboratory:  Lab Results   Component Value Date    WBC 11.9 (H) 12/22/2017    RBC 3.52 (L) 12/22/2017    HGB 11.7 (L) 12/22/2017    HCT 36.8 (L) 12/22/2017     (H) 12/22/2017    MCH 33.2 (H) 12/22/2017    MCHC 31.8 12/22/2017    RDW 13.5 12/22/2017     12/22/2017     Lab Results   Component Value Date     10/09/2018    POTASSIUM 5.1 10/09/2018     (H) 10/09/2018    BUN 40 (H) 10/09/2018    CR 1.55 (H) 10/09/2018    GFRESTIMATED 43 (L) 10/09/2018    GFRESTBLACK 52 (L) 10/09/2018    BLAISE 9.3 10/09/2018      Lab Results   Component Value Date    INR 0.93 12/22/2017    INR 1.11 01/31/2012     No results found for: CKTOTAL, CKMB, TROPN  Cholesterol (mg/dL)   Date Value   10/09/2018 127   10/24/2017 152   11/18/2014 116   06/25/2013 112     Cholesterol/HDL Ratio (no units)   Date Value   11/18/2014 2.6   06/25/2013 2.7   03/01/2012 2.6   03/02/2011 2.9     HDL Cholesterol " (mg/dL)   Date Value   10/09/2018 49   10/24/2017 75   11/18/2014 45   06/25/2013 41     LDL Cholesterol Calculated (mg/dL)   Date Value   10/09/2018 21   10/24/2017 32   11/18/2014 37   06/25/2013 52       Assessment and recommendations:    # ILR in situ showed possible AF   1. Normal device function.   2. Review of rhythm strips and found SR with frequent PACs and ST vs AT. As no AF and history of falls, will not initiate anticoagulation at this time.   3. Keep scheduled follow ups with Device Clinic     FOLLOW UP:  Keep follow up that is scheduled in March or follow up sooner as needed for symptoms.    Patient expresses understanding and agreement with the plan.    I appreciate the chance to help with Bart Blair's care. Please let me know if you have any questions or concerns.    Diandra RUIZ, CNP

## 2018-10-29 NOTE — NURSING NOTE
Chief Complaint   Patient presents with     Follow Up For     return AF       Vitals were taken and medications were reconciled.    Madie Brewster MA    9:57 AM

## 2018-10-29 NOTE — PROGRESS NOTES
"    Clinical Cardiac Electrophysiology      HPI: Bart Blair is a 87 year old male who presents for follow up of possible AF.  The patient has a past medical history significant for HTN, DMII, and syncope. The patient has experienced syncope and falls since 2007.  He had an ILR placed 6/2013 and replaced 9/2016. Patient was hospitalized after a fall 1/2018 and ILR showed no arrhythmia correlation. Patient's hypertensive medication was reduced 3/2018 as fall were thought likely to be r/t possible OH. Echocardiogram summary 3/2016 showed normal LV function, EF 60-65%, and no significant valvular pathologies. Tilt 12/2013 summary shows normal physiologic response.      Review current interval:  Device check: \"[employer] ILR remote transmission received and reviewed. Device transmission sent per MD orders. 40 episodes have been recorded as AF, since 10/16/18. It is hard to unable to determine if rhythm is SR with PACs vs AF. Pt's wife reports the following medication changes recently: d/c metformin, started tradjenta and pletal. He has not been symptomatic with the episodes. His presenting rhythm appears to be SR with PACs vs AF. Pt takes aspirin 81 mg daily. EGMs reviewed with Kelley Yoder NP. Pt has been scheduled to see EP NP on 11/29/18 to discuss rhythms and if AC is needed. Pt's wife verbalized understanding.\"    Current interval history:   States that he has not had episodes of syncope since January. Is undergoing testing for his leg edema and pain. No falls since January, he is also using his walker consistently. Denies chest pain or pressure, palpitations, abdominal edema, PND, or orthopnea.     Current Outpatient Prescriptions   Medication Sig Dispense Refill     acetaminophen (TYLENOL) 500 MG tablet Take 2 tablets (1,000 mg) by mouth 3 times daily 100 tablet 0     amoxicillin (AMOXIL) 500 MG capsule 4 tablets prior to dental appointment. Use for dental appointments 16 capsule 4     aspirin 81 MG tablet Take 1 " tablet by mouth daily.  3     atorvastatin (LIPITOR) 10 MG tablet Take 1 tablet (10 mg) by mouth daily Refill when requested 90 tablet 3     blood glucose monitoring (ACCU-CHEK COMPACT STRIPS) test strip 1 strip by In Vitro route daily 100 each 11     blood glucose monitoring (SOFTCLIX) lancets Check blood sugar once daily 100 each 3     cilostazol (PLETAL) 50 MG tablet Take 1 tablet (50 mg) by mouth 2 times daily 180 tablet 3     Cyanocobalamin (VITAMIN B-12 CR) 1000 MCG TBCR TAKE ONE TABLET BY MOUTH EVERY DAY 60 tablet 4     fluticasone (FLONASE) 50 MCG/ACT spray Spray 1-2 sprays into both nostrils daily 16 g 5     FOLIC ACID PO Take 1 mg by mouth daily       furosemide (LASIX) 20 MG tablet 1/2 tablet per day. If weight increases by 3-5 pounds then increase to 20 mg (1 tablet). If weight decreases to 205 then ok to hold. 90 tablet 3     irbesartan (AVAPRO) 75 MG tablet Take 1 tablet (75 mg) by mouth daily       linagliptin (TRADJENTA) 5 MG TABS tablet Take 1 tablet (5 mg) by mouth daily 30 tablet 3     Multiple Vitamin (MULTI VITAMIN  MENS) TABS Take  by mouth. Takes one daily         PREDNISONE PO Take 5 mg by mouth Currently taking 5 tablets (25mg) daily per patient (this medication is being taken care of through [facility]).       sertraline (ZOLOFT) 25 MG tablet TAKE ONE TABLET BY MOUTH EVERY DAY 90 tablet 3     VITAMIN D 1000 UNIT OR TABS 1 TABLET DAILY 100 4     diclofenac (VOLTAREN) 1 % GEL topical gel APPLY 4 GRAMS TO KNEES OR 2 GRAMS TO HANDS FOUR TIMES A DAY USING ENCLOSED DOSING CARD 300 g 3     doxycycline Monohydrate 100 MG CAPS 1 tablet 2 time daily for Rosacea flares 60 capsule 3     metroNIDAZOLE (METROCREAM) 0.75 % cream Apply topically 2 times daily 45 g 3     senna-docusate (SENOKOT-S;PERICOLACE) 8.6-50 MG per tablet Take 1-2 tablets by mouth Takes about 2-3 times weekly         Past Medical History:   Diagnosis Date     Anemia      Anemia due to blood loss, acute      CKD (chronic kidney  "disease) stage 3, GFR 30-59 ml/min (H) 5/31/2010     Essential hypertension, benign      Other and unspecified hyperlipidemia      Other and unspecified hyperlipidemia      Other specified disorder of skin      Pain in joint, shoulder region      Parkinson disease (H) 9/2/2010     Polyp of vocal cord or larynx      Retinal ischemia     right sided thrombotic plaque     Rosacea      Type II or unspecified type diabetes mellitus without mention of complication, not stated as uncontrolled        Past Surgical History:   Procedure Laterality Date     ARTHROPLASTY KNEE  1/31/2012    Procedure:ARTHROPLASTY KNEE; LEFT TOTAL KNEE ARTHROPLASTY (IRASEMA)^; Surgeon:SKIP FAIR; Location: OR     ARTHROSCOPY SHOULDER, OPEN ROTATOR CUFF REPAIR, COMBINED  4/24/2012    Procedure:COMBINED ARTHROSCOPY SHOULDER, OPEN ROTATOR CUFF REPAIR; RIGHT SHOULDER ARTHROSCOPY OPEN ROTATOR CUFF DEBRIDEMENT, SUBCROMIAL DECOMPRESSION, AND BICEPS TENODESIS REPAIR; Surgeon:SKIP FAIR; Location: [facility]     CL AFF SURGICAL PATHOLOGY  6-    Dr. Bowers; left hand     ENT SURGERY       INCISE FINGER TENDON SHEATH  2008    Dr. Bowers; right AND LEFT hand     THROAT SURGERY      vocal cord polyps       Family History   Problem Relation Age of Onset     Family History Negative Other      unknown family history per patient       Social History   Substance Use Topics     Smoking status: Never Smoker     Smokeless tobacco: Never Used     Alcohol use Yes      Comment: couple beers everyday       Allergies   Allergen Reactions     No Known Drug Allergies          ROS:   A complete review of systems was performed and is negative except as noted in the HPI.     Physical Examination:  Vitals: /66 (BP Location: Right arm, Patient Position: Chair, Cuff Size: Adult Regular)  Pulse 97  Ht 1.727 m (5' 8\")  Wt 96.2 kg (212 lb)  SpO2 95%  BMI 32.23 kg/m2  BMI= Body mass index is 32.23 kg/(m^2).    GENERAL APPEARANCE: healthy, alert, and no acute " distress  HEENT: no icterus, no xanthelasmas, normal pupil size and reaction, no cyanosis.  NECK: no asymmetry, no cervical bruits, no JVD   RESPIRATORY: lungs clear to auscultation - no rales, rhonchi or wheezes, no use of accessory muscles, no retractions, respirations are unlabored, normal respiratory rate  CARDIOVASCULAR: regular rhythm, normal S1 with physiologic split S2, no S3 or S4 and no murmur, click or rub  GI:  no abdominal bruits, soft, non-tender  EXTREMITIES: no clubbing  NEURO: alert and oriented to person/place/time, normal speech, affect. Walks with a walker.  VASC: Radial and posterior tibialis pulses +2 and symmetric bilaterally. No cyanosis.  1+ bilateral pitting pedal edema   SKIN: no ecchymoses    Laboratory:  Lab Results   Component Value Date    WBC 11.9 (H) 12/22/2017    RBC 3.52 (L) 12/22/2017    HGB 11.7 (L) 12/22/2017    HCT 36.8 (L) 12/22/2017     (H) 12/22/2017    MCH 33.2 (H) 12/22/2017    MCHC 31.8 12/22/2017    RDW 13.5 12/22/2017     12/22/2017     Lab Results   Component Value Date     10/09/2018    POTASSIUM 5.1 10/09/2018     (H) 10/09/2018    BUN 40 (H) 10/09/2018    CR 1.55 (H) 10/09/2018    GFRESTIMATED 43 (L) 10/09/2018    GFRESTBLACK 52 (L) 10/09/2018    BLAISE 9.3 10/09/2018      Lab Results   Component Value Date    INR 0.93 12/22/2017    INR 1.11 01/31/2012     No results found for: CKTOTAL, CKMB, TROPN  Cholesterol (mg/dL)   Date Value   10/09/2018 127   10/24/2017 152   11/18/2014 116   06/25/2013 112     Cholesterol/HDL Ratio (no units)   Date Value   11/18/2014 2.6   06/25/2013 2.7   03/01/2012 2.6   03/02/2011 2.9     HDL Cholesterol (mg/dL)   Date Value   10/09/2018 49   10/24/2017 75   11/18/2014 45   06/25/2013 41     LDL Cholesterol Calculated (mg/dL)   Date Value   10/09/2018 21   10/24/2017 32   11/18/2014 37   06/25/2013 52       Assessment and recommendations:    # ILR in situ showed possible AF   1. Normal device function.   2.  Review of rhythm strips and found SR with frequent PACs and ST vs AT. As no AF and history of falls, will not initiate anticoagulation at this time.   3. Keep scheduled follow ups with Device Clinic     FOLLOW UP:  Keep follow up that is scheduled in March or follow up sooner as needed for symptoms.    Patient expresses understanding and agreement with the plan.    I appreciate the chance to help with Bart Blair's care. Please let me know if you have any questions or concerns.    Diandra RUIZ, CNP

## 2018-10-29 NOTE — PATIENT INSTRUCTIONS
Cardiology Provider you saw in clinic today: Diandra RUIZ NP-C    Medication Changes:  None today    Labs/Tests needed:  None today.      Follow-up Visit:  Follow up as scheduled in March.     Further Instructions:        You will receive all normal lab and testing results via TuneWikihart or mail if not reviewed in clinic today. Please contact our office if you need assistance with setting up MyChart.    If you need a medication refill please contact your pharmacy. Please allow 3 business days for your refill to be completed.     As always, thank you for trusting us with your health care needs!    If you have any questions regarding your visit please contact your care team:   Cardiology  Telephone Number    EP RN  Electrophysiology Nurse Coordinator [phone]     Call for EP procedure scheduling concerns  LISSETT Barnes  [phone]           Device Clinic (Pacemakers, ICDs, Loop)   RN's : Kellye Santana Connie, Dawn During business hours: [phone]    After business hours:   [phone]- select option 4 and ask for job code [id number].

## 2018-10-29 NOTE — MR AVS SNAPSHOT
After Visit Summary   10/29/2018    Bart Blair    MRN: [id number]           Patient Information     Date Of Birth          2/14/1931        Visit Information        Provider Department      10/29/2018 10:00 AM Diandra Monae APRN [facility]        Care Instructions    Cardiology Provider you saw in clinic today: Diandra RUIZ, NP-C    Medication Changes:  None today    Labs/Tests needed:  None today.      Follow-up Visit:  Follow up as scheduled in March.     Further Instructions:        You will receive all normal lab and testing results via MyChart or mail if not reviewed in clinic today. Please contact our office if you need assistance with setting up MyChart.    If you need a medication refill please contact your pharmacy. Please allow 3 business days for your refill to be completed.     As always, thank you for trusting us with your health care needs!    If you have any questions regarding your visit please contact your care team:   Cardiology  Telephone Number    EP RN  Electrophysiology Nurse Coordinator [phone]     Call for EP procedure scheduling concerns  LISSETT Barnes  [phone]           Device Clinic (Pacemakers, ICDs, Loop)   RN's : Kelley Santana Connie, Dawn During business hours: [phone]    After business hours:   [phone]- select option 4 and ask for job code [id number].                  Follow-ups after your visit        Your next 10 appointments already scheduled     Nov 02, 2018 10:00 AM CDT   US LOWER EXTREMITY ARTERIAL DUPLEX BILATERAL with UCUSV2   [facility] US ([facility])    [address] [address] [phone]           How do I prepare for my exam? (Food and drink instructions) No Food and Drink Restrictions.  How do I prepare for my exam? (Other instructions) You do not need to do anything special to prepare for your exam.  What should I wear: Wear  comfortable clothes.  How long does the exam take: Most ultrasounds take 30 to 60 minutes.  What should I bring: Bring a list of your medicines, including vitamins, minerals and over-the-counter drugs. It is safest to leave personal items at home.  Do I need a :  No  is needed.  What do I need to tell my doctor: Tell your doctor about any allergies you may have.  What should I do after the exam: No restrictions, You may resume normal activities.  What is this test: An ultrasound uses sound waves to make pictures of the body. Sound waves do not cause pain. The only discomfort may be the pressure of the wand against your skin or full bladder.  Who should I call with questions: If you have any questions, please call the Imaging Department where you will have your exam. Directions, parking instructions, and other information is available on our website, [email or web address].            Nov 12, 2018  2:15 PM CST   Neuro Eval with Masha Sherwood PT   [facility] [place], Physical Therapy - Outpatient ([facility])    [address] [place] [address] [address]  [place] [address]   [phone]            Nov 14, 2018  1:40 PM CST   MyChart Long with Chuy Stewart MD   [facility] ([facility])    [address] [place] [id number]   [phone]            Dec 12, 2018 11:00 AM CST   SHORT with Chuy Stewart MD   [facility] ([facility])    [address] [place] [address]   [phone]            Feb 05, 2019 11:30 AM CST   (Arrive by 11:15 AM)   Return Vascular Visit with Azul Meade MD   [facility] ([facility])    [address] [place] [address]  [address]  [facility] [id number] [phone]            Mar 14, 2019 10:30 AM CDT   (Arrive by 10:15 AM)   Implantable Loop Recorder with Uc Cv Device 1   [facility] (  "[facility])    [address] [facility]  3rd Floor  [address]               Mar 14, 2019 11:00 AM CDT   (Arrive by 10:45 AM)   RETURN ARRHYTHMIA with JOSEPH Beth CNP   [facility] ([facility])    [address]  [address]  [facility] [address] [phone]              Who to contact     If you have questions or need follow up information about today's clinic visit or your schedule please contact [facility] directly at [phone].  Normal or non-critical lab and imaging results will be communicated to you by Skymarkerhart, letter or phone within 4 business days after the clinic has received the results. If you do not hear from us within 7 days, please contact the clinic through Skymarkerhart or phone. If you have a critical or abnormal lab result, we will notify you by phone as soon as possible.  Submit refill requests through My1login or call your pharmacy and they will forward the refill request to us. Please allow 3 business days for your refill to be completed.          Additional Information About Your Visit        Skymarkerhart Information     My1login gives you secure access to your electronic health record. If you see a primary care provider, you can also send messages to your care team and make appointments. If you have questions, please call your primary care clinic.  If you do not have a primary care provider, please call [phone] and they will assist you.        Care EveryWhere ID     This is your Care EveryWhere ID. This could be used by other organizations to access your [place] medical records  [id number]        Your Vitals Were     Pulse Height Pulse Oximetry BMI (Body Mass Index)          97 1.727 m (5' 8\") 95% 32.23 kg/m2         Blood Pressure from Last 3 Encounters:   10/29/18 128/66   10/09/18 130/64   10/02/18 124/76    Weight from Last 3 Encounters:   10/29/18 96.2 kg (212 lb)   10/09/18 93 kg (205 lb)   10/02/18 " 104.3 kg (230 lb)              Today, you had the following     No orders found for display       Primary Care Provider Office Phone # Fax #    Chuy Stewart [phone]       [address] [facility] [address]        Equal Access to Services     TIFFANI ABDUL : Hadii aad ku hadasho Soomaali, waaxda luqadaha, qaybta kaalmada adeegyada, waxay juliánin haymary bethn adeeg yolie laPankajmary bethduane barroso. So [facility] [phone].    ATENCIÓN: Si habla español, tiene a ellis disposición servicios gratuitos de asistencia lingüística. Llame al [phone].    We comply with applicable federal civil rights laws and [place] laws. We do not discriminate on the basis of race, color, national origin, age, disability, sex, sexual orientation, or gender identity.            Thank you!     Thank you for choosing [facility]  for your care. Our goal is always to provide you with excellent care. Hearing back from our patients is one way we can continue to improve our services. Please take a few minutes to complete the written survey that you may receive in the mail after your visit with us. Thank you!             Your Updated Medication List - Protect others around you: Learn how to safely use, store and throw away your medicines at www.disposemymeds.org.          This list is accurate as of 10/29/18 10:49 AM.  Always use your most recent med list.                   Brand Name Dispense Instructions for use Diagnosis    amoxicillin 500 MG capsule    AMOXIL    16 capsule    4 tablets prior to dental appointment. Use for dental appointments    Status post total left knee replacement       aspirin 81 MG tablet      Take 1 tablet by mouth daily.    Hypertension goal BP (blood pressure) < 130/80       atorvastatin 10 MG tablet    LIPITOR    90 tablet    Take 1 tablet (10 mg) by mouth daily Refill when requested    Hyperlipidemia LDL goal <100, Type 2 diabetes mellitus with other ophthalmic complication, without long-term  current use of insulin (H)       blood glucose monitoring lancets     100 each    Check blood sugar once daily    Type 2 diabetes mellitus with other ophthalmic complication, without long-term current use of insulin (H)       blood glucose monitoring test strip    ACCU-CHEK COMPACT STRIPS    100 each    1 strip by In Vitro route daily    Type 2 diabetes mellitus with other ophthalmic complication, without long-term current use of insulin (H)       cholecalciferol 1000 UNIT tablet    vitamin D3    100    1 TABLET DAILY    Contusion of ribs       cilostazol 50 MG tablet    PLETAL    180 tablet    Take 1 tablet (50 mg) by mouth 2 times daily    Atherosclerosis of native artery of both lower extremities with intermittent claudication (H)       diclofenac 1 % Gel topical gel    VOLTAREN    300 g    APPLY 4 GRAMS TO KNEES OR 2 GRAMS TO HANDS FOUR TIMES A DAY USING ENCLOSED DOSING CARD    Arthritis       doxycycline monohydrate 100 MG capsule     60 capsule    1 tablet 2 time daily for Rosacea flares    Rosacea       fluticasone 50 MCG/ACT spray    FLONASE    16 g    Spray 1-2 sprays into both nostrils daily    Chronic rhinitis       FOLIC ACID PO      Take 1 mg by mouth daily        furosemide 20 MG tablet    LASIX    90 tablet    1/2 tablet per day. If weight increases by 3-5 pounds then increase to 20 mg (1 tablet). If weight decreases to 205 then ok to hold.    Edema, unspecified type       irbesartan 75 MG tablet    AVAPRO     Take 1 tablet (75 mg) by mouth daily    Hypertension goal BP (blood pressure) < 150/90       linagliptin 5 MG Tabs tablet    TRADJENTA    30 tablet    Take 1 tablet (5 mg) by mouth daily    Type 2 diabetes mellitus with other ophthalmic complication, without long-term current use of insulin (H)       metroNIDAZOLE 0.75 % cream    METROCREAM    45 g    Apply topically 2 times daily    Rosacea       MULTI vitamin  MENS Tabs      Take  by mouth. Takes one daily        PREDNISONE PO      Take 5 mg  by mouth Currently taking 5 tablets (25mg) daily per patient (this medication is being taken care of through [facility]).        senna-docusate 8.6-50 MG per tablet    SENOKOT-S;PERICOLACE     Take 1-2 tablets by mouth Takes about 2-3 times weekly        sertraline 25 MG tablet    ZOLOFT    90 tablet    TAKE ONE TABLET BY MOUTH EVERY DAY    Dysthymia       TYLENOL 500 MG tablet   Generic drug:  acetaminophen     100 tablet    Take 2 tablets (1,000 mg) by mouth 3 times daily        Vitamin B-12 CR 1000 MCG Tbcr     60 tablet    TAKE ONE TABLET BY MOUTH EVERY DAY    Parkinson disease (H)

## 2018-11-02 ENCOUNTER — RADIANT APPOINTMENT (OUTPATIENT)
Dept: ULTRASOUND IMAGING | Facility: CLINIC | Age: 83
End: 2018-11-02
Attending: INTERNAL MEDICINE
Payer: COMMERCIAL

## 2018-11-02 DIAGNOSIS — I73.9 PERIPHERAL ARTERY DISEASE (H): ICD-10-CM

## 2018-11-12 ENCOUNTER — HOSPITAL ENCOUNTER (OUTPATIENT)
Dept: PHYSICAL THERAPY | Facility: CLINIC | Age: 83
Setting detail: THERAPIES SERIES
End: 2018-11-12
Attending: INTERNAL MEDICINE
Payer: MEDICARE

## 2018-11-12 DIAGNOSIS — R26.81 GAIT INSTABILITY: ICD-10-CM

## 2018-11-12 PROCEDURE — G8978 MOBILITY CURRENT STATUS: HCPCS | Mod: GP,CK | Performed by: PHYSICAL THERAPIST

## 2018-11-12 PROCEDURE — 97161 PT EVAL LOW COMPLEX 20 MIN: CPT | Mod: GP | Performed by: PHYSICAL THERAPIST

## 2018-11-12 PROCEDURE — G8979 MOBILITY GOAL STATUS: HCPCS | Mod: GP,CJ | Performed by: PHYSICAL THERAPIST

## 2018-11-12 PROCEDURE — 40000719 ZZHC STATISTIC PT DEPARTMENT NEURO VISIT: Performed by: PHYSICAL THERAPIST

## 2018-11-12 PROCEDURE — 97110 THERAPEUTIC EXERCISES: CPT | Mod: GP | Performed by: PHYSICAL THERAPIST

## 2018-11-12 ASSESSMENT — 6 MINUTE WALK TEST (6MWT): TOTAL DISTANCE WALKED (FT): 179

## 2018-11-14 ENCOUNTER — OFFICE VISIT (OUTPATIENT)
Dept: FAMILY MEDICINE | Facility: CLINIC | Age: 83
End: 2018-11-14
Payer: COMMERCIAL

## 2018-11-14 VITALS
DIASTOLIC BLOOD PRESSURE: 58 MMHG | HEIGHT: 68 IN | BODY MASS INDEX: 31.52 KG/M2 | WEIGHT: 208 LBS | SYSTOLIC BLOOD PRESSURE: 128 MMHG | HEART RATE: 108 BPM | TEMPERATURE: 97.9 F

## 2018-11-14 DIAGNOSIS — E11.65 TYPE 2 DIABETES MELLITUS WITH HYPERGLYCEMIA, WITHOUT LONG-TERM CURRENT USE OF INSULIN (H): Primary | ICD-10-CM

## 2018-11-14 DIAGNOSIS — R74.8 ELEVATED CREATINE KINASE: ICD-10-CM

## 2018-11-14 LAB — HBA1C MFR BLD: 8.8 % (ref 0–5.6)

## 2018-11-14 PROCEDURE — 99214 OFFICE O/P EST MOD 30 MIN: CPT | Performed by: FAMILY MEDICINE

## 2018-11-14 PROCEDURE — 83036 HEMOGLOBIN GLYCOSYLATED A1C: CPT | Performed by: FAMILY MEDICINE

## 2018-11-14 PROCEDURE — 80048 BASIC METABOLIC PNL TOTAL CA: CPT | Performed by: FAMILY MEDICINE

## 2018-11-14 PROCEDURE — 84443 ASSAY THYROID STIM HORMONE: CPT | Performed by: FAMILY MEDICINE

## 2018-11-14 PROCEDURE — 36415 COLL VENOUS BLD VENIPUNCTURE: CPT | Performed by: FAMILY MEDICINE

## 2018-11-14 RX ORDER — GLIPIZIDE 5 MG/1
5 TABLET ORAL
Qty: 90 TABLET | Refills: 3 | Status: SHIPPED | OUTPATIENT
Start: 2018-11-14 | End: 2019-11-02

## 2018-11-14 NOTE — PROGRESS NOTES
[facility]        OUTPATIENT PHYSICAL THERAPY FUNCTIONAL EVALUATION  PLAN OF TREATMENT FOR OUTPATIENT REHABILITATION  (COMPLETE FOR INITIAL CLAIMS ONLY)  Patient's Last Name, First Name, M.I.  YOB: 1931  Bart Blair        Provider's Name   [facility]   Medical Record No.  [id number]     Start of Care Date:  11/12/18   Onset Date:  10/25/18   Type:     _X__PT   ____OT  ____SLP Medical Diagnosis:  Gait instability; Atherosclerosis of native artery of both lower extremities with intermittent claudication.     PT Diagnosis:  Impaired functional mobility d/t LE pain Visits from SOC:  1                              __________________________________________________________________________________  Plan of Treatment/Functional Goals:  gait training, balance training, strengthening  Intermittent Claudication treadmill protocol        GOALS  3MWT  Pt to increase 3MWT to 215' with 4WW (20%) in order to demonstrate improved activity tolerance for community ambulation  02/09/19    TUG  Pt will score less than 20 sec on TUG with 4WW to demonstrate reduced risk of falls and independence with transfers.  02/09/19    Pain  Pt to report less than 3/10 distal LE pain in order to demonstrate reduced pain for improved community mobility.  02/09/19    30s STS  Pt will complete 11 STS in 30 seconds in order to demonstrate improved strength for transfers.  02/09/19       Therapy Frequency:  3 times/week   Predicted Duration of Therapy Intervention:  90 days    Masha Sherwood, PT                                    I CERTIFY THE NEED FOR THESE SERVICES FURNISHED UNDER        THIS PLAN OF TREATMENT AND WHILE UNDER MY CARE     (Physician co-signature of this document indicates review and certification of the therapy plan).                Certification Date From:  11/12/18   Certification Date  To:  02/09/19    Referring Provider:  Dr Azul Meade    Initial Assessment  See Epic Evaluation- Start of Care Date: 11/12/18

## 2018-11-14 NOTE — PATIENT INSTRUCTIONS
Your A1C has increased    Begin Glipizide.     We will recheck kidney function today -- will contact you with results.     Don't take your glipizide until you eat - it can lower your blood sugar by giving your body more insulin.

## 2018-11-14 NOTE — MR AVS SNAPSHOT
After Visit Summary   11/14/2018    Bart Blair    MRN: [id number]           Patient Information     Date Of Birth          2/14/1931        Visit Information        Provider Department      11/14/2018 1:40 PM Chuy Stewart MD [facility]        Today's Diagnoses     Type 2 diabetes mellitus with hyperglycemia, without long-term current use of insulin (H)    -  1    Elevated creatine kinase        Type 2 diabetes mellitus with other ophthalmic complication, without long-term current use of insulin (H)          Care Instructions    Your A1C has increased    Begin Glipizide.     We will recheck kidney function today -- will contact you with results.     Don't take your glipizide after you eat - it can lower your blood sugar by giving your body more insulin.          Follow-ups after your visit        Follow-up notes from your care team     Return in about 4 weeks (around 12/12/2018) for Diabetes.      Your next 10 appointments already scheduled     Nov 16, 2018  9:15 AM CST   Neuro Treatment with Sujey Carrillo, PT   [facility] [place], Physical Therapy - Outpatient ([facility])    [address]  [place] [address]   [phone]            Nov 19, 2018 11:00 AM CST   Neuro Treatment with Sujey Carrillo, PT   [facility], Physical Therapy - Outpatient ([facility])    [address]  [place] [address]   [phone]            Nov 27, 2018  1:45 PM CST   Neuro Treatment with Sujey Carrillo, PT   [facility] [place], Physical Therapy - Outpatient ([facility])    [address]  [place] [address]   [phone]            Nov 30, 2018  2:15 PM CST   Neuro Treatment with Masha Sherwood, PT   [facility], Physical Therapy - Outpatient ([facility])    [address]  [place] [address]   [phone]            Dec 04, 2018 10:30 AM CST   Neuro Treatment with Sujey Carrillo PT   [facility] [place], Physical Therapy - Outpatient ([facility])    [address]  [place] [address]   [phone]            Dec 12, 2018 11:00 AM CST   SHORT with Chuy Stewart MD   [facility]    [address] [id number] [phone]            Feb 05, 2019 11:30 AM CST   (Arrive by 11:15 AM)   Return Vascular Visit with Azul Meade MD   [facility] ([facility])    [address]  [address]  [place] [address] [phone]            Mar 14, 2019 10:30 AM CDT   (Arrive by 10:15 AM)   Implantable Loop Recorder with Uc Cv Device 1   [facility] ([facility])    [address] [place] [address]  3rd Floor  [id number]               Mar 14, 2019 11:00 AM CDT   (Arrive by 10:45 AM)   RETURN ARRHYTHMIA with JOSEPH [facility] ([facility])    [address] [place] [address]  [address] [place] [address] [phone]              Who to contact     If you have questions or need follow up information about today's clinic visit or your schedule please contact [facility] directly at [phone].  Normal or non-critical lab and imaging results will be communicated to you by MyChart, letter or phone within 4 business days after the clinic has received the results. If you do not hear from us within 7 days, please contact the clinic through MyChart or phone. If you have a critical or abnormal lab result, we will notify you by phone as soon as possible.  Submit refill requests through [employer] or call your pharmacy and they will forward the refill request to us. Please allow 3 business days for your  "refill to be completed.          Additional Information About Your Visit        [employer] Information     [employer] gives you secure access to your electronic health record. If you see a primary care provider, you can also send messages to your care team and make appointments. If you have questions, please call your primary care clinic.  If you do not have a primary care provider, please call [phone] and they will assist you.        Care EveryWhere ID     This is your Care EveryWhere ID. This could be used by other organizations to access your [place] medical records  [id number]        Your Vitals Were     Pulse Temperature Height BMI (Body Mass Index)          108 97.9  F (36.6  C) (Oral) 5' 8\" (1.727 m) 31.63 kg/m2         Blood Pressure from Last 3 Encounters:   11/14/18 128/58   10/29/18 128/66   10/09/18 130/64    Weight from Last 3 Encounters:   11/14/18 208 lb (94.3 kg)   10/29/18 212 lb (96.2 kg)   10/09/18 205 lb (93 kg)              We Performed the Following     Basic metabolic panel     Hemoglobin A1c     TSH WITH FREE T4 REFLEX          Today's Medication Changes          These changes are accurate as of 11/14/18  2:29 PM.  If you have any questions, ask your nurse or doctor.               Start taking these medicines.        Dose/Directions    glipiZIDE 5 MG tablet   Commonly known as:  GLUCOTROL   Used for:  Type 2 diabetes mellitus with hyperglycemia, without long-term current use of insulin (H)   Started by:  Chuy Stewart MD        Dose:  5 mg   Take 1 tablet (5 mg) by mouth every morning (before breakfast)   Quantity:  90 tablet   Refills:  3            Where to get your medicines      These medications were sent to [facility] - [place], [place] - [address]  [address], [place] [address]     Phone:  [phone]     glipiZIDE 5 MG tablet                Primary Care Provider Office Phone # Fax #    Chuy Stewart MD " [phone]       [address] [address]        Equal Access to Services     TIFFANI ABDUL : Enma Gruber, wayannickda jewelcoralha, qaimanita kaalmada ronakleigh ann, jd juliánin hayaaduane simonscarrie urbano trinh barrosoJody Bradshaw [facility] [phone].    ATENCIÓN: Si habla español, tiene a ellis disposición servicios gratuitos de asistencia lingüística. Llame al [phone].    We comply with applicable federal civil rights laws and [place] laws. We do not discriminate on the basis of race, color, national origin, age, disability, sex, sexual orientation, or gender identity.            Thank you!     Thank you for choosing [facility]  for your care. Our goal is always to provide you with excellent care. Hearing back from our patients is one way we can continue to improve our services. Please take a few minutes to complete the written survey that you may receive in the mail after your visit with us. Thank you!             Your Updated Medication List - Protect others around you: Learn how to safely use, store and throw away your medicines at www.disposemymeds.org.          This list is accurate as of 11/14/18  2:29 PM.  Always use your most recent med list.                   Brand Name Dispense Instructions for use Diagnosis    amoxicillin 500 MG capsule    AMOXIL    16 capsule    4 tablets prior to dental appointment. Use for dental appointments    Status post total left knee replacement       aspirin 81 MG tablet      Take 1 tablet by mouth daily.    Hypertension goal BP (blood pressure) < 130/80       atorvastatin 10 MG tablet    LIPITOR    90 tablet    Take 1 tablet (10 mg) by mouth daily Refill when requested    Hyperlipidemia LDL goal <100, Type 2 diabetes mellitus with other ophthalmic complication, without long-term current use of insulin (H)       blood glucose monitoring lancets     100 each    Check blood sugar once daily    Type 2 diabetes mellitus with other ophthalmic  complication, without long-term current use of insulin (H)       blood glucose monitoring test strip    ACCU-CHEK COMPACT STRIPS    100 each    1 strip by In Vitro route daily    Type 2 diabetes mellitus with other ophthalmic complication, without long-term current use of insulin (H)       cholecalciferol 1000 UNIT tablet    vitamin D3    100    1 TABLET DAILY    Contusion of ribs       cilostazol 50 MG tablet    PLETAL    180 tablet    Take 1 tablet (50 mg) by mouth 2 times daily    Atherosclerosis of native artery of both lower extremities with intermittent claudication (H)       diclofenac 1 % Gel topical gel    VOLTAREN    300 g    APPLY 4 GRAMS TO KNEES OR 2 GRAMS TO HANDS FOUR TIMES A DAY USING ENCLOSED DOSING CARD    Arthritis       doxycycline monohydrate 100 MG capsule     60 capsule    1 tablet 2 time daily for Rosacea flares    Rosacea       fluticasone 50 MCG/ACT spray    FLONASE    16 g    Spray 1-2 sprays into both nostrils daily    Chronic rhinitis       FOLIC ACID PO      Take 1 mg by mouth daily        furosemide 20 MG tablet    LASIX    90 tablet    1/2 tablet per day. If weight increases by 3-5 pounds then increase to 20 mg (1 tablet). If weight decreases to 205 then ok to hold.    Edema, unspecified type       glipiZIDE 5 MG tablet    GLUCOTROL    90 tablet    Take 1 tablet (5 mg) by mouth every morning (before breakfast)    Type 2 diabetes mellitus with hyperglycemia, without long-term current use of insulin (H)       irbesartan 75 MG tablet    AVAPRO     Take 1 tablet (75 mg) by mouth daily    Hypertension goal BP (blood pressure) < 150/90       linagliptin 5 MG Tabs tablet    TRADJENTA    30 tablet    Take 1 tablet (5 mg) by mouth daily    Type 2 diabetes mellitus with other ophthalmic complication, without long-term current use of insulin (H)       metroNIDAZOLE 0.75 % cream    METROCREAM    45 g    Apply topically 2 times daily    Rosacea       MULTI vitamin  MENS Tabs      Take  by mouth.  Takes one daily        PREDNISONE PO      Take 5 mg by mouth Currently taking 4 tablets (20mg) daily per patient (this medication is being taken care of through [facility]).        senna-docusate 8.6-50 MG per tablet    SENOKOT-S;PERICOLACE     Take 1-2 tablets by mouth Takes about 2-3 times weekly        sertraline 25 MG tablet    ZOLOFT    90 tablet    TAKE ONE TABLET BY MOUTH EVERY DAY    Dysthymia       TYLENOL 500 MG tablet   Generic drug:  acetaminophen     100 tablet    Take 2 tablets (1,000 mg) by mouth 3 times daily        Vitamin B-12 CR 1000 MCG Tbcr     60 tablet    TAKE ONE TABLET BY MOUTH EVERY DAY    Parkinson disease (H)

## 2018-11-15 LAB
ANION GAP SERPL CALCULATED.3IONS-SCNC: 13 MMOL/L (ref 3–14)
BUN SERPL-MCNC: 35 MG/DL (ref 7–30)
CALCIUM SERPL-MCNC: 9.1 MG/DL (ref 8.5–10.1)
CHLORIDE SERPL-SCNC: 99 MMOL/L (ref 94–109)
CO2 SERPL-SCNC: 24 MMOL/L (ref 20–32)
CREAT SERPL-MCNC: 1.53 MG/DL (ref 0.66–1.25)
GFR SERPL CREATININE-BSD FRML MDRD: 43 ML/MIN/1.7M2
GLUCOSE SERPL-MCNC: 368 MG/DL (ref 70–99)
POTASSIUM SERPL-SCNC: 4.8 MMOL/L (ref 3.4–5.3)
SODIUM SERPL-SCNC: 136 MMOL/L (ref 133–144)
TSH SERPL DL<=0.005 MIU/L-ACNC: 1.38 MU/L (ref 0.4–4)

## 2018-11-16 ENCOUNTER — HOSPITAL ENCOUNTER (OUTPATIENT)
Dept: PHYSICAL THERAPY | Facility: CLINIC | Age: 83
Setting detail: THERAPIES SERIES
End: 2018-11-16
Attending: INTERNAL MEDICINE
Payer: MEDICARE

## 2018-11-16 PROCEDURE — 97110 THERAPEUTIC EXERCISES: CPT | Mod: GP | Performed by: PHYSICAL THERAPIST

## 2018-11-16 PROCEDURE — 40000719 ZZHC STATISTIC PT DEPARTMENT NEURO VISIT: Performed by: PHYSICAL THERAPIST

## 2018-11-19 ENCOUNTER — HOSPITAL ENCOUNTER (OUTPATIENT)
Dept: PHYSICAL THERAPY | Facility: CLINIC | Age: 83
Setting detail: THERAPIES SERIES
End: 2018-11-19
Attending: INTERNAL MEDICINE
Payer: MEDICARE

## 2018-11-19 PROCEDURE — 97110 THERAPEUTIC EXERCISES: CPT | Mod: GP | Performed by: PHYSICAL THERAPIST

## 2018-11-19 PROCEDURE — 40000719 ZZHC STATISTIC PT DEPARTMENT NEURO VISIT: Performed by: PHYSICAL THERAPIST

## 2018-11-20 ENCOUNTER — HOSPITAL ENCOUNTER (OUTPATIENT)
Dept: PHYSICAL THERAPY | Facility: CLINIC | Age: 83
Setting detail: THERAPIES SERIES
End: 2018-11-20
Attending: INTERNAL MEDICINE
Payer: MEDICARE

## 2018-11-20 PROCEDURE — 40000719 ZZHC STATISTIC PT DEPARTMENT NEURO VISIT: Performed by: PHYSICAL THERAPIST

## 2018-11-20 PROCEDURE — 97110 THERAPEUTIC EXERCISES: CPT | Mod: GP | Performed by: PHYSICAL THERAPIST

## 2018-11-21 DIAGNOSIS — I70.218 ATHEROSCLEROSIS OF NATIVE ARTERIES OF EXTREMITIES WITH INTERMITTENT CLAUDICATION, OTHER EXTREMITY (H): ICD-10-CM

## 2018-11-21 DIAGNOSIS — I70.203 ATHEROSCLEROSIS OF ARTERY OF BOTH LOWER EXTREMITIES (H): Primary | ICD-10-CM

## 2018-11-21 NOTE — PROGRESS NOTES
Date: 11/21/2018    Time of Call: 10:09 AM    Diagnosis:  Peripheral artery disease    [ TORB ] Ordering provider: Azul Meade MD  Order: Bilateral lower extremity MRA    Order received by: Brock Hinojosa RN    Follow-up/additional notes: Spoke with patient's wife.  Reviewed results of duplex.  Malathi will schedule MRA in next 1-2 weeks.  We will follow up with those results once available.

## 2018-11-27 ENCOUNTER — HOSPITAL ENCOUNTER (OUTPATIENT)
Dept: MRI IMAGING | Facility: CLINIC | Age: 83
Discharge: HOME | End: 2018-11-27
Attending: INTERNAL MEDICINE | Admitting: INTERNAL MEDICINE
Payer: MEDICARE

## 2018-11-27 DIAGNOSIS — I70.218 ATHEROSCLEROSIS OF NATIVE ARTERIES OF EXTREMITIES WITH INTERMITTENT CLAUDICATION, OTHER EXTREMITY (H): ICD-10-CM

## 2018-11-27 DIAGNOSIS — I70.203 ATHEROSCLEROSIS OF ARTERY OF BOTH LOWER EXTREMITIES (H): ICD-10-CM

## 2018-11-27 PROCEDURE — A9577 INJ MULTIHANCE: HCPCS | Performed by: INTERNAL MEDICINE

## 2018-11-27 PROCEDURE — 25500064 ZZH RX 255 OP 636: Performed by: INTERNAL MEDICINE

## 2018-11-27 PROCEDURE — 73725 MR ANG LWR EXT W OR W/O DYE: CPT | Mod: 50

## 2018-11-27 RX ADMIN — GADOBENATE DIMEGLUMINE 15 ML: 529 INJECTION, SOLUTION INTRAVENOUS at 15:30

## 2018-11-29 ENCOUNTER — TELEPHONE (OUTPATIENT)
Dept: PHARMACY | Facility: CLINIC | Age: 83
End: 2018-11-29

## 2018-11-29 NOTE — TELEPHONE ENCOUNTER
Call out to patient to check on BG with recent medication changes. No answer. LM asking patient to return call to number below at earliest convenience.    Doris Ron, Pharm.D., [facility] [place]  Medication Therapy Management Pharmacist  [phone]

## 2018-11-30 ENCOUNTER — HOSPITAL ENCOUNTER (OUTPATIENT)
Dept: PHYSICAL THERAPY | Facility: CLINIC | Age: 83
Setting detail: THERAPIES SERIES
End: 2018-11-30
Attending: INTERNAL MEDICINE
Payer: MEDICARE

## 2018-11-30 PROCEDURE — 97110 THERAPEUTIC EXERCISES: CPT | Mod: GP | Performed by: PHYSICAL THERAPIST

## 2018-11-30 PROCEDURE — 40000719 ZZHC STATISTIC PT DEPARTMENT NEURO VISIT: Performed by: PHYSICAL THERAPIST

## 2018-11-30 NOTE — TELEPHONE ENCOUNTER
Bart' wife Pat returned phone call and left a message on my voicemail. I returned the call but was not able to reach them. She said they would be at a PT appointment until later in the afternoon.    Left message letting them know I will be back in clinic on Tuesday and will try to reach them then.    Doris Ron, Pharm.D., [facility]  Medication Therapy Management Pharmacist  [phone]

## 2018-12-02 LAB — RADIOLOGIST FLAGS: NORMAL

## 2018-12-04 ENCOUNTER — HOSPITAL ENCOUNTER (OUTPATIENT)
Dept: PHYSICAL THERAPY | Facility: CLINIC | Age: 83
Setting detail: THERAPIES SERIES
End: 2018-12-04
Attending: INTERNAL MEDICINE
Payer: MEDICARE

## 2018-12-04 PROCEDURE — 40000719 ZZHC STATISTIC PT DEPARTMENT NEURO VISIT: Performed by: PHYSICAL THERAPIST

## 2018-12-04 PROCEDURE — 97110 THERAPEUTIC EXERCISES: CPT | Mod: GP | Performed by: PHYSICAL THERAPIST

## 2018-12-06 ENCOUNTER — OFFICE VISIT (OUTPATIENT)
Dept: FAMILY MEDICINE | Facility: CLINIC | Age: 83
End: 2018-12-06
Payer: COMMERCIAL

## 2018-12-06 ENCOUNTER — TELEPHONE (OUTPATIENT)
Dept: FAMILY MEDICINE | Facility: CLINIC | Age: 83
End: 2018-12-06

## 2018-12-06 VITALS
DIASTOLIC BLOOD PRESSURE: 62 MMHG | OXYGEN SATURATION: 98 % | HEART RATE: 107 BPM | BODY MASS INDEX: 32.04 KG/M2 | WEIGHT: 211.4 LBS | HEIGHT: 68 IN | TEMPERATURE: 98.5 F | SYSTOLIC BLOOD PRESSURE: 134 MMHG

## 2018-12-06 DIAGNOSIS — R60.0 EDEMA, PERIPHERAL: ICD-10-CM

## 2018-12-06 DIAGNOSIS — L03.116 CELLULITIS OF LEFT LOWER EXTREMITY: Primary | ICD-10-CM

## 2018-12-06 DIAGNOSIS — Z96.652 STATUS POST TOTAL LEFT KNEE REPLACEMENT: ICD-10-CM

## 2018-12-06 DIAGNOSIS — I73.9 PAD (PERIPHERAL ARTERY DISEASE) (H): ICD-10-CM

## 2018-12-06 LAB
BASOPHILS # BLD AUTO: 0 10E9/L (ref 0–0.2)
BASOPHILS NFR BLD AUTO: 0.1 %
DIFFERENTIAL METHOD BLD: ABNORMAL
EOSINOPHIL # BLD AUTO: 0 10E9/L (ref 0–0.7)
EOSINOPHIL NFR BLD AUTO: 0.3 %
ERYTHROCYTE [DISTWIDTH] IN BLOOD BY AUTOMATED COUNT: 13.3 % (ref 10–15)
HCT VFR BLD AUTO: 38.4 % (ref 40–53)
HGB BLD-MCNC: 11.9 G/DL (ref 13.3–17.7)
LYMPHOCYTES # BLD AUTO: 1.2 10E9/L (ref 0.8–5.3)
LYMPHOCYTES NFR BLD AUTO: 10.7 %
MCH RBC QN AUTO: 32.6 PG (ref 26.5–33)
MCHC RBC AUTO-ENTMCNC: 31 G/DL (ref 31.5–36.5)
MCV RBC AUTO: 105 FL (ref 78–100)
MONOCYTES # BLD AUTO: 0.9 10E9/L (ref 0–1.3)
MONOCYTES NFR BLD AUTO: 8.5 %
NEUTROPHILS # BLD AUTO: 8.8 10E9/L (ref 1.6–8.3)
NEUTROPHILS NFR BLD AUTO: 80.4 %
PLATELET # BLD AUTO: 180 10E9/L (ref 150–450)
RBC # BLD AUTO: 3.65 10E12/L (ref 4.4–5.9)
WBC # BLD AUTO: 10.9 10E9/L (ref 4–11)

## 2018-12-06 PROCEDURE — 99214 OFFICE O/P EST MOD 30 MIN: CPT | Performed by: FAMILY MEDICINE

## 2018-12-06 PROCEDURE — 85025 COMPLETE CBC W/AUTO DIFF WBC: CPT | Performed by: FAMILY MEDICINE

## 2018-12-06 PROCEDURE — 36415 COLL VENOUS BLD VENIPUNCTURE: CPT | Performed by: FAMILY MEDICINE

## 2018-12-06 RX ORDER — SULFAMETHOXAZOLE/TRIMETHOPRIM 800-160 MG
1 TABLET ORAL 2 TIMES DAILY
Qty: 20 TABLET | Refills: 0 | Status: SHIPPED | OUTPATIENT
Start: 2018-12-06 | End: 2019-03-21

## 2018-12-06 NOTE — PROGRESS NOTES
SUBJECTIVE:   Bart Blair is a 87 year old male who presents to clinic today for the following health issues:    Results - Patient would like to go over MRA results from 11/27/18    Joint Pain    Onset: 1 week     Description:   Location: Left leg   Character: Dull ache    Intensity: moderate    Progression of Symptoms: worse    Accompanying Signs & Symptoms:  Other symptoms: warmth, swelling, redness and weeping skin     History:   Previous similar pain: no       Precipitating factors:   Trauma or overuse: no     Alleviating factors:  Improved by: nothing    Therapies Tried and outcome: Lasix - pt is currently taking a whole pill (20MG) daily. Has not seen any improvement.     Patient reports that the discharge has been increasing over the past week, and this is the worst it has been. He denies any bloody discharge. He has had a history of leg swelling and pain. He has no history of CHF. Patient had a MRA of his legs about 2 weeks ago. His cardiologist noted that he may have some narrowing of the arteries in his legs. Patient reports his toes do feel cold, and he always has pain in them as well. He denies pain in the site that the discharge is     Additional Comments:  Patient was on prednisone for giant cell arteritis. He denies pain or vision changes at this time.     Patient reports occasional chest pain, but this resolves with a few deep breaths.     Impression:     1. Abdominal aorta: No MRA abnormality identified.      2. 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 Single vessel runoff by anterior tibial artery  posterior tibial artery is reconstituted at the ankle via collaterals.    3. Left leg: Posterior tibial and peroneal arteries are occluded at  the proximal to mid calf. Single vessel runoff by anterior tibial  artery. Reconstitution of the posterior tibial artery at the ankle  through the collaterals.      4. Nonenhancing foci in both kidneys may represent  "renal cysts, but  are inadequately evaluated on this study. Renal ultrasound could  further evaluate.     5. Decreased T1 in the articular surfaces in the bilateral femurs and  tibia as described, which is most likely related to degenerative  disease. Knee radiographs would better evaluate.          Problem list and histories reviewed & adjusted, as indicated.  Additional history: as documented    BP Readings from Last 3 Encounters:   12/06/18 134/62   11/14/18 128/58   10/29/18 128/66    Wt Readings from Last 3 Encounters:   12/06/18 95.9 kg (211 lb 6.4 oz)   11/14/18 94.3 kg (208 lb)   10/29/18 96.2 kg (212 lb)                    Reviewed and updated as needed this visit by clinical staff  Tobacco  Allergies  Meds  Med Hx  Surg Hx  Fam Hx  Soc Hx      Reviewed and updated as needed this visit by Provider         ROS:  Constitutional, HEENT, cardiovascular, pulmonary, gi and gu systems are negative, except as otherwise noted.    This document serves as a record of the services and decisions personally performed by MERLIN URENA. It was created on his/her behalf by Trish Lo, a trained medical scribe. The creation of this document is based on the provider's statements to the medical scribe. Trish Lo, December 6, 2018 10:41 AM    OBJECTIVE:     /62 (Cuff Size: Adult Regular)  Pulse 107  Temp 98.5  F (36.9  C) (Oral)  Ht 1.727 m (5' 8\")  Wt 95.9 kg (211 lb 6.4 oz)  SpO2 98%  BMI 32.14 kg/m2  Body mass index is 32.14 kg/(m^2).  GENERAL: healthy, alert and no distress  HENT: ear canals and TM's normal, nose and mouth without ulcers or lesions  RESP: lungs clear to auscultation - no rales, rhonchi or wheezes  CV: 3+ edema bilaterally. irregular rhythm, normal rate, normal S1 S2, no S3 or S4, no murmur, click or rub, no peripheral edema and peripheral pulses strong  MS: no gross musculoskeletal defects noted, no edema  SKIN: weeping of yellowish fluid of anterior lower left leg, with surrounding " erythema patchy up to the knee.   NEURO: Normal strength and tone, mentation intact and speech normal  PSYCH: mentation appears normal, affect normal/bright    Diagnostic Test Results:  No results found for this or any previous visit (from the past 24 hour(s)).    ASSESSMENT/PLAN:       ICD-10-CM    1. Cellulitis of left lower extremity L03.116 CBC with platelets differential     sulfamethoxazole-trimethoprim (BACTRIM DS/SEPTRA DS) 800-160 MG tablet   2. Edema, peripheral R60.9    3. Status post total left knee replacement Z96.652    4. PAD (peripheral artery disease) (H) I73.9        I prescribed the patient Bactrim for cellulitis and ordered a CBC.  I advised him to increase his Lasix to 20 mg BID, elevate his legs TID, and use an ACE wrap. Patient recently had a echo, so I am not concerned about CHF at this time. Patient will follow up with Dr. Stewart his PCP in 1 week to recheck.      Patient Instructions   Take Bactrim.     Elevate legs above head 3 times a day.     Increase Lasix to 20 mg twice daily.     Use an ACE wrap.     Follow up with Dr. Stewart in 1 week.     If you can, it is helpful if you fill out a survey about your clinic visit if it is mailed to your house in a few weeks.       Lisa Taveras, DO  [facility]    The information in this document, created by the medical scribe Trish Lo for me, accurately reflects the services I personally performed and the decisions made by me. I have reviewed and approved this document for accuracy prior to leaving the patient care area.

## 2018-12-06 NOTE — TELEPHONE ENCOUNTER
Reason for Call:  Other call back    Detailed comments: Patient's wife is wondering if patient should still go to physical therapy tomorrow with the infection in his leg and since it is all bandaged. Please call to advise    Phone Number Patient can be reached at: Home number on file [phone] (home)    Best Time: anytime    Can we leave a detailed message on this number? YES    Call taken on 12/6/2018 at 11:48 AM by Gaurav Giron

## 2018-12-06 NOTE — MR AVS SNAPSHOT
After Visit Summary   12/6/2018    Bart Blair    MRN: [id number]           Patient Information     Date Of Birth          2/14/1931        Visit Information        Provider Department      12/6/2018 10:40 AM Lisa Taveras DO [facility]        Today's Diagnoses     Cellulitis of left lower extremity    -  1    Edema, peripheral        Status post total left knee replacement        PAD (peripheral artery disease) (H)          Care Instructions    Take Bactrim.     Elevate legs above head 3 times a day.     Increase Lasix to 20 mg twice daily.     Use an ACE wrap.     Follow up with Dr. Stewart in 1 week.     If you can, it is helpful if you fill out a survey about your clinic visit if it is mailed to your house in a few weeks.           Follow-ups after your visit        Your next 10 appointments already scheduled     Dec 07, 2018 10:15 AM CST   Neuro Treatment with Sujey Carrillo, PT   [facility] ([facility])    [address], [address]  [place] [address]   [phone]            Dec 11, 2018 10:00 AM CST   Neuro Treatment with Masha Sherwood, PT   [facility], [facility] - [facility]patient ([facility])    [address], [address]  [place] [address]   [phone]            Dec 12, 2018  1:40 PM CST   [facility]uy [facility]   [facility]    [address] [place] [id number]   [phone]            Dec 14, 2018 10:15 AM CST   Neuro Treatment with Sujey Carrillo PT   [facility])    [address], [address]  [place] [address]   [phone]            Dec 21, 2018  9:30 AM CST   Neuro Treatment with Sujey Carrillo PT   [facility]  ([facility])    [address]  [place] [address]   [phone]            Jan 28, 2019 12:00 AM CST   CARDIAC DEVICE CHECK - REMOTE with  ICD REMOTE   [facility] ([facility])    [address]  [address]  [facility] [address] [phone]            Feb 05, 2019 11:30 AM CST   (Arrive by 11:15 AM)   Return Vascular Visit with Azul Meade MD   [facility] ([facility])    [address]  [address]  [place] [id number] [phone]            Mar 14, 2019 10:30 AM CDT   (Arrive by 10:15 AM)   CARDIAC DEVICE CHECK - IN CLINIC with  CV DEVICE 1   [facility] ([facility])    [address] [place] [address]  3rd Floor  [place] [id number] [phone]            Mar 14, 2019 11:00 AM CDT   (Arrive by 10:45 AM)   RETURN ARRHYTHMIA with JOSEPH Beth CNP   [facility])    [address] [place] [address]  [address] [place] [id number] [phone]              Who to contact     If you have questions or need follow up information about today's clinic visit or your schedule please contact [facility] directly at [phone].  Normal or non-critical lab and imaging results will be communicated to you by MyChart, letter or phone within 4 business days after the clinic has received the results. If you do not hear from us within 7 days, please contact the clinic through MyChart or phone. If you have a critical or abnormal lab result, we will notify you by phone as soon as possible.  Submit refill requests through [employer] or call your pharmacy and they will forward the refill request to us. Please allow 3 business days for your refill to be completed.          Additional Information About Your Visit        [employer] Information     [employer] gives you secure  "access to your electronic health record. If you see a primary care provider, you can also send messages to your care team and make appointments. If you have questions, please call your primary care clinic.  If you do not have a primary care provider, please call [phone] and they will assist you.        Care EveryWhere ID     This is your Care EveryWhere ID. This could be used by other organizations to access your [facility] [place] medical records  [id number]        Your Vitals Were     Pulse Temperature Height Pulse Oximetry BMI (Body Mass Index)       107 98.5  F (36.9  C) (Oral) 5' 8\" (1.727 m) 98% 32.14 kg/m2        Blood Pressure from Last 3 Encounters:   12/06/18 134/62   11/14/18 128/58   10/29/18 128/66    Weight from Last 3 Encounters:   12/06/18 211 lb 6.4 oz (95.9 kg)   11/14/18 208 lb (94.3 kg)   10/29/18 212 lb (96.2 kg)              We Performed the Following     CBC with platelets differential          Today's Medication Changes          These changes are accurate as of 12/6/18 11:23 AM.  If you have any questions, ask your nurse or doctor.               Start taking these medicines.        Dose/Directions    sulfamethoxazole-trimethoprim 800-160 MG tablet   Commonly known as:  BACTRIM DS/SEPTRA DS   Used for:  Cellulitis of left lower extremity   Started by:  Lisa Taveras DO        Dose:  1 tablet   Take 1 tablet by mouth 2 times daily   Quantity:  20 tablet   Refills:  0         These medicines have changed or have updated prescriptions.        Dose/Directions    furosemide 20 MG tablet   Commonly known as:  LASIX   This may have changed:    - how much to take  - additional instructions   Used for:  Edema, unspecified type        1/2 tablet per day. If weight increases by 3-5 pounds then increase to 20 mg (1 tablet). If weight decreases to 205 then ok to hold.   Quantity:  90 tablet   Refills:  3            Where to get your medicines      These medications were sent to [facility] - [place], [place] - [address]  [address] [facility] [address]     Phone:  [phone]     sulfamethoxazole-trimethoprim 800-160 MG tablet                Primary Care Provider Office Phone # Fax #    Chuy Stewart [phone]       [address] [address]        Equal Access to Services     TIFFANI ABDUL : Hadii aad ku hadasho Soomaali, waaxda luqadaha, qaybta kaalmada adeegyada, waxay idiin hayaan adeeg kharash la'aan ah. So [facility] [phone].    ATENCIÓN: Si habla espbarbara, tiene a ellis disposición servicios gratuitos de asistencia lingüística. Alejandro al [phone].    We comply with applicable federal civil rights laws and [place] laws. We do not discriminate on the basis of race, color, national origin, age, disability, sex, sexual orientation, or gender identity.            Thank you!     Thank you for choosing [facility]  for your care. Our goal is always to provide you with excellent care. Hearing back from our patients is one way we can continue to improve our services. Please take a few minutes to complete the written survey that you may receive in the mail after your visit with us. Thank you!             Your Updated Medication List - Protect others around you: Learn how to safely use, store and throw away your medicines at www.disposemymeds.org.          This list is accurate as of 12/6/18 11:23 AM.  Always use your most recent med list.                   Brand Name Dispense Instructions for use Diagnosis    amoxicillin 500 MG capsule    AMOXIL    16 capsule    4 tablets prior to dental appointment. Use for dental appointments    Status post total left knee replacement       aspirin 81 MG tablet    ASA     Take 1 tablet by mouth daily.    Hypertension goal BP (blood pressure) < 130/80       atorvastatin 10 MG tablet    LIPITOR    90 tablet    Take 1 tablet (10 mg) by mouth daily Refill when requested    Hyperlipidemia  LDL goal <100, Type 2 diabetes mellitus with other ophthalmic complication, without long-term current use of insulin (H)       blood glucose monitoring lancets     100 each    Check blood sugar once daily    Type 2 diabetes mellitus with other ophthalmic complication, without long-term current use of insulin (H)       blood glucose monitoring test strip    ACCU-CHEK COMPACT STRIPS    100 each    1 strip by In Vitro route daily    Type 2 diabetes mellitus with other ophthalmic complication, without long-term current use of insulin (H)       cilostazol 50 MG tablet    PLETAL    180 tablet    Take 1 tablet (50 mg) by mouth 2 times daily    Atherosclerosis of native artery of both lower extremities with intermittent claudication (H)       diclofenac 1 % topical gel    VOLTAREN    300 g    APPLY 4 GRAMS TO KNEES OR 2 GRAMS TO HANDS FOUR TIMES A DAY USING ENCLOSED DOSING CARD    Arthritis       doxycycline monohydrate 100 MG capsule    MONODOX    60 capsule    1 tablet 2 time daily for Rosacea flares    Rosacea       fluticasone 50 MCG/ACT nasal spray    FLONASE    16 g    Spray 1-2 sprays into both nostrils daily    Chronic rhinitis       FOLIC ACID PO      Take 1 mg by mouth daily        furosemide 20 MG tablet    LASIX    90 tablet    1/2 tablet per day. If weight increases by 3-5 pounds then increase to 20 mg (1 tablet). If weight decreases to 205 then ok to hold.    Edema, unspecified type       glipiZIDE 5 MG tablet    GLUCOTROL    90 tablet    Take 1 tablet (5 mg) by mouth every morning (before breakfast)    Type 2 diabetes mellitus with hyperglycemia, without long-term current use of insulin (H)       irbesartan 75 MG tablet    AVAPRO     Take 1 tablet (75 mg) by mouth daily    Hypertension goal BP (blood pressure) < 150/90       linagliptin 5 MG Tabs tablet    TRADJENTA    30 tablet    Take 1 tablet (5 mg) by mouth daily    Type 2 diabetes mellitus with other ophthalmic complication, without long-term current use  of insulin (H)       metroNIDAZOLE 0.75 % external cream    METROCREAM    45 g    Apply topically 2 times daily    Rosacea       MULTI vitamin  MENS Tabs      Take  by mouth. Takes one daily        PREDNISONE PO      Take 5 mg by mouth Currently taking 4 tablets (20mg) daily per patient (this medication is being taken care of through [facility]).        senna-docusate 8.6-50 MG tablet    SENOKOT-S/PERICOLACE     Take 1-2 tablets by mouth Takes about 2-3 times weekly        sertraline 25 MG tablet    ZOLOFT    90 tablet    TAKE ONE TABLET BY MOUTH EVERY DAY    Dysthymia       sulfamethoxazole-trimethoprim 800-160 MG tablet    BACTRIM DS/SEPTRA DS    20 tablet    Take 1 tablet by mouth 2 times daily    Cellulitis of left lower extremity       TYLENOL 500 MG tablet   Generic drug:  acetaminophen     100 tablet    Take 2 tablets (1,000 mg) by mouth 3 times daily        Vitamin B-12 CR 1000 MCG Tbcr     60 tablet    TAKE ONE TABLET BY MOUTH EVERY DAY    Parkinson disease (H)       vitamin D3 1000 units (25 mcg) tablet    CHOLECALCIFEROL    100    1 TABLET DAILY    Contusion of ribs

## 2018-12-06 NOTE — PATIENT INSTRUCTIONS
Take Bactrim.     Elevate legs above head 3 times a day.     Increase Lasix to 20 mg twice daily.     Use an ACE wrap.     Follow up with Dr. Stewart in 1 week.     If you can, it is helpful if you fill out a survey about your clinic visit if it is mailed to your house in a few weeks.

## 2018-12-06 NOTE — TELEPHONE ENCOUNTER
" Bart Blair is a 87 year old male who calls with leg problem.    NURSING ASSESSMENT:  Description:  See below. Wife states his leg is red and not swollen, but his foot is red and swollen. It is weeping clear fluid. Denies chest pain, shortness of breath, cold/blue extremity, coughing up pink frothy sputum, trouble walking.   Onset/duration:  3 days  Precip. factors:  n/a  Associated symptoms:  See description  Improves/worsens symptoms:  n/a  Pain scale (0-10)   0/10  Allergies:   Allergies   Allergen Reactions     No Known Drug Allergies      NURSING PLAN: Nursing advice to patient seek medical care in 2-4 hours    RECOMMENDED DISPOSITION:  See in 4 hours  Will comply with recommendation: Yes  If further questions/concerns or if symptoms do not improve, worsen or new symptoms develop, call your PCP or [facility] as soon as possible.      Guideline used:  Telephone Triage Protocols for Nurses, Fifth Edition, Esther Spears \"leg pain/swelling\"    Gaurav Mclaughlin RN    "

## 2018-12-06 NOTE — TELEPHONE ENCOUNTER
Reason for call:  Patient reporting a symptom    Symptom or request: Whole leg is red, is weeping a clear fluid    Duration (how long have symptoms been present): for 2-3 days    Have you been treated for this before? No    Additional comments: Wife Malathi is calling to speak with a nurse.  Very concerned has not seen anything like it before.    Phone Number patient can be reached at:  Home number on file [phone] (home)    Best Time:  Any    Can we leave a detailed message on this number:  YES    Call taken on 12/6/2018 at 7:38 AM by Muna Chinchilla

## 2018-12-07 DIAGNOSIS — M19.90 ARTHRITIS: ICD-10-CM

## 2018-12-07 NOTE — TELEPHONE ENCOUNTER
"Requested Prescriptions   Pending Prescriptions Disp Refills     diclofenac (VOLTAREN) 1 % topical gel  Last Written Prescription Date:  11/16/2017  Last Fill Quantity: 300 g,  # refills: 3   Last office visit: 11/14/2018 with prescribing provider:  BERKLEY Stewart   Future Office Visit:   Next 5 appointments (look out 90 days)     Dec 12, 2018  1:40 PM CST   SHORT with Chuy Stewart MD   [facility] ([facility])    [address] [place] [id number] [phone]               300 g 3     Sig: APPLY 4 GRAMS TO KNEES OR 2 GRAMS TO HANDS FOUR TIMES A DAY USING ENCLOSED DOSING CARD    Topical Steroids and Nonsteroidals Protocol Passed    12/7/2018 11:04 AM       Passed - Patient is age 6 or older       Passed - Authorizing prescriber's most recent note related to this medication read.    If refill request is for ophthalmic use, please forward request to provider for approval.         Passed - High potency steroid not ordered       Passed - Recent (12 mo) or future (30 days) visit within the authorizing provider's specialty    Patient had office visit in the last 12 months or has a visit in the next 30 days with authorizing provider or within the authorizing provider's specialty.  See \"Patient Info\" tab in inbasket, or \"Choose Columns\" in Meds & Orders section of the refill encounter.                "

## 2018-12-08 ENCOUNTER — HOSPITAL ENCOUNTER (EMERGENCY)
Facility: CLINIC | Age: 83
Discharge: HOME | End: 2018-12-08
Attending: EMERGENCY MEDICINE | Admitting: EMERGENCY MEDICINE
Payer: MEDICARE

## 2018-12-08 ENCOUNTER — NURSE TRIAGE (OUTPATIENT)
Dept: NURSING | Facility: CLINIC | Age: 83
End: 2018-12-08

## 2018-12-08 ENCOUNTER — APPOINTMENT (OUTPATIENT)
Dept: ULTRASOUND IMAGING | Facility: CLINIC | Age: 83
End: 2018-12-08
Attending: EMERGENCY MEDICINE
Payer: MEDICARE

## 2018-12-08 VITALS
TEMPERATURE: 98.7 F | DIASTOLIC BLOOD PRESSURE: 80 MMHG | SYSTOLIC BLOOD PRESSURE: 139 MMHG | HEART RATE: 69 BPM | OXYGEN SATURATION: 100 %

## 2018-12-08 DIAGNOSIS — R60.9 EDEMA, UNSPECIFIED TYPE: ICD-10-CM

## 2018-12-08 LAB
ALBUMIN SERPL-MCNC: 2.7 G/DL (ref 3.4–5)
ALP SERPL-CCNC: 49 U/L (ref 40–150)
ALT SERPL W P-5'-P-CCNC: 38 U/L (ref 0–70)
ANION GAP SERPL CALCULATED.3IONS-SCNC: 7 MMOL/L (ref 3–14)
AST SERPL W P-5'-P-CCNC: 19 U/L (ref 0–45)
BASOPHILS # BLD AUTO: 0 10E9/L (ref 0–0.2)
BASOPHILS NFR BLD AUTO: 0.1 %
BILIRUB SERPL-MCNC: 0.3 MG/DL (ref 0.2–1.3)
BUN SERPL-MCNC: 32 MG/DL (ref 7–30)
CALCIUM SERPL-MCNC: 8.3 MG/DL (ref 8.5–10.1)
CHLORIDE SERPL-SCNC: 101 MMOL/L (ref 94–109)
CO2 SERPL-SCNC: 30 MMOL/L (ref 20–32)
CREAT SERPL-MCNC: 1.41 MG/DL (ref 0.66–1.25)
CRP SERPL-MCNC: 9.8 MG/L (ref 0–8)
DIFFERENTIAL METHOD BLD: ABNORMAL
EOSINOPHIL # BLD AUTO: 0 10E9/L (ref 0–0.7)
EOSINOPHIL NFR BLD AUTO: 0.2 %
ERYTHROCYTE [DISTWIDTH] IN BLOOD BY AUTOMATED COUNT: 12.9 % (ref 10–15)
GFR SERPL CREATININE-BSD FRML MDRD: 47 ML/MIN/1.7M2
GLUCOSE BLDC GLUCOMTR-MCNC: 202 MG/DL (ref 70–99)
GLUCOSE SERPL-MCNC: 200 MG/DL (ref 70–99)
HCT VFR BLD AUTO: 34.4 % (ref 40–53)
HGB BLD-MCNC: 10.8 G/DL (ref 13.3–17.7)
IMM GRANULOCYTES # BLD: 0.2 10E9/L (ref 0–0.4)
IMM GRANULOCYTES NFR BLD: 1.4 %
LYMPHOCYTES # BLD AUTO: 0.7 10E9/L (ref 0.8–5.3)
LYMPHOCYTES NFR BLD AUTO: 6.6 %
MCH RBC QN AUTO: 32 PG (ref 26.5–33)
MCHC RBC AUTO-ENTMCNC: 31.4 G/DL (ref 31.5–36.5)
MCV RBC AUTO: 102 FL (ref 78–100)
MONOCYTES # BLD AUTO: 0.6 10E9/L (ref 0–1.3)
MONOCYTES NFR BLD AUTO: 5.9 %
NEUTROPHILS # BLD AUTO: 9.2 10E9/L (ref 1.6–8.3)
NEUTROPHILS NFR BLD AUTO: 85.8 %
NRBC # BLD AUTO: 0 10*3/UL
NRBC BLD AUTO-RTO: 0 /100
PLATELET # BLD AUTO: 174 10E9/L (ref 150–450)
POTASSIUM SERPL-SCNC: 4.6 MMOL/L (ref 3.4–5.3)
PROT SERPL-MCNC: 6.2 G/DL (ref 6.8–8.8)
RBC # BLD AUTO: 3.37 10E12/L (ref 4.4–5.9)
SODIUM SERPL-SCNC: 138 MMOL/L (ref 133–144)
WBC # BLD AUTO: 10.7 10E9/L (ref 4–11)

## 2018-12-08 PROCEDURE — 80053 COMPREHEN METABOLIC PANEL: CPT | Performed by: EMERGENCY MEDICINE

## 2018-12-08 PROCEDURE — 00000146 ZZHCL STATISTIC GLUCOSE BY METER IP

## 2018-12-08 PROCEDURE — 99284 EMERGENCY DEPT VISIT MOD MDM: CPT | Mod: Z6 | Performed by: EMERGENCY MEDICINE

## 2018-12-08 PROCEDURE — 93971 EXTREMITY STUDY: CPT | Mod: LT

## 2018-12-08 PROCEDURE — 85025 COMPLETE CBC W/AUTO DIFF WBC: CPT | Performed by: EMERGENCY MEDICINE

## 2018-12-08 PROCEDURE — 86140 C-REACTIVE PROTEIN: CPT | Performed by: EMERGENCY MEDICINE

## 2018-12-08 PROCEDURE — 99284 EMERGENCY DEPT VISIT MOD MDM: CPT | Mod: 25

## 2018-12-08 ASSESSMENT — ENCOUNTER SYMPTOMS
DIAPHORESIS: 0
EYE REDNESS: 0
SHORTNESS OF BREATH: 0
HEADACHES: 0
DIFFICULTY URINATING: 0
CHILLS: 0
ABDOMINAL PAIN: 0
NECK STIFFNESS: 0
ARTHRALGIAS: 0
FEVER: 0
COLOR CHANGE: 0
CONFUSION: 0

## 2018-12-08 NOTE — ED PROVIDER NOTES
"  History     Chief Complaint   Patient presents with     Leg Swelling     chronic swelling in lower legs but much worse the last week; left lower leg red and swollen, some weeping reportedly from tiny wound area just above ankle; saw MD on Wednesday and placed on antibiotics;  tender in area; denies fever or chills     HPI  Bart Blair is a 87 year old male with a history of Parkinson's disease, HTN, CKD III, DM II, anemia who presents to the ED for chronic bilateral leg swelling that is worsening. He was seen in clinic Wednesday, 3 days ago, for swelling and redness. He was prescribed bactrim for cellulitis and was advised to increase Lasix to 20 mg BID. Patient is to follow-up in 1 week. Both the feet were wrapped because it was draining a lot. The family state it looked like it was getting worse and more red so they unwrapped it. Family states they touched his foot and he jumped so they brought him to the ED for evaluation. They note the swelling has gone down since.     The patient has more swelling on the right than the left, and notes this was not witnessed before. He reports BLE pain throughout the night. He reports bilateral toe pain. He is not using compression socks because it is \"impossible to get on\".  The patient denies fever, chills, or diaphoresis. He denies past history of DVT or PE. He states he is not anticoagulated. Family is worried of a clot. The patient wears a heart look due to faint spells in the past, last check was last month. He has no trouble sleeping flat. Yesterday family noticed swelling above the leg wrap.       MRA angiogram lower extremity bilateral with contrast, 11/02/18:  Impression:  1. Abdominal aorta: No MRA abnormality identified.   2. 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 Single vessel runoff by anterior tibial artery  posterior tibial artery is reconstituted at the ankle via collaterals.  3. Left leg: Posterior tibial and " peroneal arteries are occluded at  the proximal to mid calf. Single vessel runoff by anterior tibial  artery. Reconstitution of the posterior tibial artery at the ankle  through the collaterals.   4. Nonenhancing foci in both kidneys may represent renal cysts, but  are inadequately evaluated on this study. Renal ultrasound could  further evaluate.  5. Decreased T1 in the articular surfaces in the bilateral femurs and  tibia as described, which is most likely related to degenerative  disease. Knee radiographs would better evaluate.  Echocardiogram 10/17/18:   Interpretation Summary  Global and regional left ventricular function is normal with an EF of 60-65%.  Right ventricular function, chamber size, wall motion, and thickness are  normal.  The inferior vena cava is normal.   Remote loop/optival interrogation, 10/26/18:  ''[email or web address] remote transmission received and reviewed. Device transmission sent per MD orders. 40 episodes have been recorded as AF, since 10/16/18. It is hard to unable to determine if rhythm is SR with PACs vs AF... He has not been symptomatic with the episodes. His presenting rhythm appears to be SR with PACs vs AF. Pt takes aspirin 81 mg daily.''    I have reviewed the Medications, Allergies, Past Medical and Surgical History, and Social History in the [employer] system.  Past Medical History:   Diagnosis Date     Anemia      Anemia due to blood loss, acute      CKD (chronic kidney disease) stage 3, GFR 30-59 ml/min (H) 5/31/2010     Essential hypertension, benign      Other and unspecified hyperlipidemia      Other and unspecified hyperlipidemia      Other specified disorder of skin      Pain in joint, shoulder region      Parkinson disease (H) 9/2/2010     Polyp of vocal cord or larynx      Retinal ischemia     right sided thrombotic plaque     Rosacea      Type II or unspecified type diabetes mellitus without mention of complication, not stated as uncontrolled        Past Surgical History:    Procedure Laterality Date     ARTHROPLASTY KNEE  1/31/2012    Procedure:ARTHROPLASTY KNEE; LEFT TOTAL KNEE ARTHROPLASTY (IRASEMA)^; Surgeon:SKIP FAIR; Location:[facility]     ARTHROSCOPY SHOULDER, OPEN ROTATOR CUFF REPAIR, COMBINED  4/24/2012    Procedure:COMBINED ARTHROSCOPY SHOULDER, OPEN ROTATOR CUFF REPAIR; RIGHT SHOULDER ARTHROSCOPY OPEN ROTATOR CUFF DEBRIDEMENT, SUBCROMIAL DECOMPRESSION, AND BICEPS TENODESIS REPAIR; Surgeon:SKIP FAIR; Location: [facility]     [facility] SURGICAL PATHOLOGY  6-    Dr. Bowers; left hand     ENT SURGERY       INCISE FINGER TENDON SHEATH  2008    Dr. Bowers; right AND LEFT hand     THROAT SURGERY      vocal cord polyps       Family History   Problem Relation Age of Onset     Family History Negative Other      unknown family history per patient       Social History   Substance Use Topics     Smoking status: Never Smoker     Smokeless tobacco: Never Used     Alcohol use Yes      Comment: couple beers everyday       No current facility-administered medications for this encounter.      Current Outpatient Prescriptions   Medication     acetaminophen (TYLENOL) 500 MG tablet     aspirin 81 MG tablet     atorvastatin (LIPITOR) 10 MG tablet     cilostazol (PLETAL) 50 MG tablet     Cyanocobalamin (VITAMIN B-12 CR) 1000 MCG TBCR     diclofenac (VOLTAREN) 1 % GEL topical gel     FOLIC ACID PO     glipiZIDE (GLUCOTROL) 5 MG tablet     irbesartan (AVAPRO) 75 MG tablet     linagliptin (TRADJENTA) 5 MG TABS tablet     metroNIDAZOLE (METROCREAM) 0.75 % cream     Multiple Vitamin (MULTI VITAMIN  MENS) TABS     PREDNISONE PO     sertraline (ZOLOFT) 25 MG tablet     sulfamethoxazole-trimethoprim (BACTRIM DS/SEPTRA DS) 800-160 MG tablet     VITAMIN D 1000 UNIT OR TABS     amoxicillin (AMOXIL) 500 MG capsule     blood glucose monitoring (ACCU-CHEK COMPACT STRIPS) test strip     blood glucose monitoring (SOFTCLIX) lancets     doxycycline Monohydrate 100 MG CAPS     fluticasone (FLONASE) 50 MCG/ACT  spray     furosemide (LASIX) 20 MG tablet     senna-docusate (SENOKOT-S;PERICOLACE) 8.6-50 MG per tablet        Allergies   Allergen Reactions     No Known Drug Allergies        Review of Systems   Constitutional: Negative for chills, diaphoresis and fever.   HENT: Negative for congestion.    Eyes: Negative for redness.   Respiratory: Negative for shortness of breath.    Cardiovascular: Positive for leg swelling (BLE). Negative for chest pain.   Gastrointestinal: Negative for abdominal pain.   Genitourinary: Negative for difficulty urinating.   Musculoskeletal: Negative for arthralgias and neck stiffness.        Bilateral toe pain   Skin: Negative for color change.   Neurological: Negative for headaches.   Psychiatric/Behavioral: Negative for confusion.   All other systems reviewed and are negative.      Physical Exam   BP: 137/68  Heart Rate: 103  Temp: 98.7  F (37.1  C)  SpO2: 98 %      Physical Exam   Constitutional: No distress.   HENT:   Head: Atraumatic.   Mouth/Throat: Oropharynx is clear and moist. No oropharyngeal exudate.   Eyes: Pupils are equal, round, and reactive to light. No scleral icterus.   Cardiovascular: Normal heart sounds and intact distal pulses.    Pulmonary/Chest: Breath sounds normal. No respiratory distress.   Abdominal: Soft. Bowel sounds are normal. There is no tenderness.   Musculoskeletal: He exhibits edema. He exhibits no tenderness.   Skin: Skin is warm. No rash noted. He is not diaphoretic.       ED Course     ED Course     Procedures             Labs Ordered and Resulted from Time of ED Arrival Up to the Time of Departure from the ED   GLUCOSE BY METER - Abnormal; Notable for the following:        Result Value    Glucose 202 (*)     All other components within normal limits   CBC WITH PLATELETS DIFFERENTIAL - Abnormal; Notable for the following:     RBC Count 3.37 (*)     Hemoglobin 10.8 (*)     Hematocrit 34.4 (*)      (*)     MCHC 31.4 (*)     Absolute Neutrophil 9.2 (*)      Absolute Lymphocytes 0.7 (*)     All other components within normal limits   CRP INFLAMMATION - Abnormal; Notable for the following:     CRP Inflammation 9.8 (*)     All other components within normal limits   COMPREHENSIVE METABOLIC PANEL - Abnormal; Notable for the following:     Glucose 200 (*)     Urea Nitrogen 32 (*)     Creatinine 1.41 (*)     GFR Estimate 47 (*)     GFR Estimate If Black 57 (*)     Calcium 8.3 (*)     Albumin 2.7 (*)     Protein Total 6.2 (*)     All other components within normal limits   GLUCOSE MONITOR NURSING POCT            Assessments & Plan (with Medical Decision Making)   87-year-old male with a long-standing history of edema presents for further evaluation of leg swelling.  Patient was recently seen and evaluated and started on Bactrim for possible left lower leg infection.  Patient was noted to have increased swelling today.  Exam revealed bilateral pitting edema to the mid thighs.  There is skin erythema over the left distal lower extremity.  Differential included cellulitis, lymphedema, congestive heart failure, liver or kidney disease.  Laboratories were obtained including CBC revealing normal white blood count of 10.7.  CRP was minimally elevated at 9.8.  Comprehensive metabolic panel revealed glucose of 200 and low albumin and protein.  Creatinine was close to baseline at 1.41 but elevated.  Ultrasound of the left lower extremity revealed no evidence of DVT.  Patient is known to have venous insufficiency which is contributing to her edema.  I have given a number of recommendations including elevation, continuing diuretic, compression stockings as well as follow-up with primary physician.    I have reviewed the nursing notes.    I have reviewed the findings, diagnosis, plan and need for follow up with the patient.    Current Discharge Medication List          Final diagnoses:   Edema, unspecified type     I, Poppy Medina, am serving as a trained medical scribe to document  services personally performed by Lauro Castro MD, based on the provider's statements to me.   I, Lauro Castro MD, was physically present and have reviewed and verified the accuracy of this note documented by Poppy Medina.    12/8/2018   [facility], [place], EMERGENCY DEPARTMENT     Mandeep Castro MD  12/08/18 1053

## 2018-12-08 NOTE — DISCHARGE INSTRUCTIONS
Continue current medications   cellulitis  Cellulitis is an infection of the deep layers of skin. A break in the skin, such as a cut or scratch, can let bacteria under the skin. If the bacteria get to deep layers of the skin, it can be serious. If not treated, cellulitis can get into the bloodstream and lymph nodes. The infection can then spread throughout the body. This causes serious illness.  Cellulitis causes the affected skin to become red, swollen, warm, and sore. The reddened areas have a visible border. An open sore may leak fluid (pus). You may have a fever, chills, and pain.  Cellulitis is treated with antibiotics taken for 7 to 10 days. An open sore may be cleaned and covered with cool wet gauze. Symptoms should get better 1 to 2 days after treatment is started. Make sure to take all the antibiotics for the full number of days until they are gone. Keep taking the medicine even if your symptoms go away.  Home care  Follow these tips:    Limit the use of the part of your body with cellulitis.     If the infection is on your leg, keep your leg raised while sitting. This will help to reduce swelling.    Take all of the antibiotic medicine exactly as directed until it is gone. Do not miss any doses, especially during the first 7 days. Don t stop taking the medicine when your symptoms get better.    Keep the affected area clean and dry.    Wash your hands with soap and warm water before and after touching your skin. Anyone else who touches your skin should also wash his or her hands. Don't share towels.  Follow-up care  Follow up with your healthcare provider, or as advised. If your infection does not go away on the first antibiotic, your healthcare provider will prescribe a different one.  When to seek medical advice  Call your healthcare provider right away if any of these occur:    Red areas that spread    Swelling or pain that gets worse    Fluid leaking from the skin (pus)    Fever higher of 100.4  F  (38.0  C) or higher after 2 days on antibiotics  Date Last Reviewed: 9/1/2016 2000-2018 [employer]. [address] [place] [address], [place] [address]. All rights reserved. This information is not intended as a substitute for professional medical care. Always follow your healthcare professional's instructions.

## 2018-12-08 NOTE — ED AVS SNAPSHOT
[facility], Emergency Department    [address]    [place] [address]    Phone:  [phone]    Fax:  [phone]                                       Bart Blair   MRN: [id number]    Department:  [facility], Emergency Department   Date of Visit:  12/8/2018           Patient Information     Date Of Birth          2/14/1931        Your diagnoses for this visit were:     Edema, unspecified type        You were seen by Mandeep Castro MD.      Follow-up Information     Follow up with Chuy Stewart MD.    Specialty:  Family Practice    Contact information:    [address] [facility] [address]  [phone]          Discharge Instructions         Continue current medications   cellulitis  Cellulitis is an infection of the deep layers of skin. A break in the skin, such as a cut or scratch, can let bacteria under the skin. If the bacteria get to deep layers of the skin, it can be serious. If not treated, cellulitis can get into the bloodstream and lymph nodes. The infection can then spread throughout the body. This causes serious illness.  Cellulitis causes the affected skin to become red, swollen, warm, and sore. The reddened areas have a visible border. An open sore may leak fluid (pus). You may have a fever, chills, and pain.  Cellulitis is treated with antibiotics taken for 7 to 10 days. An open sore may be cleaned and covered with cool wet gauze. Symptoms should get better 1 to 2 days after treatment is started. Make sure to take all the antibiotics for the full number of days until they are gone. Keep taking the medicine even if your symptoms go away.  Home care  Follow these tips:    Limit the use of the part of your body with cellulitis.     If the infection is on your leg, keep your leg raised while sitting. This will help to reduce swelling.    Take all of the antibiotic medicine exactly as directed until it is gone. Do not miss any doses, especially during the first 7 days.  Don t stop taking the medicine when your symptoms get better.    Keep the affected area clean and dry.    Wash your hands with soap and warm water before and after touching your skin. Anyone else who touches your skin should also wash his or her hands. Don't share towels.  Follow-up care  Follow up with your healthcare provider, or as advised. If your infection does not go away on the first antibiotic, your healthcare provider will prescribe a different one.  When to seek medical advice  Call your healthcare provider right away if any of these occur:    Red areas that spread    Swelling or pain that gets worse    Fluid leaking from the skin (pus)    Fever higher of 100.4  F (38.0  C) or higher after 2 days on antibiotics  Date Last Reviewed: 9/1/2016 2000-2018 [employer]. [address] [place] [address]. All rights reserved. This information is not intended as a substitute for professional medical care. Always follow your healthcare professional's instructions.          Discharge References/Attachments     LEG SWELLING IN BOTH LEGS (ENGLISH)      Your next 10 appointments already scheduled     Dec 12, 2018  1:40 PM CST   SHORT with Chuy Stewart MD   [facility]    [address] [place] [address]   [phone]            Dec 14, 2018 10:15 AM CST   Neuro Treatment with Sujey Carrillo, TRACY   [facility] [place], Physical Therapy - Outpatient ([facility])    [address]  [place] [address]   [phone]            Dec 21, 2018  9:30 AM CST   Neuro Treatment with Sujey Carrillo PT   [facility] ([facility])    [address]  [place] [address]   [phone]            Jan 28, 2019 12:00 AM CST   CARDIAC DEVICE CHECK - REMOTE with  ICD REMOTE   [facility]  [facility] ([facility])    [address]  [address]  [facility] [address]   [phone]            Feb 05, 2019 11:30 AM CST   (Arrive by 11:15 AM)   Return Vascular Visit with Azul Meade MD   [facility] ([facility])    [address] [place] [address]  [address]  [facility] [address]   [phone]            Mar 14, 2019 10:30 AM CDT   (Arrive by 10:15 AM)   CARDIAC DEVICE CHECK - IN CLINIC with  CV DEVICE 1   [facility] ([facility])    [address] [place] [address]  3rd Floor  [facility] [address]   [phone]            Mar 14, 2019 11:00 AM CDT   (Arrive by 10:45 AM)   RETURN ARRHYTHMIA with JOSEPH Beth CNP   [facility] ([facility])    [address] [place] [address]  [address] [place] [address]   [phone]            Jun 20, 2019 12:00 AM CDT   CARDIAC DEVICE CHECK - REMOTE with  ICD REMOTE   [facility] ([facility])    [address] [place] [address]  [address] [place] [address]   [phone]              24 Hour Appointment Hotline       To make an appointment at any [facility] call [phone] ([phone]). If you don't have a family doctor or clinic, we will help you find one. [facility] are conveniently located to serve the needs of you and your family.             Review of your medicines      Our records show that you are taking the medicines listed below. If these are incorrect, please call your family doctor or clinic.        Dose / Directions Last dose taken    amoxicillin 500 MG capsule   Commonly known as:  AMOXIL   Quantity:  16 capsule        4 tablets prior to dental appointment. Use for dental appointments   Refills:  4        aspirin 81 MG tablet   Commonly known as:  ASA   Dose:  1 tablet        Take 1 tablet by mouth daily.   Refills:  3        atorvastatin 10 MG tablet   Commonly known  as:  LIPITOR   Dose:  10 mg   Quantity:  90 tablet        Take 1 tablet (10 mg) by mouth daily Refill when requested   Refills:  3        blood glucose monitoring lancets   Quantity:  100 each        Check blood sugar once daily   Refills:  3        blood glucose monitoring test strip   Commonly known as:  ACCU-CHEK COMPACT STRIPS   Dose:  1 strip   Quantity:  100 each        1 strip by In Vitro route daily   Refills:  11        cilostazol 50 MG tablet   Commonly known as:  PLETAL   Dose:  50 mg   Quantity:  180 tablet        Take 1 tablet (50 mg) by mouth 2 times daily   Refills:  3        diclofenac 1 % topical gel   Commonly known as:  VOLTAREN   Quantity:  300 g        APPLY 4 GRAMS TO KNEES OR 2 GRAMS TO HANDS FOUR TIMES A DAY USING ENCLOSED DOSING CARD   Refills:  3        doxycycline monohydrate 100 MG capsule   Commonly known as:  MONODOX   Quantity:  60 capsule        1 tablet 2 time daily for Rosacea flares   Refills:  3        fluticasone 50 MCG/ACT nasal spray   Commonly known as:  FLONASE   Dose:  1-2 spray   Quantity:  16 g        Spray 1-2 sprays into both nostrils daily   Refills:  5        FOLIC ACID PO   Dose:  1 mg        Take 1 mg by mouth daily   Refills:  0        furosemide 20 MG tablet   Commonly known as:  LASIX   Quantity:  90 tablet        1/2 tablet per day. If weight increases by 3-5 pounds then increase to 20 mg (1 tablet). If weight decreases to 205 then ok to hold.   Refills:  3        glipiZIDE 5 MG tablet   Commonly known as:  GLUCOTROL   Dose:  5 mg   Quantity:  90 tablet        Take 1 tablet (5 mg) by mouth every morning (before breakfast)   Refills:  3        irbesartan 75 MG tablet   Commonly known as:  AVAPRO   Dose:  75 mg        Take 1 tablet (75 mg) by mouth daily   Refills:  0        linagliptin 5 MG Tabs tablet   Commonly known as:  TRADJENTA   Dose:  5 mg   Quantity:  30 tablet        Take 1 tablet (5 mg) by mouth daily   Refills:  3        metroNIDAZOLE 0.75 % external  cream   Commonly known as:  METROCREAM   Quantity:  45 g        Apply topically 2 times daily   Refills:  3        MULTI vitamin  MENS Tabs        Take  by mouth. Takes one daily   Refills:  0        PREDNISONE PO   Dose:  5 mg        Take 5 mg by mouth Currently taking 4 tablets (20mg) daily per patient (this medication is being taken care of through [facility]).   Refills:  0        senna-docusate 8.6-50 MG tablet   Commonly known as:  SENOKOT-S/PERICOLACE   Dose:  1-2 tablet        Take 1-2 tablets by mouth Takes about 2-3 times weekly   Refills:  0        sertraline 25 MG tablet   Commonly known as:  ZOLOFT   Quantity:  90 tablet        TAKE ONE TABLET BY MOUTH EVERY DAY   Refills:  3        sulfamethoxazole-trimethoprim 800-160 MG tablet   Commonly known as:  BACTRIM DS/SEPTRA DS   Dose:  1 tablet   Quantity:  20 tablet        Take 1 tablet by mouth 2 times daily   Refills:  0        TYLENOL 500 MG tablet   Dose:  1000 mg   Quantity:  100 tablet   Generic drug:  acetaminophen        Take 2 tablets (1,000 mg) by mouth 3 times daily   Refills:  0        Vitamin B-12 CR 1000 MCG Tbcr   Quantity:  60 tablet        TAKE ONE TABLET BY MOUTH EVERY DAY   Refills:  4        vitamin D3 1000 units (25 mcg) tablet   Commonly known as:  CHOLECALCIFEROL   Quantity:  100        1 TABLET DAILY   Refills:  4                Procedures and tests performed during your visit     CBC with platelets differential    CRP inflammation    Comprehensive metabolic panel    Glucose by meter    Glucose monitor nursing POCT    US Lower Extremity Venous Duplex Left      Orders Needing Specimen Collection     None      Pending Results     No orders found from 12/6/2018 to 12/9/2018.            Pending Culture Results     No orders found from 12/6/2018 to 12/9/2018.            Pending Results Instructions     If you had any lab results that were not finalized at the time of your Discharge, you can call the ED Lab Result RN at [phone].  You will be contacted by this team for any positive Lab results or changes in treatment. The nurses are available 7 days a week from 10A to 6:30P.  You can leave a message 24 hours per day and they will return your call.        Thank you for choosing [facility]       Thank you for choosing [facility] for your care. Our goal is always to provide you with excellent care. Hearing back from our patients is one way we can continue to improve our services. Please take a few minutes to complete the written survey that you may receive in the mail after you visit with us. Thank you!        [employer] Information     [employer] gives you secure access to your electronic health record. If you see a primary care provider, you can also send messages to your care team and make appointments. If you have questions, please call your primary care clinic.  If you do not have a primary care provider, please call [phone] and they will assist you.        Care EveryWhere ID     This is your Care EveryWhere ID. This could be used by other organizations to access your [facility] medical records  [id number]        Equal Access to Services     TIFFANI ABDUL : Hadii emmie peñao Soeliceo, waaxda luqadaha, qaybta kaalmada adeleigh ann, jd tsang . So [facility] [phone].    ATENCIÓN: Si habla español, tiene a ellis disposición servicios gratuitos de asistencia lingüística. Llame al [phone].    We comply with applicable federal civil rights laws and [place] laws. We do not discriminate on the basis of race, color, national origin, age, disability, sex, sexual orientation, or gender identity.            After Visit Summary       This is your record. Keep this with you and show to your community pharmacist(s) and doctor(s) at your next visit.

## 2018-12-08 NOTE — TELEPHONE ENCOUNTER
Spouse calling to report swelling and pain that are getting worse, pt has been on an abx for 2 days for cellulitis of his leg.  Unknown temp.     Disposition:  See a provider within 4 hours.  Spouse verbalized understanding and had no further questions.     Saritha Aponte RN/FNA    Reason for Disposition    [1] Taking antibiotics > 24 hours AND [2] symptoms WORSE    Additional Information    Negative: Severe difficulty breathing (e.g., struggling for each breath, speaks in single words)    Negative: Sounds like a life-threatening emergency to the triager    Negative: [1] Recent hospitalization for pneumonia AND [2] taking an antibiotic    Negative: [1] Animal bite infection AND [2] taking an antibiotic    Negative: [1] Ear  infection (Otitis Media) AND [2] taking an antibiotic    Negative: [1] Ear  infection (Swimmer's Ear)) AND [2] taking an antibiotic    Negative: [1] Sinus infection AND [2] taking an antibiotic    Negative: [1] Strep throat AND [2] taking an antibiotic    Negative: [1] Urinary tract  infection (e.g., cystitis, pyelonephritis, urethritis, epididymitis) AND [2] male AND [3] taking an antibiotic    Negative: [1] Urinary tract  infection (e.g., cystitis, pyelonephritis, urethritis) AND [2] female AND [3] taking an antibiotic    Negative: [1] Wound infection AND [2] taking an antibiotic    Negative: MODERATE difficulty breathing (e.g., speaks in phrases, SOB even at rest, pulse 100-120)    Negative: Fever > 103 F (39.4 C)    Negative: Patient sounds very sick or weak to the triager    Protocols used: INFECTION ON ANTIBIOTIC FOLLOW-UP CALL-ADULT-AH

## 2018-12-08 NOTE — ED AVS SNAPSHOT
[facility] [place], Emergency Department    [address]    [facility] [address]    Phone:  [phone]    Fax:  [phone]                                       Bart Blair   MRN: [id number]    Department:  [facility], Emergency Department   Date of Visit:  12/8/2018           After Visit Summary Signature Page     I have received my discharge instructions, and my questions have been answered. I have discussed any challenges I see with this plan with the nurse or doctor.    ..........................................................................................................................................  Patient/Patient Representative Signature      ..........................................................................................................................................  Patient Representative Print Name and Relationship to Patient    ..................................................               ................................................  Date                                   Time    ..........................................................................................................................................  Reviewed by Signature/Title    ...................................................              ..............................................  Date                                               Time          22EPIC Rev 08/18

## 2018-12-11 NOTE — TELEPHONE ENCOUNTER
Was going to approve but got this message:    Drug-Drug: sertraline and diclofenac   Toxic effects may be increased with concurrent administration of NSAIDs and Selective Serotonin Reuptake Inhibitors. The risk of upper gastrointestinal bleeding may be increased. Patients taking both drugs concurrently should be educated about the signs and symptoms of GI bleeding.       Gaurav Mclaughlin RN

## 2018-12-12 ENCOUNTER — OFFICE VISIT (OUTPATIENT)
Dept: FAMILY MEDICINE | Facility: CLINIC | Age: 83
End: 2018-12-12
Payer: COMMERCIAL

## 2018-12-12 VITALS
DIASTOLIC BLOOD PRESSURE: 65 MMHG | BODY MASS INDEX: 31.83 KG/M2 | OXYGEN SATURATION: 96 % | HEART RATE: 120 BPM | WEIGHT: 210 LBS | HEIGHT: 68 IN | SYSTOLIC BLOOD PRESSURE: 131 MMHG | TEMPERATURE: 98.7 F

## 2018-12-12 DIAGNOSIS — E11.65 TYPE 2 DIABETES MELLITUS WITH HYPERGLYCEMIA, WITHOUT LONG-TERM CURRENT USE OF INSULIN (H): ICD-10-CM

## 2018-12-12 DIAGNOSIS — R60.9 EDEMA, UNSPECIFIED TYPE: ICD-10-CM

## 2018-12-12 DIAGNOSIS — L03.116 CELLULITIS OF LEFT LOWER EXTREMITY: Primary | ICD-10-CM

## 2018-12-12 PROCEDURE — 99214 OFFICE O/P EST MOD 30 MIN: CPT | Performed by: FAMILY MEDICINE

## 2018-12-12 ASSESSMENT — MIFFLIN-ST. JEOR: SCORE: 1602.05

## 2018-12-12 NOTE — PATIENT INSTRUCTIONS
Two hours after meal around 200 is ok.    We do not want you to have a low blood sugar    You can start exercise back on the treadmill, walking will help with your leg pain.

## 2018-12-12 NOTE — PROGRESS NOTES
SUBJECTIVE:   Bart Blair is a 87 year old male who presents to clinic today for the following health issues:    ED/UC Followup:    Facility:  [facility]  Date of visit: 12/08/18  Reason for visit: edema of legs  Current Status: better      Edema:  Patient is here today for a follow up after his recent hospital admission. He has several question about his medical care. Patient was admitted to the hospital for evaluation of his edema. DVT  was ruled. He would like to know if he can continue physical therapy.    Medications:  Patient had an appointment with his rheumatologist at [facility] today and they discussed patient possibly going on Actemra injections for his giant cell arteritis management.    Diabetes:  Patient recent A1c was 8.8. He would like to continue to monitor his blood sugar, but states that his glucometer is outdated and that he would like a new prescription. His home blood sugar measure has been in the 90 s at home and 200s after meals.      Problem list and histories reviewed & adjusted, as indicated.  Additional history: as documented    Patient Active Problem List   Diagnosis     Retinal ischemia     Polyp of vocal cord or larynx     Other specified disorder of skin     HYPERLIPIDEMIA LDL GOAL <100     Parkinson disease (H)     S/P total knee replacement     Anemia due to blood loss, acute     Dysthymia     BPH (benign prostatic hyperplasia)     Syncope     Health Care Home     Hypertension goal BP (blood pressure) < 150/90     Fall     Type 2 diabetes mellitus with other diabetic ophthalmic complication (H)     Varicose vein     Neck pain     Implantable loop recorder, [employer]     Advance Care Planning     Temporal arteritis (H)     Type 2 diabetes mellitus with hyperglycemia, without long-term current use of insulin (H)     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 Bowers; left hand     ENT SURGERY       INCISE FINGER TENDON SHEATH  2008    Dr. Bowers; right AND LEFT hand     THROAT SURGERY      vocal cord polyps       Social History     Tobacco Use     Smoking status: Never Smoker     Smokeless tobacco: Never Used   Substance Use Topics     Alcohol use: Yes     Comment: couple beers everyday     Family History   Problem Relation Age of Onset     Family History Negative Other         unknown family history per patient         Current Outpatient Medications   Medication Sig Dispense Refill     acetaminophen (TYLENOL) 500 MG tablet Take 2 tablets (1,000 mg) by mouth 3 times daily 100 tablet 0     amoxicillin (AMOXIL) 500 MG capsule 4 tablets prior to dental appointment. Use for dental appointments 16 capsule 4     aspirin 81 MG tablet Take 1 tablet by mouth daily.  3     atorvastatin (LIPITOR) 10 MG tablet Take 1 tablet (10 mg) by mouth daily Refill when requested 90 tablet 3     blood glucose monitoring (ACCU-CHEK COMPACT STRIPS) test strip 1 strip by In Vitro route daily 100 each 11     blood glucose monitoring (SOFTCLIX) lancets Check blood sugar once daily 100 each 3     cilostazol (PLETAL) 50 MG tablet Take 1 tablet (50 mg) by mouth 2 times daily 180 tablet 3     Cyanocobalamin (VITAMIN B-12 CR) 1000 MCG TBCR TAKE ONE TABLET BY MOUTH EVERY DAY 60 tablet 4     diclofenac (VOLTAREN) 1 % topical gel APPLY 4 GRAMS TO KNEES OR 2 GRAMS TO HANDS FOUR TIMES A DAY USING ENCLOSED DOSING CARD 300 g 3     doxycycline Monohydrate 100 MG CAPS  1 tablet 2 time daily for Rosacea flares 60 capsule 3     fluticasone (FLONASE) 50 MCG/ACT spray Spray 1-2 sprays into both nostrils daily 16 g 5     FOLIC ACID PO Take 1 mg by mouth daily       furosemide (LASIX) 20 MG tablet 1/2 tablet per day. If weight increases by 3-5 pounds then increase to 20 mg (1 tablet). If weight decreases to 205 then ok to hold. (Patient taking differently: 20 mg 2 times daily 1/2 tablet per day. If weight increases by 3-5 pounds then increase to 20 mg (1 tablet). If weight decreases to 205 then ok to hold.  12/8/18: dose increased this past week, taking 20 mg in the morning and 20 mg in the evening) 90 tablet 3     glipiZIDE (GLUCOTROL) 5 MG tablet Take 1 tablet (5 mg) by mouth every morning (before breakfast) 90 tablet 3     irbesartan (AVAPRO) 75 MG tablet Take 1 tablet (75 mg) by mouth daily       linagliptin (TRADJENTA) 5 MG TABS tablet Take 1 tablet (5 mg) by mouth daily 30 tablet 3     metroNIDAZOLE (METROCREAM) 0.75 % cream Apply topically 2 times daily 45 g 3     Multiple Vitamin (MULTI VITAMIN  MENS) TABS Take  by mouth. Takes one daily         order for DME Glucometer, brand as covered by insurance. 1 each 0     order for DME Test strips for pt's glucometer, brand as covered by insurance. Test daily and prn. 100 each 4     order for DME Lancets.  Daily and prn. 100 each 4     PREDNISONE PO Take 5 mg by mouth Currently taking 4 tablets (20mg) daily per patient (this medication is being taken care of through [facility]).       senna-docusate (SENOKOT-S;PERICOLACE) 8.6-50 MG per tablet Take 1-2 tablets by mouth Takes about 2-3 times weekly       sertraline (ZOLOFT) 25 MG tablet TAKE ONE TABLET BY MOUTH EVERY DAY 90 tablet 3     sulfamethoxazole-trimethoprim (BACTRIM DS/SEPTRA DS) 800-160 MG tablet Take 1 tablet by mouth 2 times daily 20 tablet 0     VITAMIN D 1000 UNIT OR TABS 1 TABLET DAILY 100 4     Allergies   Allergen Reactions     No Known Drug Allergies      Recent  "Labs   Lab Test 12/08/18  1346 11/14/18  1400 10/09/18  1031 05/29/18  1310  12/22/17  1830 10/24/17  1050  11/22/16  0948  11/18/14  1238   A1C  --  8.8* 8.3* 8.5*   < >  --  6.6*   < > 6.3*   < > 6.0   LDL  --   --  21  --   --   --  32  --   --   --  37   HDL  --   --  49  --   --   --  75  --   --   --  45   TRIG  --   --  287*  --   --   --  226*  --   --   --  171*   ALT 38  --   --   --   --  16  --   --   --   --  10   CR 1.41* 1.53* 1.55* 1.23   < > 1.13 1.29*  --   --    < > 1.31*   GFRESTIMATED 47* 43* 43* 56*   < > 61 53*  --   --    < > 52*   GFRESTBLACK 57* 52* 52* 67   < > 74 64  --   --    < > 63   POTASSIUM 4.6 4.8 5.1 5.5*   < > 5.1 4.9  --   --    < > 4.5   TSH  --  1.38  --   --   --   --   --   --  1.73  --  2.42    < > = values in this interval not displayed.      BP Readings from Last 3 Encounters:   12/12/18 131/65   12/08/18 139/80   12/06/18 134/62    Wt Readings from Last 3 Encounters:   12/12/18 210 lb (95.3 kg)   12/06/18 211 lb 6.4 oz (95.9 kg)   11/14/18 208 lb (94.3 kg)                  Labs reviewed in EPIC    Reviewed and updated as needed this visit by clinical staff  Tobacco  Allergies  Meds  Med Hx  Surg Hx  Fam Hx  Soc Hx      Reviewed and updated as needed this visit by Provider         ROS:  Constitutional, HEENT, cardiovascular, pulmonary, gi and gu systems are negative, except as otherwise noted.    This document serves as a record of the services and decisions personally performed by HIRO MARQUEZ. It was created on his behalf by Dante Jennings trained medical scribes. The creation of this document is based on the provider's statements to the medical scribe. Dante Jennings, December 12, 2018 1:51 PM      OBJECTIVE:     /65 (BP Location: Right arm, Cuff Size: Adult Regular)   Pulse 120   Temp 98.7  F (37.1  C) (Oral)   Ht 5' 8\" (1.727 m)   Wt 210 lb (95.3 kg)   SpO2 96%   BMI 31.93 kg/m    Body mass index is 31.93 kg/m .  GENERAL: healthy, alert and no " distress  RESP: lungs clear to auscultation - no rales, rhonchi or wheezes  CV: regular rate and rhythm, normal S1 S2, no S3 or S4, no murmur, click or rub, no peripheral edema and peripheral pulses strong  MS: no gross musculoskeletal defects noted, no edema  SKIN: no suspicious lesions or rashes  PSYCH: mentation appears normal, affect normal/bright    Diagnostic Test Results:  No results found for this or any previous visit (from the past 24 hour(s)).    ASSESSMENT/PLAN:   (L03.116) Cellulitis of left lower extremity  (primary encounter diagnosis)  Comment: After his hospital visit, patient leg shows marked improvement  Plan: Continue to follow up    (R60.9) Edema, unspecified type  Comment: Patient edema has markedly improved  Plan: Recommend follow up edema returns    (E11.65) Type 2 diabetes mellitus with hyperglycemia, without long-term current use of insulin (H)  Comment: Patient A1c is 8.8, he also would like some new test strips and glucometer.  Plan: order for DME, order for DME, order for DME              Patient Instructions   Two hours after meal around 200 is ok.    We do not want you to have a low blood sugar    You can start exercise back on the treadmill, walking will help with your leg pain.              The information in this document, created by the medical scribcorine Jennings, accurately reflects the services I personally performed and the decisions made by me. I have reviewed and approved this document for accuracy prior to leaving the patient care area.      Chuy Stewart MD, MD  [facility]

## 2018-12-14 ENCOUNTER — ALLIED HEALTH/NURSE VISIT (OUTPATIENT)
Dept: PHARMACY | Facility: CLINIC | Age: 83
End: 2018-12-14
Payer: COMMERCIAL

## 2018-12-14 ENCOUNTER — HOSPITAL ENCOUNTER (OUTPATIENT)
Dept: PHYSICAL THERAPY | Facility: CLINIC | Age: 83
Setting detail: THERAPIES SERIES
End: 2018-12-14
Attending: INTERNAL MEDICINE
Payer: MEDICARE

## 2018-12-14 DIAGNOSIS — E11.65 TYPE 2 DIABETES MELLITUS WITH HYPERGLYCEMIA, WITHOUT LONG-TERM CURRENT USE OF INSULIN (H): ICD-10-CM

## 2018-12-14 PROCEDURE — 97110 THERAPEUTIC EXERCISES: CPT | Mod: GP | Performed by: PHYSICAL THERAPIST

## 2018-12-14 PROCEDURE — 99606 MTMS BY PHARM EST 15 MIN: CPT | Mod: U4 | Performed by: PHARMACIST

## 2018-12-14 PROCEDURE — 99607 MTMS BY PHARM ADDL 15 MIN: CPT | Mod: U4 | Performed by: PHARMACIST

## 2018-12-14 NOTE — PROGRESS NOTES
SUBJECTIVE/OBJECTIVE:                Bart Blair is a 87 year old male called for a follow-up visit for Medication Therapy Management.  He was referred to me from Chuy Stewart.      Chief Complaint: Follow up from our visit on 6/19/18 and visits with Dr. Stewart 11/14/18 and 12/12/18.      Tobacco: No tobacco use  Alcohol: not currently using    Medication Adherence/Access:  no issues reported    Diabetes:  Pt currently taking Tradjenta 5mg daily, started glipizide 5mg daily at November visit with Dr. Stewart d/t elevated blood sugars. Of note he takes prednisone 20mg daily for giant cell arteritis. Is working with Rheumatology to possibly start on Actemra, which would reduce the need for prednisone. Hoping to get approved for this in the next few weeks.  SMBG Ranges (patient reported): New meter, hasn't started. Old meter FBG in 90's. This is improved from when on Tradjenta up in the 150-170's.  Symptoms of low blood sugar? none.   Symptoms of high blood sugar? none  ACEi/ARB: Yes: irbesartan. Urine albumin is   Lab Results   Component Value Date    UMALCR 15.32 03/29/2018    Aspirin: Taking 81mg daily and denies side effects  Statin: Yes: atorvastatin 10mg daily and denies side effects  Lab Results   Component Value Date    A1C 8.8 11/14/2018    A1C 8.3 10/09/2018    A1C 8.5 05/29/2018    A1C 9.3 03/29/2018    A1C 6.6 10/24/2017       Today's Vitals: There were no vitals taken for this visit. Phone visit.      ASSESSMENT:              Current medications were reviewed today as discussed above.  Medicare Part D topics discussed:Self-monitoring    Medication Adherence: good, no issues identified    Diabetes: Stable. Patient is not meeting A1c goal of < 8%.  Self monitoring of blood glucose is at goal of fasting  mg/dL per patient report, since starting on glipzide.    PLAN:                  1. Continue present medications.  2. Start using new glucometer, to ensure accurate readings.    I spent 30 minutes  with this patient today. All changes were made via collaborative practice agreement with Chuy Stewart. A copy of the visit note was provided to the patient's primary care provider.     Will follow up in 1 months - Pat will call with updates on Actemra and blood sugars.    The patient declined a summary of these recommendations as an after visit summary.      Doris Ron, Pharm.D., [facility]  Medication Therapy Management Pharmacist  [phone]

## 2018-12-14 NOTE — ADDENDUM NOTE
Encounter addended by: Sujey Carrillo, PT on: 12/14/2018 10:32 AM   Actions taken: Flowsheet accepted

## 2018-12-17 ENCOUNTER — HOSPITAL ENCOUNTER (OUTPATIENT)
Dept: PHYSICAL THERAPY | Facility: CLINIC | Age: 83
Setting detail: THERAPIES SERIES
End: 2018-12-17
Attending: INTERNAL MEDICINE
Payer: MEDICARE

## 2018-12-17 PROCEDURE — 97110 THERAPEUTIC EXERCISES: CPT | Mod: GP | Performed by: PHYSICAL THERAPIST

## 2018-12-20 ENCOUNTER — HOSPITAL ENCOUNTER (OUTPATIENT)
Dept: PHYSICAL THERAPY | Facility: CLINIC | Age: 83
Setting detail: THERAPIES SERIES
End: 2018-12-20
Attending: INTERNAL MEDICINE
Payer: MEDICARE

## 2018-12-20 PROCEDURE — 97110 THERAPEUTIC EXERCISES: CPT | Mod: GP | Performed by: PHYSICAL THERAPIST

## 2019-01-04 NOTE — RESULT ENCOUNTER NOTE
"I spoke with the Patient and spouse via telephone call and communicated these results.  The patient continues on cilostazol 50 mg po bid with no side effects.  He continues with physical therapy.  He was unable to participate in PAD rehab secondary to unsteady gait.  Physical therapy continues to work with him on this and continues to increase treadmill walking as able.    Patient states that \"some days his legs are better and some days they are not.\"  They have not gotten any worse.  He will continue his PT and follow up as scheduled with Dr. Meade on 2/5/19.    Brock Hinojosa, RN    Vascular Medicine/ [facility]  "

## 2019-01-08 ENCOUNTER — HOSPITAL ENCOUNTER (OUTPATIENT)
Dept: PHYSICAL THERAPY | Facility: CLINIC | Age: 84
Setting detail: THERAPIES SERIES
End: 2019-01-08
Attending: INTERNAL MEDICINE
Payer: MEDICARE

## 2019-01-08 PROCEDURE — 97530 THERAPEUTIC ACTIVITIES: CPT | Mod: GP | Performed by: PHYSICAL THERAPIST

## 2019-01-10 ENCOUNTER — TELEPHONE (OUTPATIENT)
Dept: PHARMACY | Facility: CLINIC | Age: 84
End: 2019-01-10

## 2019-01-10 NOTE — TELEPHONE ENCOUNTER
Call out to patient to review blood sugars and medication questions/concerns. No answer. LM for patient to return call to number below at earliest convenience.    Doris Ron, Pharm.D., [facility]  Medication Therapy Management Pharmacist  [phone]

## 2019-01-14 ENCOUNTER — HOSPITAL ENCOUNTER (OUTPATIENT)
Dept: PHYSICAL THERAPY | Facility: CLINIC | Age: 84
Setting detail: THERAPIES SERIES
End: 2019-01-14
Attending: INTERNAL MEDICINE
Payer: MEDICARE

## 2019-01-14 PROCEDURE — 97110 THERAPEUTIC EXERCISES: CPT | Mod: GP | Performed by: PHYSICAL THERAPIST

## 2019-01-17 ENCOUNTER — HOSPITAL ENCOUNTER (OUTPATIENT)
Dept: PHYSICAL THERAPY | Facility: CLINIC | Age: 84
Setting detail: THERAPIES SERIES
End: 2019-01-17
Attending: INTERNAL MEDICINE
Payer: MEDICARE

## 2019-01-17 PROCEDURE — 97110 THERAPEUTIC EXERCISES: CPT | Mod: GP | Performed by: PHYSICAL THERAPIST

## 2019-01-18 ENCOUNTER — TELEPHONE (OUTPATIENT)
Dept: FAMILY MEDICINE | Facility: CLINIC | Age: 84
End: 2019-01-18

## 2019-01-18 NOTE — TELEPHONE ENCOUNTER
Started disability parking certificate form and placed in MD's sign me. LOV- 12/12.  Kelsey North RN

## 2019-01-18 NOTE — TELEPHONE ENCOUNTER
Reason for Call:  Other     Detailed comments: patients wife called and the patient needs to re new his handy cap parking. Please millie to discuss     Phone Number Patient can be reached at: Home number on file [phone] (home)    Best Time: any    Can we leave a detailed message on this number? YES    Call taken on 1/18/2019 at 1:34 PM by Annita Key

## 2019-01-22 ENCOUNTER — HOSPITAL ENCOUNTER (OUTPATIENT)
Dept: PHYSICAL THERAPY | Facility: CLINIC | Age: 84
Setting detail: THERAPIES SERIES
End: 2019-01-22
Attending: INTERNAL MEDICINE
Payer: MEDICARE

## 2019-01-22 DIAGNOSIS — I70.213 ATHEROSCLEROSIS OF NATIVE ARTERY OF BOTH LOWER EXTREMITIES WITH INTERMITTENT CLAUDICATION (H): ICD-10-CM

## 2019-01-22 PROCEDURE — 97110 THERAPEUTIC EXERCISES: CPT | Mod: GP | Performed by: PHYSICAL THERAPIST

## 2019-01-22 NOTE — TELEPHONE ENCOUNTER
Spouse Malathi leaves VM on RN line for return call to [phone].  Consent on file.  Malathi reports that patient does not drive, has not been driving for 8 years, has given it up due to health issues, etc.  Malathi drives him around, but they still need the sticker for patient, as he does go places and needs access.  RN filled in as much of form as able and forwarded to PCP for final review.    Georgina Htuson RN

## 2019-01-22 NOTE — TELEPHONE ENCOUNTER
Health Call Center    Phone Message    May a detailed message be left on voicemail: yes    Reason for Call: Medication Refill Request    Has the patient contacted the pharmacy for the refill? Yes   Name of medication being requested: cilostazol (PLETAL) 50 MG tablet  Provider who prescribed the medication: Dr. Meade  Pharmacy: [place] [address]  Date medication is needed: As soon as possible      Action Taken: Message routed to:  [facility] Cardiology

## 2019-01-22 NOTE — DISCHARGE INSTRUCTIONS
Set a timer for 4 mins - walk until the timer goes off! 2-3x during the day.    Talk to your son about the idea of starting to walk on his treadmill a few times per week.    You have 2 more appts here currently. We will do some retesting next time. Think about if you'd like to continue, or try doing more on your own. Pat - feel free to call and discuss.      Fawn Sherwood DPT, [facility]   Physical Therapist  [facility]  [address]  [address] [place] [address]   [email or web address]  [email or web address]  Office: [phone]

## 2019-01-22 NOTE — TELEPHONE ENCOUNTER
Reason for Call:  Other call back    Detailed comments: Patient's wife would like to  this form at our  when it's ready    Phone Number Patient can be reached at: Home number on file [phone] (home)    Best Time: anytime    Can we leave a detailed message on this number? YES    Call taken on 1/22/2019 at 10:20 AM by Gaurav Giron

## 2019-01-22 NOTE — TELEPHONE ENCOUNTER
"Dr. Stewart asked that I call patient and find out if he is currently driving. If he is driving, he would like to review this with patient as it may not be appropriate to have him driving.     If he is not driving, we can ata the no box and have Dr. Stewart review reasons as to why not. If he is driving, we should review this with Dr. Stewart to see what follow up plan should be.    Form placed in silver team RN \"other\" folder.    Patient/family was instructed to return call to [facility] RN directly on the RN Call back line at [phone].     Gaurav Mclaughlin RN    "

## 2019-01-22 NOTE — IP AVS SNAPSHOT
MRN:[id number]                      After Visit Summary   1/22/2019    Bart Blair    MRN: [id number]           Visit Information        Provider Department      1/22/2019 10:00 AM Masha Sherwood, PT [facility], [place], Physical Therapy - Outpatient        Your next 10 appointments already scheduled    Jan 24, 2019 11:30 AM CST  Neuro Treatment with Sujey Carrillo, PT  [facility] [place], Physical Therapy - Outpatient ([facility]) [address], [address]  [place] [address]  [phone]   Jan 28, 2019 12:00 AM CST  CARDIAC DEVICE CHECK - REMOTE with UC ICD REMOTE  [facility] ([facility]) [address]  [address]  [facility] [address] [phone]   Jan 29, 2019 10:45 AM CST  Neuro Treatment with Sujey Carrillo, PT  [facility] [place], Physical Therapy - Outpatient ([facility]) [address], [address]  [place] [address]  [phone]   Feb 05, 2019 11:30 AM CST  (Arrive by 11:15 AM)  Return Vascular Visit with Azul Meade MD  [facility] ([facility]) [address]  [address]  [facility] [id number] [phone]   Mar 14, 2019 10:30 AM CDT  (Arrive by 10:15 AM)  CARDIAC DEVICE CHECK - IN CLINIC with UC CV DEVICE 1  [facility] ([facility]) [address]  [address]  [facility] [address] [phone]   Mar 14, 2019 11:00 AM CDT  (Arrive by 10:45 AM)  RETURN ARRHYTHMIA with JOSEPH Beth CNP  [facility] ([facility]) [address]  [address]  [facility] [address] [phone]   Jun 20, 2019 12:00 AM CDT  CARDIAC DEVICE CHECK - REMOTE with UC ICD REMOTE  [facility] ([facility]) [address]  [address]  [facility] [address] [phone]        Further  instructions from your care team       Set a timer for 4 mins - walk until the timer goes off! 2-3x during the day.    Talk to your son about the idea of starting to walk on his treadmill a few times per week.    You have 2 more appts here currently. We will do some retesting next time. Think about if you'd like to continue, or try doing more on your own. Pat - feel free to call and discuss.      HERMILO RankinT, NCS   Physical Therapist  [facility]  [address]  [address] [place] [address]   [email or web address]  [email or web address]  Office: [phone]        MyChart Information    [facility] gives you secure access to your electronic health record. If you see a primary care provider, you can also send messages to your care team and make appointments. If you have questions, please call your primary care clinic.  If you do not have a primary care provider, please call [phone] and they will assist you.       Care EveryWhere ID    This is your Care EveryWhere ID. This could be used by other organizations to access your [facility] medical records  [id number]       Equal Access to Services    TIFFANI ABDUL : Hadgelacio Gruber, kylah purcell, marco almonte, jd barroso. So [facility] [phone].    ATENCIÓN: Si habla español, tiene a ellis disposición servicios gratuitos de asistencia lingüística. Alejandro al [phone].    We comply with applicable federal civil rights laws and [place] laws. We do not discriminate on the basis of race, color, national origin, age, disability, sex, sexual orientation, or gender identity.

## 2019-01-23 RX ORDER — CILOSTAZOL 50 MG/1
50 TABLET ORAL 2 TIMES DAILY
Qty: 180 TABLET | Refills: 3 | Status: SHIPPED | OUTPATIENT
Start: 2019-01-23 | End: 2020-01-30

## 2019-01-23 NOTE — TELEPHONE ENCOUNTER
Form completed and signed. Form place at the  waiting for patient to . Copy made for chart.    Thank you,  Joslyn HUGGINS    NE Team Kendra

## 2019-01-24 ENCOUNTER — HOSPITAL ENCOUNTER (OUTPATIENT)
Dept: PHYSICAL THERAPY | Facility: CLINIC | Age: 84
Setting detail: THERAPIES SERIES
End: 2019-01-24
Attending: INTERNAL MEDICINE
Payer: MEDICARE

## 2019-01-24 PROCEDURE — 97110 THERAPEUTIC EXERCISES: CPT | Mod: GP | Performed by: PHYSICAL THERAPIST

## 2019-01-24 NOTE — TELEPHONE ENCOUNTER
An Item was picked up at the [facility] :    Date it was picked up?  01/23/2019    What was picked up?  Envelope/forms    Who picked them up?  Zahra Paddy    Relationship to patient?  daughter    Did they show ID?  Yes    Was it logged in book? Yes    Who gave it to the patient?    Edilma Chinchilla-Patient Representative

## 2019-01-28 ENCOUNTER — TELEPHONE (OUTPATIENT)
Dept: FAMILY MEDICINE | Facility: CLINIC | Age: 84
End: 2019-01-28

## 2019-01-28 ENCOUNTER — HOSPITAL ENCOUNTER (EMERGENCY)
Facility: CLINIC | Age: 84
Discharge: HOME | End: 2019-01-28
Attending: EMERGENCY MEDICINE | Admitting: EMERGENCY MEDICINE
Payer: MEDICARE

## 2019-01-28 ENCOUNTER — ANCILLARY PROCEDURE (OUTPATIENT)
Dept: CARDIOLOGY | Facility: CLINIC | Age: 84
End: 2019-01-28
Attending: INTERNAL MEDICINE
Payer: MEDICARE

## 2019-01-28 ENCOUNTER — APPOINTMENT (OUTPATIENT)
Dept: ULTRASOUND IMAGING | Facility: CLINIC | Age: 84
End: 2019-01-28
Payer: MEDICARE

## 2019-01-28 VITALS
RESPIRATION RATE: 16 BRPM | WEIGHT: 214 LBS | HEART RATE: 85 BPM | OXYGEN SATURATION: 99 % | BODY MASS INDEX: 32.54 KG/M2 | DIASTOLIC BLOOD PRESSURE: 77 MMHG | TEMPERATURE: 96.6 F | SYSTOLIC BLOOD PRESSURE: 153 MMHG

## 2019-01-28 DIAGNOSIS — R55 SYNCOPE: ICD-10-CM

## 2019-01-28 DIAGNOSIS — L03.119 CELLULITIS OF FOOT: ICD-10-CM

## 2019-01-28 PROCEDURE — 93298 REM INTERROG DEV EVAL SCRMS: CPT | Performed by: INTERNAL MEDICINE

## 2019-01-28 PROCEDURE — 99284 EMERGENCY DEPT VISIT MOD MDM: CPT | Mod: 25 | Performed by: EMERGENCY MEDICINE

## 2019-01-28 PROCEDURE — 93971 EXTREMITY STUDY: CPT | Mod: LT

## 2019-01-28 PROCEDURE — 99284 EMERGENCY DEPT VISIT MOD MDM: CPT | Mod: Z6 | Performed by: EMERGENCY MEDICINE

## 2019-01-28 PROCEDURE — 93299 CARDIAC DEVICE CHECK - REMOTE: CPT | Mod: ZF

## 2019-01-28 RX ORDER — SULFAMETHOXAZOLE/TRIMETHOPRIM 800-160 MG
1 TABLET ORAL 2 TIMES DAILY
Qty: 20 TABLET | Refills: 0 | Status: SHIPPED | OUTPATIENT
Start: 2019-01-28 | End: 2019-03-25

## 2019-01-28 ASSESSMENT — ENCOUNTER SYMPTOMS: SHORTNESS OF BREATH: 0

## 2019-01-28 NOTE — ED PROVIDER NOTES
[facility] EMERGENCY DEPARTMENT ([facility])    1/28/19        History     Chief Complaint   Patient presents with     Leg Swelling     L leg is swollen with a redness from ankle to knee. R leg is also swollen but not as much     HPI  Bart Blair is a 87 year old male with a history of PAD, CKD stage III and type II diabetes, as well as a possible Parkinson's diagnosis, who presents to the Emergency Department due to bilateral lower leg edema and redness. Patient reports that the lower extremity edema and redness began in October. He was recently evaluated in the ED on 12/08 for these symptoms and had a negative workup for DVT. At that time, it was believed to be cellulitis of the left lower extremity, and the patient was subsequently placed on Bactrim. Patient denies chest pain or shortness of breath.     No current facility-administered medications for this encounter.      Current Outpatient Medications   Medication     acetaminophen (TYLENOL) 500 MG tablet     aspirin 81 MG tablet     atorvastatin (LIPITOR) 10 MG tablet     blood glucose monitoring (ACCU-CHEK COMPACT STRIPS) test strip     blood glucose monitoring (SOFTCLIX) lancets     cilostazol (PLETAL) 50 MG tablet     diclofenac (VOLTAREN) 1 % topical gel     FOLIC ACID PO     furosemide (LASIX) 20 MG tablet     glipiZIDE (GLUCOTROL) 5 MG tablet     irbesartan (AVAPRO) 75 MG tablet     linagliptin (TRADJENTA) 5 MG TABS tablet     Multiple Vitamin (MULTI VITAMIN  MENS) TABS     order for DME     order for DME     order for DME     PREDNISONE PO     sertraline (ZOLOFT) 25 MG tablet     tocilizumab (ACTEMRA) 162 MG/0.9ML subcutaneous injection     VITAMIN D 1000 UNIT OR TABS     amoxicillin (AMOXIL) 500 MG capsule     COMPRESSION STOCKINGS     Cyanocobalamin (VITAMIN B-12 CR) 1000 MCG TBCR     doxycycline Monohydrate 100 MG CAPS     fluticasone (FLONASE) 50 MCG/ACT spray     metroNIDAZOLE (METROCREAM) 0.75 % cream     senna-docusate  (SENOKOT-S;PERICOLACE) 8.6-50 MG per tablet     sulfamethoxazole-trimethoprim (BACTRIM DS/SEPTRA DS) 800-160 MG tablet     Past Medical History:   Diagnosis Date     Anemia      Anemia due to blood loss, acute      CKD (chronic kidney disease) stage 3, GFR 30-59 ml/min (H) 5/31/2010     Essential hypertension, benign      Other and unspecified hyperlipidemia      Other and unspecified hyperlipidemia      Other specified disorder of skin      Pain in joint, shoulder region      Parkinson disease (H) 9/2/2010     Polyp of vocal cord or larynx      Retinal ischemia     right sided thrombotic plaque     Rosacea      Type II or unspecified type diabetes mellitus without mention of complication, not stated as uncontrolled        Past Surgical History:   Procedure Laterality Date     ARTHROPLASTY KNEE  1/31/2012    Procedure:ARTHROPLASTY KNEE; LEFT TOTAL KNEE ARTHROPLASTY (TheraVid)^; Surgeon:SKIP FAIR; Location: OR     ARTHROSCOPY SHOULDER, OPEN ROTATOR CUFF REPAIR, COMBINED  4/24/2012    Procedure:COMBINED ARTHROSCOPY SHOULDER, OPEN ROTATOR CUFF REPAIR; RIGHT SHOULDER ARTHROSCOPY OPEN ROTATOR CUFF DEBRIDEMENT, SUBCROMIAL DECOMPRESSION, AND BICEPS TENODESIS REPAIR; Surgeon:SKIP FAIR; Location: [facility]     CL AFF SURGICAL PATHOLOGY  6-    Dr. Bowers; left hand     ENT SURGERY       INCISE FINGER TENDON SHEATH  2008    Dr. Bowers; right AND LEFT hand     THROAT SURGERY      vocal cord polyps       Family History   Problem Relation Age of Onset     Family History Negative Other         unknown family history per patient       Social History     Tobacco Use     Smoking status: Former Smoker     Smokeless tobacco: Never Used   Substance Use Topics     Alcohol use: Yes     Comment: rarely     Allergies   Allergen Reactions     No Known Drug Allergies        I have reviewed the Medications, Allergies, Past Medical and Surgical History, and Social History in the Epic system.    Review of Systems   Respiratory:  Negative for shortness of breath.    Cardiovascular: Positive for leg swelling (bilateral lower leg (L>R)). Negative for chest pain.   Skin: Positive for rash (bilateral lower leg (L>R)).   All other systems reviewed and are negative.      Physical Exam   BP: 127/55  Pulse: 72  Temp: 96.6  F (35.9  C)  Resp: 18  Weight: 97.1 kg (214 lb)  SpO2: 94 %      Physical Exam   Constitutional: He is oriented to person, place, and time. He appears well-developed and well-nourished. No distress.   HENT:   Head: Normocephalic and atraumatic.   Right Ear: External ear normal.   Left Ear: External ear normal.   Eyes: EOM are normal.   Neck: Normal range of motion. Neck supple.   Cardiovascular: Normal rate, regular rhythm, normal heart sounds and intact distal pulses.   Pulmonary/Chest: Effort normal and breath sounds normal.   Abdominal: Soft.   Musculoskeletal: Normal range of motion. Edema: both lower extremities. left greater than right.    Neurological: He is alert and oriented to person, place, and time.   Skin: Skin is warm and dry. Rash (chronic venous stasis discoloration. of both legs. ) noted. He is not diaphoretic.   Psychiatric: He has a normal mood and affect.   Nursing note and vitals reviewed.      ED Course        Procedures           Labs Ordered and Resulted from Time of ED Arrival Up to the Time of Departure from the ED - No data to display         Assessments & Plan (with Medical Decision Making)   The patient presents with bilateral lower extremity swelling. He has had this since October and it has been worked up. Given this I did not feel labs were indicated.  However, family is noticing a redness so came for evaluation.  He was treated with bactrim in the past and the redness improved.   He has changes of his skin consistent with chronic venous statis.  Given his left leg is more swollen than right, ultrasound was done and shows no dvt. He has mild amount of redness of his foot.  Pulses intact. I will  prescribe antibiotics for this.  He was given mrsa coverage.  We also discussed the importance of compression socks.     I have reviewed the nursing notes.    I have reviewed the findings, diagnosis, plan and need for follow up with the patient.       Medication List      ASK your doctor about these medications    cephALEXin 250 MG capsule  Commonly known as:  KEFLEX  250 mg, Oral, 4 TIMES DAILY  Ask about: Should I take this medication?     sulfamethoxazole-trimethoprim 800-160 MG tablet  Commonly known as:  BACTRIM DS  1 tablet, Oral, 2 TIMES DAILY  Ask about: Should I take this medication?            Final diagnoses:   Cellulitis of foot   I, Lilliana David, am serving as a trained medical scribe to document services personally performed by Lexie Garrett MD, based on the provider's statements to me.   ILexie MD, was physically present and have reviewed and verified the accuracy of this note documented by Lilliana David.    1/28/2019   [facility], [place], EMERGENCY DEPARTMENT     Lexie Garrett MD  02/21/19 2026

## 2019-01-28 NOTE — DISCHARGE INSTRUCTIONS
Take antibiotics as prescribed.     Wear compression socks for comfort and swelling.     Please see medical attention if worsening/concerns (increased redness, increased swelling, increased pain, fevers)    Today's result:  Results for orders placed or performed during the hospital encounter of 01/28/19   US Lower Extremity Venous Duplex Left    Narrative    ULTRASOUND LEFT LOWER EXTREMITY VENOUS DUPLEX LEFT January 28, 2019  12:21 PM    HISTORY: Pain swelling, rule out deep vein thrombosis.      TECHNIQUE: Venous Doppler US.?Color flow and spectral Doppler with  waveform analysis performed.    FINDINGS: Calf edema.      Impression    IMPRESSION: No evidence of lower extremity deep venous thrombosis.     EDDY BRADEN MD

## 2019-01-28 NOTE — TELEPHONE ENCOUNTER
Reason for Call:  Other     Detailed comments: Patient's left leg is bright and swollen and red on top going up the leg.This has been since 01/25/2019 and getting worse each day. Please advise. [facility]      Phone Number Patient can be reached at: Other phone number:   Anne-Marie Blair (Spouse) [phone]         Best Time:     Can we leave a detailed message on this number? Not Applicable    Call taken on 1/28/2019 at 9:10 AM by Lea Brown

## 2019-01-28 NOTE — ED AVS SNAPSHOT
[facility], [place], Emergency Department  [address]  [facility] [address]  Phone:  [phone]  Fax:  [phone]                                    Bart Blair   MRN: [id number]    Department:  [facility], Emergency Department   Date of Visit:  1/28/2019           After Visit Summary Signature Page    I have received my discharge instructions, and my questions have been answered. I have discussed any challenges I see with this plan with the nurse or doctor.    ..........................................................................................................................................  Patient/Patient Representative Signature      ..........................................................................................................................................  Patient Representative Print Name and Relationship to Patient    ..................................................               ................................................  Date                                   Time    ..........................................................................................................................................  Reviewed by Signature/Title    ...................................................              ..............................................  Date                                               Time          22EPIC Rev 08/18

## 2019-01-28 NOTE — TELEPHONE ENCOUNTER
"Bart Blair is a 87 year old male who calls with leg swelling and redness.    NURSING ASSESSMENT:  Description:  Patient saw Dr. Stewart for feet swelling a few weeks ago. Since then, L foot swelling has increased and is very red, extending up leg to about mid-calf.  Spouse Pat states it looks like \"a really bad sunburn\". She states it is slightly warm. He has slight numbness/tingling as well. Patient states it is uncomfortable, but doesn't hurt a lot. Denies fever, chest pain, SOB.   Onset/duration:  Worsening the past week or so.  Precip. factors:  NA  Associated symptoms:  See above  Improves/worsens symptoms:  NA  Allergies:   Allergies   Allergen Reactions     No Known Drug Allergies        NURSING PLAN: Nursing advice to patient To ED to r/o cellulitis, DVT, etc.    RECOMMENDED DISPOSITION:  To ED, another person to drive -Spouse Anne-Marie will bring him to [facility] for evaluation.  Will comply with recommendation: Yes  If further questions/concerns or if symptoms do not improve, worsen or new symptoms develop, call your PCP or [facility] as soon as possible.      Guideline used:  Telephone Triage Protocols for Nurses, Fifth Edition, Esther Spears  \"Leg Pain/Swelling\"    MAHI GALVAN RN    "

## 2019-02-05 ENCOUNTER — OFFICE VISIT (OUTPATIENT)
Dept: CARDIOLOGY | Facility: CLINIC | Age: 84
End: 2019-02-05
Attending: INTERNAL MEDICINE
Payer: MEDICARE

## 2019-02-05 VITALS
BODY MASS INDEX: 31.75 KG/M2 | HEART RATE: 96 BPM | HEIGHT: 68 IN | OXYGEN SATURATION: 97 % | WEIGHT: 209.5 LBS | DIASTOLIC BLOOD PRESSURE: 61 MMHG | SYSTOLIC BLOOD PRESSURE: 122 MMHG

## 2019-02-05 DIAGNOSIS — R60.0 LOCALIZED EDEMA: ICD-10-CM

## 2019-02-05 DIAGNOSIS — I73.9 PAD (PERIPHERAL ARTERY DISEASE) (H): ICD-10-CM

## 2019-02-05 DIAGNOSIS — I87.2 VENOUS INCOMPETENCE: Primary | ICD-10-CM

## 2019-02-05 PROCEDURE — G0463 HOSPITAL OUTPT CLINIC VISIT: HCPCS | Mod: ZF

## 2019-02-05 PROCEDURE — 99215 OFFICE O/P EST HI 40 MIN: CPT | Mod: GC | Performed by: INTERNAL MEDICINE

## 2019-02-05 ASSESSMENT — PAIN SCALES - GENERAL: PAINLEVEL: MILD PAIN (2)

## 2019-02-05 ASSESSMENT — MIFFLIN-ST. JEOR: SCORE: 1599.79

## 2019-02-05 NOTE — PATIENT INSTRUCTIONS
You were seen today in the Cardiovascular Clinic at the [facility].      Cardiology Providers you saw during your visit:   Dr. Meade    Diagnosis:    (I87.2) Venous incompetence  (primary encounter diagnosis)    (R60.0) Localized edema    Results:  None today    Recommendations:   Wear compression stockings daily.  Can get at [facility]     Continue with PT     Continue cilostazol     Re-evaluation for Supervised Exercise Therapy.  If you don't hear from this week then call number listed on referral.    Follow-up:  3 months with LOY De Paz      For emergencies call [phone].    For any scheduling needs, please call [phone].     Thank you for your visit today!     Please call if you have any questions or concerns.  Brock Hinojosa RN

## 2019-02-05 NOTE — NURSING NOTE
Diet: Patient instructed regarding a heart healthy diet, including discussion of reduced fat and sodium intake. Patient demonstrated understanding of this information and agreed to call with further questions or concerns.  Med Reconcile: Reviewed and verified all current medications with the patient. The updated medication list was printed and given to the patient.  Patient will complete Physical Therapy.  Once completed he will be re-evaluated for SET.  Return Appointment: 3 months with Dr. Meade.  Patient given instructions regarding scheduling next clinic visit. Patient demonstrated understanding of this information and agreed to call with further questions or concerns.  Medication Change:  Compression hosiery 20-30 mm Hg every day.  If fails compression hosiery in 3 months then consider pneumatic compression.  Patient was educated regarding prescribed medication change, including discussion of the indication, administration, side effects, and when to report to MD or RN. Patient demonstrated understanding of this information and agreed to call with further questions or concerns.  Patient stated he understood all health information given and agreed to call with further questions or concerns.

## 2019-02-05 NOTE — PROGRESS NOTES
Vascular Cardiology Consultation      CARDIOLOGY CLINIC NOTE  02/05/2019    HPI:  Bart Blair is a 87 year old male with a history of Parkinson's disease, HTN, CKD III, DM II, peripheral artery disease, chronic LE edema, and anemia who presents for follow up of PAD and LE edema.  Since last visit Dionisio states he has been able to walk a little bit further after starting cilostazol.  Dionisio reports he has been working with physical therapy for 11 sessions, and now able to walk 3 minutes on a treadmill or 2 laps in the hallway.  He also feels that the cramping in his calves has improved.  He has had considerable swelling in his legs, and was recently diagnosed with cellulitis in his left ankle with left greater than right lower extremity edema.  The pain and swelling has improved since starting Keflex.  Dionisio states that his greatest bother is the lower extremity swelling.  He denies fevers chills, lightheadedness dizziness, chest pain, shortness of breath, abdominal fullness, or changes in urination.  He is still taking oral diuretics.  He is not currently wearing compression stockings as his family states that his current compressions do not fit.  They have had to get new shoes this is likely swollen more.    ROS:  A complete 10-point ROS was negative except as above.    PAST MEDICAL HISTORY:  Past Medical History:   Diagnosis Date     Anemia      Anemia due to blood loss, acute      CKD (chronic kidney disease) stage 3, GFR 30-59 ml/min (H) 5/31/2010     Essential hypertension, benign      Other and unspecified hyperlipidemia      Other and unspecified hyperlipidemia      Other specified disorder of skin      Pain in joint, shoulder region      Parkinson disease (H) 9/2/2010     Polyp of vocal cord or larynx      Retinal ischemia     right sided thrombotic plaque     Rosacea      Type II or unspecified type diabetes mellitus without mention of complication, not stated as uncontrolled        PAST SURGICAL  HISTORY:  Past Surgical History:   Procedure Laterality Date     ARTHROPLASTY KNEE  1/31/2012    Procedure:ARTHROPLASTY KNEE; LEFT TOTAL KNEE ARTHROPLASTY (IRASEMA)^; Surgeon:SKIP FAIR; Location: OR     ARTHROSCOPY SHOULDER, OPEN ROTATOR CUFF REPAIR, COMBINED  4/24/2012    Procedure:COMBINED ARTHROSCOPY SHOULDER, OPEN ROTATOR CUFF REPAIR; RIGHT SHOULDER ARTHROSCOPY OPEN ROTATOR CUFF DEBRIDEMENT, SUBCROMIAL DECOMPRESSION, AND BICEPS TENODESIS REPAIR; Surgeon:SKIP FAIR; Location: [facility]     [facility] SURGICAL PATHOLOGY  6-    Dr. Bowers; left hand     ENT SURGERY       INCISE FINGER TENDON SHEATH  2008    Dr. Bowers; right AND LEFT hand     THROAT SURGERY      vocal cord polyps       FAMILY HISTORY:  Family History   Problem Relation Age of Onset     Family History Negative Other         unknown family history per patient       SOCIAL HISTORY:  Social History     Socioeconomic History     Marital status:      Spouse name: ford     Number of children: 6     Years of education: 12     Highest education level: None   Social Needs     Financial resource strain: None     Food insecurity - worry: None     Food insecurity - inability: None     Transportation needs - medical: None     Transportation needs - non-medical: None   Occupational History     Occupation: [employer]     Employer: RETIRED   Tobacco Use     Smoking status: Former Smoker     Smokeless tobacco: Never Used   Substance and Sexual Activity     Alcohol use: Yes     Comment: rarely     Drug use: No     Sexual activity: Not Currently     Partners: Female   Other Topics Concern     Parent/sibling w/ CABG, MI or angioplasty before 65F 55M? No   Social History Narrative     None       MEDICATIONS:  Current Outpatient Medications   Medication     acetaminophen (TYLENOL) 500 MG tablet     amoxicillin (AMOXIL) 500 MG capsule     aspirin 81 MG tablet     atorvastatin (LIPITOR) 10 MG tablet     blood glucose monitoring (ACCU-CHEK COMPACT STRIPS)  "test strip     blood glucose monitoring (SOFTCLIX) lancets     cephALEXin (KEFLEX) 250 MG capsule     cilostazol (PLETAL) 50 MG tablet     Cyanocobalamin (VITAMIN B-12 CR) 1000 MCG TBCR     diclofenac (VOLTAREN) 1 % topical gel     doxycycline Monohydrate 100 MG CAPS     fluticasone (FLONASE) 50 MCG/ACT spray     FOLIC ACID PO     furosemide (LASIX) 20 MG tablet     glipiZIDE (GLUCOTROL) 5 MG tablet     irbesartan (AVAPRO) 75 MG tablet     linagliptin (TRADJENTA) 5 MG TABS tablet     metroNIDAZOLE (METROCREAM) 0.75 % cream     Multiple Vitamin (MULTI VITAMIN  MENS) TABS     order for DME     order for DME     order for DME     PREDNISONE PO     sertraline (ZOLOFT) 25 MG tablet     sulfamethoxazole-trimethoprim (BACTRIM DS) 800-160 MG tablet     sulfamethoxazole-trimethoprim (BACTRIM DS/SEPTRA DS) 800-160 MG tablet     tocilizumab (ACTEMRA) 162 MG/0.9ML subcutaneous injection     VITAMIN D 1000 UNIT OR TABS     senna-docusate (SENOKOT-S;PERICOLACE) 8.6-50 MG per tablet     No current facility-administered medications for this visit.        ALLERGIES:     Allergies   Allergen Reactions     No Known Drug Allergies        PHYSICAL EXAM:  /61 (BP Location: Right arm, Patient Position: Chair, Cuff Size: Adult Regular)   Pulse 96   Ht 1.727 m (5' 8\")   Wt 95 kg (209 lb 8 oz)   SpO2 97%   BMI 31.85 kg/m    Gen: alert, interactive, NAD  HEENT: atraumatic, EOMI, MMM  Neck: supple, no JVD  CV: RRR, no m/r/g  Chest: CTAB, no wheezes or crackles  Abd: soft, NT, ND, +BS  Ext: no LE edema, 2+ peripheral pulses  Skin: warm and dry, no rashes on exposed surfaces  Neuro: alert and oriented, no focal deficits    LABS:    CMP  Last Comprehensive Metabolic Panel:  Sodium   Date Value Ref Range Status   12/08/2018 138 133 - 144 mmol/L Final     Potassium   Date Value Ref Range Status   12/08/2018 4.6 3.4 - 5.3 mmol/L Final     Chloride   Date Value Ref Range Status   12/08/2018 101 94 - 109 mmol/L Final     Carbon Dioxide "   Date Value Ref Range Status   12/08/2018 30 20 - 32 mmol/L Final     Anion Gap   Date Value Ref Range Status   12/08/2018 7 3 - 14 mmol/L Final     Glucose   Date Value Ref Range Status   12/08/2018 200 (H) 70 - 99 mg/dL Final     Urea Nitrogen   Date Value Ref Range Status   12/08/2018 32 (H) 7 - 30 mg/dL Final     Creatinine   Date Value Ref Range Status   12/08/2018 1.41 (H) 0.66 - 1.25 mg/dL Final     GFR Estimate   Date Value Ref Range Status   12/08/2018 47 (L) >60 mL/min/1.7m2 Final     Comment:     Non  GFR Calc     Calcium   Date Value Ref Range Status   12/08/2018 8.3 (L) 8.5 - 10.1 mg/dL Final     Bilirubin Total   Date Value Ref Range Status   12/08/2018 0.3 0.2 - 1.3 mg/dL Final     Alkaline Phosphatase   Date Value Ref Range Status   12/08/2018 49 40 - 150 U/L Final     ALT   Date Value Ref Range Status   12/08/2018 38 0 - 70 U/L Final     AST   Date Value Ref Range Status   12/08/2018 19 0 - 45 U/L Final       CBC  CBC RESULTS:   Recent Labs   Lab Test 12/08/18  1346   WBC 10.7   RBC 3.37*   HGB 10.8*   HCT 34.4*   *   MCH 32.0   MCHC 31.4*   RDW 12.9          TROP  Lab Results   Component Value Date    TROPI 0.026 12/22/2017    TROPI 0.024 06/09/2013    TROPI 0.026 06/08/2013    TROPI 0.040 (H) 06/08/2013    TROPI 0.047 (H) 06/08/2013    TROPONIN 0.02 12/22/2017    TROPONIN 0.01 06/07/2013    TROPONIN 0.00 08/11/2012    TROPONIN 0.01 02/26/2011       LIPIDS  Recent Labs   Lab Test 10/09/18  1031 10/24/17  1050 11/18/14  1238 06/25/13  0829   CHOL 127 152 116 112   HDL 49 75 45 41   LDL 21 32 37 52   TRIG 287* 226* 171* 92   CHOLHDLRATIO  --   --  2.6 2.7       TSH  TSH   Date Value Ref Range Status   11/14/2018 1.38 0.40 - 4.00 mU/L Final       HBA1C  Lab Results   Component Value Date    A1C 8.8 11/14/2018    A1C 8.3 10/09/2018    A1C 8.5 05/29/2018    A1C 9.3 03/29/2018    A1C 6.6 10/24/2017       CARDIAC DATA:  MRA angiogram Low Ext Bilat w/ Contrast  11/27/18:  Impression:     1. Abdominal aorta: No MRA abnormality identified.      2. 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 Single vessel runoff by anterior tibial artery  posterior tibial artery is reconstituted at the ankle via collaterals.        3. Left leg: Posterior tibial and peroneal arteries are occluded at  the proximal to mid calf. Single vessel runoff by anterior tibial  artery. Reconstitution of the posterior tibial artery at the ankle  through the collaterals.      4. Nonenhancing foci in both kidneys may represent renal cysts, but  are inadequately evaluated on this study. Renal ultrasound could  further evaluate.     5. Decreased T1 in the articular surfaces in the bilateral femurs and  tibia as described, which is most likely related to degenerative  disease. Knee radiographs would better evaluate.     [Consider Follow Up: Renal lesions and probable degenerative changes  in the bilateral knees]    US LE 11/2/18  RIGHT:       EXTERNAL ILIAC ARTERY: 110/0 cm/s, triphasic       COMMON FEMORAL ARTERY: 63/0 cm/s, triphasic       PROFUNDUS FEMORAL ARTERY: 73/0 cm/s, biphasic          SUPERFICIAL FEMORAL ARTERY, proximal: 67/0 cm/s, triphasic       SUPERFICIAL FEMORAL ARTERY, mid: 92/0 cm/s, triphasic       SUPERFICIAL FEMORAL ARTERY, distal: 66/0 cm/s, triphasic          POPLITEAL: 73/0 cm/s, biphasic          POSTERIOR TIBIAL ARTERY, proximal: 62/0 cm/s, biphasic          POSTERIOR TIBIAL ARTERY: occluded from mid to distal          ANTERIOR TIBIAL ARTERY, ankle: 98/26 cm/s, triphasic     LEFT:       EXTERNAL ILIAC ARTERY: 79/0 cm/s, triphasic       COMMON FEMORAL ARTERY: 68/0 cm/s, triphasic       PROFUNDUS FEMORAL ARTERY: 77/0 cm/s, triphasic          SUPERFICIAL FEMORAL ARTERY, proximal: 79/0 cm/s, triphasic       SUPERFICIAL FEMORAL ARTERY, mid: 78/0 cm/s, triphasic       SUPERFICIAL FEMORAL ARTERY, distal: 76/0 cm/s, triphasic          POPLITEAL:  73/0 cm/s, biphasic          POSTERIOR TIBIAL ARTERY, ankle: 43/0 cm/s, biphasic          ANTERIOR TIBIAL ARTERY, ankle: 103/19 cm/s, triphasic                                                                      IMPRESSION:  1. Right posterior tibial artery occluded from the mid calf.     2. Biphasic waveforms reflect noncompliance due to heavily calcified  arteries.     3. No significant inflow disease demonstrated.    Echocardiogram 10/17/18  Interpretation Summary  Global and regional left ventricular function is normal with an EF of 60-65%.  Right ventricular function, chamber size, wall motion, and thickness are  normal.  The inferior vena cava is normal.    ASSESSMENT/PLAN:  Dionisio returns to clinic for evaluation of his peripheral artery disease and lower extremity edema.  His lower extremity ultrasounds and MRA of the lower extremities show occlusion of the calf arteries with reconstitution distally.  He seems to be doing better with the addition of cilostazol and with physical therapy.  He was unable to enroll in supervised exercise therapy due to some underlying health concerns, however we will try to pursue this at this time as he seems to be improving.    As for the lower extremity swelling, this seems to be his biggest concern.  He is not wearing compressions at this time and that was strongly encouraged.  A new prescription was written for him.  We did advise that the compressions could make the claudication symptoms worse if it decreases blood flow further to the lower extremities, and he was advised to call if he notices that the claudication gets worse.  Review of the echo shows that the lower extremity swelling is due to venous insufficiency and not related to heart failure.  He is currently being treated for cellulitis, which is making swelling in his left leg worse but both legs have edema.  On exam he has lymphedema, likely secondary to venous insufficiency, and may benefit from pneumatic  compression devices, however EF to show compliance with compression stockings for 3 months before this can be ordered.    1. PAD  2. Venous Insufficiency  3. CKD III  4. Venous insufficiency    -Encouraged daily use of compression stockings.  A new prescription was ordered.  -Referral for supervised exercise therapy was provided  -Continued exercise with PT was also encouraged  -Continue cilostazol  -Continue daily aspirin  -Continue irbesartan  -Continue Lasix  -Advised to avoid salty foods    Pt seen and discussed with Dr. Meade.    Raghu Brink MD  Cardiology Fellow, PGY-4  [phone]      ATTENDING ATTESTATION    Patient was seen and evaluated in clinic today with the cardiology fellow. The note has been edited to reflect the history taking performed by me, my personal review of imaging, EKGs, labs and procedures, and our joint assessment and plan.     Total time spent 45 minutes, of which >50% was spent in face-to-face patient evaluation, reviewing data with patient, prolonged counseling of lifestyle modifications, any necessary genetic testing and screening of family members, and coordination of care.       Azul Meade MD MSc    Division of Cardiology  Vascular Section  [facility]

## 2019-02-05 NOTE — LETTER
2/5/2019      RE: Bart Blair  [address] [facility] [id number]       Dear Colleague,    Thank you for the opportunity to participate in the care of your patient, Bart Blair, at the [facility]. Please see a copy of my visit note below.         Vascular Cardiology Consultation      CARDIOLOGY CLINIC NOTE  02/05/2019    HPI:  Bart Blair is a 87 year old male with a history of Parkinson's disease, HTN, CKD III, DM II, peripheral artery disease, chronic LE edema, and anemia  who presents for follow up of PAD and LE edema.  Since last visit Dionisio states he has been able to walk a little bit further after starting cilostazol.  Dionisio reports he has been working with physical therapy for 11 sessions, and now able to walk 3 minutes on a treadmill or 2 laps in the hallway.  He also feels that the cramping in his calves has improved.  He has had considerable swelling in his legs, and was recently diagnosed with cellulitis in his left ankle with left greater than right lower extremity edema.  The pain and swelling has improved since starting Keflex.  Dionisio states that his greatest bother is the lower extremity swelling.  He denies fevers chills, lightheadedness dizziness, chest pain, shortness of breath, abdominal fullness, or changes in urination.  He is still taking oral diuretics.  He is not currently wearing compression stockings as his family states that his current compressions do not fit.  They have had to get new shoes this is likely swollen more.    ROS:  A complete 10-point ROS was negative except as above.    PAST MEDICAL HISTORY:  Past Medical History:   Diagnosis Date     Anemia      Anemia due to blood loss, acute      CKD (chronic kidney disease) stage 3, GFR 30-59 ml/min (H) 5/31/2010     Essential hypertension, benign      Other and unspecified hyperlipidemia      Other and unspecified hyperlipidemia      Other specified disorder of skin       Pain in joint, shoulder region      Parkinson disease (H) 9/2/2010     Polyp of vocal cord or larynx      Retinal ischemia     right sided thrombotic plaque     Rosacea      Type II or unspecified type diabetes mellitus without mention of complication, not stated as uncontrolled        PAST SURGICAL HISTORY:  Past Surgical History:   Procedure Laterality Date     ARTHROPLASTY KNEE  1/31/2012    Procedure:ARTHROPLASTY KNEE; LEFT TOTAL KNEE ARTHROPLASTY (IRASEMA)^; Surgeon:SKIP FAIR; Location:[facility] OR     ARTHROSCOPY SHOULDER, OPEN ROTATOR CUFF REPAIR, COMBINED  4/24/2012    Procedure:COMBINED ARTHROSCOPY SHOULDER, OPEN ROTATOR CUFF REPAIR; RIGHT SHOULDER ARTHROSCOPY OPEN ROTATOR CUFF DEBRIDEMENT, SUBCROMIAL DECOMPRESSION, AND BICEPS TENODESIS REPAIR; Surgeon:SKIP FAIR; Location:[facility] SD     CL AFF SURGICAL PATHOLOGY  6-    Dr. Bowers; left hand     ENT SURGERY       INCISE FINGER TENDON SHEATH  2008    Dr. Bowers; right AND LEFT hand     THROAT SURGERY      vocal cord polyps       FAMILY HISTORY:  Family History   Problem Relation Age of Onset     Family History Negative Other         unknown family history per patient       SOCIAL HISTORY:  Social History     Socioeconomic History     Marital status:      Spouse name: ford     Number of children: 6     Years of education: 12     Highest education level: None   Social Needs     Financial resource strain: None     Food insecurity - worry: None     Food insecurity - inability: None     Transportation needs - medical: None     Transportation needs - non-medical: None   Occupational History     Occupation: printing     Employer: RETIRED   Tobacco Use     Smoking status: Former Smoker     Smokeless tobacco: Never Used   Substance and Sexual Activity     Alcohol use: Yes     Comment: rarely     Drug use: No     Sexual activity: Not Currently     Partners: Female   Other Topics Concern     Parent/sibling w/ CABG, MI or angioplasty before 65F 55M?  "No   Social History Narrative     None       MEDICATIONS:  Current Outpatient Medications   Medication     acetaminophen (TYLENOL) 500 MG tablet     amoxicillin (AMOXIL) 500 MG capsule     aspirin 81 MG tablet     atorvastatin (LIPITOR) 10 MG tablet     blood glucose monitoring (ACCU-CHEK COMPACT STRIPS) test strip     blood glucose monitoring (SOFTCLIX) lancets     cephALEXin (KEFLEX) 250 MG capsule     cilostazol (PLETAL) 50 MG tablet     Cyanocobalamin (VITAMIN B-12 CR) 1000 MCG TBCR     diclofenac (VOLTAREN) 1 % topical gel     doxycycline Monohydrate 100 MG CAPS     fluticasone (FLONASE) 50 MCG/ACT spray     FOLIC ACID PO     furosemide (LASIX) 20 MG tablet     glipiZIDE (GLUCOTROL) 5 MG tablet     irbesartan (AVAPRO) 75 MG tablet     linagliptin (TRADJENTA) 5 MG TABS tablet     metroNIDAZOLE (METROCREAM) 0.75 % cream     Multiple Vitamin (MULTI VITAMIN  MENS) TABS     order for DME     order for DME     order for DME     PREDNISONE PO     sertraline (ZOLOFT) 25 MG tablet     sulfamethoxazole-trimethoprim (BACTRIM DS) 800-160 MG tablet     sulfamethoxazole-trimethoprim (BACTRIM DS/SEPTRA DS) 800-160 MG tablet     tocilizumab (ACTEMRA) 162 MG/0.9ML subcutaneous injection     VITAMIN D 1000 UNIT OR TABS     senna-docusate (SENOKOT-S;PERICOLACE) 8.6-50 MG per tablet     No current facility-administered medications for this visit.        ALLERGIES:     Allergies   Allergen Reactions     No Known Drug Allergies        PHYSICAL EXAM:  /61 (BP Location: Right arm, Patient Position: Chair, Cuff Size: Adult Regular)   Pulse 96   Ht 1.727 m (5' 8\")   Wt 95 kg (209 lb 8 oz)   SpO2 97%   BMI 31.85 kg/m     Gen: alert, interactive, NAD  HEENT: atraumatic, EOMI, MMM  Neck: supple, no JVD  CV: RRR, no m/r/g  Chest: CTAB, no wheezes or crackles  Abd: soft, NT, ND, +BS  Ext: no LE edema, 2+ peripheral pulses  Skin: warm and dry, no rashes on exposed surfaces  Neuro: alert and oriented, no focal " deficits    LABS:    CMP  Last Comprehensive Metabolic Panel:  Sodium   Date Value Ref Range Status   12/08/2018 138 133 - 144 mmol/L Final     Potassium   Date Value Ref Range Status   12/08/2018 4.6 3.4 - 5.3 mmol/L Final     Chloride   Date Value Ref Range Status   12/08/2018 101 94 - 109 mmol/L Final     Carbon Dioxide   Date Value Ref Range Status   12/08/2018 30 20 - 32 mmol/L Final     Anion Gap   Date Value Ref Range Status   12/08/2018 7 3 - 14 mmol/L Final     Glucose   Date Value Ref Range Status   12/08/2018 200 (H) 70 - 99 mg/dL Final     Urea Nitrogen   Date Value Ref Range Status   12/08/2018 32 (H) 7 - 30 mg/dL Final     Creatinine   Date Value Ref Range Status   12/08/2018 1.41 (H) 0.66 - 1.25 mg/dL Final     GFR Estimate   Date Value Ref Range Status   12/08/2018 47 (L) >60 mL/min/1.7m2 Final     Comment:     Non  GFR Calc     Calcium   Date Value Ref Range Status   12/08/2018 8.3 (L) 8.5 - 10.1 mg/dL Final     Bilirubin Total   Date Value Ref Range Status   12/08/2018 0.3 0.2 - 1.3 mg/dL Final     Alkaline Phosphatase   Date Value Ref Range Status   12/08/2018 49 40 - 150 U/L Final     ALT   Date Value Ref Range Status   12/08/2018 38 0 - 70 U/L Final     AST   Date Value Ref Range Status   12/08/2018 19 0 - 45 U/L Final       CBC  CBC RESULTS:   Recent Labs   Lab Test 12/08/18  1346   WBC 10.7   RBC 3.37*   HGB 10.8*   HCT 34.4*   *   MCH 32.0   MCHC 31.4*   RDW 12.9          TROP  Lab Results   Component Value Date    TROPI 0.026 12/22/2017    TROPI 0.024 06/09/2013    TROPI 0.026 06/08/2013    TROPI 0.040 (H) 06/08/2013    TROPI 0.047 (H) 06/08/2013    TROPONIN 0.02 12/22/2017    TROPONIN 0.01 06/07/2013    TROPONIN 0.00 08/11/2012    TROPONIN 0.01 02/26/2011       LIPIDS  Recent Labs   Lab Test 10/09/18  1031 10/24/17  1050 11/18/14  1238 06/25/13  0829   CHOL 127 152 116 112   HDL 49 75 45 41   LDL 21 32 37 52   TRIG 287* 226* 171* 92   CHOLHDLRATIO  --   --  2.6  2.7       TSH  TSH   Date Value Ref Range Status   11/14/2018 1.38 0.40 - 4.00 mU/L Final       HBA1C  Lab Results   Component Value Date    A1C 8.8 11/14/2018    A1C 8.3 10/09/2018    A1C 8.5 05/29/2018    A1C 9.3 03/29/2018    A1C 6.6 10/24/2017       CARDIAC DATA:  MRA angiogram Low Ext Bilat w/ Contrast 11/27/18:  Impression:     1. Abdominal aorta: No MRA abnormality identified.      2. 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 Single vessel runoff by anterior tibial artery  posterior tibial artery is reconstituted at the ankle via collaterals.        3. Left leg: Posterior tibial and peroneal arteries are occluded at  the proximal to mid calf. Single vessel runoff by anterior tibial  artery. Reconstitution of the posterior tibial artery at the ankle  through the collaterals.      4. Nonenhancing foci in both kidneys may represent renal cysts, but  are inadequately evaluated on this study. Renal ultrasound could  further evaluate.     5. Decreased T1 in the articular surfaces in the bilateral femurs and  tibia as described, which is most likely related to degenerative  disease. Knee radiographs would better evaluate.     [Consider Follow Up: Renal lesions and probable degenerative changes  in the bilateral knees]    US LE 11/2/18  RIGHT:       EXTERNAL ILIAC ARTERY: 110/0 cm/s, triphasic       COMMON FEMORAL ARTERY: 63/0 cm/s, triphasic       PROFUNDUS FEMORAL ARTERY: 73/0 cm/s, biphasic          SUPERFICIAL FEMORAL ARTERY, proximal: 67/0 cm/s, triphasic       SUPERFICIAL FEMORAL ARTERY, mid: 92/0 cm/s, triphasic       SUPERFICIAL FEMORAL ARTERY, distal: 66/0 cm/s, triphasic          POPLITEAL: 73/0 cm/s, biphasic          POSTERIOR TIBIAL ARTERY, proximal: 62/0 cm/s, biphasic          POSTERIOR TIBIAL ARTERY: occluded from mid to distal          ANTERIOR TIBIAL ARTERY, ankle: 98/26 cm/s, triphasic     LEFT:       EXTERNAL ILIAC ARTERY: 79/0 cm/s, triphasic        COMMON FEMORAL ARTERY: 68/0 cm/s, triphasic       PROFUNDUS FEMORAL ARTERY: 77/0 cm/s, triphasic          SUPERFICIAL FEMORAL ARTERY, proximal: 79/0 cm/s, triphasic       SUPERFICIAL FEMORAL ARTERY, mid: 78/0 cm/s, triphasic       SUPERFICIAL FEMORAL ARTERY, distal: 76/0 cm/s, triphasic          POPLITEAL: 73/0 cm/s, biphasic          POSTERIOR TIBIAL ARTERY, ankle: 43/0 cm/s, biphasic          ANTERIOR TIBIAL ARTERY, ankle: 103/19 cm/s, triphasic                                                                      IMPRESSION:  1. Right posterior tibial artery occluded from the mid calf.     2. Biphasic waveforms reflect noncompliance due to heavily calcified  arteries.     3. No significant inflow disease demonstrated.    Echocardiogram 10/17/18  Interpretation Summary  Global and regional left ventricular function is normal with an EF of 60-65%.  Right ventricular function, chamber size, wall motion, and thickness are  normal.  The inferior vena cava is normal.    ASSESSMENT/PLAN:  Dionisio returns to clinic for evaluation of his peripheral artery disease and lower extremity edema.  His lower extremity ultrasounds and MRA of the lower extremities show occlusion of the calf arteries with reconstitution distally.  He seems to be doing better with the addition of cilostazol and with physical therapy.  He was unable to enroll in supervised exercise therapy due to some underlying health concerns, however we will try to pursue this at this time as he seems to be improving.    As for the lower extremity swelling, this seems to be his biggest concern.  He is not wearing compressions at this time and that was strongly encouraged.  A new prescription was written for him.  We did advise that the compressions could make the claudication symptoms worse if it decreases blood flow further to the lower extremities, and he was advised to call if he notices that the claudication gets worse.  Review of the echo shows that the lower  extremity swelling is due to venous insufficiency and not related to heart failure.  He is currently being treated for cellulitis, which is making swelling in his left leg worse but both legs have edema.  On exam he has lymphedema, likely secondary to venous insufficiency, and may benefit from pneumatic compression devices, however EF to show compliance with compression stockings for 3 months before this can be ordered.    1. PAD  2. Venous Insufficiency  3. CKD III  4. Venous insufficiency    -Encouraged daily use of compression stockings.  A new prescription was ordered.  -Referral for supervised exercise therapy was provided  -Continued exercise with PT was also encouraged  -Continue cilostazol  -Continue daily aspirin  -Continue irbesartan  -Continue Lasix  -Advised to avoid salty foods    Pt seen and discussed with Dr. Meade.    Raghu Brink MD  Cardiology Fellow, PGY-4  [phone]      ATTENDING ATTESTATION    Patient was seen and evaluated in clinic today with the cardiology fellow. The note has been edited to reflect the history taking performed by me, my personal review of imaging, EKGs, labs and procedures, and our joint assessment and plan.     Total time spent 45 minutes, of which >50% was spent in face-to-face patient evaluation, reviewing data with patient, prolonged counseling of lifestyle modifications, any necessary genetic testing and screening of family members, and coordination of care.       Azul Meade MD MSc    Division of Cardiology  Vascular Section  [facility]

## 2019-02-13 LAB
MDC_IDC_MSMT_BATTERY_DTM: NORMAL
MDC_IDC_MSMT_BATTERY_STATUS: NORMAL
MDC_IDC_PG_IMPLANT_DTM: NORMAL
MDC_IDC_PG_MFG: NORMAL
MDC_IDC_PG_MODEL: NORMAL
MDC_IDC_PG_SERIAL: NORMAL
MDC_IDC_PG_TYPE: NORMAL
MDC_IDC_SESS_CLINIC_NAME: NORMAL
MDC_IDC_SESS_DTM: NORMAL
MDC_IDC_SESS_TYPE: NORMAL
MDC_IDC_SET_ZONE_DETECTION_INTERVAL: 2000 MS
MDC_IDC_SET_ZONE_DETECTION_INTERVAL: 3000 MS
MDC_IDC_SET_ZONE_DETECTION_INTERVAL: 410 MS
MDC_IDC_SET_ZONE_TYPE: NORMAL
MDC_IDC_STAT_AT_BURDEN_PERCENT: 5.6 %
MDC_IDC_STAT_AT_DTM_END: NORMAL
MDC_IDC_STAT_AT_DTM_START: NORMAL
MDC_IDC_STAT_EPISODE_RECENT_COUNT: 0
MDC_IDC_STAT_EPISODE_RECENT_COUNT: 1
MDC_IDC_STAT_EPISODE_RECENT_COUNT: 132
MDC_IDC_STAT_EPISODE_RECENT_COUNT_DTM_END: NORMAL
MDC_IDC_STAT_EPISODE_RECENT_COUNT_DTM_START: NORMAL
MDC_IDC_STAT_EPISODE_TOTAL_COUNT: 0
MDC_IDC_STAT_EPISODE_TOTAL_COUNT: 213
MDC_IDC_STAT_EPISODE_TOTAL_COUNT: 9
MDC_IDC_STAT_EPISODE_TOTAL_COUNT_DTM_END: NORMAL
MDC_IDC_STAT_EPISODE_TOTAL_COUNT_DTM_START: NORMAL
MDC_IDC_STAT_EPISODE_TYPE: NORMAL

## 2019-03-06 ENCOUNTER — TELEPHONE (OUTPATIENT)
Dept: CARDIOLOGY | Facility: CLINIC | Age: 84
End: 2019-03-06

## 2019-03-06 NOTE — TELEPHONE ENCOUNTER
[facility] Call Center    Phone Message    May a detailed message be left on voicemail: yes    Reason for Call: Other: Please reach out to Chitra to schedule a device check that will coordinate with his 1:30pm appointment on 4.4.19     Action Taken: Message routed to:  [facility] ([facility] clinic coord cardio

## 2019-03-07 ENCOUNTER — TELEPHONE (OUTPATIENT)
Dept: FAMILY MEDICINE | Facility: CLINIC | Age: 84
End: 2019-03-07

## 2019-03-07 DIAGNOSIS — E11.39 TYPE 2 DIABETES MELLITUS WITH OTHER OPHTHALMIC COMPLICATION, WITHOUT LONG-TERM CURRENT USE OF INSULIN (H): ICD-10-CM

## 2019-03-07 NOTE — TELEPHONE ENCOUNTER
Reason for Call:  Other prescription    Detailed comments: wife is wondering if patient should still be on linagliptin (TRADJENTA) 5 MG TABS tablet medication. She states pharmacy was to put in a refill request for medication. Wife informed request not received yet, she would like to hear back from RN before she calls pharmacy to place request again.     Phone Number Patient can be reached at: Home number on file [phone] (home)    Best Time: anytime    Can we leave a detailed message on this number? YES    Call taken on 3/7/2019 at 12:21 PM by Joslyn Randall

## 2019-03-07 NOTE — TELEPHONE ENCOUNTER
Prescription approved per [facility] Refill Protocol.  Scheduled patient an office for diabetic follow up.  Martin Murillo RN

## 2019-03-21 ENCOUNTER — TELEPHONE (OUTPATIENT)
Dept: FAMILY MEDICINE | Facility: CLINIC | Age: 84
End: 2019-03-21

## 2019-03-21 DIAGNOSIS — L03.119 CELLULITIS OF LOWER EXTREMITY, UNSPECIFIED LATERALITY: Primary | ICD-10-CM

## 2019-03-21 RX ORDER — CLINDAMYCIN HCL 300 MG
300 CAPSULE ORAL 3 TIMES DAILY
Qty: 21 CAPSULE | Refills: 0 | Status: SHIPPED | OUTPATIENT
Start: 2019-03-21 | End: 2019-03-25

## 2019-03-21 NOTE — TELEPHONE ENCOUNTER
Called wife. I advised that it was very important that we see him for possible cellulitis. Expressed understanding. I scheduled appointment.   Lyubov Holt RN

## 2019-03-21 NOTE — TELEPHONE ENCOUNTER
Reason for call:  Patient reporting a symptom    Symptom or request: cellulitis flare up. Swollen legs, no fever    Duration (how long have symptoms been present): 3 days    Have you been treated for this before? Yes    Additional comments: patient has been seen in the ED twice for this. Patient has an upcoming apt with PCP on 03/28. Wife is wondering if an antibiotic script can be sent to    [id number] - [place], [place] [id number] AT [facility] & [address]      Phone Number patient can be reached at:  Home number on file [phone] (home)    Best Time:  anytime    Can we leave a detailed message on this number:  YES    Call taken on 3/21/2019 at 9:33 AM by Joslyn Randall

## 2019-03-21 NOTE — TELEPHONE ENCOUNTER
Malathi states that there is no way she can come in today. Earliest she can come in is Monday at 2:00pm. I scheduled this appointment with you. Preferred pharmacy is [facility] in [place]. I advised that it is crucial he is seen if he develops fever, pain, or if the redness is spreading.  Lyubov Holt RN

## 2019-03-21 NOTE — TELEPHONE ENCOUNTER
I would like to see him. Could he come at the end of the day today 6:20. We could consider starting antibiotics empirically if he cannot come in but would need to be seen in the next few days. If there is any drainage I would like to do a culture

## 2019-03-21 NOTE — TELEPHONE ENCOUNTER
Patients wife called, she states they have to cancel the appointment for today, as they will not be able to make it at the scheduled time. Wife does want to be sure that Dr. Stewart gets the message regarding patient.

## 2019-03-25 ENCOUNTER — OFFICE VISIT (OUTPATIENT)
Dept: FAMILY MEDICINE | Facility: CLINIC | Age: 84
End: 2019-03-25
Payer: MEDICARE

## 2019-03-25 VITALS
DIASTOLIC BLOOD PRESSURE: 68 MMHG | BODY MASS INDEX: 33.46 KG/M2 | WEIGHT: 220.8 LBS | SYSTOLIC BLOOD PRESSURE: 116 MMHG | TEMPERATURE: 98.8 F | HEIGHT: 68 IN | HEART RATE: 107 BPM | OXYGEN SATURATION: 97 %

## 2019-03-25 DIAGNOSIS — I87.8 VENOUS STASIS: ICD-10-CM

## 2019-03-25 DIAGNOSIS — I10 HYPERTENSION GOAL BP (BLOOD PRESSURE) < 150/90: ICD-10-CM

## 2019-03-25 DIAGNOSIS — Z96.652 STATUS POST TOTAL LEFT KNEE REPLACEMENT: ICD-10-CM

## 2019-03-25 DIAGNOSIS — M31.6 TEMPORAL ARTERITIS (H): Primary | ICD-10-CM

## 2019-03-25 DIAGNOSIS — G20.A1 PARKINSON DISEASE (H): ICD-10-CM

## 2019-03-25 LAB
CRP SERPL-MCNC: <2.9 MG/L (ref 0–8)
ERYTHROCYTE [SEDIMENTATION RATE] IN BLOOD BY WESTERGREN METHOD: 9 MM/H (ref 0–20)

## 2019-03-25 PROCEDURE — 85652 RBC SED RATE AUTOMATED: CPT | Performed by: FAMILY MEDICINE

## 2019-03-25 PROCEDURE — 86140 C-REACTIVE PROTEIN: CPT | Performed by: FAMILY MEDICINE

## 2019-03-25 PROCEDURE — 36415 COLL VENOUS BLD VENIPUNCTURE: CPT | Performed by: FAMILY MEDICINE

## 2019-03-25 PROCEDURE — 99214 OFFICE O/P EST MOD 30 MIN: CPT | Performed by: FAMILY MEDICINE

## 2019-03-25 RX ORDER — AMOXICILLIN 500 MG/1
CAPSULE ORAL
Qty: 16 CAPSULE | Refills: 4 | Status: SHIPPED | OUTPATIENT
Start: 2019-03-25 | End: 2020-06-18

## 2019-03-25 RX ORDER — IRBESARTAN 75 MG/1
75 TABLET ORAL DAILY
Qty: 90 TABLET | Refills: 3 | Status: SHIPPED | OUTPATIENT
Start: 2019-03-25 | End: 2019-05-07

## 2019-03-25 ASSESSMENT — MIFFLIN-ST. JEOR: SCORE: 1646.04

## 2019-03-25 NOTE — PROGRESS NOTES
"  SUBJECTIVE:   Bart Blair is a 88 year old male who presents to clinic today for the following health issues:      ED/UC Followup:    Facility:  [facility]  Date of visit: 1/28/19  Reason for visit: Cellulitis of foot/ leg  Current Status:  Doing okay-- Picked up Rx of Clindamycin but has not started due to reading up on side effects         Patients symptoms improved without anabiotics.  We had treated empirically due to his inability to get into the clinic. After review his symptoms were probably due to venous stasis.     Here with daughter and wife. [place] forms colleted for daughter. He needs assistance with ADL's, transportation to appts and response to acute changes when they occur    Problem list and histories reviewed & adjusted, as indicated.  Additional history: as documented    BP Readings from Last 3 Encounters:   03/25/19 116/68   02/05/19 122/61   01/28/19 153/77    Wt Readings from Last 3 Encounters:   03/25/19 100.2 kg (220 lb 12.8 oz)   02/05/19 95 kg (209 lb 8 oz)   01/28/19 97.1 kg (214 lb)                     Reviewed and updated as needed this visit by clinical staff  Tobacco  Allergies  Meds  Med Hx  Surg Hx  Fam Hx  Soc Hx      Reviewed and updated as needed this visit by Provider         ROS:  Constitutional, HEENT, cardiovascular, pulmonary, gi and gu systems are negative, except as otherwise noted.    OBJECTIVE:     /68 (BP Location: Right arm, Patient Position: Sitting, Cuff Size: Adult Large)   Pulse 107   Temp 98.8  F (37.1  C) (Oral)   Ht 1.727 m (5' 8\")   Wt 100.2 kg (220 lb 12.8 oz)   SpO2 97%   BMI 33.57 kg/m    Body mass index is 33.57 kg/m .   GENERAL: alert, no distress, frail and elderly  NECK: no adenopathy, no asymmetry, masses, or scars and thyroid normal to palpation  RESP: lungs clear to auscultation - no rales, rhonchi or wheezes  CV: irregularly irregular rhythm and 2+ bilateral lower extremity pitting edema to knee    ABDOMEN: soft, nontender, no " hepatosplenomegaly, no masses and bowel sounds normal  MS: no erythema of lower extremities. Wrapped with ACE's for compression  SKIN: no suspicious lesions or rashes    Diagnostic Test Results:  none     ASSESSMENT:       PLAN:   (M31.6) Temporal arteritis (H)  (primary encounter diagnosis)  Comment: need to recheck labs. Recent change in meds  Plan: **ESR FUTURE anytime, CRP, inflammation            (I87.8) Venous stasis  Comment:   Plan: compression, elevation.     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 Use for dental appointments     Dispense:  16 capsule     Refill:  4     irbesartan (AVAPRO) 75 MG tablet     Sig: Take 1 tablet (75 mg) by mouth daily     Dispense:  90 tablet     Refill:  3           Chuy Stewart MD, MD  [facility]

## 2019-03-25 NOTE — PATIENT INSTRUCTIONS
Orders Placed This Encounter     **ESR FUTURE anytime     CRP, inflammation     amoxicillin (AMOXIL) 500 MG capsule     Si tablets prior to dental appointment. Use for dental appointments     Dispense:  16 capsule     Refill:  4     irbesartan (AVAPRO) 75 MG tablet     Sig: Take 1 tablet (75 mg) by mouth daily     Dispense:  90 tablet     Refill:  3

## 2019-04-02 ENCOUNTER — TELEPHONE (OUTPATIENT)
Dept: FAMILY MEDICINE | Facility: CLINIC | Age: 84
End: 2019-04-02
Payer: MEDICARE

## 2019-04-02 NOTE — TELEPHONE ENCOUNTER
Reason for call:  Patient reporting a symptom    Symptom or request: Swollen left leg, foot, ankle & knee    Duration (how long have symptoms been present): unknown    Have you been treated for this before? Yes    Additional comments: Patient's spouse called and stated patient's left leg has gotten very swollen to the point that now his foot, ankle and knee are swollen too and very red and warm.  Patient's spouse also stated that when they saw the doctor last week patient was prescribed antibiotic in case his leg did not get any better, however, she would like to speak to the doctor or nurse before starting on antibiotics.  Please call patient's spouse to discuss how to follow up.    Phone Number patient can be reached at:  Home number on file [phone] (home)    Best Time:  ASAP    Can we leave a detailed message on this number:  YES    Call taken on 4/2/2019 at 4:33 PM by Laverne Jackson

## 2019-04-02 NOTE — TELEPHONE ENCOUNTER
Patient has been elevating some today, but Malathi reports symptoms have just started in the past 24 hours. I instructed to have patient elevate overnight, and if symptoms are still present in the morning, patient should start the clindamycin and call the RN line for a same-day appointment on Thursday. They voice understanding. Will leave encounter open for update.    Georgina Hutson RN

## 2019-04-02 NOTE — TELEPHONE ENCOUNTER
Route to PCP to advise regarding leg swelling/pain/redness.  Please see details below. Would you like patient to take the clindamycin previously prescribed and see you in clinic?    Bart Blair is a 88 year old male who calls with leg swelling and redness.    NURSING ASSESSMENT:  Description: Spouse Malathi reports swelling and redness in L leg from foot to knee level.  This is similar to symptoms he had previously when he had cellulitis.  At that time in January, he had been advised on ED to r/o DVT, which was negative, and he was prescribed abx for the cellulitis. He then was triaged for similar symptoms in March a couple of days prior to seeing PCP, and was prescribed clindamycin empirically, but then the symptoms had subsided by time of clinic visit, so he never took the clindamycin. He currently denies chest pain, SOB, issues with pulse/circulation/numbness, inability to walk, fever.  He reports leg is somewhat painful, but this is how it has been intermittently for awhile, and not localized to calf. They deny any weeping at this time.  Spouse is wondering if patient should take the clindamycin he was previously prescribed, and I instructed that I cannot advise that since this is a different episode.     Allergies:   Allergies   Allergen Reactions     No Known Drug Allergies      NURSING PLAN: Routed to provider Yes and Nursing advice to patient See provider.     RECOMMENDED DISPOSITION:  See in 4 hours, another person to drive - Advised urgent care this evening, but they decline and would like to see what PCP recommends.  I instructed on sx that would warrant emergency care, such as chest pain, SOB with understanding voiced.   Will comply with recommendation: No- Barriers to comply with plan of care Spouse states urgent care is too far and inconvenient, would like recommendation from PCP.  If further questions/concerns or if symptoms do not improve, worsen or new symptoms develop, call your PCP or [place]  [facility] as soon as possible.      Guideline used:  Telephone Triage Protocols for Nurses, Fifth Edition, Esther Spears \"Leg Pain/Swelling\"    MAHI GALVAN RN    "

## 2019-04-04 ENCOUNTER — ANCILLARY PROCEDURE (OUTPATIENT)
Dept: CARDIOLOGY | Facility: CLINIC | Age: 84
End: 2019-04-04
Attending: INTERNAL MEDICINE
Payer: MEDICARE

## 2019-04-04 ENCOUNTER — OFFICE VISIT (OUTPATIENT)
Dept: CARDIOLOGY | Facility: CLINIC | Age: 84
DRG: 603 | End: 2019-04-04
Attending: NURSE PRACTITIONER
Payer: MEDICARE

## 2019-04-04 ENCOUNTER — HOSPITAL ENCOUNTER (INPATIENT)
Facility: CLINIC | Age: 84
LOS: 4 days | Discharge: HOME HEALTH | DRG: 603 | End: 2019-04-08
Attending: EMERGENCY MEDICINE | Admitting: INTERNAL MEDICINE
Payer: MEDICARE

## 2019-04-04 VITALS — WEIGHT: 216 LBS | OXYGEN SATURATION: 100 % | BODY MASS INDEX: 31.99 KG/M2 | HEIGHT: 69 IN

## 2019-04-04 DIAGNOSIS — R55 SYNCOPE AND COLLAPSE: ICD-10-CM

## 2019-04-04 DIAGNOSIS — R55 SYNCOPE, UNSPECIFIED SYNCOPE TYPE: ICD-10-CM

## 2019-04-04 DIAGNOSIS — G20.A1 PARKINSON DISEASE (H): Primary | ICD-10-CM

## 2019-04-04 DIAGNOSIS — L03.119 CELLULITIS OF LOWER EXTREMITY, UNSPECIFIED LATERALITY: ICD-10-CM

## 2019-04-04 DIAGNOSIS — L03.116 CELLULITIS OF LEFT LOWER EXTREMITY: ICD-10-CM

## 2019-04-04 LAB
ALBUMIN SERPL-MCNC: 3 G/DL (ref 3.4–5)
ALP SERPL-CCNC: 44 U/L (ref 40–150)
ALT SERPL W P-5'-P-CCNC: 16 U/L (ref 0–70)
ANION GAP SERPL CALCULATED.3IONS-SCNC: 7 MMOL/L (ref 3–14)
AST SERPL W P-5'-P-CCNC: 11 U/L (ref 0–45)
BASOPHILS # BLD AUTO: 0 10E9/L (ref 0–0.2)
BASOPHILS NFR BLD AUTO: 0.1 %
BILIRUB SERPL-MCNC: 0.6 MG/DL (ref 0.2–1.3)
BUN SERPL-MCNC: 41 MG/DL (ref 7–30)
CALCIUM SERPL-MCNC: 8.4 MG/DL (ref 8.5–10.1)
CHLORIDE SERPL-SCNC: 104 MMOL/L (ref 94–109)
CO2 SERPL-SCNC: 28 MMOL/L (ref 20–32)
CREAT SERPL-MCNC: 1.56 MG/DL (ref 0.66–1.25)
DIFFERENTIAL METHOD BLD: ABNORMAL
EOSINOPHIL # BLD AUTO: 0 10E9/L (ref 0–0.7)
EOSINOPHIL NFR BLD AUTO: 0.1 %
ERYTHROCYTE [DISTWIDTH] IN BLOOD BY AUTOMATED COUNT: 14 % (ref 10–15)
GFR SERPL CREATININE-BSD FRML MDRD: 39 ML/MIN/{1.73_M2}
GLUCOSE SERPL-MCNC: 198 MG/DL (ref 70–99)
HBA1C MFR BLD: 7.6 % (ref 0–5.6)
HCT VFR BLD AUTO: 34.1 % (ref 40–53)
HGB BLD-MCNC: 10.9 G/DL (ref 13.3–17.7)
IMM GRANULOCYTES # BLD: 0.2 10E9/L (ref 0–0.4)
IMM GRANULOCYTES NFR BLD: 1.8 %
LACTATE BLD-SCNC: 1.9 MMOL/L (ref 0.7–2)
LYMPHOCYTES # BLD AUTO: 0.9 10E9/L (ref 0.8–5.3)
LYMPHOCYTES NFR BLD AUTO: 7.6 %
MCH RBC QN AUTO: 33.3 PG (ref 26.5–33)
MCHC RBC AUTO-ENTMCNC: 32 G/DL (ref 31.5–36.5)
MCV RBC AUTO: 104 FL (ref 78–100)
MDC_IDC_EPISODE_DTM: NORMAL
MDC_IDC_EPISODE_DURATION: 1560 S
MDC_IDC_EPISODE_ID: 40
MDC_IDC_EPISODE_TYPE: NORMAL
MDC_IDC_MSMT_BATTERY_STATUS: NORMAL
MDC_IDC_PG_IMPLANT_DTM: NORMAL
MDC_IDC_PG_MFG: NORMAL
MDC_IDC_PG_MODEL: NORMAL
MDC_IDC_PG_SERIAL: NORMAL
MDC_IDC_PG_TYPE: NORMAL
MDC_IDC_SESS_CLINIC_NAME: NORMAL
MDC_IDC_SESS_DTM: NORMAL
MDC_IDC_SESS_TYPE: NORMAL
MDC_IDC_SET_ZONE_DETECTION_INTERVAL: 2000 MS
MDC_IDC_SET_ZONE_DETECTION_INTERVAL: 3000 MS
MDC_IDC_SET_ZONE_DETECTION_INTERVAL: 410 MS
MDC_IDC_SET_ZONE_TYPE: NORMAL
MDC_IDC_STAT_AT_BURDEN_PERCENT: 3 %
MDC_IDC_STAT_AT_DTM_END: NORMAL
MDC_IDC_STAT_AT_DTM_START: NORMAL
MDC_IDC_STAT_EPISODE_RECENT_COUNT: 0
MDC_IDC_STAT_EPISODE_RECENT_COUNT: 233
MDC_IDC_STAT_EPISODE_RECENT_COUNT: 7
MDC_IDC_STAT_EPISODE_RECENT_COUNT_DTM_END: NORMAL
MDC_IDC_STAT_EPISODE_RECENT_COUNT_DTM_START: NORMAL
MDC_IDC_STAT_EPISODE_TOTAL_COUNT: 0
MDC_IDC_STAT_EPISODE_TOTAL_COUNT: 10
MDC_IDC_STAT_EPISODE_TOTAL_COUNT: 234
MDC_IDC_STAT_EPISODE_TOTAL_COUNT_DTM_END: NORMAL
MDC_IDC_STAT_EPISODE_TOTAL_COUNT_DTM_START: NORMAL
MDC_IDC_STAT_EPISODE_TYPE: NORMAL
MONOCYTES # BLD AUTO: 0.8 10E9/L (ref 0–1.3)
MONOCYTES NFR BLD AUTO: 6.7 %
NEUTROPHILS # BLD AUTO: 9.8 10E9/L (ref 1.6–8.3)
NEUTROPHILS NFR BLD AUTO: 83.7 %
NRBC # BLD AUTO: 0 10*3/UL
NRBC BLD AUTO-RTO: 0 /100
PLATELET # BLD AUTO: 125 10E9/L (ref 150–450)
POTASSIUM SERPL-SCNC: 4.4 MMOL/L (ref 3.4–5.3)
PROT SERPL-MCNC: 6.1 G/DL (ref 6.8–8.8)
RBC # BLD AUTO: 3.27 10E12/L (ref 4.4–5.9)
SODIUM SERPL-SCNC: 138 MMOL/L (ref 133–144)
WBC # BLD AUTO: 11.8 10E9/L (ref 4–11)

## 2019-04-04 PROCEDURE — 25000128 H RX IP 250 OP 636: Performed by: INTERNAL MEDICINE

## 2019-04-04 PROCEDURE — 12000001 ZZH R&B MED SURG/OB UMMC

## 2019-04-04 PROCEDURE — 87040 BLOOD CULTURE FOR BACTERIA: CPT | Performed by: EMERGENCY MEDICINE

## 2019-04-04 PROCEDURE — 99285 EMERGENCY DEPT VISIT HI MDM: CPT | Mod: 25 | Performed by: EMERGENCY MEDICINE

## 2019-04-04 PROCEDURE — 80053 COMPREHEN METABOLIC PANEL: CPT | Performed by: EMERGENCY MEDICINE

## 2019-04-04 PROCEDURE — 99215 OFFICE O/P EST HI 40 MIN: CPT | Mod: ZP | Performed by: NURSE PRACTITIONER

## 2019-04-04 PROCEDURE — 25000128 H RX IP 250 OP 636: Performed by: EMERGENCY MEDICINE

## 2019-04-04 PROCEDURE — 85025 COMPLETE CBC W/AUTO DIFF WBC: CPT | Performed by: EMERGENCY MEDICINE

## 2019-04-04 PROCEDURE — A9270 NON-COVERED ITEM OR SERVICE: HCPCS | Mod: GY | Performed by: INTERNAL MEDICINE

## 2019-04-04 PROCEDURE — 96365 THER/PROPH/DIAG IV INF INIT: CPT | Performed by: EMERGENCY MEDICINE

## 2019-04-04 PROCEDURE — 83605 ASSAY OF LACTIC ACID: CPT | Performed by: EMERGENCY MEDICINE

## 2019-04-04 PROCEDURE — 99207 ZZC APP CREDIT; MD BILLING SHARED VISIT: CPT | Performed by: INTERNAL MEDICINE

## 2019-04-04 PROCEDURE — 25800030 ZZH RX IP 258 OP 636: Performed by: EMERGENCY MEDICINE

## 2019-04-04 PROCEDURE — 96367 TX/PROPH/DG ADDL SEQ IV INF: CPT | Performed by: EMERGENCY MEDICINE

## 2019-04-04 PROCEDURE — 99285 EMERGENCY DEPT VISIT HI MDM: CPT | Mod: Z6 | Performed by: EMERGENCY MEDICINE

## 2019-04-04 PROCEDURE — G0463 HOSPITAL OUTPT CLINIC VISIT: HCPCS

## 2019-04-04 PROCEDURE — 25000132 ZZH RX MED GY IP 250 OP 250 PS 637: Mod: GY | Performed by: INTERNAL MEDICINE

## 2019-04-04 PROCEDURE — 96361 HYDRATE IV INFUSION ADD-ON: CPT | Performed by: EMERGENCY MEDICINE

## 2019-04-04 PROCEDURE — 25800030 ZZH RX IP 258 OP 636

## 2019-04-04 PROCEDURE — 83036 HEMOGLOBIN GLYCOSYLATED A1C: CPT | Performed by: EMERGENCY MEDICINE

## 2019-04-04 PROCEDURE — 25000128 H RX IP 250 OP 636

## 2019-04-04 RX ORDER — NALOXONE HYDROCHLORIDE 0.4 MG/ML
.1-.4 INJECTION, SOLUTION INTRAMUSCULAR; INTRAVENOUS; SUBCUTANEOUS
Status: DISCONTINUED | OUTPATIENT
Start: 2019-04-04 | End: 2019-04-08 | Stop reason: HOSPADM

## 2019-04-04 RX ORDER — MULTIVITAMIN,THERAPEUTIC
1 TABLET ORAL DAILY
Status: DISCONTINUED | OUTPATIENT
Start: 2019-04-05 | End: 2019-04-08 | Stop reason: HOSPADM

## 2019-04-04 RX ORDER — ACETAMINOPHEN 325 MG/1
650 TABLET ORAL EVERY 6 HOURS PRN
Status: DISCONTINUED | OUTPATIENT
Start: 2019-04-04 | End: 2019-04-08 | Stop reason: HOSPADM

## 2019-04-04 RX ORDER — LANOLIN ALCOHOL/MO/W.PET/CERES
1000 CREAM (GRAM) TOPICAL DAILY
Status: DISCONTINUED | OUTPATIENT
Start: 2019-04-05 | End: 2019-04-08 | Stop reason: HOSPADM

## 2019-04-04 RX ORDER — CILOSTAZOL 50 MG/1
50 TABLET ORAL 2 TIMES DAILY
Status: DISCONTINUED | OUTPATIENT
Start: 2019-04-04 | End: 2019-04-08 | Stop reason: HOSPADM

## 2019-04-04 RX ORDER — AMPICILLIN AND SULBACTAM 2; 1 G/1; G/1
3 INJECTION, POWDER, FOR SOLUTION INTRAMUSCULAR; INTRAVENOUS EVERY 6 HOURS
Status: DISCONTINUED | OUTPATIENT
Start: 2019-04-05 | End: 2019-04-08 | Stop reason: HOSPADM

## 2019-04-04 RX ORDER — CLINDAMYCIN HCL 300 MG
CAPSULE ORAL
Refills: 0 | Status: ON HOLD | COMMUNITY
Start: 2019-03-21 | End: 2019-04-05

## 2019-04-04 RX ORDER — ONDANSETRON 2 MG/ML
4 INJECTION INTRAMUSCULAR; INTRAVENOUS EVERY 6 HOURS PRN
Status: DISCONTINUED | OUTPATIENT
Start: 2019-04-04 | End: 2019-04-08 | Stop reason: HOSPADM

## 2019-04-04 RX ORDER — ASPIRIN 81 MG/1
81 TABLET, CHEWABLE ORAL DAILY
Status: DISCONTINUED | OUTPATIENT
Start: 2019-04-05 | End: 2019-04-08 | Stop reason: HOSPADM

## 2019-04-04 RX ORDER — SODIUM CHLORIDE 9 MG/ML
1000 INJECTION, SOLUTION INTRAVENOUS CONTINUOUS
Status: DISCONTINUED | OUTPATIENT
Start: 2019-04-04 | End: 2019-04-04

## 2019-04-04 RX ORDER — NICOTINE POLACRILEX 4 MG
15-30 LOZENGE BUCCAL
Status: DISCONTINUED | OUTPATIENT
Start: 2019-04-04 | End: 2019-04-08 | Stop reason: HOSPADM

## 2019-04-04 RX ORDER — HEPARIN SODIUM 5000 [USP'U]/.5ML
5000 INJECTION, SOLUTION INTRAVENOUS; SUBCUTANEOUS EVERY 12 HOURS
Status: DISCONTINUED | OUTPATIENT
Start: 2019-04-04 | End: 2019-04-08 | Stop reason: HOSPADM

## 2019-04-04 RX ORDER — DEXTROSE MONOHYDRATE 25 G/50ML
25-50 INJECTION, SOLUTION INTRAVENOUS
Status: DISCONTINUED | OUTPATIENT
Start: 2019-04-04 | End: 2019-04-08 | Stop reason: HOSPADM

## 2019-04-04 RX ORDER — AMOXICILLIN 250 MG
1 CAPSULE ORAL 2 TIMES DAILY PRN
Status: DISCONTINUED | OUTPATIENT
Start: 2019-04-04 | End: 2019-04-08 | Stop reason: HOSPADM

## 2019-04-04 RX ORDER — IRBESARTAN 75 MG/1
75 TABLET ORAL EVERY EVENING
Status: DISCONTINUED | OUTPATIENT
Start: 2019-04-05 | End: 2019-04-08 | Stop reason: HOSPADM

## 2019-04-04 RX ORDER — SERTRALINE HYDROCHLORIDE 25 MG/1
25 TABLET, FILM COATED ORAL DAILY
Status: DISCONTINUED | OUTPATIENT
Start: 2019-04-05 | End: 2019-04-08 | Stop reason: HOSPADM

## 2019-04-04 RX ORDER — ONDANSETRON 4 MG/1
4 TABLET, ORALLY DISINTEGRATING ORAL EVERY 6 HOURS PRN
Status: DISCONTINUED | OUTPATIENT
Start: 2019-04-04 | End: 2019-04-08 | Stop reason: HOSPADM

## 2019-04-04 RX ORDER — FOLIC ACID 1 MG/1
1 TABLET ORAL DAILY
Status: DISCONTINUED | OUTPATIENT
Start: 2019-04-05 | End: 2019-04-08 | Stop reason: HOSPADM

## 2019-04-04 RX ORDER — ATORVASTATIN CALCIUM 10 MG/1
10 TABLET, FILM COATED ORAL AT BEDTIME
Status: DISCONTINUED | OUTPATIENT
Start: 2019-04-04 | End: 2019-04-08 | Stop reason: HOSPADM

## 2019-04-04 RX ORDER — CALCIUM CARBONATE 500 MG/1
1000 TABLET, CHEWABLE ORAL 4 TIMES DAILY PRN
Status: DISCONTINUED | OUTPATIENT
Start: 2019-04-04 | End: 2019-04-08 | Stop reason: HOSPADM

## 2019-04-04 RX ORDER — ACETAMINOPHEN 325 MG/1
975 TABLET ORAL 3 TIMES DAILY
Status: DISCONTINUED | OUTPATIENT
Start: 2019-04-04 | End: 2019-04-08 | Stop reason: HOSPADM

## 2019-04-04 RX ORDER — AMOXICILLIN 250 MG
2 CAPSULE ORAL 2 TIMES DAILY PRN
Status: DISCONTINUED | OUTPATIENT
Start: 2019-04-04 | End: 2019-04-08 | Stop reason: HOSPADM

## 2019-04-04 RX ORDER — FUROSEMIDE 20 MG
40 TABLET ORAL DAILY
Status: DISCONTINUED | OUTPATIENT
Start: 2019-04-05 | End: 2019-04-04

## 2019-04-04 RX ORDER — GLIPIZIDE 5 MG/1
5 TABLET ORAL
Status: DISCONTINUED | OUTPATIENT
Start: 2019-04-05 | End: 2019-04-08 | Stop reason: HOSPADM

## 2019-04-04 RX ORDER — AMPICILLIN AND SULBACTAM 2; 1 G/1; G/1
3 INJECTION, POWDER, FOR SOLUTION INTRAMUSCULAR; INTRAVENOUS ONCE
Status: COMPLETED | OUTPATIENT
Start: 2019-04-04 | End: 2019-04-04

## 2019-04-04 RX ORDER — ACETAMINOPHEN 325 MG/1
650 TABLET ORAL EVERY 4 HOURS PRN
Status: DISCONTINUED | OUTPATIENT
Start: 2019-04-04 | End: 2019-04-04

## 2019-04-04 RX ORDER — PREDNISONE 5 MG/1
10 TABLET ORAL DAILY
Status: DISCONTINUED | OUTPATIENT
Start: 2019-04-05 | End: 2019-04-08 | Stop reason: HOSPADM

## 2019-04-04 RX ADMIN — ACETAMINOPHEN 975 MG: 325 TABLET, FILM COATED ORAL at 23:59

## 2019-04-04 RX ADMIN — HEPARIN SODIUM 5000 UNITS: 5000 INJECTION, SOLUTION INTRAVENOUS; SUBCUTANEOUS at 23:59

## 2019-04-04 RX ADMIN — SODIUM CHLORIDE 1000 ML: 900 INJECTION, SOLUTION INTRAVENOUS at 17:42

## 2019-04-04 RX ADMIN — AMPICILLIN SODIUM AND SULBACTAM SODIUM 3 G: 2; 1 INJECTION, POWDER, FOR SOLUTION INTRAMUSCULAR; INTRAVENOUS at 18:30

## 2019-04-04 RX ADMIN — ATORVASTATIN CALCIUM 10 MG: 10 TABLET, FILM COATED ORAL at 23:59

## 2019-04-04 RX ADMIN — SODIUM CHLORIDE 1000 ML: 9 INJECTION, SOLUTION INTRAVENOUS at 21:23

## 2019-04-04 RX ADMIN — VANCOMYCIN HYDROCHLORIDE 2000 MG: 10 INJECTION, POWDER, LYOPHILIZED, FOR SOLUTION INTRAVENOUS at 19:44

## 2019-04-04 ASSESSMENT — MIFFLIN-ST. JEOR
SCORE: 1640.15
SCORE: 1617.47

## 2019-04-04 ASSESSMENT — ENCOUNTER SYMPTOMS
SHORTNESS OF BREATH: 0
COLOR CHANGE: 1
DIFFICULTY URINATING: 0
EYE REDNESS: 0
HEADACHES: 0
ABDOMINAL PAIN: 0
ARTHRALGIAS: 0
FEVER: 0
CHILLS: 0
NECK STIFFNESS: 0
CONFUSION: 0

## 2019-04-04 ASSESSMENT — PAIN SCALES - GENERAL: PAINLEVEL: NO PAIN (0)

## 2019-04-04 NOTE — TELEPHONE ENCOUNTER
Reason for Call:  Other call back    Detailed comments: Patient wife returning call, RN requested  to put in TE. Patient wife would like to update RN that patient was referred to ER by nurse at [facility] and they are waiting to be seen in ER. Please advise.     Phone Number Patient can be reached at: Cell number on file:    Telephone Information:   Mobile [phone]       Best Time: Anytime    Can we leave a detailed message on this number? YES    Call taken on 4/4/2019 at 4:40 PM by Jessi Nieto

## 2019-04-04 NOTE — ED PROVIDER NOTES
[place] EMERGENCY DEPARTMENT ([facility])  4/04/19   History     Chief Complaint   Patient presents with     Cellulitis     The history is provided by the patient, the spouse and medical records.     Bart Blair is a 88 year old male with a medical history significant for Parkinson's disease, hypertension, type 2 diabetes mellitus, chronic venous stasis and is s/p left knee arthroplasty (1/31/2012) who presents to the Emergency Department for evaluation of cellulitis to his left lower extremity.  The patient's wife reports that the patient has had cellulitis multiple times in the past; last infection was a couple of months ago.  The patient 10 days ago began having redness of his left lower extremity and it has been worsening over the last few days and today began spreading above his left knee.  The patient contacted his PCP on 4/2/2019 and was told to start taking the clindamycin that was previously prescribed to him.  The patient started taking this yesterday; he has taken a total of 4 doses at this point.  The patient was seen in his cardiology clinic today and was advised to come to the Emergency Department to have the cellulitis evaluated.  The wife reports that they have not noticed any significant improvement of the cellulitis since starting the clindamycin at this point.  The patient has not had any fevers or chills.  The patient does note that he has had pain associated with this cellulitis, but it is not as bad today.  The wife also notes that she has noticed some discoloration to his right lower extremity as well.  Patient is noted to have bilateral lower extremity swelling.  No other symptoms noted.    I have reviewed the Medications, Allergies, Past Medical and Surgical History, and Social History in the [employer] system.    Past Medical History:   Diagnosis Date     Anemia      Anemia due to blood loss, acute      CKD (chronic kidney disease) stage 3, GFR 30-59 ml/min (H) 5/31/2010     Essential  hypertension, benign      Other and unspecified hyperlipidemia      Other and unspecified hyperlipidemia      Other specified disorder of skin      Pain in joint, shoulder region      Parkinson disease (H) 9/2/2010     Polyp of vocal cord or larynx      Retinal ischemia     right sided thrombotic plaque     Rosacea      Type II or unspecified type diabetes mellitus without mention of complication, not stated as uncontrolled        Past Surgical History:   Procedure Laterality Date     ARTHROPLASTY KNEE  1/31/2012    Procedure:ARTHROPLASTY KNEE; LEFT TOTAL KNEE ARTHROPLASTY (IRASEMA)^; Surgeon:SKIP FAIR; Location: OR     ARTHROSCOPY SHOULDER, OPEN ROTATOR CUFF REPAIR, COMBINED  4/24/2012    Procedure:COMBINED ARTHROSCOPY SHOULDER, OPEN ROTATOR CUFF REPAIR; RIGHT SHOULDER ARTHROSCOPY OPEN ROTATOR CUFF DEBRIDEMENT, SUBCROMIAL DECOMPRESSION, AND BICEPS TENODESIS REPAIR; Surgeon:SKIP FAIR; Location: [facility]     CL AFF SURGICAL PATHOLOGY  6-    Dr. Bowers; left hand     ENT SURGERY       INCISE FINGER TENDON SHEATH  2008    Dr. Bowers; right AND LEFT hand     THROAT SURGERY      vocal cord polyps       Family History   Problem Relation Age of Onset     Family History Negative Other         unknown family history per patient       Social History     Tobacco Use     Smoking status: Former Smoker     Smokeless tobacco: Never Used   Substance Use Topics     Alcohol use: Yes     Comment: rarely       Current Facility-Administered Medications   Medication     0.9% sodium chloride BOLUS    Followed by     sodium chloride 0.9% infusion     Current Outpatient Medications   Medication     amoxicillin (AMOXIL) 500 MG capsule     aspirin 81 MG tablet     atorvastatin (LIPITOR) 10 MG tablet     blood glucose monitoring (ACCU-CHEK COMPACT STRIPS) test strip     blood glucose monitoring (SOFTCLIX) lancets     cilostazol (PLETAL) 50 MG tablet     clindamycin (CLEOCIN) 300 MG capsule     COMPRESSION STOCKINGS      "Cyanocobalamin (VITAMIN B-12 CR) 1000 MCG TBCR     diclofenac (VOLTAREN) 1 % topical gel     fluticasone (FLONASE) 50 MCG/ACT spray     FOLIC ACID PO     furosemide (LASIX) 20 MG tablet     glipiZIDE (GLUCOTROL) 5 MG tablet     irbesartan (AVAPRO) 75 MG tablet     linagliptin (TRADJENTA) 5 MG TABS tablet     metroNIDAZOLE (METROCREAM) 0.75 % cream     Multiple Vitamin (MULTI VITAMIN  MENS) TABS     order for DME     order for DME     order for DME     PREDNISONE PO     senna-docusate (SENOKOT-S;PERICOLACE) 8.6-50 MG per tablet     sertraline (ZOLOFT) 25 MG tablet     tocilizumab (ACTEMRA) 162 MG/0.9ML subcutaneous injection     VITAMIN D 1000 UNIT OR TABS        Allergies   Allergen Reactions     No Known Drug Allergies          Review of Systems   Constitutional: Negative for chills and fever.   HENT: Negative for congestion.    Eyes: Negative for redness.   Respiratory: Negative for shortness of breath.    Cardiovascular: Positive for leg swelling (bilateral). Negative for chest pain.   Gastrointestinal: Negative for abdominal pain.   Genitourinary: Negative for difficulty urinating.   Musculoskeletal: Negative for arthralgias and neck stiffness.   Skin: Positive for color change (redness to left lower extremity; mild discoloration to right lower extremity).   Neurological: Negative for headaches.   Psychiatric/Behavioral: Negative for confusion.   All other systems reviewed and are negative.      Physical Exam   BP: 153/61  Pulse: 109  Temp: 98.7  F (37.1  C)  Resp: 18  Height: 175.3 cm (5' 9\")  Weight: 95.7 kg (211 lb)  SpO2: 97 %      Physical Exam   Constitutional: He is oriented to person, place, and time. He appears well-developed and well-nourished. No distress.   HENT:   Head: Normocephalic and atraumatic.   Mouth/Throat: No oropharyngeal exudate.   Eyes: Pupils are equal, round, and reactive to light. Right eye exhibits no discharge. Left eye exhibits no discharge. No scleral icterus.   Neck: Normal range " of motion. Neck supple.   Cardiovascular: Regular rhythm, normal heart sounds and intact distal pulses. Tachycardia present. Exam reveals no gallop and no friction rub.   No murmur heard.  Pulmonary/Chest: Effort normal and breath sounds normal. No respiratory distress. He has no wheezes. He exhibits no tenderness.   Abdominal: Soft. Bowel sounds are normal. He exhibits no distension. There is no tenderness.   Musculoskeletal: Normal range of motion. He exhibits no edema, tenderness or deformity.        Legs:  Neurological: He is alert and oriented to person, place, and time. No cranial nerve deficit.   Skin: Skin is warm and dry. No rash noted. He is not diaphoretic. No erythema. No pallor.   Psychiatric: He has a normal mood and affect.   Nursing note and vitals reviewed.      ED Course   5:52 PM  The patient was seen and examined by Pawan Harry DO in [facility].        Procedures             Critical Care time:  none             Labs Ordered and Resulted from Time of ED Arrival Up to the Time of Departure from the ED - No data to display         Assessments & Plan (with Medical Decision Making)   Has had similar occurrences in the past is more severe.  The cellulitis extends from above his left knee to his toes.  He was also tachycardic upon arrival.  He has no difficulty moving any joints in the affected area.  He does have a left-sided knee replacement.  Lab work shows a WBC count of 11.8.  Patient does not appear to be septic, lactic acid is not elevated.  Blood glucose is elevated.  Patient was given Unasyn and vancomycin.  I discussed the case with the hospitalist service to will admit to their service.  Patient understands and agrees with the plan.    I have reviewed the nursing notes.    I have reviewed the findings, diagnosis, plan and need for follow up with the patient.       Medication List      There are no discharge medications for this visit.         Final diagnoses:   None     IVictor Manuel, am  serving as a trained medical scribe to document services personally performed by Pawan Harry DO, based on the provider's statements to me.   I, Pawan Harry DO, was physically present and have reviewed and verified the accuracy of this note documented by Victor Manuel Lopez.    4/4/2019   [facility], [place], EMERGENCY DEPARTMENT     Pawan Harry DO  04/04/19 [id number]

## 2019-04-04 NOTE — PHARMACY-VANCOMYCIN DOSING SERVICE
Pharmacy Vancomycin Initial Note  Date of Service 2019  Patient's  1931  88 year old, male    Indication: Cellultitis    Current estimated CrCl = Estimated Creatinine Clearance: 37.4 mL/min (A) (based on SCr of 1.56 mg/dL (H)).    Creatinine for last 3 days  2019:  5:07 PM Creatinine 1.56 mg/dL    Recent Vancomycin Level(s) for last 3 days  No results found for requested labs within last 72 hours.      Vancomycin IV Administrations (past 72 hours)      No vancomycin orders with administrations in past 72 hours.                Nephrotoxins and other renal medications (From now, onward)    Start     Dose/Rate Route Frequency Ordered Stop    19 1930  vancomycin 1500 mg in 0.9% NaCl 250 ml intermittent infusion 1,500 mg      1,500 mg  over 90 Minutes Intravenous EVERY 24 HOURS 19 1847      19 1845  vancomycin (VANCOCIN) 2,000 mg in sodium chloride 0.9 % 500 mL intermittent infusion      2,000 mg  over 2 Hours Intravenous ONCE 19 1827      19 1810  ampicillin-sulbactam (UNASYN) 3 g vial to attach to  mL bag     Comments:  DO NOT USE THIS FIELD FOR ADMIN INSTRUCTIONS; INFORMATION DOES NOT SHOW ON MAR. USE THE FIELD ABOVE MARKED ADMIN INSTRUCTIONS    3 g  200 mL/hr over 30 Minutes Intravenous ONCE 19 1809            Contrast Orders - past 72 hours (72h ago, onward)    None                Plan:  1.  Start vancomycin  2000mg x1 (21mg/kg), followed by 1500 mg IV q24h (16mg/kg, SCr near baseline at the moment).   2.  Goal Trough Level: 10-15 mg/L   3.  Pharmacy will check trough levels as appropriate in 1-3 Days.    4. Serum creatinine levels will be ordered daily for the first week of therapy and at least twice weekly for subsequent weeks.    5. [place] method utilized to dose vancomycin therapy: Method 2    Jayleen Ham, PharmD

## 2019-04-04 NOTE — PATIENT INSTRUCTIONS
It was a pleasure to see you in clinic today.  Please do not hesitate to call with any questions or concerns.   You are scheduled for your next remote transmission on 7/11/19.      Kelley Vazquez RN, BSN  Electrophysiology Nurse Clinician  [facility]    During Business Hours Please Call:  [phone]  After Hours Please Call:  [phone] - select option #4 and ask for job code [phone]

## 2019-04-04 NOTE — NURSING NOTE
Vitals completed successfully and medication reconciled.     Stefany Foreman CMA  1:29 PM    Chief Complaint   Patient presents with     Follow Up     orthostats. Device check prior. Follow up syncope.

## 2019-04-04 NOTE — ED NOTES
[facility], [place]   ED Nurse to Floor Handoff     Bart Blair is a 88 year old male who speaks English and lives with a spouse,  in a nursing home  They arrived in the ED by ambulance from home    ED Chief Complaint: Cellulitis    ED Dx;   Final diagnoses:   None         Needed?: No    Allergies:   Allergies   Allergen Reactions     No Known Drug Allergies    .  Past Medical Hx:   Past Medical History:   Diagnosis Date     Anemia      Anemia due to blood loss, acute      CKD (chronic kidney disease) stage 3, GFR 30-59 ml/min (H) 5/31/2010     Essential hypertension, benign      Other and unspecified hyperlipidemia      Other and unspecified hyperlipidemia      Other specified disorder of skin      Pain in joint, shoulder region      Parkinson disease (H) 9/2/2010     Polyp of vocal cord or larynx      Retinal ischemia     right sided thrombotic plaque     Rosacea      Type II or unspecified type diabetes mellitus without mention of complication, not stated as uncontrolled       Baseline Mental status: WDL  Current Mental Status changes: at basesline    Infection present or suspected this encounter: no  Sepsis suspected: No  Isolation type: No active isolations     Activity level - Baseline/Home:  Stand with Assist  Activity Level - Current:   Stand with Assist    Bariatric equipment needed?: No    In the ED these meds were given:   Medications   0.9% sodium chloride BOLUS (1,000 mLs Intravenous New Bag 4/4/19 4627)     Followed by   sodium chloride 0.9% infusion (not administered)       Drips running?  No    Home pump  No    Current LDAs  Peripheral IV 04/04/19 Right Lower forearm (Active)   Site Assessment WDL 4/4/2019  5:13 PM   Line Status Saline locked 4/4/2019  5:13 PM   Phlebitis Scale 0-->no symptoms 4/4/2019  5:13 PM   Infiltration Scale 0 4/4/2019  5:13 PM   Infiltration Site Treatment Method  None 4/4/2019  5:13 PM   Extravasation? No 4/4/2019  5:13 PM   Dressing  "Intervention New dressing  4/4/2019  5:13 PM   Number of days: 0       Labs results:   Labs Ordered and Resulted from Time of ED Arrival Up to the Time of Departure from the ED   CBC WITH PLATELETS DIFFERENTIAL - Abnormal; Notable for the following components:       Result Value    WBC 11.8 (*)     RBC Count 3.27 (*)     Hemoglobin 10.9 (*)     Hematocrit 34.1 (*)      (*)     MCH 33.3 (*)     Platelet Count 125 (*)     Absolute Neutrophil 9.8 (*)     All other components within normal limits   LACTIC ACID WHOLE BLOOD   COMPREHENSIVE METABOLIC PANEL   BLOOD CULTURE   BLOOD CULTURE       Imaging Studies: No results found for this or any previous visit (from the past 24 hour(s)).    Recent vital signs:   /61   Pulse 109   Temp 98.7  F (37.1  C) (Oral)   Resp 18   Ht 1.753 m (5' 9\")   Wt 95.7 kg (211 lb)   SpO2 97%   BMI 31.16 kg/m      Cardiac Rhythm: Normal Sinus  Pt needs tele? No  Skin/wound Issues: None    Code Status: Full Code    Pain control: fair    Nausea control: pt had none    Abnormal labs/tests/findings requiring intervention: left leg cellulitis    Family present during ED course? Yes   Family Comments/Social Situation comments: none    Tasks needing completion: None    Coby Schreiber, RN    [phone] [facility]    "

## 2019-04-04 NOTE — LETTER
Transition Communication Hand-off for Care Transitions to Next Level of Care Provider    Name: Bart Blair  : 1931  MRN #: [id number]  Primary Care Provider: Chuy Stewart MD     Primary Clinic: [address] [place] [id number]     Reason for Hospitalization:  Cellulitis of left lower extremity [L03.116]  Admit Date/Time: 2019  4:15 PM  Discharge Date:  2019  Payor Source: Payor: MEDICARE / Plan: MEDICARE / Product Type: Medicare /     Reason for Communication Hand-off Referral: Avoidable readmission within 30 days    Discharge Plan: Home with home care.  [facility]  Phone  [phone]  Fax  [phone]     Discharge Needs Assessment:  Needs      Most Recent Value   Equipment Currently Used at Home  walker, rolling [4-ww]        Follow-up specialty is recommended: Yes    Follow-up plan:    Future Appointments   Date Time Provider Department Center   2019  1:00 PM Hanh Mckenna, PT [facility] [place]   2019  6:30 PM UR PT OVERFLOW [facility] [place]   2019 11:30 AM [facility]   2019 11:00 AM Chuy Stewart MD [facility]   2019 12:00 AM [facility]       Any outstanding tests or procedures:        Referrals     Future Labs/Procedures    Home care nursing referral     Comments:    [facility]  Phone  [phone]  Fax  [phone]     RN skilled nursing visit.   RN to assess vital signs and weight, respiratory and cardiac status, pain level and activity tolerance, hydration, nutrition and bowel status   RN to complete home safety evaluation.    Physical Therapy to evaluate and treat  Lymphedema Therapy to evaluate and treat    Your provider has ordered home care nursing services. If you have not been contacted within 2 days of your discharge please call the inpatient department phone number at [phone] .          Heather Espinal RN, BSN  Care Coordinator,   Phone [phone]  Pager [phone]  [phone]    AVS/Discharge Summary is the source of truth; this is a helpful guide for improved communication of patient story

## 2019-04-04 NOTE — PROGRESS NOTES
"    Heart Care - Clinical Cardiac Electrophysiology       HPI:   Bart Blair year old male who presents for follow up of OH.  The patient has a past medical history significant for HTN, DMII, PAD, venous insuffiencey, and syncope. The patient has experienced syncope and falls since 2007.  He had an ILR placed 6/2013 and replaced 9/2016. Patient was hospitalized after a fall 1/2018 and ILR showed no arrhythmia correlation. Patient's hypertensive medication was reduced 3/2018 as fall were thought likely to be r/t possible OH. Echocardiogram summary 3/2016 showed normal LV function, EF 60-65%, and no significant valvular pathologies. Tilt 12/2013 summary shows normal physiologic response.  No arrhythmia corralation with falls or syncope on his ILR. His lightheadedness improved with reduced dose of irbesartan (75 mg daily).    Reviewed current interval:  - Orthostatic BPs today: supine 138/76 , sitting 129/68  , standing 133/69 , standing 1 minute 153/74 , standing 3 minutes 152/70 , no symptoms  - Today s Device check: \"Normal loop recorder function. No patient activated episodes recorded.  1 tachy episode and 1 AF episode recorded since last [facility] transmission on 1/28/19.  The tachy episode shows a regular ventricular rhythm with a rate in the 150s, but due to baseline artifact, it is difficult to determine distinct p-wave activity.  The AF episode was recorded on 3/11/19 and lasted 26 minutes.  Episode review reveals an irregular ventricular rhythm with baseline artifact, cannot rule out AF. Intrinsic rhythm = SR @ 102 bpm. Loop recorder battery status = good.\"    Current interval history:  States that most recent cellulitis flair of left lower leg started 10 days ago. He is currently on clindamycin. Leg is red and tender and swollen up to his thigh the past two days. Denies chills, fevers, nausea, vomiting. States that he has not fallen since January 2018.  He uses his walker and no " rugs in house. He is very careful with trying not to fall.  States the he walks on the TM one day a week without problems.  Denies chest pain or pressure, palpitations, dyspnea with rest or exertion.     Current Outpatient Medications   Medication Sig Dispense Refill     amoxicillin (AMOXIL) 500 MG capsule 4 tablets prior to dental appointment. Use for dental appointments 16 capsule 4     aspirin 81 MG tablet Take 1 tablet by mouth daily.  3     atorvastatin (LIPITOR) 10 MG tablet Take 1 tablet (10 mg) by mouth daily Refill when requested 90 tablet 3     blood glucose monitoring (ACCU-CHEK COMPACT STRIPS) test strip 1 strip by In Vitro route daily 100 each 11     blood glucose monitoring (SOFTCLIX) lancets Check blood sugar once daily 100 each 3     cilostazol (PLETAL) 50 MG tablet Take 1 tablet (50 mg) by mouth 2 times daily 180 tablet 3     COMPRESSION STOCKINGS 1 each daily Lower Extremity:   Knee High;  bilateral;  20-30 mm Hg.  Measure and fit.  Style and color per patient preference.  D[place] n [place] per patient need. 6 each 11     Cyanocobalamin (VITAMIN B-12 CR) 1000 MCG TBCR TAKE ONE TABLET BY MOUTH EVERY DAY 60 tablet 4     diclofenac (VOLTAREN) 1 % topical gel APPLY 4 GRAMS TO KNEES OR 2 GRAMS TO HANDS FOUR TIMES A DAY USING ENCLOSED DOSING CARD 300 g 3     fluticasone (FLONASE) 50 MCG/ACT spray Spray 1-2 sprays into both nostrils daily 16 g 5     FOLIC ACID PO Take 1 mg by mouth daily       furosemide (LASIX) 20 MG tablet 1/2 tablet per day. If weight increases by 3-5 pounds then increase to 20 mg (1 tablet). If weight decreases to 205 then ok to hold. (Patient taking differently: 20 mg 2 times daily 1/2 tablet per day. If weight increases by 3-5 pounds then increase to 20 mg (1 tablet). If weight decreases to 205 then ok to hold.  12/8/18: dose increased this past week, taking 20 mg in the morning and 20 mg in the evening) 90 tablet 3     glipiZIDE (GLUCOTROL) 5 MG tablet Take 1 tablet (5 mg) by mouth  every morning (before breakfast) 90 tablet 3     irbesartan (AVAPRO) 75 MG tablet Take 1 tablet (75 mg) by mouth daily 90 tablet 3     linagliptin (TRADJENTA) 5 MG TABS tablet Take 1 tablet (5 mg) by mouth daily 30 tablet 0     metroNIDAZOLE (METROCREAM) 0.75 % cream Apply topically 2 times daily 45 g 3     Multiple Vitamin (MULTI VITAMIN  MENS) TABS Take  by mouth. Takes one daily         order for DME Glucometer, brand as covered by insurance. 1 each 0     order for DME Test strips for pt's glucometer, brand as covered by insurance. Test daily and prn. 100 each 4     order for DME Lancets.  Daily and prn. 100 each 4     PREDNISONE PO Take 5 mg by mouth Currently taking 4 tablets (20mg) daily per patient (this medication is being taken care of through [facility]).       senna-docusate (SENOKOT-S;PERICOLACE) 8.6-50 MG per tablet Take 1-2 tablets by mouth Takes about 2-3 times weekly       sertraline (ZOLOFT) 25 MG tablet TAKE ONE TABLET BY MOUTH EVERY DAY 90 tablet 3     tocilizumab (ACTEMRA) 162 MG/0.9ML subcutaneous injection Inject 162 mg Subcutaneous       VITAMIN D 1000 UNIT OR TABS 1 TABLET DAILY 100 4       Past Medical History:   Diagnosis Date     Anemia      Anemia due to blood loss, acute      CKD (chronic kidney disease) stage 3, GFR 30-59 ml/min (H) 5/31/2010     Essential hypertension, benign      Other and unspecified hyperlipidemia      Other and unspecified hyperlipidemia      Other specified disorder of skin      Pain in joint, shoulder region      Parkinson disease (H) 9/2/2010     Polyp of vocal cord or larynx      Retinal ischemia     right sided thrombotic plaque     Rosacea      Type II or unspecified type diabetes mellitus without mention of complication, not stated as uncontrolled        Past Surgical History:   Procedure Laterality Date     ARTHROPLASTY KNEE  1/31/2012    Procedure:ARTHROPLASTY KNEE; LEFT TOTAL KNEE ARTHROPLASTY (IRASEMA)^; Surgeon:SKIP FAIR; Location: OR      "ARTHROSCOPY SHOULDER, OPEN ROTATOR CUFF REPAIR, COMBINED  4/24/2012    Procedure:COMBINED ARTHROSCOPY SHOULDER, OPEN ROTATOR CUFF REPAIR; RIGHT SHOULDER ARTHROSCOPY OPEN ROTATOR CUFF DEBRIDEMENT, SUBCROMIAL DECOMPRESSION, AND BICEPS TENODESIS REPAIR; Surgeon:SKIP FAIR; Location:[facility] SURGICAL PATHOLOGY  6-    Dr. Bowers; left hand     ENT SURGERY       INCISE FINGER TENDON SHEATH  2008    Dr. Bowers; right AND LEFT hand     THROAT SURGERY      vocal cord polyps       Family History   Problem Relation Age of Onset     Family History Negative Other         unknown family history per patient       Social History     Tobacco Use     Smoking status: Former Smoker     Smokeless tobacco: Never Used   Substance Use Topics     Alcohol use: Yes     Comment: rarely       Allergies   Allergen Reactions     No Known Drug Allergies          ROS:   A complete review of systems was performed and is negative except as noted in the HPI.    Physical Examination:  Vitals: Ht 1.753 m (5' 9\")   Wt 98 kg (216 lb)   SpO2 100%   BMI 31.90 kg/m    BMI= Body mass index is 31.9 kg/m .    GENERAL APPEARANCE: healthy, alert, and no acute distress  HEENT: no icterus, no xanthelasmas, normal pupil size and reaction, no cyanosis.  NECK: no asymmetry, no cervical bruits, no JVD   RESPIRATORY: lungs clear to auscultation - no rales, rhonchi or wheezes, no use of accessory muscles, no retractions, respirations are unlabored, normal respiratory rate  CARDIOVASCULAR: regular rhythm, normal S1 with physiologic split S2, no S3 or S4 and no murmur, click or rub  GI:  no abdominal bruits, soft, non-tender  EXTREMITIES/Vascular: 2+ edema of bilateral lower extremities   NEURO: alert and oriented to person/place/time, normal speech, and affect  SKIN: no ecchymoses. LLE with erythema and warmth from toes to midthing, tender to palpation, serosanguinous drainage from shin.    Assessment and recommendations:    # ILR in situ: Review of " device check shows episode of irregular rhythm. Due to artifact unable to rule our AF, although he has had many episodes in the past of irregular rhythm that were clearly SR with PACs.  1. Normal device function.   2. Keep scheduled follow ups with [facility]     # Falls/Syncope: Likely OH r/t Parkinsonism. Orthostats today show no drop in BP. Last episode was January 2018.   1. Try to drink 60 ounces of 50/50 electrolyte fluids/water mix per day.  Examples: Propel or Pedialyte. Gatorade and Powerade are higher in sugar/calories and are okay in moderation or if you are an athlete.    # Left leg cellulitis: history of chronic venous stasis and PAD. Most recent cellulitis flair started 10 days ago. He is currently on clindamycin.   1. Sending to the ED for possible admission. Report called to [facility] ED.     Patient expresses understanding and agreement with the plan.    I appreciate the chance to help with Bart Blair 's care. Please let me know if you have any questions or concerns.    Diandra RUIZ, SAVAGE-C

## 2019-04-04 NOTE — LETTER
"4/4/2019      RE: Bart Blair  [address] [address]       Dear Colleague,    Thank you for the opportunity to participate in the care of your patient, Bart Blair, at the [facility] HEART [facility]. Please see a copy of my visit note below.  Heart Care - Clinical Cardiac Electrophysiology       HPI:   Bart Blair year old male who presents for follow up of OH.  The patient has a past medical history significant for HTN, DMII, PAD, venous insuffiencey, and syncope. The patient has experienced syncope and falls since 2007.  He had an ILR placed 6/2013 and replaced 9/2016. Patient was hospitalized after a fall 1/2018 and ILR showed no arrhythmia correlation. Patient's hypertensive medication was reduced 3/2018 as fall were thought likely to be r/t possible OH. Echocardiogram summary 3/2016 showed normal LV function, EF 60-65%, and no significant valvular pathologies. Tilt 12/2013 summary shows normal physiologic response.  No arrhythmia corralation with falls or syncope on his ILR. His lightheadedness improved with reduced dose of irbesartan (75 mg daily).    Reviewed current interval:  - Orthostatic BPs today: supine 138/76 , sitting 129/68  , standing 133/69 , standing 1 minute 153/74 , standing 3 minutes 152/70 , no symptoms  - Today s Device check: \"Normal loop recorder function. No patient activated episodes recorded.  1 tachy episode and 1 AF episode recorded since last [facility] transmission on 1/28/19.  The tachy episode shows a regular ventricular rhythm with a rate in the 150s, but due to baseline artifact, it is difficult to determine distinct p-wave activity.  The AF episode was recorded on 3/11/19 and lasted 26 minutes.  Episode review reveals an irregular ventricular rhythm with baseline artifact, cannot rule out AF. Intrinsic rhythm = SR @ 102 bpm. Loop recorder battery status = good.\"    Current interval " history:  States that most recent cellulitis flair of left lower leg started 10 days ago. He is currently on clindamycin. Leg is red and tender and swollen up to his thigh the past two days. Denies chills, fevers, nausea, vomiting. States that he has not fallen since January 2018.  He uses his walker and no rugs in house. He is very careful with trying not to fall.  States the he walks on the TM one day a week without problems.  Denies chest pain or pressure, palpitations, dyspnea with rest or exertion.     Current Outpatient Medications   Medication Sig Dispense Refill     amoxicillin (AMOXIL) 500 MG capsule 4 tablets prior to dental appointment. Use for dental appointments 16 capsule 4     aspirin 81 MG tablet Take 1 tablet by mouth daily.  3     atorvastatin (LIPITOR) 10 MG tablet Take 1 tablet (10 mg) by mouth daily Refill when requested 90 tablet 3     blood glucose monitoring (ACCU-CHEK COMPACT STRIPS) test strip 1 strip by In Vitro route daily 100 each 11     blood glucose monitoring (SOFTCLIX) lancets Check blood sugar once daily 100 each 3     cilostazol (PLETAL) 50 MG tablet Take 1 tablet (50 mg) by mouth 2 times daily 180 tablet 3     COMPRESSION STOCKINGS 1 each daily Lower Extremity:   Knee High;  bilateral;  20-30 mm Hg.  Measure and fit.  Style and color per patient preference.  Nat Collier per patient need. 6 each 11     Cyanocobalamin (VITAMIN B-12 CR) 1000 MCG TBCR TAKE ONE TABLET BY MOUTH EVERY DAY 60 tablet 4     diclofenac (VOLTAREN) 1 % topical gel APPLY 4 GRAMS TO KNEES OR 2 GRAMS TO HANDS FOUR TIMES A DAY USING ENCLOSED DOSING CARD 300 g 3     fluticasone (FLONASE) 50 MCG/ACT spray Spray 1-2 sprays into both nostrils daily 16 g 5     FOLIC ACID PO Take 1 mg by mouth daily       furosemide (LASIX) 20 MG tablet 1/2 tablet per day. If weight increases by 3-5 pounds then increase to 20 mg (1 tablet). If weight decreases to 205 then ok to hold. (Patient taking differently: 20 mg 2 times daily  1/2 tablet per day. If weight increases by 3-5 pounds then increase to 20 mg (1 tablet). If weight decreases to 205 then ok to hold.  12/8/18: dose increased this past week, taking 20 mg in the morning and 20 mg in the evening) 90 tablet 3     glipiZIDE (GLUCOTROL) 5 MG tablet Take 1 tablet (5 mg) by mouth every morning (before breakfast) 90 tablet 3     irbesartan (AVAPRO) 75 MG tablet Take 1 tablet (75 mg) by mouth daily 90 tablet 3     linagliptin (TRADJENTA) 5 MG TABS tablet Take 1 tablet (5 mg) by mouth daily 30 tablet 0     metroNIDAZOLE (METROCREAM) 0.75 % cream Apply topically 2 times daily 45 g 3     Multiple Vitamin (MULTI VITAMIN  MENS) TABS Take  by mouth. Takes one daily         order for DME Glucometer, brand as covered by insurance. 1 each 0     order for DME Test strips for pt's glucometer, brand as covered by insurance. Test daily and prn. 100 each 4     order for DME Lancets.  Daily and prn. 100 each 4     PREDNISONE PO Take 5 mg by mouth Currently taking 4 tablets (20mg) daily per patient (this medication is being taken care of through [facility]).       senna-docusate (SENOKOT-S;PERICOLACE) 8.6-50 MG per tablet Take 1-2 tablets by mouth Takes about 2-3 times weekly       sertraline (ZOLOFT) 25 MG tablet TAKE ONE TABLET BY MOUTH EVERY DAY 90 tablet 3     tocilizumab (ACTEMRA) 162 MG/0.9ML subcutaneous injection Inject 162 mg Subcutaneous       VITAMIN D 1000 UNIT OR TABS 1 TABLET DAILY 100 4       Past Medical History:   Diagnosis Date     Anemia      Anemia due to blood loss, acute      CKD (chronic kidney disease) stage 3, GFR 30-59 ml/min (H) 5/31/2010     Essential hypertension, benign      Other and unspecified hyperlipidemia      Other and unspecified hyperlipidemia      Other specified disorder of skin      Pain in joint, shoulder region      Parkinson disease (H) 9/2/2010     Polyp of vocal cord or larynx      Retinal ischemia     right sided thrombotic plaque     Rosacea      Type II  "or unspecified type diabetes mellitus without mention of complication, not stated as uncontrolled        Past Surgical History:   Procedure Laterality Date     ARTHROPLASTY KNEE  1/31/2012    Procedure:ARTHROPLASTY KNEE; LEFT TOTAL KNEE ARTHROPLASTY (IRASEMA)^; Surgeon:SKIP FAIR; Location: OR     ARTHROSCOPY SHOULDER, OPEN ROTATOR CUFF REPAIR, COMBINED  4/24/2012    Procedure:COMBINED ARTHROSCOPY SHOULDER, OPEN ROTATOR CUFF REPAIR; RIGHT SHOULDER ARTHROSCOPY OPEN ROTATOR CUFF DEBRIDEMENT, SUBCROMIAL DECOMPRESSION, AND BICEPS TENODESIS REPAIR; Surgeon:SKIP FAIR; Location: [facility]     [facility] SURGICAL PATHOLOGY  6-    Dr. Bowers; left hand     ENT SURGERY       INCISE FINGER TENDON SHEATH  2008    Dr. Bowers; right AND LEFT hand     THROAT SURGERY      vocal cord polyps       Family History   Problem Relation Age of Onset     Family History Negative Other         unknown family history per patient       Social History     Tobacco Use     Smoking status: Former Smoker     Smokeless tobacco: Never Used   Substance Use Topics     Alcohol use: Yes     Comment: rarely       Allergies   Allergen Reactions     No Known Drug Allergies          ROS:   A complete review of systems was performed and is negative except as noted in the HPI.    Physical Examination:  Vitals: Ht 1.753 m (5' 9\")   Wt 98 kg (216 lb)   SpO2 100%   BMI 31.90 kg/m     BMI= Body mass index is 31.9 kg/m .    GENERAL APPEARANCE: healthy, alert, and no acute distress  HEENT: no icterus, no xanthelasmas, normal pupil size and reaction, no cyanosis.  NECK: no asymmetry, no cervical bruits, no JVD   RESPIRATORY: lungs clear to auscultation - no rales, rhonchi or wheezes, no use of accessory muscles, no retractions, respirations are unlabored, normal respiratory rate  CARDIOVASCULAR: regular rhythm, normal S1 with physiologic split S2, no S3 or S4 and no murmur, click or rub  GI:  no abdominal bruits, soft, non-tender  EXTREMITIES/Vascular: 2+ " edema of bilateral lower extremities   NEURO: alert and oriented to person/place/time, normal speech, and affect  SKIN: no ecchymoses. LLE with erythema and warmth from toes to midthing, tender to palpation, serosanguinous drainage from shin.    Assessment and recommendations:    # ILR in situ:  Review of device check shows episode of irregular rhythm. Due to artifact unable to rule our AF, although he has had many episodes in the past of irregular rhythm that were clearly SR with PACs.  1. Normal device function.   2. Keep scheduled follow ups with Device [facility]     # Falls/Syncope: Likely OH r/t Parkinsonism. Orthostats today show no drop in BP. Last episode was January 2018.   1. Try to drink 60 ounces of 50/50 electrolyte fluids/water mix per day.  Examples: Propel or Pedialyte. Gatorade and Powerade are higher in sugar/calories and are okay in moderation or if you are an athlete.    # Left leg cellulitis: history of chronic venous stasis and PAD. Most recent cellulitis flair started 10 days ago. He is currently on clindamycin.   1. Sending to the ED for possible admission. Report called to [facility] ED.     Patient expresses understanding and agreement with the plan.    I appreciate the chance to help with Bart Blair 's care. Please let me know if you have any questions or concerns.    ERIN LorenzanaC

## 2019-04-04 NOTE — TELEPHONE ENCOUNTER
Called Malathi. She states it is still red and swollen. However, they have an appointment at the cardiologist. Malathi will call back after that appointment with an update.  Lyubov Holt RN

## 2019-04-04 NOTE — ED TRIAGE NOTES
Patient presents to triage with cellulitis of left leg. It started 2 weeks ago but has recently been spreading. Pt started clindamycin yesterday.

## 2019-04-04 NOTE — TELEPHONE ENCOUNTER
Patient/family was instructed to return call to [facility] RN directly on the RN Call back line at [phone]. Requested call back for update.  Lyubov Holt RN

## 2019-04-04 NOTE — TELEPHONE ENCOUNTER
Patient's wife returns call to RN VM. She can be reached at [phone].    Gaurav Mclaughlin RN

## 2019-04-05 ENCOUNTER — APPOINTMENT (OUTPATIENT)
Dept: PHYSICAL THERAPY | Facility: CLINIC | Age: 84
DRG: 603 | End: 2019-04-05
Attending: INTERNAL MEDICINE
Payer: MEDICARE

## 2019-04-05 LAB
ANION GAP SERPL CALCULATED.3IONS-SCNC: 8 MMOL/L (ref 3–14)
BASOPHILS # BLD AUTO: 0 10E9/L (ref 0–0.2)
BASOPHILS NFR BLD AUTO: 0.1 %
BUN SERPL-MCNC: 34 MG/DL (ref 7–30)
CALCIUM SERPL-MCNC: 8.1 MG/DL (ref 8.5–10.1)
CHLORIDE SERPL-SCNC: 110 MMOL/L (ref 94–109)
CO2 SERPL-SCNC: 25 MMOL/L (ref 20–32)
CREAT SERPL-MCNC: 1.24 MG/DL (ref 0.66–1.25)
CRP SERPL-MCNC: 45.3 MG/L (ref 0–8)
DIFFERENTIAL METHOD BLD: ABNORMAL
EOSINOPHIL # BLD AUTO: 0.1 10E9/L (ref 0–0.7)
EOSINOPHIL NFR BLD AUTO: 0.6 %
ERYTHROCYTE [DISTWIDTH] IN BLOOD BY AUTOMATED COUNT: 14.3 % (ref 10–15)
GFR SERPL CREATININE-BSD FRML MDRD: 52 ML/MIN/{1.73_M2}
GLUCOSE BLDC GLUCOMTR-MCNC: 114 MG/DL (ref 70–99)
GLUCOSE BLDC GLUCOMTR-MCNC: 132 MG/DL (ref 70–99)
GLUCOSE BLDC GLUCOMTR-MCNC: 158 MG/DL (ref 70–99)
GLUCOSE BLDC GLUCOMTR-MCNC: 184 MG/DL (ref 70–99)
GLUCOSE BLDC GLUCOMTR-MCNC: 78 MG/DL (ref 70–99)
GLUCOSE SERPL-MCNC: 77 MG/DL (ref 70–99)
HCT VFR BLD AUTO: 33.4 % (ref 40–53)
HGB BLD-MCNC: 10.5 G/DL (ref 13.3–17.7)
IMM GRANULOCYTES # BLD: 0.1 10E9/L (ref 0–0.4)
IMM GRANULOCYTES NFR BLD: 0.6 %
LYMPHOCYTES # BLD AUTO: 1.8 10E9/L (ref 0.8–5.3)
LYMPHOCYTES NFR BLD AUTO: 22 %
MAGNESIUM SERPL-MCNC: 2.6 MG/DL (ref 1.6–2.3)
MCH RBC QN AUTO: 31.9 PG (ref 26.5–33)
MCHC RBC AUTO-ENTMCNC: 31.4 G/DL (ref 31.5–36.5)
MCV RBC AUTO: 102 FL (ref 78–100)
MONOCYTES # BLD AUTO: 0.9 10E9/L (ref 0–1.3)
MONOCYTES NFR BLD AUTO: 10.9 %
NEUTROPHILS # BLD AUTO: 5.4 10E9/L (ref 1.6–8.3)
NEUTROPHILS NFR BLD AUTO: 65.8 %
NRBC # BLD AUTO: 0 10*3/UL
NRBC BLD AUTO-RTO: 0 /100
PLATELET # BLD AUTO: 117 10E9/L (ref 150–450)
POTASSIUM SERPL-SCNC: 4.2 MMOL/L (ref 3.4–5.3)
RBC # BLD AUTO: 3.29 10E12/L (ref 4.4–5.9)
SODIUM SERPL-SCNC: 143 MMOL/L (ref 133–144)
WBC # BLD AUTO: 8.2 10E9/L (ref 4–11)

## 2019-04-05 PROCEDURE — 86140 C-REACTIVE PROTEIN: CPT | Performed by: INTERNAL MEDICINE

## 2019-04-05 PROCEDURE — 25000125 ZZHC RX 250

## 2019-04-05 PROCEDURE — 25000128 H RX IP 250 OP 636: Performed by: INTERNAL MEDICINE

## 2019-04-05 PROCEDURE — 80048 BASIC METABOLIC PNL TOTAL CA: CPT | Performed by: INTERNAL MEDICINE

## 2019-04-05 PROCEDURE — 36415 COLL VENOUS BLD VENIPUNCTURE: CPT | Performed by: INTERNAL MEDICINE

## 2019-04-05 PROCEDURE — 99232 SBSQ HOSP IP/OBS MODERATE 35: CPT | Performed by: INTERNAL MEDICINE

## 2019-04-05 PROCEDURE — 25000131 ZZH RX MED GY IP 250 OP 636 PS 637: Mod: GY | Performed by: INTERNAL MEDICINE

## 2019-04-05 PROCEDURE — 97116 GAIT TRAINING THERAPY: CPT | Mod: GP | Performed by: PHYSICAL THERAPIST

## 2019-04-05 PROCEDURE — 25000132 ZZH RX MED GY IP 250 OP 250 PS 637: Mod: GY | Performed by: INTERNAL MEDICINE

## 2019-04-05 PROCEDURE — 85025 COMPLETE CBC W/AUTO DIFF WBC: CPT | Performed by: INTERNAL MEDICINE

## 2019-04-05 PROCEDURE — 99223 1ST HOSP IP/OBS HIGH 75: CPT | Mod: AI | Performed by: INTERNAL MEDICINE

## 2019-04-05 PROCEDURE — A9270 NON-COVERED ITEM OR SERVICE: HCPCS | Mod: GY | Performed by: INTERNAL MEDICINE

## 2019-04-05 PROCEDURE — 97110 THERAPEUTIC EXERCISES: CPT | Mod: GP | Performed by: PHYSICAL THERAPIST

## 2019-04-05 PROCEDURE — 12000001 ZZH R&B MED SURG/OB UMMC

## 2019-04-05 PROCEDURE — 99207 ZZC CONSULT E&M CHANGED TO SUBSEQUENT LEVEL: CPT | Performed by: INTERNAL MEDICINE

## 2019-04-05 PROCEDURE — 83735 ASSAY OF MAGNESIUM: CPT | Performed by: INTERNAL MEDICINE

## 2019-04-05 PROCEDURE — 00000146 ZZHCL STATISTIC GLUCOSE BY METER IP

## 2019-04-05 PROCEDURE — 97161 PT EVAL LOW COMPLEX 20 MIN: CPT | Mod: GP | Performed by: PHYSICAL THERAPIST

## 2019-04-05 PROCEDURE — 97530 THERAPEUTIC ACTIVITIES: CPT | Mod: GP | Performed by: PHYSICAL THERAPIST

## 2019-04-05 RX ORDER — FUROSEMIDE 20 MG
20 TABLET ORAL DAILY
Status: DISCONTINUED | OUTPATIENT
Start: 2019-04-05 | End: 2019-04-08 | Stop reason: HOSPADM

## 2019-04-05 RX ORDER — PREDNISONE 5 MG/1
1 TABLET ORAL DAILY
COMMUNITY
End: 2020-04-22

## 2019-04-05 RX ADMIN — LINAGLIPTIN 5 MG: 5 TABLET, FILM COATED ORAL at 13:15

## 2019-04-05 RX ADMIN — VITAMIN D, TAB 1000IU (100/BT) 1000 UNITS: 25 TAB at 08:23

## 2019-04-05 RX ADMIN — PREDNISONE 10 MG: 5 TABLET ORAL at 08:23

## 2019-04-05 RX ADMIN — AMPICILLIN SODIUM AND SULBACTAM SODIUM 3 G: 2; 1 INJECTION, POWDER, FOR SOLUTION INTRAMUSCULAR; INTRAVENOUS at 18:40

## 2019-04-05 RX ADMIN — AMPICILLIN SODIUM AND SULBACTAM SODIUM 3 G: 2; 1 INJECTION, POWDER, FOR SOLUTION INTRAMUSCULAR; INTRAVENOUS at 11:42

## 2019-04-05 RX ADMIN — SERTRALINE HYDROCHLORIDE 25 MG: 25 TABLET ORAL at 08:23

## 2019-04-05 RX ADMIN — ACETAMINOPHEN 975 MG: 325 TABLET, FILM COATED ORAL at 13:15

## 2019-04-05 RX ADMIN — ASPIRIN 81 MG CHEWABLE TABLET 81 MG: 81 TABLET CHEWABLE at 08:23

## 2019-04-05 RX ADMIN — IRBESARTAN 75 MG: 75 TABLET ORAL at 19:51

## 2019-04-05 RX ADMIN — CILOSTAZOL 50 MG: 50 TABLET ORAL at 00:00

## 2019-04-05 RX ADMIN — HEPARIN SODIUM 5000 UNITS: 5000 INJECTION, SOLUTION INTRAVENOUS; SUBCUTANEOUS at 11:41

## 2019-04-05 RX ADMIN — AMPICILLIN SODIUM AND SULBACTAM SODIUM 3 G: 2; 1 INJECTION, POWDER, FOR SOLUTION INTRAMUSCULAR; INTRAVENOUS at 00:12

## 2019-04-05 RX ADMIN — FUROSEMIDE 20 MG: 20 TABLET ORAL at 13:15

## 2019-04-05 RX ADMIN — CYANOCOBALAMIN TAB 1000 MCG 1000 MCG: 1000 TAB at 08:23

## 2019-04-05 RX ADMIN — FOLIC ACID 1 MG: 1 TABLET ORAL at 08:23

## 2019-04-05 RX ADMIN — GLIPIZIDE 5 MG: 5 TABLET ORAL at 08:28

## 2019-04-05 RX ADMIN — HEPARIN SODIUM 5000 UNITS: 5000 INJECTION, SOLUTION INTRAVENOUS; SUBCUTANEOUS at 22:52

## 2019-04-05 RX ADMIN — Medication 1500 MG: at 19:51

## 2019-04-05 RX ADMIN — AMPICILLIN SODIUM AND SULBACTAM SODIUM 3 G: 2; 1 INJECTION, POWDER, FOR SOLUTION INTRAMUSCULAR; INTRAVENOUS at 06:25

## 2019-04-05 RX ADMIN — ACETAMINOPHEN 975 MG: 325 TABLET, FILM COATED ORAL at 08:23

## 2019-04-05 RX ADMIN — CILOSTAZOL 50 MG: 50 TABLET ORAL at 08:36

## 2019-04-05 RX ADMIN — ATORVASTATIN CALCIUM 10 MG: 10 TABLET, FILM COATED ORAL at 22:52

## 2019-04-05 RX ADMIN — THERA TABS 1 TABLET: TAB at 08:23

## 2019-04-05 RX ADMIN — CILOSTAZOL 50 MG: 50 TABLET ORAL at 19:50

## 2019-04-05 RX ADMIN — ACETAMINOPHEN 975 MG: 325 TABLET, FILM COATED ORAL at 19:50

## 2019-04-05 ASSESSMENT — ACTIVITIES OF DAILY LIVING (ADL)
ADLS_ACUITY_SCORE: 19
ADLS_ACUITY_SCORE: 18
AMBULATION: 1-->ASSISTIVE EQUIPMENT
ADLS_ACUITY_SCORE: 15
ADLS_ACUITY_SCORE: 18
TRANSFERRING: 1-->ASSISTIVE EQUIPMENT
DRESS: 0-->INDEPENDENT
ADLS_ACUITY_SCORE: 19
FALL_HISTORY_WITHIN_LAST_SIX_MONTHS: NO
TOILETING: 0-->INDEPENDENT
ADLS_ACUITY_SCORE: 19
COGNITION: 0 - NO COGNITION ISSUES REPORTED
SWALLOWING: 0-->SWALLOWS FOODS/LIQUIDS WITHOUT DIFFICULTY
BATHING: 1-->ASSISTIVE EQUIPMENT
RETIRED_EATING: 0-->INDEPENDENT
RETIRED_COMMUNICATION: 0-->UNDERSTANDS/COMMUNICATES WITHOUT DIFFICULTY
WHICH_OF_THE_ABOVE_FUNCTIONAL_RISKS_HAD_A_RECENT_ONSET_OR_CHANGE?: AMBULATION;TRANSFERRING

## 2019-04-05 NOTE — PLAN OF CARE
Pt arrived to 8a at 2030. Pt is A&Ox4. VSS. LS clear, on RA. BS active, LBM on 4/4/2019. Voiding well. Pt denies pain. BLEs swollen, LLE more red than RLE, both are elevated with pillow. R PIV patent and infusing LR at 125ml/hr. Vaco and ampicillin given. Pt up with SBA. Continue to monitor.

## 2019-04-05 NOTE — PHARMACY-ADMISSION MEDICATION HISTORY
Admission medication history interview status for the 4/4/2019 admission is complete. See Epic admission navigator for allergy information, pharmacy, prior to admission medications and immunization status.     Medication history interview sources:  patient and his wife. Pt's wife has a list of all the medications.    Changes made to PTA medication list (reason)  Added: none  Deleted: metronidazole  0.75% cream  Changed: prednisone 5 mg po daily >> 10 mg po daily  Atorvastatin 10 mg po daily >> HS  Fluticasone 1-2 sprays into both nostrils daily >> daily prn for allergies  Irbesartan 75 mg po daily >> HS  Linagliptin 5 mg po daily >> daily at lunch  Senna-docusate 1-2 tablets  >> 1-2 tablets po daily prn  Vitamin D 1000 units po daily >>  HS  Tocilizumb: last dose was given 3/28/19, and the next dose is due on 4/11/19    Additional medication history information (including reliability of information, actions taken by pharmacist):Pt's wife is not sure about vitamin B12 dose. The product was brought OCT.      Prior to Admission medications    Medication Sig Last Dose Taking? Auth Provider   aspirin 81 MG tablet Take 1 tablet by mouth daily. 4/4/2019 at Unknown time Yes Chuy Stewart MD   atorvastatin (LIPITOR) 10 MG tablet Take 1 tablet (10 mg) by mouth daily Refill when requested  Patient taking differently: Take 10 mg by mouth At Bedtime Refill when requested 4/4/2019 at Unknown time Yes Chuy Stewart MD   cilostazol (PLETAL) 50 MG tablet Take 1 tablet (50 mg) by mouth 2 times daily 4/4/2019 at Unknown time Yes Azul Meade MD   diclofenac (VOLTAREN) 1 % topical gel APPLY 4 GRAMS TO KNEES OR 2 GRAMS TO HANDS FOUR TIMES A DAY USING ENCLOSED DOSING CARD 4/4/2019 at Unknown time Yes Chuy Stewart MD   fluticasone (FLONASE) 50 MCG/ACT spray Spray 1-2 sprays into both nostrils daily  Patient taking differently: Spray 1-2 sprays into both nostrils daily as needed  Past Month at Unknown time  Yes Chuy Stewart MD   folic acid (FOLVITE) 1 MG tablet Take 1 mg by mouth daily 4/4/2019 at Unknown time Yes Reported, Patient   furosemide (LASIX) 20 MG tablet 1/2 tablet per day. If weight increases by 3-5 pounds then increase to 20 mg (1 tablet). If weight decreases to 205 then ok to hold.  Patient taking differently: 20 mg 2 times daily 1/2 tablet per day. If weight increases by 3-5 pounds then increase to 20 mg (1 tablet). If weight decreases to 205 then ok to hold.  12/8/18: dose increased this past week, taking 20 mg in the morning and 20 mg in the evening 4/4/2019 at Unknown time Yes Chuy Stewart MD   glipiZIDE (GLUCOTROL) 5 MG tablet Take 1 tablet (5 mg) by mouth every morning (before breakfast) 4/4/2019 at Unknown time Yes Chuy Stewart MD   linagliptin (TRADJENTA) 5 MG TABS tablet Take 1 tablet (5 mg) by mouth daily  Patient taking differently: Take 5 mg by mouth daily (with lunch)  4/4/2019 at Unknown time Yes Chuy Stewart MD   Multiple Vitamin (MULTI VITAMIN  MENS) TABS Take 1/2 at noon and 1/2 at night  by mouth.    4/4/2019 at Unknown time Yes Reported, Patient   predniSONE (DELTASONE) 5 MG tablet Take 10 mg by mouth daily 10 mg po daily until rheumatology appointment. 4/4/2019 at Unknown time Yes Unknown, Entered By History   VITAMIN D 1000 UNIT OR TABS 1 TABLET DAILY  Patient taking differently: 1 TABLET DAILY at bedtime 4/4/2019 at Unknown time Yes Chuy Stewart MD   amoxicillin (AMOXIL) 500 MG capsule 4 tablets prior to dental appointment. Use for dental appointments More than a month at Unknown time  Chuy Stewart MD   Cyanocobalamin (VITAMIN B-12 CR) 1000 MCG TBCR TAKE ONE TABLET BY MOUTH EVERY DAY  Patient taking differently: TAKE ONE TABLET BY MOUTH EVERY DAY. Pt's taking OCT vitamin B12 and the dose is not clear.   Chuy Stewart MD   irbesartan (AVAPRO) 75 MG tablet Take 1 tablet (75 mg) by mouth daily  Patient taking  differently: Take 75 mg by mouth At Bedtime  4/3/2019  Chuy Stewart MD   senna-docusate (SENOKOT-S;PERICOLACE) 8.6-50 MG per tablet Take 1-2 tablets by mouth daily as needed for constipation Takes about 2-3 times weekly More than a month at Unknown time  Azul Meade MD   sertraline (ZOLOFT) 25 MG tablet TAKE ONE TABLET BY MOUTH EVERY DAY 4/3/2019  Chuy Stewart MD   tocilizumab (ACTEMRA) 162 MG/0.9ML subcutaneous injection Inject 162 mg Subcutaneous twice a month 3/28/2019  Reported, Patient         Medication history completed by: Cherelle Moore PharmD, BCPS April 5, 2019

## 2019-04-05 NOTE — PLAN OF CARE
Discharge Planner PT   Patient plan for discharge: Home  Current status: PT evaluation completed.  Pt demonstrated supine to/from sitting with HOB elevated, heavy use of bed rail, and SBA x 1.  Sit to/from standing from EOB with 4-ww and CGA x 1, cues to lock walker prior to transferring.  Pt ambulated 250' with 4-ww and CGA x 1, c/o right knee pain when ambulating.  Pt tolerated seated exercises well with no c/o increased pain.  Pt educated to keep LEs elevated when in bed or sitting in chair.  Pt not appropriate for bandaging at this time until on antibiotics for 48hrs and cellulitis improving.   Barriers to return to prior living situation: Stairs  Recommendations for discharge: Pt would benefit from home PT for lymphedema management.    Rationale for recommendations: Pt would benefit from further skilled PT for strengthening and increase independence with all functional mobility/gait.        Entered by: Pamella Pink 04/05/2019 10:56 AM

## 2019-04-05 NOTE — PROGRESS NOTES
04/05/19 1025   Quick Adds   Type of Visit Initial PT Evaluation       Present no   Language English   Living Environment   Lives With spouse   Living Arrangements house   Home Accessibility stairs to enter home   Number of Stairs, Main Entrance 2   Stair Railings, Main Entrance railing on right side (ascending)   Transportation Anticipated family or friend will provide   Living Environment Comment Pt reported having 2 steps to get into his home, has 1 railing.  Pt reported that everything is on one level once in the home.  Pt stated there is an upstairs/downstairs but he does not need to go up/downstairs.    Self-Care   Usual Activity Tolerance moderate   Current Activity Tolerance fair   Regular Exercise No   Equipment Currently Used at Home walker, rolling  (4-ww)   Activity/Exercise/Self-Care Comment Pt reported being independent with all ADLs at baseline.  Pt has a chair he uses in the shower and has grab bars.     Functional Level Prior   Ambulation 1-->assistive equipment   Transferring 1-->assistive equipment   Toileting 0-->independent   Bathing 1-->assistive equipment   Communication 0-->understands/communicates without difficulty   Swallowing 0-->swallows foods/liquids without difficulty   Cognition 0 - no cognition issues reported   Fall history within last six months no   Which of the above functional risks had a recent onset or change? ambulation;transferring   Prior Functional Level Comment Pt reported being independent with all functional mobility at baseline, but walking distance limited due to weakness.  Pt did not specify how far he could walk.    General Information   Onset of Illness/Injury or Date of Surgery - Date 04/04/19   Referring Physician Eric Pisano MD   Patient/Family Goals Statement Pt would like to get stronger and walk further.    Pertinent History of Current Problem (include personal factors and/or comorbidities that impact the POC) Pt is a 87 y/o male  admitted to 8a for left LE cellulitis.  Pt was started on oral antibiotics but cellulitis was spreading above the knee.  PMH: Parkinsons disease, HTN, type 2 DM, PAD, chronic venous stasis, bilateral LE edema, hisory of giant significat arrhythmia, and s/p TKA 1/31/12.     Precautions/Limitations fall precautions   Weight-Bearing Status - LUE full weight-bearing   Weight-Bearing Status - RUE full weight-bearing   Weight-Bearing Status - LLE full weight-bearing   Weight-Bearing Status - RLE full weight-bearing   Heart Disease Risk Factors Overweight;Lack of physical activity;High blood pressure;Diabetes   General Observations Pt supine in bed at start of PT session.  Pt currently getting IV fluids.     Cognitive Status Examination   Orientation orientation to person, place and time   Level of Consciousness alert   Follows Commands and Answers Questions 100% of the time;able to follow multistep instructions   Personal Safety and Judgment intact   Memory (Not tested)   Pain Assessment   Patient Currently in Pain No   Integumentary/Edema   Integumentary/Edema other (describe)   Integumentary/Edema Comments Pt presents with bilateral LE pitting edema, which patient has at baseline.  Pt left LE is more edematous than right LE and reddness that spreads above the knee.    Posture    Posture Forward head position;Protracted shoulders   Range of Motion (ROM)   ROM Comment Pt demonstrated functional bilateral LE ROM during bed mobility, transfers, and gait.    Strength   Strength Comments LE strength not formally tested.  Pt is able to lift LEs' in/out of bed but has more difficulty with lifting LEs into bed.  Pt is able to complete hip flexion and LAQ against gravity independently.    Bed Mobility   Bed Mobility Comments Supine to/from sitting at EOB with HOB elevated, use of bed rail, and SBA x 1.  Pt reported having a bed rail at home, but his bed if flat at home.  Pt was able to scoot/reposition in bed wiht SBA x 1.   "  Transfer Skills   Transfer Comments Sit to/from standing from EOB with FWW and CGA x 1.    Gait   Gait Comments Pt ambulated 250' with 4-ww and CGA x 1   Balance   Balance Comments Pt demonstrated good sitting balance at EOB and fair standing balance with 4-ww.    Sensory Examination   Sensory Perception Comments Pt has no c/o numbness/tingling along bilateral LEs.     General Therapy Interventions   Planned Therapy Interventions bed mobility training;gait training;strengthening;transfer training;home program guidelines   Intervention Comments Bed mobility, transfers, gait, and LE strengthening initiated.    Clinical Impression   Criteria for Skilled Therapeutic Intervention yes, treatment indicated   PT Diagnosis Decreased strength and impaired functional mobility.    Influenced by the following impairments Deconditioned, LE edema bilaterally, and parkinsons    Functional limitations due to impairments Impaired bed mobility, transfers, and gait.    Clinical Presentation Stable/Uncomplicated   Clinical Presentation Rationale Pt is a 89 y/o male admitted with left LE cellulitis.  Pt uses a 4-ww at baseline and is independent with all ADLs at baseline.     Clinical Decision Making (Complexity) Low complexity   Therapy Frequency` 2 times/day   Predicted Duration of Therapy Intervention (days/wks) 5 days   Anticipated Equipment Needs at Discharge other (see comments)  (No equipment needs)   Anticipated Discharge Disposition Home with Assist;Home with Home Therapy  (Lymphedma)   Risk & Benefits of therapy have been explained Yes   Patient, Family & other staff in agreement with plan of care Yes   [facility] TM \"6 Clicks\"   2016, Trustees of [facility], under license to [employer].  All rights reserved.   6 Clicks Short Forms Basic Mobility Inpatient Short Form   [facility] AM-PAC  \"6 Clicks\" V.2 Basic Mobility Inpatient Short Form   1. Turning from your back to your side while in a flat " bed without using bedrails? 4 - None   2. Moving from lying on your back to sitting on the side of a flat bed without using bedrails? 4 - None   3. Moving to and from a bed to a chair (including a wheelchair)? 3 - A Little   4. Standing up from a chair using your arms (e.g., wheelchair, or bedside chair)? 3 - A Little   5. To walk in hospital room? 3 - A Little   6. Climbing 3-5 steps with a railing? 3 - A Little   Basic Mobility Raw Score (Score out of 24.Lower scores equate to lower levels of function) 20   Total Evaluation Time   Total Evaluation Time (Minutes) 12

## 2019-04-05 NOTE — H&P
[facility], [place]    History and Physical - Hospitalist Service,      Date of Admission:  4/4/2019    Assessment & Plan     Bart Blair is a 88 year old male with a medical history significant for Parkinson's disease, hypertension, type 2 diabetes mellitus,   peripheral artery disease, chronic venous stasis, bilateral lower extremity edema, history of giant cell arteritis on chronic prednisone taper, history of syncope and falls, ILR placed, followed by cardiology.  No significant arrhythmia noted.  Last seen today 4/4/2019. s/p left knee arthroplasty (1/31/2012).   Patient admitted 4/4/2019 with left lower extremity cellulitis.       # L LE Cellulitis.  History of recurrent cellulitis.  History of chronic venous stasis.  Bilateral lower extremity edema. PAD.   No improvement on oral clindamycin as outpatient, started couple days ago.  Patient immunocompromised (on prednisone. actemra for giant cell arteritis)    Hemodynamics stable  Clinically nontoxic R nonseptic  Lactic acid 1.9  Mild leukocytosis 11.8  Afebrile    IV NSS bolus given in ED  Blood cultures sent in the ED  Patient empirically started on IV vancomycin and Unasyn, continue the same for now.  Pharmacy to dose vancomycin  Infectious disease consultation in the morning  Elevate both lower extremities  Serial clinical exam.  Watch for extension of rash beyond the marked lines  Monitor vitals  Trend WBC, CRP    Pain management: Acetaminophen scheduled 975 3 times daily and as needed.  Tramadol 25 mg as needed.       #History of giant cell arteritis: w loss of right eye vision.  On prednisone taper.  Currently takes 10 mg daily, continue  Reportedly patient doing better.  ESR trending down.  -Follows rheumatology.  Last seen on February 2019.  -Patient on Actemra subcutaneous every 2 weeks, next dose due on March 11 per wife  -Outpatient rheumatology follow-up      #Type 2 diabetes: Per wife well controlled.   Steroid-induced.  -Continue home glipizide 5 mg in the morning, linagliptin 5 mg daily with lunch  -Keep on insulin aspart medium sliding scale  -Check A1c  -Monitor blood glucose before meals and bedtime  - Hypoglycemia protocol    #Cardiovascular:   -HTN: Controlled   -continue home  irbesartan 75 mg daily with holding parameters as tolerated.  -Monitor blood pressure    Hyperlipidemia: Continue Lipitor 10 mg  Continue aspirin 81 mg daily    History of syncope and falls since 2007.  Followed by cardiology team. He had an ILR placed 6/2013 and replaced 9/2016. Patient was hospitalized after a fall 1/2018 and ILR showed no arrhythmia correlation. Patient's hypertensive medication was reduced 3/2018 as fall were thought likely to be r/t possible OH. Echocardiogram summary 3/2016 showed normal LV function, EF 60-65%, and no significant valvular pathologies. Tilt 12/2013 summary shows normal physiologic response.  No arrhythmia corralation with falls or syncope on his ILR. His lightheadedness improved with reduced dose of irbesartan (75 mg daily).    Patient asymptomatic currently.    -Follow fall precautions  -Avoid dehydration (careful fluid management)  -Check orthostatic vitals as needed.   -Outpatient follow-up    # PAD: Continue home cilostazol.     # CKD: Per chart review, recent creatinine between 1.2-1.5  -Follow-up BMP, intake/output  - Avoid nephrotoxic-, renal dosing    #Bilateral lower extremity edema: Chronic.  Prior venous ultrasounds negative for DVT    -Consider diuretics (lasix) as tolerated   -Lymphedema consultation  -Elevate lower extremities  - daily. Wt. I/o.     #Depression: Continue home sertraline.  Mood stable.    #Continue other home supplements including vitamin B12, vitamin D3, folic acid, multivitamins.       Diet: Orders Placed This Encounter      Combination Diet Regular Diet Adult    DVT Prophylaxis: Heparin SQ  Hernandez Catheter: not present  Code Status: DNR. DNI. Discussed with patient  and wife at bedside.    Disposition Plan   Expected discharge: 3-5 days, recommended to prior living arrangement once Cellulitis improved..  Entered: Eric Pisano MD 04/04/2019, 9:08 PM     The patient's care was discussed with the RN. .    Eric Pisano MD  [facility], [place]  Please see sticky note for cross cover information  ______________________________________________________________________    Chief Complaint     Left leg cellulitis.    History is obtained from the patient, electronic health record, emergency department physician and patient's spouse    History of Present Illness      Bart Blair is a 88 year old male with a medical history significant for Parkinson's disease, hypertension, type 2 diabetes mellitus,   peripheral artery disease, chronic venous stasis, bilateral lower extremity edema, history of giant cell arteritis on chronic prednisone taper, history of syncope and falls, ILR placed, followed by cardiology.  No significant arrhythmia noted.  Last seen today 4/4/2019. s/p left knee arthroplasty (1/31/2012).     He presents to the Emergency Department 4/4/2019 for evaluation of cellulitis to his left lower extremity.  The patient's wife reports that the patient has had cellulitis multiple times in the past; last infection was a couple of months ago.  The patient 10 days ago began having redness of his left lower extremity and it has been worsening over the last few days and today began spreading above his left knee.  The patient contacted his PCP on 4/2/2019 and was told to start taking the clindamycin that was previously prescribed to him.  The patient started taking this yesterday; he has taken a total of 4 doses at this point.  The patient was seen in his cardiology clinic today and was advised to come to the Emergency Department to have the cellulitis evaluated.  The wife reports that they have not noticed any significant improvement of the cellulitis since  starting the clindamycin at this point.  The patient has not had any fevers or chills.  The patient does note that he has had pain associated with this cellulitis, but it is not as bad today.  The wife also notes that she has noticed mild discoloration to his right lower extremity as well.   No other symptoms noted.    Denies fever or chills. No cough or cp or sob. No LH or dizziness. No NV or pain abdomen. No dysuria or bowel complaint. No new sensory or motor complaint.  Patient hard of hearing.  Can see from the right eye.  Very pleasant.    Patient follows with rheumatology team for his giant cell arteritis.  Follows cardiology team for his history of syncope, falls. Has ILR placed.  No history of significant arrhythmia.      Review of Systems    The 10 point Review of Systems is negative other than noted in the HPI or here.     Past Medical History      I have reviewed this patient's medical history and updated it with pertinent information if needed.       Blepharitis 02/17/2010     Age-related nuclear cataract of both eyes 02/17/2010     Diabetes mellitus (Psychiatric) 02/06/2012     HTN (hypertension) (Psychiatric) 02/06/2012     Hyperlipidemia (Psychiatric) 02/06/2012     CKD (chronic kidney disease), stage III (Psychiatric) 02/06/2012     Parkinsonism (Psychiatric) 02/06/2012     Depression (Psychiatric) 02/06/2012     Temporal arteritis (Psychiatric) 07/31/2017     Closed fracture of lateral wall of orbit (Psychiatric) 01/16/2018     Closed anterior dislocation of left shoulder 01/16/2018     Alcohol use 01/16/2018     Essential hypertension (Psychiatric) 01/30/2018     GCA (giant cell arteritis) (Psychiatric)     Cognitive changes 05/24/2018     Pain in both lower extremities 05/24/2018     Lumbar spondylosis (Psychiatric) 05/24/2018     Long term (current) use of systemic steroids 12/12/2018     NS (nuclear sclerosis), right 02/14/2019     NS (nuclear sclerosis), left 02/14/2019            Past Surgical History   I have reviewed this patient's surgical history and updated it with pertinent  information if needed.  Past Surgical History:   Procedure Laterality Date     ARTHROPLASTY KNEE  1/31/2012    Procedure:ARTHROPLASTY KNEE; LEFT TOTAL KNEE ARTHROPLASTY (IRASEMA)^; Surgeon:SKIP FAIR; Location: OR     ARTHROSCOPY SHOULDER, OPEN ROTATOR CUFF REPAIR, COMBINED  4/24/2012    Procedure:COMBINED ARTHROSCOPY SHOULDER, OPEN ROTATOR CUFF REPAIR; RIGHT SHOULDER ARTHROSCOPY OPEN ROTATOR CUFF DEBRIDEMENT, SUBCROMIAL DECOMPRESSION, AND BICEPS TENODESIS REPAIR; Surgeon:SKIP FAIR; Location: [facility]     [facility] AFF SURGICAL PATHOLOGY  6-    Dr. Bowers; left hand     ENT SURGERY       INCISE FINGER TENDON SHEATH  2008    Dr. Bowers; right AND LEFT hand     THROAT SURGERY      vocal cord polyps       Social History   I have reviewed this patient's social history and updated it with pertinent information if needed.  Social History     Tobacco Use     Smoking status: Former Smoker     Smokeless tobacco: Never Used   Substance Use Topics     Alcohol use: Yes     Comment: rarely     Drug use: No       Family History   I have reviewed this patient's family history and updated it with pertinent information if needed.   Family History   Problem Relation Age of Onset     Family History Negative Other         unknown family history per patient     Prior to Admission Medications    Reviewed with the wife.    Aspirin 81 once daily  Prednisone 10 mg once daily for giant cell arteritis (on pred taper-slow)  Actemra inj twice month. Next dose due on 04.11. 19  Cilostazol 50 mg twice daily  Sertraline 25 mg once daily  Folic acid 1 mg daily  Lasix 20 mg  Irbesartan 75 mg at bedtime   Lipitor 10 mg at bedtime   Multivitamin  Tradjenta 5 mg at lunch   Glipizide 5 mg before breakfast   vitamin B12 1000 mcg daily.   Acetaminophen 1000 mg bid        Allergies   Allergies   Allergen Reactions     No Known Drug Allergies        Physical Exam   Vital Signs: Temp: 97.4  F (36.3  C) Temp src: Oral BP: 136/66 Pulse: 83   Resp: 17  SpO2: 98 % O2 Device: None (Room air)    Weight: 211 lbs 0 oz      General: alert, interactive, NAD, obese  HEENT: AT/NC, anicteric, PERRLA, Moist MM  Neck: Supple. No JVD, no lymphadenopathy  Respi/Chest: Clear BL, nonlabored.  CVS/Heart: S1S2 regular,   GI/Abd: Soft, non tender, non distended, BS +  MSK/Extremities: Distal pulses 2+.  Bilateral lower extremity edema up to thighs.  Pitting. ++  Skin: Left lower extremity: Cellulitis with diffuse right, extending slightly above the knee.  Slightly warm and slightly tender on palpation.  Slight oozing from shin area.   Neuro: AO x 4, Grossly intact.  Psychiatric: Stable mood.  Very pleasant.      Data   Data reviewed today: I reviewed all medications, new labs and imaging results over the last 24 hours. I personally reviewed no images or EKG's today.    Recent Labs   Lab 04/04/19  1707   WBC 11.8*   HGB 10.9*   *   *      POTASSIUM 4.4   CHLORIDE 104   CO2 28   BUN 41*   CR 1.56*   ANIONGAP 7   BLAISE 8.4*   *   ALBUMIN 3.0*   PROTTOTAL 6.1*   BILITOTAL 0.6   ALKPHOS 44   ALT 16   AST 11     No results found for this or any previous visit (from the past 24 hour(s)).

## 2019-04-05 NOTE — CONSULTS
INFECTIOUS DISEASE CONSULTATION    NAME: Bart Blair  MRN:  [id number]  AGE:  88 year old            :  1931    PRIMARY CARE PROVIDER:  Chuy Stewart  Consult Requester:  Ruben Jarrett MD     Date of Admission:   2019  Date of Visit:  2019    REASON FOR CONSULT: cellulitis, LE, recurrent      IMPRESSION AND SUGGESTIONS:    1. Left lower extremity cellulitis:  As this is the third time that the patient has had cellulitis during the past year, he likely has an anatomic issue (such as lymphatic drainage, venous stasis) that is problematic. With respect to the likely causes, streptococcal and staphylococcal infections are the most likely. He does not have a pet dog or cat and has not been swimming.     The patient has been receiving iv vancomycin and iv ampicillin-sulbactam (Unasyn). The upper border of the cellulitis (on the thigh) has retracted on the basis of the redness that no longer extends to the line that was drawn on the border yesterday. The decrease in the erythema of the thigh is confirmed by the patient's wife, who was in the room with his daughter during my time with the patient. She does not feel that there has been a decrease in the erythema that is present from the dorsum of the left foot distally to the toes.    Suggest:    Continue current iv vancomycin and iv ampicillin-sulbactam (Unasyn). Given the patient's diabetes, ampicillin-sulbactam (Unasyn) is a logical selection of antibiotic and coverage of possible MRSA with vancomycin is well thought out    Consider obtaining PCR of the nares for methicillin-resistant Staphylococcus aureus (MRSA) and, if negative, discontinuing the vancomycin.    While he has notable edema of both lower extremities (pitting edema of the thigh on the right, for example), please evaluate the dorsum of his left foot with an ultrasound to look for an abscess given the somewhat more prominent dorsal pedal edema and the sense of possible early  fluctuance (vs. edema) that I get when I palpate the area.    2. Corticosteroids:  Suggest:    The patient has been on a prednisone taper. In the event that he has further stressors, he should be considered for stress-dose corticosteroids.    3. Diabetes mellitus    Recognize that glucose levels may be elevated due to the patient's infection.    4. Immunizations:    He is up to date with tetanus and influenza immunizations    5. Blind right eye since 2001    6. History of left total knee arthroplasty in the area of the cellulitis.    Follow clinical evaluation of the knee    7. Chronic kidney disease    8. Decreased hearing, right:    The Ambrose test lateralized to the right. This is suggestive of a conductive hearing loss.    9. Cranial nerve VII (left) lesion    Asymmetric smile with weakness on the left.    10. Pitting edema to thigh, bilateral    Moderately decreased albumin of 3.0 on 4/4/2019    HISTORY OF PRESENT ILLNESS:      This 88-year-old man with diabetes mellitus, lower extremity venous stasis, a history of a left total knee arthroplasty, and left temporal arteritis on a prednisone taper has had two prior episodes of left lower extremity cellulitis in the past year according to the patient's wife. He developed the onset of erythema of the left lower extremity approximately 10 days prior to presentation and on 4/2/2019 his primary care provider told him to take oral clindamycin. His erythema progressed and when he was seen in the cardiology clinic on 4/4/2019 he was told to go to the ED.    He and his wife note that he has not had fever or chills. In the ED, he was found to have erythema from the left toes up to the left thigh. This includes the area of the left knee, and he had a left total knee arthroplasty in 2012.    Blood cultures were obtained (no growth thus far) and the patient was begun on iv vancomycin and iv ampicillin-sulbactam (Unasyn). Since admission,the upper border of the erythema due to  cellulitis (on the thigh) has retracted on the basis of the redness that no longer extends to the line that was drawn on the border yesterday. The decrease in the erythema of the thigh is confirmed by the patient's wife.    He is on a prednisone taper and has not received stress-dose steroids thus far.    PAST MEDICAL HISTORY:  Past Medical History:   Diagnosis Date     Anemia      Anemia due to blood loss, acute      CKD (chronic kidney disease) stage 3, GFR 30-59 ml/min (H) 5/31/2010     Essential hypertension, benign      Other and unspecified hyperlipidemia      Other and unspecified hyperlipidemia      Other specified disorder of skin      Pain in joint, shoulder region      Parkinson disease (H) 9/2/2010     Polyp of vocal cord or larynx      Retinal ischemia     right sided thrombotic plaque     Rosacea      Type II or unspecified type diabetes mellitus without mention of complication, not stated as uncontrolled        PAST SURGICAL HISTORY:  Past Surgical History:   Procedure Laterality Date     ARTHROPLASTY KNEE  1/31/2012    Procedure:ARTHROPLASTY KNEE; LEFT TOTAL KNEE ARTHROPLASTY (IRASEMA)^; Surgeon:SKIP FAIR; Location: OR     ARTHROSCOPY SHOULDER, OPEN ROTATOR CUFF REPAIR, COMBINED  4/24/2012    Procedure:COMBINED ARTHROSCOPY SHOULDER, OPEN ROTATOR CUFF REPAIR; RIGHT SHOULDER ARTHROSCOPY OPEN ROTATOR CUFF DEBRIDEMENT, SUBCROMIAL DECOMPRESSION, AND BICEPS TENODESIS REPAIR; Surgeon:SKIP FAIR; Location:[facility] SURGICAL PATHOLOGY  6-    Dr. Bowers; left hand     ENT SURGERY       INCISE FINGER TENDON SHEATH  2008    Dr. Bowers; right AND LEFT hand     THROAT SURGERY      vocal cord polyps       ALLERGIES:  No known drug allergies    CURRENT MEDICATIONS:  Current Facility-Administered Medications   Medication     acetaminophen (TYLENOL) tablet 650 mg     acetaminophen (TYLENOL) tablet 975 mg     ampicillin-sulbactam (UNASYN) 3 g vial to attach to  mL bag     aspirin (ASA)  chewable tablet 81 mg     atorvastatin (LIPITOR) tablet 10 mg     calcium carbonate (TUMS) chewable tablet 1,000 mg     cilostazol (PLETAL) tablet 50 mg     cyanocobalamin (VITAMIN B-12) tablet 1,000 mcg     glucose gel 15-30 g    Or     dextrose 50 % injection 25-50 mL    Or     glucagon injection 1 mg     folic acid (FOLVITE) tablet 1 mg     furosemide (LASIX) tablet 20 mg     glipiZIDE (GLUCOTROL) tablet 5 mg     heparin sodium injection 5,000 Units     insulin aspart (NovoLOG) inj (RAPID ACTING)     insulin aspart (NovoLOG) inj (RAPID ACTING)     irbesartan (AVAPRO) half-tablet 75 mg     linagliptin (TRADJENTA) tablet 5 mg     melatonin tablet 1 mg     multivitamin, therapeutic (THERA-VIT) tablet 1 tablet     naloxone (NARCAN) injection 0.1-0.4 mg     ondansetron (ZOFRAN-ODT) ODT tab 4 mg    Or     ondansetron (ZOFRAN) injection 4 mg     predniSONE (DELTASONE) tablet 10 mg     senna-docusate (SENOKOT-S/PERICOLACE) 8.6-50 MG per tablet 1 tablet    Or     senna-docusate (SENOKOT-S/PERICOLACE) 8.6-50 MG per tablet 2 tablet     sertraline (ZOLOFT) tablet 25 mg     traMADol (ULTRAM) half-tab 25 mg     vancomycin 1500 mg in 0.9% NaCl 250 ml intermittent infusion 1,500 mg     vitamin D3 (CHOLECALCIFEROL) 1000 units (25 mcg) tablet 1,000 Units       FAMILY HISTORY:  Family History   Problem Relation Age of Onset     Family History Negative Other         unknown family history per patient       SOCIAL HISTORY: the patient was born in [place] and moved to the [place] at age 19.  Social History     Socioeconomic History     Marital status:      Spouse name: ford     Number of children: 6     Years of education: 12     Highest education level: Not on file   Occupational History     Occupation: printing     Employer: RETIRED   Social Needs     Financial resource strain: Not on file     Food insecurity:     Worry: Not on file     Inability: Not on file     Transportation needs:     Medical: Not on file      Non-medical: Not on file   Tobacco Use     Smoking status: Former Smoker     Smokeless tobacco: Never Used   Substance and Sexual Activity     Alcohol use: Yes     Comment: rarely     Drug use: No     Sexual activity: Not Currently     Partners: Female   Lifestyle     Physical activity:     Days per week: Not on file     Minutes per session: Not on file     Stress: Not on file   Relationships     Social connections:     Talks on phone: Not on file     Gets together: Not on file     Attends Mu-ism service: Not on file     Active member of club or organization: Not on file     Attends meetings of clubs or organizations: Not on file     Relationship status: Not on file     Intimate partner violence:     Fear of current or ex partner: Not on file     Emotionally abused: Not on file     Physically abused: Not on file     Forced sexual activity: Not on file   Other Topics Concern     Parent/sibling w/ CABG, MI or angioplasty before 65F 55M? No   Social History Narrative     Not on file       REVIEW OF SYSTEMS:  Negative for fever or chills. Positive for blindness in the right eye since 2001 (according to the wife, due to emboli from a plaque). Decreased hearing on the right. Occasional cough due to phlegm in his throat, without shortness of breath. No chest pressure or pain. No abdominal pain, nausea, vomiting, constipation. He has had diarrhea recently but has not had a bowel movement today. Positive for LLE redness and warmth. No back pain. A 12-point ROS is otherwise negative.    LABORATORY RESULTS:  Inflammatory Markers    Recent Labs   Lab Test 04/05/19  0532 03/25/19  1454 12/08/18  1346 12/28/17  1601 10/24/17  1050 08/08/17  1321 10/26/11  0949   SED  --  9  --  19 28* 31* 27*   CRP 45.3* <2.9 9.8*  --   --   --   --        Hematology Studies    Recent Labs   Lab Test 04/05/19  0532 04/04/19  1707 12/08/18  1346 12/06/18  1139 12/22/17  1830 09/07/16  0755 02/26/15  2056   WBC 8.2 11.8* 10.7 10.9 11.9* 8.2  13.7*   ANEU 5.4 9.8* 9.2* 8.8* 9.9*  --  8.8*   AEOS 0.1 0.0 0.0 0.0 0.0  --  0.0   HGB 10.5* 10.9* 10.8* 11.9* 11.7* 11.3* 12.2*   * 104* 102* 105* 105* 108* 107*   * 125* 174 180 175 186 172       Metabolic Studies     Recent Labs   Lab Test 19  0532 19  1707 18  1346 18  1400 10/09/18  1031    138 138 136 138   POTASSIUM 4.2 4.4 4.6 4.8 5.1   CHLORIDE 110* 104 101 99 102   CO2 25 28 30 24 23   BUN 34* 41* 32* 35* 40*   CR 1.24 1.56* 1.41* 1.53* 1.55*   GFRESTIMATED 52* 39* 47* 43* 43*       Hepatic Studies    Recent Labs   Lab Test 19  1707 18  1346 17  1830 14  1238 10/14/13  1508 12  1102   BILITOTAL 0.6 0.3 0.3 0.5 0.5 0.8   ALKPHOS 44 49 52 87 68 75   ALBUMIN 3.0* 2.7* 3.0* 3.8 3.7 3.8   AST 11 19 13 20 29 27   ALT 16 38 16 10 14 <6       Thyroid Studies    Recent Labs   Lab Test 18  1400 16  0948   TSH 1.38 1.73       Microbiology:  Culture Micro   Date Value Ref Range Status   2019 No growth after 17 hours  Preliminary   2019 No growth after 17 hours  Preliminary   2017 No growth  Final   10/26/2011 No growth  Final       Vitals:    19 2030 19 2354 19 0759 19 1600   BP: 128/65 140/67 132/66 134/69   BP Location:  Right arm Right arm Left arm   Pulse: 90 85 93 90   Resp: 16 16 16 16   Temp: 98.2  F (36.8  C) 97.2  F (36.2  C) 97.6  F (36.4  C) 98.2  F (36.8  C)   TempSrc: Oral Oral Oral Oral   SpO2: 97% 93% 92% 97%   Weight:       Height:         Temp (high/low/average during past 24 hours): Temp (24hrs), Av.7  F (36.5  C), Min:97.2  F (36.2  C), Max:98.2  F (36.8  C)      PHYSICAL EXAMINATION:  Vital signs as above  General: Pleasant older gentleman with an Turkish accent who is in bed with the head of the bed elevated. His wife and daughter are in the room.  Lymph node exam: No submental, cervical, supraclavicular, axillary, or inguinal nodes were palpable.  HEENT: NCAT. Blind  in right eye. EOMI. No conjunctival hemorrhage. No scleral icterus. Positive arcus. Decreased hearing (grossly) on the right. Ambrose test lateralized to the right. Upper plate on dental examination. Tongue is midline and without fasciculations.  Neck: Supple. No bruit.   Back: No costovertebral angle tenderness or spinal tenderness.   Lungs: Clear to auscultation  Cardiac: RRR S1S2 without MGR  Abdomen: Normal bowel sounds, soft, non-tender  Extremities: Bilateral pitting edema to thighs. The left lower extremity has erythema from the dorsum of the foot by the toes to the lower thigh. Since admission,the upper border of the erythema due to cellulitis (on the thigh) has retracted on the basis of the redness that no longer extends to the line that was drawn on the border yesterday. The decrease in the erythema of the thigh is confirmed by the patient's wife. The dorsum of the left foot has increased edema and a sense that there may be the beginning of fluctuance.  Neuro: AOX4. CN II-XII intact except for blindness of the right eye and decreased hearing on the right and a Ambrose test that localizes to the right, and an asymmetric smile with weakness on the left.as noted above (I did not test gag or corneal reflex). Able to move all four extremities and to cooperate with the examination.      Electronically signed by Dennis Rudolph M.D.  4/5/2019 6:48 PM  --

## 2019-04-05 NOTE — PLAN OF CARE
"Patient has denied discomfort, except for \"one time\". He said that he feels better since the antibiotics were started.   He has rested in bed for the most part.  Pt has seen him and made recommendations.  Lymphedema wraps are not possible, per PT, until patient has been on antibiotic 48 hours plus, and she indicated Monday would be the earliest start date, depending on the condition of the leg.  Leg has been up on a pillow, with redness within markings.          Fasting glucose this morning was 77.  Recheck BG before breakfast was 78.  Breakfast arrived as assessment was being done, and he did eat it all.  BG before lunch did require sliding scale coverage of one unit.  He did order breakfast without reminding, but was fatigued, sleeping a lot around lunch time, and did need to be reminded to order a meal.         Pharmacy came to see patient, and he acknowledged that he does not know his medications.  His wife manages them, and Pharmacist requested that we call her when the wife arrives, so medications can be reviewed.  "

## 2019-04-05 NOTE — PROGRESS NOTES
Care Coordinator Progress Note    Admission Date/Time:  4/4/2019  Attending MD:  Ruben Jarrett MD    Data  Chart reviewed, discussed with interdisciplinary team.   Patient was admitted for:    Cellulitis of left lower extremity  Cellulitis of lower extremity, unspecified laterality  Parkinson disease (H).    Concerns with insurance coverage for discharge needs: None.  Current Living Situation: Patient lives with spouse.  Support System: Supportive and Involved  Services Involved: Home Care  Transportation at Discharge: TBD  Transportation to Medical Appointments:   - Name of caregiver: Malathi wife  Barriers to Discharge: NA    Coordination of Care and Referrals: Provided patient/family with options for Home Care.        Assessment  HC referral sent for skilled nursing and physical therapy. No further discharge concerns at this time. All therapy orders completed. RNCC available as needed.    [facility]  Phone  [phone]  Fax  [phone]        Plan  Anticipated Discharge Date:  TBD  Anticipated Discharge Plan:  Home with home care.    Heather Espinal RN, BSN  Care Coordinator, 8A  Phone [phone]  Pager [phone]

## 2019-04-06 ENCOUNTER — APPOINTMENT (OUTPATIENT)
Dept: PHYSICAL THERAPY | Facility: CLINIC | Age: 84
DRG: 603 | End: 2019-04-06
Payer: MEDICARE

## 2019-04-06 ENCOUNTER — APPOINTMENT (OUTPATIENT)
Dept: ULTRASOUND IMAGING | Facility: CLINIC | Age: 84
DRG: 603 | End: 2019-04-06
Attending: INTERNAL MEDICINE
Payer: MEDICARE

## 2019-04-06 LAB
ANION GAP SERPL CALCULATED.3IONS-SCNC: 7 MMOL/L (ref 3–14)
BUN SERPL-MCNC: 28 MG/DL (ref 7–30)
CALCIUM SERPL-MCNC: 8.3 MG/DL (ref 8.5–10.1)
CHLORIDE SERPL-SCNC: 110 MMOL/L (ref 94–109)
CO2 SERPL-SCNC: 27 MMOL/L (ref 20–32)
CREAT SERPL-MCNC: 1.46 MG/DL (ref 0.66–1.25)
CRP SERPL-MCNC: 22.3 MG/L (ref 0–8)
ERYTHROCYTE [DISTWIDTH] IN BLOOD BY AUTOMATED COUNT: 14 % (ref 10–15)
GFR SERPL CREATININE-BSD FRML MDRD: 42 ML/MIN/{1.73_M2}
GLUCOSE BLDC GLUCOMTR-MCNC: 153 MG/DL (ref 70–99)
GLUCOSE BLDC GLUCOMTR-MCNC: 168 MG/DL (ref 70–99)
GLUCOSE BLDC GLUCOMTR-MCNC: 174 MG/DL (ref 70–99)
GLUCOSE BLDC GLUCOMTR-MCNC: 88 MG/DL (ref 70–99)
GLUCOSE BLDC GLUCOMTR-MCNC: 89 MG/DL (ref 70–99)
GLUCOSE SERPL-MCNC: 86 MG/DL (ref 70–99)
HCT VFR BLD AUTO: 34.4 % (ref 40–53)
HGB BLD-MCNC: 10.9 G/DL (ref 13.3–17.7)
MCH RBC QN AUTO: 32 PG (ref 26.5–33)
MCHC RBC AUTO-ENTMCNC: 31.7 G/DL (ref 31.5–36.5)
MCV RBC AUTO: 101 FL (ref 78–100)
MRSA DNA SPEC QL NAA+PROBE: NEGATIVE
PLATELET # BLD AUTO: 114 10E9/L (ref 150–450)
POTASSIUM SERPL-SCNC: 4.2 MMOL/L (ref 3.4–5.3)
RBC # BLD AUTO: 3.41 10E12/L (ref 4.4–5.9)
SODIUM SERPL-SCNC: 144 MMOL/L (ref 133–144)
SPECIMEN SOURCE: NORMAL
WBC # BLD AUTO: 6.1 10E9/L (ref 4–11)

## 2019-04-06 PROCEDURE — 97116 GAIT TRAINING THERAPY: CPT | Mod: GP

## 2019-04-06 PROCEDURE — 36415 COLL VENOUS BLD VENIPUNCTURE: CPT | Performed by: INTERNAL MEDICINE

## 2019-04-06 PROCEDURE — 25000125 ZZHC RX 250

## 2019-04-06 PROCEDURE — A9270 NON-COVERED ITEM OR SERVICE: HCPCS | Mod: GY | Performed by: INTERNAL MEDICINE

## 2019-04-06 PROCEDURE — 25000132 ZZH RX MED GY IP 250 OP 250 PS 637: Mod: GY | Performed by: INTERNAL MEDICINE

## 2019-04-06 PROCEDURE — 99233 SBSQ HOSP IP/OBS HIGH 50: CPT | Performed by: INTERNAL MEDICINE

## 2019-04-06 PROCEDURE — 87640 STAPH A DNA AMP PROBE: CPT | Performed by: INTERNAL MEDICINE

## 2019-04-06 PROCEDURE — 97530 THERAPEUTIC ACTIVITIES: CPT | Mod: GP

## 2019-04-06 PROCEDURE — 12000001 ZZH R&B MED SURG/OB UMMC

## 2019-04-06 PROCEDURE — 86140 C-REACTIVE PROTEIN: CPT | Performed by: INTERNAL MEDICINE

## 2019-04-06 PROCEDURE — 25000131 ZZH RX MED GY IP 250 OP 636 PS 637: Mod: GY | Performed by: INTERNAL MEDICINE

## 2019-04-06 PROCEDURE — 00000146 ZZHCL STATISTIC GLUCOSE BY METER IP

## 2019-04-06 PROCEDURE — 97110 THERAPEUTIC EXERCISES: CPT | Mod: GP

## 2019-04-06 PROCEDURE — 85027 COMPLETE CBC AUTOMATED: CPT | Performed by: INTERNAL MEDICINE

## 2019-04-06 PROCEDURE — 80048 BASIC METABOLIC PNL TOTAL CA: CPT | Performed by: INTERNAL MEDICINE

## 2019-04-06 PROCEDURE — 87641 MR-STAPH DNA AMP PROBE: CPT | Performed by: INTERNAL MEDICINE

## 2019-04-06 PROCEDURE — 25000128 H RX IP 250 OP 636: Performed by: INTERNAL MEDICINE

## 2019-04-06 PROCEDURE — 76882 US LMTD JT/FCL EVL NVASC XTR: CPT | Mod: LT

## 2019-04-06 RX ORDER — SODIUM CHLORIDE 9 MG/ML
INJECTION, SOLUTION INTRAVENOUS
Status: DISPENSED
Start: 2019-04-06 | End: 2019-04-06

## 2019-04-06 RX ADMIN — ACETAMINOPHEN 975 MG: 325 TABLET, FILM COATED ORAL at 08:44

## 2019-04-06 RX ADMIN — ACETAMINOPHEN 975 MG: 325 TABLET, FILM COATED ORAL at 13:59

## 2019-04-06 RX ADMIN — AMPICILLIN SODIUM AND SULBACTAM SODIUM 3 G: 2; 1 INJECTION, POWDER, FOR SOLUTION INTRAMUSCULAR; INTRAVENOUS at 11:48

## 2019-04-06 RX ADMIN — IRBESARTAN 75 MG: 75 TABLET ORAL at 20:41

## 2019-04-06 RX ADMIN — ACETAMINOPHEN 975 MG: 325 TABLET, FILM COATED ORAL at 20:41

## 2019-04-06 RX ADMIN — AMPICILLIN SODIUM AND SULBACTAM SODIUM 3 G: 2; 1 INJECTION, POWDER, FOR SOLUTION INTRAMUSCULAR; INTRAVENOUS at 00:55

## 2019-04-06 RX ADMIN — HEPARIN SODIUM 5000 UNITS: 5000 INJECTION, SOLUTION INTRAVENOUS; SUBCUTANEOUS at 22:17

## 2019-04-06 RX ADMIN — THERA TABS 1 TABLET: TAB at 08:45

## 2019-04-06 RX ADMIN — PREDNISONE 10 MG: 5 TABLET ORAL at 08:44

## 2019-04-06 RX ADMIN — FOLIC ACID 1 MG: 1 TABLET ORAL at 08:45

## 2019-04-06 RX ADMIN — AMPICILLIN SODIUM AND SULBACTAM SODIUM 3 G: 2; 1 INJECTION, POWDER, FOR SOLUTION INTRAMUSCULAR; INTRAVENOUS at 06:29

## 2019-04-06 RX ADMIN — HEPARIN SODIUM 5000 UNITS: 5000 INJECTION, SOLUTION INTRAVENOUS; SUBCUTANEOUS at 10:26

## 2019-04-06 RX ADMIN — CYANOCOBALAMIN TAB 1000 MCG 1000 MCG: 1000 TAB at 08:45

## 2019-04-06 RX ADMIN — Medication 1500 MG: at 21:24

## 2019-04-06 RX ADMIN — LINAGLIPTIN 5 MG: 5 TABLET, FILM COATED ORAL at 13:20

## 2019-04-06 RX ADMIN — SERTRALINE HYDROCHLORIDE 25 MG: 25 TABLET ORAL at 08:46

## 2019-04-06 RX ADMIN — VITAMIN D, TAB 1000IU (100/BT) 1000 UNITS: 25 TAB at 08:45

## 2019-04-06 RX ADMIN — FUROSEMIDE 20 MG: 20 TABLET ORAL at 08:44

## 2019-04-06 RX ADMIN — ASPIRIN 81 MG CHEWABLE TABLET 81 MG: 81 TABLET CHEWABLE at 08:44

## 2019-04-06 RX ADMIN — AMPICILLIN SODIUM AND SULBACTAM SODIUM 3 G: 2; 1 INJECTION, POWDER, FOR SOLUTION INTRAMUSCULAR; INTRAVENOUS at 18:27

## 2019-04-06 RX ADMIN — CILOSTAZOL 50 MG: 50 TABLET ORAL at 08:46

## 2019-04-06 RX ADMIN — GLIPIZIDE 5 MG: 5 TABLET ORAL at 08:46

## 2019-04-06 RX ADMIN — ATORVASTATIN CALCIUM 10 MG: 10 TABLET, FILM COATED ORAL at 22:17

## 2019-04-06 RX ADMIN — CILOSTAZOL 50 MG: 50 TABLET ORAL at 20:41

## 2019-04-06 ASSESSMENT — ACTIVITIES OF DAILY LIVING (ADL)
ADLS_ACUITY_SCORE: 15

## 2019-04-06 NOTE — PROGRESS NOTES
[facility]    Medicine Progress Note - Hospitalist Service       Date of Admission:  4/4/2019  Assessment & Plan   # LLE- cellulitis- with chronic venous stasis. Clinically improving  - Ct Vanc + Unasyn for now . appreciate ID   # Giant cell arteritis- ct Prednisone , Actemra (next dose- 3/11)  # DM-II: ct Glipizide, Linagliptin   Recent Labs   Lab 04/06/19  0834 04/06/19  0537 04/06/19  0342 04/05/19  2121 04/05/19  1214 04/05/19  0819 04/05/19  0532 04/05/19  0240  04/04/19  1707   GLC  --  86  --   --   --   --  77  --   --  198*   BGM 88  --  89 132* 184* 78  --  114*   < >  --     < > = values in this interval not displayed.     # HTN- Ct Irbesartan   # HLD- Ct Lipitor   # h/o Syncope and falls- fall precautions. Follows with cards. Loop recorder in place  # B/L Lower extremity edema-  started low dose lasix. Elevate  # Depression- ct sertraline  # parkinson's- as per wife diagnosis was removed few years ago and carvidopa was stopped    Diet: Combination Diet Regular Diet Adult    DVT Prophylaxis: Heparin SQ  Hernandez Catheter: not present  Code Status: DNR/DNI      Disposition Plan   Expected discharge: 2 - 3 days, recommended to prior living arrangement once infection better.  Entered: Chadwick Joyce MD, MD 04/06/2019, 11:49 AM       The patient's care was discussed with the Bedside Nurse, Patient and Patient's Family.    Chadwick Joyce MD, MD  Hospitalist Service  [facility]    ______________________________________________________________________    Interval History   Feels better, no fever.  4 point ROS done and neg unless mentioned    Data reviewed today: I reviewed all medications, new labs and imaging results over the last 24 hours. I personally reviewed no images or EKG's today.    Physical Exam   Vital Signs: Temp: 97.3  F (36.3  C) Temp src: Oral BP: 143/69 Pulse: 87   Resp: 16 SpO2: 95 % O2 Device: None (Room air)    Weight:  211 lbs 0 oz  Gen- pleasant laying on bed  HEENT- NAD, CHINTAN  Neck- supple  CVS- I+II+ no m/r/g  RS- CTAB  Abdo- soft, no tenderness . No g/r/r  Ext-  edema ++ b/l LE.   Skin- LLE redness - further decreased than admission marking.   Data   BMP  Recent Labs   Lab 04/06/19  0537 04/05/19  0532 04/04/19  1707    143 138   POTASSIUM 4.2 4.2 4.4   CHLORIDE 110* 110* 104   BLAISE 8.3* 8.1* 8.4*   CO2 27 25 28   BUN 28 34* 41*   CR 1.46* 1.24 1.56*   GLC 86 77 198*     CBC  Recent Labs   Lab 04/06/19  0537 04/05/19  0532 04/04/19  1707   WBC 6.1 8.2 11.8*   RBC 3.41* 3.29* 3.27*   HGB 10.9* 10.5* 10.9*   HCT 34.4* 33.4* 34.1*   * 102* 104*   MCH 32.0 31.9 33.3*   MCHC 31.7 31.4* 32.0   RDW 14.0 14.3 14.0   * 117* 125*     INRNo lab results found in last 7 days.  LFTs  Recent Labs   Lab 04/04/19  1707   ALKPHOS 44   AST 11   ALT 16   BILITOTAL 0.6   PROTTOTAL 6.1*   ALBUMIN 3.0*      Inflammatory Markers    Recent Labs   Lab Test 04/06/19  0537 04/05/19  0532 03/25/19  1454 12/08/18  1346 12/28/17  1601 10/24/17  1050 08/08/17  1321 10/26/11  0949   SED  --   --  9  --  19 28* 31* 27*   CRP 22.3* 45.3* <2.9 9.8*  --   --   --   --

## 2019-04-06 NOTE — PLAN OF CARE
VS: VSS   O2: Stable on RA.   Output: Voiding in toilet.   Last BM: Per chart 4/4.   Activity: Up with walker and SBA. Alarms on for safety.   Skin: Intact. Multiple bruises on arms. LLE red/cellulitis without drainage and SONJA.   Pain: Denies, on scheduled Tylenol.   CMS: Intact.   Dressing: None   Diet: Regular. BGs with sliding scale insulin with meals.   LDA: PIV with good blood return.   Equipment: Walker, IV pole.    Plan: Continue per POC.   Additional Info:

## 2019-04-06 NOTE — PLAN OF CARE
OT: Orders received and acknowledged. Per PT, patient is doing well and transfers SBA-mod I. He typically uses AE for LE dressing, otherwise IND. Patient does report having a tub/shower with grab bars and shower chair, however did not want to practice tub transfer today. Reports he was having no problems prior to admission. Will place on hold and check in tomorrow to see if any skilled OT needs.

## 2019-04-06 NOTE — PLAN OF CARE
Discharge Planner PT   Patient plan for discharge: home  Current status: pt is SBA for bed mobility, transfers, and gait. Ambulates 250 feet with 4WW SBA in [address] way, complains of L hip pain with prolonged distance walking. Pt needs use of 1 UE support ambulating in room without walker.  Barriers to return to prior living situation: medical stability  Recommendations for discharge: home with assist PRN, home PT for home safety eval and progression of indep with mobility in home setting, lymphedema  Rationale for recommendations: Pt would benefit from further skilled PT for strengthening and increase independence with all functional mobility/gait.          Entered by: Sasha Chappell 04/06/2019 8:58 AM

## 2019-04-06 NOTE — PLAN OF CARE
Pt AxOx4. Pt up with SBA able to void spontaneously without difficulty. Last BM was 4/4/19, passing flatus, denies nausea, and active bowel sounds. Tolerating a regular diet. Forgot to call before dinner to get BG since he only checks in the AM at home. Lung sounds clear in all fields, no SOB, no chest pain. Denies any pain. BLE swelling- R is pink and L is red(blanchable). L leg marked and has not moved past the marking. PIV SL in between abx. Wife updated pharmacy on meds and the case coordinator this evening. Will continue to monitor.

## 2019-04-06 NOTE — PLAN OF CARE
Pt is A&Ox4. VSS. LS clear, on RA. BS active, LBM on 4/4/2019. Voiding well. Pt denies pain. BLEs swollen, LLE more red than RLE, both are elevated with pillow. R PIV patent and SL. Abx: Vanco and ampicillin. Pt up with SBA. Continue to monitor

## 2019-04-07 ENCOUNTER — APPOINTMENT (OUTPATIENT)
Dept: PHYSICAL THERAPY | Facility: CLINIC | Age: 84
DRG: 603 | End: 2019-04-07
Payer: MEDICARE

## 2019-04-07 LAB
CREAT SERPL-MCNC: 1.26 MG/DL (ref 0.66–1.25)
GFR SERPL CREATININE-BSD FRML MDRD: 51 ML/MIN/{1.73_M2}
GLUCOSE BLDC GLUCOMTR-MCNC: 118 MG/DL (ref 70–99)
GLUCOSE BLDC GLUCOMTR-MCNC: 129 MG/DL (ref 70–99)
GLUCOSE BLDC GLUCOMTR-MCNC: 151 MG/DL (ref 70–99)
GLUCOSE BLDC GLUCOMTR-MCNC: 216 MG/DL (ref 70–99)
GLUCOSE BLDC GLUCOMTR-MCNC: 92 MG/DL (ref 70–99)
PLATELET # BLD AUTO: 123 10E9/L (ref 150–450)

## 2019-04-07 PROCEDURE — 25000128 H RX IP 250 OP 636: Performed by: INTERNAL MEDICINE

## 2019-04-07 PROCEDURE — A9270 NON-COVERED ITEM OR SERVICE: HCPCS | Performed by: INTERNAL MEDICINE

## 2019-04-07 PROCEDURE — 85049 AUTOMATED PLATELET COUNT: CPT | Performed by: INTERNAL MEDICINE

## 2019-04-07 PROCEDURE — 00000146 ZZHCL STATISTIC GLUCOSE BY METER IP

## 2019-04-07 PROCEDURE — 82565 ASSAY OF CREATININE: CPT | Performed by: INTERNAL MEDICINE

## 2019-04-07 PROCEDURE — 36415 COLL VENOUS BLD VENIPUNCTURE: CPT | Performed by: INTERNAL MEDICINE

## 2019-04-07 PROCEDURE — 97116 GAIT TRAINING THERAPY: CPT | Mod: GP

## 2019-04-07 PROCEDURE — 25000132 ZZH RX MED GY IP 250 OP 250 PS 637: Performed by: INTERNAL MEDICINE

## 2019-04-07 PROCEDURE — 12000001 ZZH R&B MED SURG/OB UMMC

## 2019-04-07 PROCEDURE — 99232 SBSQ HOSP IP/OBS MODERATE 35: CPT | Performed by: INTERNAL MEDICINE

## 2019-04-07 PROCEDURE — 25000131 ZZH RX MED GY IP 250 OP 636 PS 637: Performed by: INTERNAL MEDICINE

## 2019-04-07 PROCEDURE — 97530 THERAPEUTIC ACTIVITIES: CPT | Mod: GP

## 2019-04-07 RX ADMIN — ACETAMINOPHEN 975 MG: 325 TABLET, FILM COATED ORAL at 19:41

## 2019-04-07 RX ADMIN — THERA TABS 1 TABLET: TAB at 08:33

## 2019-04-07 RX ADMIN — GLIPIZIDE 5 MG: 5 TABLET ORAL at 08:33

## 2019-04-07 RX ADMIN — CYANOCOBALAMIN TAB 1000 MCG 1000 MCG: 1000 TAB at 08:34

## 2019-04-07 RX ADMIN — SERTRALINE HYDROCHLORIDE 25 MG: 25 TABLET ORAL at 08:33

## 2019-04-07 RX ADMIN — VITAMIN D, TAB 1000IU (100/BT) 1000 UNITS: 25 TAB at 08:33

## 2019-04-07 RX ADMIN — CILOSTAZOL 50 MG: 50 TABLET ORAL at 08:33

## 2019-04-07 RX ADMIN — ATORVASTATIN CALCIUM 10 MG: 10 TABLET, FILM COATED ORAL at 21:49

## 2019-04-07 RX ADMIN — AMPICILLIN SODIUM AND SULBACTAM SODIUM 3 G: 2; 1 INJECTION, POWDER, FOR SOLUTION INTRAMUSCULAR; INTRAVENOUS at 00:25

## 2019-04-07 RX ADMIN — PREDNISONE 10 MG: 5 TABLET ORAL at 08:33

## 2019-04-07 RX ADMIN — CILOSTAZOL 50 MG: 50 TABLET ORAL at 19:41

## 2019-04-07 RX ADMIN — ACETAMINOPHEN 975 MG: 325 TABLET, FILM COATED ORAL at 14:05

## 2019-04-07 RX ADMIN — LINAGLIPTIN 5 MG: 5 TABLET, FILM COATED ORAL at 13:20

## 2019-04-07 RX ADMIN — AMPICILLIN SODIUM AND SULBACTAM SODIUM 3 G: 2; 1 INJECTION, POWDER, FOR SOLUTION INTRAMUSCULAR; INTRAVENOUS at 19:42

## 2019-04-07 RX ADMIN — FUROSEMIDE 20 MG: 20 TABLET ORAL at 08:33

## 2019-04-07 RX ADMIN — HEPARIN SODIUM 5000 UNITS: 5000 INJECTION, SOLUTION INTRAVENOUS; SUBCUTANEOUS at 21:49

## 2019-04-07 RX ADMIN — FOLIC ACID 1 MG: 1 TABLET ORAL at 08:33

## 2019-04-07 RX ADMIN — AMPICILLIN SODIUM AND SULBACTAM SODIUM 3 G: 2; 1 INJECTION, POWDER, FOR SOLUTION INTRAMUSCULAR; INTRAVENOUS at 13:10

## 2019-04-07 RX ADMIN — ACETAMINOPHEN 975 MG: 325 TABLET, FILM COATED ORAL at 08:33

## 2019-04-07 RX ADMIN — ASPIRIN 81 MG CHEWABLE TABLET 81 MG: 81 TABLET CHEWABLE at 08:33

## 2019-04-07 RX ADMIN — HEPARIN SODIUM 5000 UNITS: 5000 INJECTION, SOLUTION INTRAVENOUS; SUBCUTANEOUS at 10:28

## 2019-04-07 RX ADMIN — AMPICILLIN SODIUM AND SULBACTAM SODIUM 3 G: 2; 1 INJECTION, POWDER, FOR SOLUTION INTRAMUSCULAR; INTRAVENOUS at 06:52

## 2019-04-07 RX ADMIN — IRBESARTAN 75 MG: 75 TABLET ORAL at 21:14

## 2019-04-07 ASSESSMENT — ACTIVITIES OF DAILY LIVING (ADL)
ADLS_ACUITY_SCORE: 14
ADLS_ACUITY_SCORE: 14
ADLS_ACUITY_SCORE: 15
ADLS_ACUITY_SCORE: 14
ADLS_ACUITY_SCORE: 15
ADLS_ACUITY_SCORE: 15

## 2019-04-07 NOTE — PLAN OF CARE
Discharge Planner PT   Patient plan for discharge: home  Current status: pt demos mod I bed mobility from elevated HOB. Ambulates with cane in room distances at best 25 feet with CGA - SBA. Patient demos mod I tub transfer and ambulates with 4WW 150 feet.   Barriers to return to prior living situation: medical stability  Recommendations for discharge: home, may benefit from home PT to address home safety  Rationale for recommendations: see above       Entered by: Sasha Chappell 04/07/2019 8:29 AM

## 2019-04-07 NOTE — PLAN OF CARE
Pt AxOx4. Pt up with SBA. Able to void without difficulty. Last BM 4/4/19, passing flatus, no nausea, and active bowel sounds in all quadrants. Tolerating regular diet, last BG was 174. Lung sounds clear in all fields, no SOB, no chest pain. Denies any pain. BLE have edema. RLE is pink. LLE is red, but still blanchable and has not moved past marking. Does have some bruising on arms and L shoulder pt stated it was mostly from a fall a few months ago. PIV SL in between abx. Able to make needs known. Will continue to monitor.

## 2019-04-07 NOTE — PLAN OF CARE
VS: VSS   O2: >90% on RA   Output: Voiding without difficulty in BR, brief in place   Last BM: LBM 4/7 per pt report, passing flatus and BS active   Activity: Up with SBA and walker or cane, up with therapy in hallways this morning and up to BR. Hx of falls but gait steady when up   Skin: Skin intact ex for multiple bruises (L side from previous fall), BLE discolored but L worse than R. LLE was red at beginning of shift when sitting in chair but has turned more pink since being up in bed with legs elevated.    Pain: Pain is being managed with scheduled tylenol, PRN tramadol available if needed but has not taken.    CMS: CMS intact, denies N/T, DP +1 bilaterally, BLE edema pitting +3, +4 on L foot.    Dressing: None, SONJA, LLE marked and has not extended past markings.    Diet: Tolerating regular diet, BG checks. 92 this AM and 216 at lunchtime (pt forgot to call and had already eaten, requested to be covered with 2 units sliding scale insulin plus oral antidiabetic). BG QID.    LDA: PIV SL between abx, flushed without difficulty.    Equipment: IV pole, walker, cane, personal belongings at bedside   Plan: Continue to monitor patient, most likely discharge home tomorrow if infection continues improving.    Additional Info: Please remind patient to call before eating, pt is not normally on insulin at home.   Vancomycin discontinued today, currently on unasyn Q6H

## 2019-04-07 NOTE — PROGRESS NOTES
[facility]    Medicine Progress Note - Hospitalist Service       Date of Admission:  4/4/2019  Assessment & Plan   # LLE- cellulitis- with chronic venous stasis. Clinically improving  - Vanc 4/4- 4/7 (neg MRSA)  + Unasyn 4/4-- . appreciate ID review     # Giant cell arteritis- ct Prednisone , Actemra (next dose- 3/11)  # DM-II: ct Glipizide, Linagliptin   Recent Labs   Lab 04/07/19  0828 04/07/19  0155 04/06/19  2210 04/06/19  1652 04/06/19  1234 04/06/19  0834 04/06/19  0537  04/05/19  0532  04/04/19  1707   GLC  --   --   --   --   --   --  86  --  77  --  198*   BGM 92 118* 168* 174* 153* 88  --    < >  --    < >  --     < > = values in this interval not displayed.     # HTN- Ct Irbesartan   # HLD- Ct Lipitor   # h/o Syncope and falls- fall precautions. Follows with cards. Loop recorder in place  # B/L Lower extremity edema-  started low dose lasix. Elevate  # Depression- ct sertraline  # parkinson's- as per wife diagnosis was removed few years ago and carvidopa was stopped    Diet: Combination Diet Regular Diet Adult    DVT Prophylaxis: Heparin SQ  Hernandez Catheter: not present  Code Status: DNR/DNI      Disposition Plan   Expected discharge: Tomorrow, recommended to prior living arrangement once infection better.  Entered: Chadwick Joyce MD, MD 04/07/2019, 9:56 AM       The patient's care was discussed with the Bedside Nurse, Patient and Patient's Family.    Chadwick Joyce MD, MD  Hospitalist Service  [facility]    ______________________________________________________________________    Interval History   Feels better, no fever.  4 point ROS done and neg unless mentioned    Data reviewed today: I reviewed all medications, new labs and imaging results over the last 24 hours. I personally reviewed no images or EKG's today.    Physical Exam   Vital Signs: Temp: 96.2  F (35.7  C) Temp src: Oral BP: 154/74 Pulse: 80   Resp: 16 SpO2: 94 % O2  Device: None (Room air)    Weight: 211 lbs 0 oz  Gen- pleasant laying on bed  HEENT- NAD, CHINTAN  Neck- supple  CVS- I+II+ no m/r/g  RS- CTAB  Abdo- soft, no tenderness . No g/r/r  Ext-  edema ++ b/l LE.   Skin- LLE redness - further decreased than admission marking.   Data   BMP  Recent Labs   Lab 04/07/19 0538 04/06/19  0537 04/05/19  0532 04/04/19  1707   NA  --  144 143 138   POTASSIUM  --  4.2 4.2 4.4   CHLORIDE  --  110* 110* 104   BLAISE  --  8.3* 8.1* 8.4*   CO2  --  27 25 28   BUN  --  28 34* 41*   CR 1.26* 1.46* 1.24 1.56*   GLC  --  86 77 198*     CBC  Recent Labs   Lab 04/07/19 0538 04/06/19  0537 04/05/19  0532 04/04/19  1707   WBC  --  6.1 8.2 11.8*   RBC  --  3.41* 3.29* 3.27*   HGB  --  10.9* 10.5* 10.9*   HCT  --  34.4* 33.4* 34.1*   MCV  --  101* 102* 104*   MCH  --  32.0 31.9 33.3*   MCHC  --  31.7 31.4* 32.0   RDW  --  14.0 14.3 14.0   * 114* 117* 125*     INRNo lab results found in last 7 days.  LFTs  Recent Labs   Lab 04/04/19  1707   ALKPHOS 44   AST 11   ALT 16   BILITOTAL 0.6   PROTTOTAL 6.1*   ALBUMIN 3.0*      Inflammatory Markers    Recent Labs   Lab Test 04/06/19  0537 04/05/19  0532 03/25/19  1454 12/08/18  1346 12/28/17  1601 10/24/17  1050 08/08/17  1321 10/26/11  0949   SED  --   --  9  --  19 28* 31* 27*   CRP 22.3* 45.3* <2.9 9.8*  --   --   --   --

## 2019-04-07 NOTE — PLAN OF CARE
VS: VSS, denies CP, SOB.   O2: >90% on RA   Output: Voiding spontaneously in BR, brief in place   Last BM: LBM 4/7 per pt report,   Activity: Up with SBA and walker and cane. Up to BR. Hx of falls but gait steady when up to walk.   Skin: Skin intact ex for multiple bruises, BLE discolored. legs elevated with pillow.    Pain: Pain is being managed with scheduled tylenol, PRN tramadol available if needed, pt refused this shift.   CMS: CMS intact, denies N/T, BLE edema pitting +3, +4 on L.   Dressing: None, LLE marked, has not extended past markings.    Diet: Tolerating regular diet, BG checks. 129 at supper. BG with meal and at bed times.     LDA: PIV SL between abx.   Equipment: IV pole, walker, cane, personal belongings at bedside   Plan: Possible discharge home tomorrow if infection continues improving.    Additional Info: Please remind Pt to call for BG check before eating.

## 2019-04-08 ENCOUNTER — APPOINTMENT (OUTPATIENT)
Dept: PHYSICAL THERAPY | Facility: CLINIC | Age: 84
DRG: 603 | End: 2019-04-08
Payer: MEDICARE

## 2019-04-08 ENCOUNTER — PATIENT OUTREACH (OUTPATIENT)
Dept: CARE COORDINATION | Facility: CLINIC | Age: 84
End: 2019-04-08

## 2019-04-08 VITALS
TEMPERATURE: 96.3 F | HEART RATE: 91 BPM | BODY MASS INDEX: 31.25 KG/M2 | WEIGHT: 211 LBS | SYSTOLIC BLOOD PRESSURE: 154 MMHG | RESPIRATION RATE: 16 BRPM | OXYGEN SATURATION: 96 % | HEIGHT: 69 IN | DIASTOLIC BLOOD PRESSURE: 71 MMHG

## 2019-04-08 LAB
ANION GAP SERPL CALCULATED.3IONS-SCNC: 7 MMOL/L (ref 3–14)
BUN SERPL-MCNC: 24 MG/DL (ref 7–30)
CALCIUM SERPL-MCNC: 8.4 MG/DL (ref 8.5–10.1)
CHLORIDE SERPL-SCNC: 111 MMOL/L (ref 94–109)
CO2 SERPL-SCNC: 27 MMOL/L (ref 20–32)
CREAT SERPL-MCNC: 1.27 MG/DL (ref 0.66–1.25)
CRP SERPL-MCNC: 6 MG/L (ref 0–8)
ERYTHROCYTE [DISTWIDTH] IN BLOOD BY AUTOMATED COUNT: 14 % (ref 10–15)
GFR SERPL CREATININE-BSD FRML MDRD: 50 ML/MIN/{1.73_M2}
GLUCOSE BLDC GLUCOMTR-MCNC: 162 MG/DL (ref 70–99)
GLUCOSE BLDC GLUCOMTR-MCNC: 93 MG/DL (ref 70–99)
GLUCOSE BLDC GLUCOMTR-MCNC: 98 MG/DL (ref 70–99)
GLUCOSE SERPL-MCNC: 87 MG/DL (ref 70–99)
HCT VFR BLD AUTO: 32.9 % (ref 40–53)
HGB BLD-MCNC: 10.5 G/DL (ref 13.3–17.7)
MCH RBC QN AUTO: 32.3 PG (ref 26.5–33)
MCHC RBC AUTO-ENTMCNC: 31.9 G/DL (ref 31.5–36.5)
MCV RBC AUTO: 101 FL (ref 78–100)
PLATELET # BLD AUTO: 125 10E9/L (ref 150–450)
POTASSIUM SERPL-SCNC: 4.2 MMOL/L (ref 3.4–5.3)
RBC # BLD AUTO: 3.25 10E12/L (ref 4.4–5.9)
SODIUM SERPL-SCNC: 145 MMOL/L (ref 133–144)
WBC # BLD AUTO: 5 10E9/L (ref 4–11)

## 2019-04-08 PROCEDURE — 80048 BASIC METABOLIC PNL TOTAL CA: CPT | Performed by: INTERNAL MEDICINE

## 2019-04-08 PROCEDURE — A9270 NON-COVERED ITEM OR SERVICE: HCPCS | Performed by: INTERNAL MEDICINE

## 2019-04-08 PROCEDURE — 86140 C-REACTIVE PROTEIN: CPT | Performed by: INTERNAL MEDICINE

## 2019-04-08 PROCEDURE — 85027 COMPLETE CBC AUTOMATED: CPT | Performed by: INTERNAL MEDICINE

## 2019-04-08 PROCEDURE — 25000128 H RX IP 250 OP 636: Performed by: INTERNAL MEDICINE

## 2019-04-08 PROCEDURE — 25000132 ZZH RX MED GY IP 250 OP 250 PS 637: Performed by: INTERNAL MEDICINE

## 2019-04-08 PROCEDURE — 25000131 ZZH RX MED GY IP 250 OP 636 PS 637: Performed by: INTERNAL MEDICINE

## 2019-04-08 PROCEDURE — 97116 GAIT TRAINING THERAPY: CPT | Mod: GP

## 2019-04-08 PROCEDURE — 00000146 ZZHCL STATISTIC GLUCOSE BY METER IP

## 2019-04-08 PROCEDURE — 99239 HOSP IP/OBS DSCHRG MGMT >30: CPT | Performed by: INTERNAL MEDICINE

## 2019-04-08 PROCEDURE — 99231 SBSQ HOSP IP/OBS SF/LOW 25: CPT | Performed by: INTERNAL MEDICINE

## 2019-04-08 PROCEDURE — 97535 SELF CARE MNGMENT TRAINING: CPT | Mod: GP

## 2019-04-08 PROCEDURE — 36415 COLL VENOUS BLD VENIPUNCTURE: CPT | Performed by: INTERNAL MEDICINE

## 2019-04-08 RX ORDER — SACCHAROMYCES BOULARDII 250 MG
250 CAPSULE ORAL 2 TIMES DAILY
Qty: 28 CAPSULE | Refills: 0 | Status: SHIPPED | OUTPATIENT
Start: 2019-04-08 | End: 2019-07-02

## 2019-04-08 RX ORDER — FUROSEMIDE 20 MG
20 TABLET ORAL DAILY
Qty: 30 TABLET | Refills: 0 | Status: SHIPPED | OUTPATIENT
Start: 2019-04-09 | End: 2019-07-02

## 2019-04-08 RX ADMIN — HEPARIN SODIUM 5000 UNITS: 5000 INJECTION, SOLUTION INTRAVENOUS; SUBCUTANEOUS at 10:40

## 2019-04-08 RX ADMIN — ACETAMINOPHEN 975 MG: 325 TABLET, FILM COATED ORAL at 08:02

## 2019-04-08 RX ADMIN — AMPICILLIN SODIUM AND SULBACTAM SODIUM 3 G: 2; 1 INJECTION, POWDER, FOR SOLUTION INTRAMUSCULAR; INTRAVENOUS at 12:46

## 2019-04-08 RX ADMIN — CYANOCOBALAMIN TAB 1000 MCG 1000 MCG: 1000 TAB at 08:01

## 2019-04-08 RX ADMIN — AMPICILLIN SODIUM AND SULBACTAM SODIUM 3 G: 2; 1 INJECTION, POWDER, FOR SOLUTION INTRAMUSCULAR; INTRAVENOUS at 00:00

## 2019-04-08 RX ADMIN — SERTRALINE HYDROCHLORIDE 25 MG: 25 TABLET ORAL at 08:01

## 2019-04-08 RX ADMIN — LINAGLIPTIN 5 MG: 5 TABLET, FILM COATED ORAL at 12:46

## 2019-04-08 RX ADMIN — CILOSTAZOL 50 MG: 50 TABLET ORAL at 08:01

## 2019-04-08 RX ADMIN — PREDNISONE 10 MG: 5 TABLET ORAL at 08:01

## 2019-04-08 RX ADMIN — THERA TABS 1 TABLET: TAB at 08:01

## 2019-04-08 RX ADMIN — FOLIC ACID 1 MG: 1 TABLET ORAL at 08:01

## 2019-04-08 RX ADMIN — ASPIRIN 81 MG CHEWABLE TABLET 81 MG: 81 TABLET CHEWABLE at 08:01

## 2019-04-08 RX ADMIN — AMPICILLIN SODIUM AND SULBACTAM SODIUM 3 G: 2; 1 INJECTION, POWDER, FOR SOLUTION INTRAMUSCULAR; INTRAVENOUS at 06:16

## 2019-04-08 RX ADMIN — FUROSEMIDE 20 MG: 20 TABLET ORAL at 08:04

## 2019-04-08 RX ADMIN — VITAMIN D, TAB 1000IU (100/BT) 1000 UNITS: 25 TAB at 08:01

## 2019-04-08 RX ADMIN — GLIPIZIDE 5 MG: 5 TABLET ORAL at 08:01

## 2019-04-08 ASSESSMENT — ACTIVITIES OF DAILY LIVING (ADL)
ADLS_ACUITY_SCORE: 14

## 2019-04-08 NOTE — PROGRESS NOTES
INFECTIOUS DISEASE FOLLOW-UP    NAME: Bart Blair  MRN:  [id number]  AGE:  88 year old            :  1931    PRIMARY CARE PROVIDER:  Chuy Stewart  Date of Admission:   2019  Consult Requester:  Ruben Jarrett MD      REASON FOR SEEING: left lower extremity cellulitis    INTERVAL EVENTS:   1. The patient has had a marked improvement in the cellulitis since my examination on Friday. The upper border of the erythema, which had been approximately 12 cm proximal to the knee on admission, is now several cm proximal to the mid-calf. The degree of the erythema is much less as well, and the skin on the calf and the dorsum of the foot is now a dark pink color rather than a nearly lobster-red color as it was prior to the weekend.  2. The ultrasound of the dorsum of the left foot on 2019 did not demonstrate findings suggestive of an abscess.  3. Vancomycin has been discontinued as cultures of the area did not grow methicillin-resistant Staphylococcus aureus (MRSA) -- nor did they yield Staphylococcus aureus   4. Blood cultures continue to have no growth.  5. Persistence of thrombocytopenia since admission    IMPRESSION:      Markedly improving LLE cellulitis. The patient has had the clinical improvement that I noted above (item 1), a decrease in the WBC count from 11,800 on 2019 to 5,000 today, and a progressive decline in the CRP levels to 6.0 today.:    Lab Test 19  0644 19  0537 19  0532   SED  --   --   --    CRP 6.0 22.3* 45.3*     The patient, when discharged, should be advised (along with his wife) that if he has a fever or shaking chills, or if he has an increase in the area of erythema or the intensity of the erythema on the LLE, that he should seek medical attention promptly.    SUGGESTIONS:      1. Consider changing from ampicillin-sulbactam (Unasyn) to amoxicillin-clavulanate (Augmentin), which has nearly an identical spectrum of activity against bacteria. It will  not be present at the same tissue levels as is the case with the intravenous antibiotic, but the patient has made dramatic improvement.   2. Hypoalbuminemia: this should be discussed, with the patient and his wife, in the context of the type of diet and supplements that he should take after discharge.  3. Outpatient evaluation of thrombocytopenia.     ALLERGIES:  No known drug allergies    CURRENT MEDICATIONS:  Current Facility-Administered Medications   Medication     acetaminophen (TYLENOL) tablet 650 mg     acetaminophen (TYLENOL) tablet 975 mg     ampicillin-sulbactam (UNASYN) 3 g vial to attach to  mL bag     aspirin (ASA) chewable tablet 81 mg     atorvastatin (LIPITOR) tablet 10 mg     calcium carbonate (TUMS) chewable tablet 1,000 mg     cilostazol (PLETAL) tablet 50 mg     cyanocobalamin (VITAMIN B-12) tablet 1,000 mcg     glucose gel 15-30 g    Or     dextrose 50 % injection 25-50 mL    Or     glucagon injection 1 mg     folic acid (FOLVITE) tablet 1 mg     furosemide (LASIX) tablet 20 mg     glipiZIDE (GLUCOTROL) tablet 5 mg     heparin sodium injection 5,000 Units     insulin aspart (NovoLOG) inj (RAPID ACTING)     insulin aspart (NovoLOG) inj (RAPID ACTING)     irbesartan (AVAPRO) half-tablet 75 mg     linagliptin (TRADJENTA) tablet 5 mg     melatonin tablet 1 mg     multivitamin, therapeutic (THERA-VIT) tablet 1 tablet     naloxone (NARCAN) injection 0.1-0.4 mg     ondansetron (ZOFRAN-ODT) ODT tab 4 mg    Or     ondansetron (ZOFRAN) injection 4 mg     predniSONE (DELTASONE) tablet 10 mg     senna-docusate (SENOKOT-S/PERICOLACE) 8.6-50 MG per tablet 1 tablet    Or     senna-docusate (SENOKOT-S/PERICOLACE) 8.6-50 MG per tablet 2 tablet     sertraline (ZOLOFT) tablet 25 mg     traMADol (ULTRAM) half-tab 25 mg     vitamin D3 (CHOLECALCIFEROL) 1000 units (25 mcg) tablet 1,000 Units       REVIEW OF SYSTEMS:  Negative for fever or chills. Occasional shortness of breath that resolves when he returns to  the bed. No constipation or diarrhea. Minimal LLE leg discomfort. Otherwise, a 4-point ROS is negative.    LABORATORY RESULTS:  Inflammatory Markers    Recent Labs   Lab Test 04/08/19  0644 04/06/19  0537 04/05/19  0532 03/25/19  1454 12/08/18  1346 12/28/17  1601 10/24/17  1050 08/08/17  1321 10/26/11  0949   SED  --   --   --  9  --  19 28* 31* 27*   CRP 6.0 22.3* 45.3* <2.9 9.8*  --   --   --   --        Hematology Studies    Recent Labs   Lab Test 04/08/19  0644 04/07/19  0538 04/06/19  0537 04/05/19  0532 04/04/19  1707 12/08/18  1346 12/06/18  1139 12/22/17  1830  02/26/15  2056   WBC 5.0  --  6.1 8.2 11.8* 10.7 10.9 11.9*   < > 13.7*   ANEU  --   --   --  5.4 9.8* 9.2* 8.8* 9.9*  --  8.8*   AEOS  --   --   --  0.1 0.0 0.0 0.0 0.0  --  0.0   HGB 10.5*  --  10.9* 10.5* 10.9* 10.8* 11.9* 11.7*   < > 12.2*   *  --  101* 102* 104* 102* 105* 105*   < > 107*   * 123* 114* 117* 125* 174 180 175   < > 172    < > = values in this interval not displayed.       Metabolic Studies     Recent Labs   Lab Test 04/08/19  0644 04/07/19  0538 04/06/19  0537 04/05/19  0532 04/04/19  1707 12/08/18  1346   *  --  144 143 138 138   POTASSIUM 4.2  --  4.2 4.2 4.4 4.6   CHLORIDE 111*  --  110* 110* 104 101   CO2 27  --  27 25 28 30   BUN 24  --  28 34* 41* 32*   CR 1.27* 1.26* 1.46* 1.24 1.56* 1.41*   GFRESTIMATED 50* 51* 42* 52* 39* 47*       Hepatic Studies    Recent Labs   Lab Test 04/04/19  1707 12/08/18  1346 12/22/17  1830 11/18/14  1238 10/14/13  1508 08/11/12  1102   BILITOTAL 0.6 0.3 0.3 0.5 0.5 0.8   ALKPHOS 44 49 52 87 68 75   ALBUMIN 3.0* 2.7* 3.0* 3.8 3.7 3.8   AST 11 19 13 20 29 27   ALT 16 38 16 10 14 <6       Thyroid Studies    Prior laboratory tests   Lab Test 11/14/18  1400 11/22/16  0948   TSH 1.38 1.73       Microbiology:  Culture Micro   Date Value Ref Range Status   04/04/2019 No growth after 4 days  Preliminary   04/04/2019 No growth after 4 days  Preliminary   12/22/2017 No growth  Final    10/26/2011 No growth  Final       Urine Studies    Recent Labs   Lab Test 10/22/15  1533 13  1530 13  1019 13  1120 12  1101 10/26/11  1020   LEUKEST Negative Negative Negative Negative Trace* Negative   WBCU  --  <1  --   --  5* O - 2       Vitals:    19 0741 19 1700 19 2220 19 0715   BP: 154/74 175/88 160/61 154/71   BP Location: Left arm Left arm Left arm    Pulse:  86 93 91   Resp: 16 18 16 16   Temp: 96.2  F (35.7  C) 96.4  F (35.8  C) 97.9  F (36.6  C) 96.3  F (35.7  C)   TempSrc: Oral Oral Oral    SpO2: 94% 98% 97% 96%   Weight:       Height:         Temp (high/low/average during past 24 hours): Temp (24hrs), Av.9  F (36.1  C), Min:96.3  F (35.7  C), Max:97.9  F (36.6  C)      PHYSICAL EXAMINATION:  Vital signs as above  General: Pleasant man lying in bed and with a compression bandage over a stocking on the LLE from the ankle to just above the mid-calf.  Extremities:  LLE: The upper border of the erythema, which had been approximately 12 cm proximal to the knee on admission, is now several cm proximal to the mid-calf. The degree of the erythema is much less as well, and the skin on the calf and the dorsum of the foot are now a dark pink color rather than a nearly lobster-red color as prior to the weekend.  Neuro: AOX4. Able to move all four extremities. Knows that on Wednesday the weather forecast is for significant snow.    Electronically signed by Dennis Rudolph M.D.  2019 3:43 PM  --

## 2019-04-08 NOTE — PLAN OF CARE
VS: A&Ox4. VSS. Denies SOB, chest pain, dizziness, lightheadedness, nausea, and vomiting. Patient reports baseline neuropathy in BLE.    O2: >90% RA. Lung sounds clear bilaterally. IS encouraged.   Output: Voiding spontaneously without difficulty.    Last BM: Bowel sounds active, passing gas, and last BM 4/8. Briefs in place.   Activity: A1 with walker and gait belt. Ambulated in munoz with PT this shift.    Up for meals? Up for meals.    Skin: Intact. Bruised, discoloration in BLE.    Pain: Denies pain. Has Tramadol 25 mg Q6H and scheduled tylenol.    CMS: Baseline neuropathy in BLE.    Dressing: None, SONJA, LLE marked and has not extended past markings.    Diet: Regular diet and tolerating well. BG this morning was 93 and 162 at lunch time.    LDA: PIV patent and saline locked between abx.    Equipment: IV pole, walker, personal belongings at bedside.    Plan: Potential discharge to home with home care today 4/8.    Additional Info: Patient able to make needs known. Will continue to monitor.

## 2019-04-08 NOTE — PROGRESS NOTES
Care Coordinator - Discharge Planning    Admission Date/Time:  4/4/2019  Attending MD:  Ruben Jarrett MD     Data  Date of initial CC assessment:  4/5/2019  Chart reviewed, discussed with interdisciplinary team.   Patient was admitted for:   1. Parkinson disease (H)    2. Cellulitis of left lower extremity    3. Cellulitis of lower extremity, unspecified laterality         Assessment   Full assessment completed in previous note    Coordination of Care and Referrals: Previous RNCC made referral to [facility] for RN visits.    Per Dr. Joyce, patient may discharge today vs tomorrow pending ID recommendations. He may need IV antibiotics. Inpatient therapies recommend home PT.    Per [facility], patient does not have coverage for home IV antibiotics; Dr. Joyce notified. Writer updated home care orders to include PT.       Plan  Anticipated Discharge Date:  4/8 vs 4/9  Anticipated Discharge Plan:  Home with assist from family, home care services and clinic follow up as recommended by the team    CTS Handoff completed:  NO    Barb Shepherd RNCC

## 2019-04-08 NOTE — PLAN OF CARE
Discharge Planner PT   Patient plan for discharge: Home with assist and home PT  Current status: Pt seated up in chair upon arrival. Graded compression bandaging initiated on B LE for swelling. Pt ambulated ~200' with 4ww and CGA-SBA after compression wraps donned. Pt climbed 3 stairs with railings on both sides to mimic home entrance and SBA and step to pattern. Pt able to complete bed mobility with bed rail with SBA.   Barriers to return to prior living situation: Swelling, decreased ROM and strength in both LE  Recommendations for discharge: Home with home PT  Rationale for recommendations: Skilled PT needed for lymph edema management and increased independence with movement.        Entered by: Syeda Cam 04/08/2019 11:42 AM     .

## 2019-04-08 NOTE — PROGRESS NOTES
[facility]    Medicine Progress Note - Hospitalist Service       Date of Admission:  4/4/2019  Assessment & Plan   # LLE- cellulitis- with chronic venous stasis. Clinically improving  - Vanc 4/4- 4/7 (neg MRSA)  + Unasyn 4/4-- . appreciate ID review . A/w final plans      # Giant cell arteritis- ct Prednisone , Actemra (next dose- 3/11)  # DM-II: ct Glipizide, Linagliptin   Recent Labs   Lab 04/08/19  0759 04/08/19  0644 04/08/19  0157 04/07/19  2148 04/07/19  1707 04/07/19  1253 04/07/19  0828  04/06/19  0537  04/05/19  0532  04/04/19  1707   GLC  --  87  --   --   --   --   --   --  86  --  77  --  198*   BGM 93  --  98 151* 129* 216* 92   < >  --    < >  --    < >  --     < > = values in this interval not displayed.     # HTN- Ct Irbesartan   # HLD- Ct Lipitor   # h/o Syncope and falls- fall precautions. Follows with cards. Loop recorder in place  # B/L Lower extremity edema-  started low dose lasix. Elevate  # Depression- ct sertraline  # parkinson's- as per wife diagnosis was removed few years ago and carvidopa was stopped    Diet: Combination Diet Regular Diet Adult    DVT Prophylaxis: Heparin SQ  Hernandez Catheter: not present  Code Status: DNR/DNI      Disposition Plan   Expected discharge: Today, recommended to prior living arrangement once infection better.  Entered: Chadwick Joyce MD, MD 04/08/2019, 10:51 AM       The patient's care was discussed with the Bedside Nurse, Patient and Patient's Family.    Chadwick Joyce MD, MD  Hospitalist Service  [facility]    ______________________________________________________________________    Interval History   Feels better, no fever.  4 point ROS done and neg unless mentioned    Data reviewed today: I reviewed all medications, new labs and imaging results over the last 24 hours. I personally reviewed no images or EKG's today.    Physical Exam   Vital Signs: Temp: 96.3  F (35.7  C) Temp src:  Oral BP: 154/71 Pulse: 91   Resp: 16 SpO2: 96 % O2 Device: None (Room air)    Weight: 211 lbs 0 oz  Gen- pleasant laying on bed  HEENT- NAD, CHINTAN  Neck- supple  CVS- I+II+ no m/r/g  RS- CTAB  Abdo- soft, no tenderness . No g/r/r  Ext-  edema ++ b/l LE.   Skin- LLE redness - further decreased than admission marking.   Data   BMP  Recent Labs   Lab 04/08/19 0644 04/07/19  0538 04/06/19  0537 04/05/19  0532 04/04/19  1707   *  --  144 143 138   POTASSIUM 4.2  --  4.2 4.2 4.4   CHLORIDE 111*  --  110* 110* 104   BLAISE 8.4*  --  8.3* 8.1* 8.4*   CO2 27  --  27 25 28   BUN 24  --  28 34* 41*   CR 1.27* 1.26* 1.46* 1.24 1.56*   GLC 87  --  86 77 198*     CBC  Recent Labs   Lab 04/08/19 0644 04/07/19  0538 04/06/19  0537 04/05/19  0532 04/04/19  1707   WBC 5.0  --  6.1 8.2 11.8*   RBC 3.25*  --  3.41* 3.29* 3.27*   HGB 10.5*  --  10.9* 10.5* 10.9*   HCT 32.9*  --  34.4* 33.4* 34.1*   *  --  101* 102* 104*   MCH 32.3  --  32.0 31.9 33.3*   MCHC 31.9  --  31.7 31.4* 32.0   RDW 14.0  --  14.0 14.3 14.0   * 123* 114* 117* 125*     INRNo lab results found in last 7 days.  LFTs  Recent Labs   Lab 04/04/19  1707   ALKPHOS 44   AST 11   ALT 16   BILITOTAL 0.6   PROTTOTAL 6.1*   ALBUMIN 3.0*      Inflammatory Markers    Recent Labs   Lab Test 04/08/19  0644 04/06/19  0537 04/05/19  0532 03/25/19  1454 12/08/18  1346 12/28/17  1601 10/24/17  1050 08/08/17  1321 10/26/11  0949   SED  --   --   --  9  --  19 28* 31* 27*   CRP 6.0 22.3* 45.3* <2.9 9.8*  --   --   --   --

## 2019-04-08 NOTE — PLAN OF CARE
Pt is A&Ox4. VSS. LS clear, on RA. BS active, LBM on 4/7/2019. Voiding well. Pt denies pain. BLEs swollen, redness improving, elevated with pillows. R PIV patent and SL. Abx: Vanco and ampicillin. Pt up with SBA. Continue to monitor

## 2019-04-08 NOTE — DISCHARGE SUMMARY
[facility], [place]  Hospitalist Discharge Summary       Date of Admission:  4/4/2019  Date of Discharge:  4/8/2019  Discharging Provider: Chadwick Joyce MD, MD    Discharge Diagnoses   # LLE- cellulitis- with chronic venous stasis.     Previous medical issues:   # Giant cell arteritis  # DM-II:   # HTN-   # HLD-  # h/o Syncope and falls-  # B/L Lower extremity edema-   # Depression  # thrombocytopenia- intermittent chronic as per review of EMR    Follow-ups Needed After Discharge   Follow-up Appointments     Adult [facility] Follow-up and recommended labs and tests      Follow up with primary care provider, Chuy Stewart MD, within 7   days for hospital follow- up.  The following labs/tests are recommended:   CBC (follow thrombocytopenia) and CRP. Consider evaluation for lymphedema.    Follow with lymphedema clinic      Appointments on [place] and/or [facility] (with [facility] or [facility]   provider or service). Call [phone] if you haven't heard regarding   these appointments within 7 days of discharge.         {    Unresulted Labs Ordered in the Past 30 Days of this Admission     Date and Time Order Name Status Description    4/4/2019 1628 Blood culture Preliminary     4/4/2019 1628 Blood culture Preliminary           Discharge Disposition   Discharged to home  Condition at discharge: Stable    Hospital Course  Problem based  # LLE- cellulitis- with chronic venous stasis. Had IV antibiotics and ID review .  - Vanc 4/4- 4/7 (neg MRSA)  + Unasyn 4/4-- . appreciate ID review . With significant clinical improvement changed to PO Augmentin and advised outpatient follow up     # Giant cell arteritis- ct Prednisone , Actemra (next dose- 3/11)  # DM-II: ct Glipizide, Linagliptin                   Recent Labs   Lab 04/08/19  0759 04/08/19  0644 04/08/19  0157 04/07/19  2148 04/07/19  1707 04/07/19  1253 04/07/19  0828   04/06/19  0537   04/05/19  0532   04/04/19  1707   GLC  --  87   --   --   --   --   --   --  86  --  77  --  198*   BGM 93  --  98 151* 129* 216* 92   < >  --    < >  --    < >  --     < > = values in this interval not displayed.      # HTN- Ct Irbesartan   # HLD- Ct Lipitor   # h/o Syncope and falls- fall precautions. Follows with cards. Loop recorder in place  # B/L Lower extremity edema-  started low dose lasix. Elevate, lymphedema  # Depression- ct sertraline  # parkinson's- as per wife diagnosis was removed few years ago and carvidopa was stopped    Consultations This Hospital Stay   PHARMACY TO DOSE VANCO  PHYSICAL THERAPY ADULT IP CONSULT  PHARMACY TO DOSE VANCO  LYMPHEDEMA THERAPY IP CONSULT  INFECTIOUS DISEASE [facility] ADULT IP CONSULT  OCCUPATIONAL THERAPY ADULT IP CONSULT    Code Status   DNR/DNI    Time Spent on this Encounter   I, Chadwick Joyce MD, personally saw the patient today and spent greater than 30 minutes discharging this patient.       Chadwick Joyce MD, MD  [facility], [place]  ______________________________________________________________________    Physical Exam   Vital Signs: Temp: 96.3  F (35.7  C) Temp src: Oral BP: 154/71 Pulse: 91   Resp: 16 SpO2: 96 % O2 Device: None (Room air)    Weight: 211 lbs 0 oz  Gen- pleasant laying on bed  HEENT- NAD, CHINTAN  Neck- supple  CVS- I+II+ no m/r/g  RS- CTAB  Abdo- soft, no tenderness . No g/r/r  Ext-  edema ++ b/l LE.   Skin- LLE redness - significantly improved     Vitals:    04/04/19 1532   Weight: 95.7 kg (211 lb)       Primary Care Physician   Chuy Stewart MD    Immunizations       Discharge Orders      Home care nursing referral      Home Care PT Referral for Hospital Discharge      MD face to face encounter    Documentation of Face to Face and Certification for Home Health Services    I certify that patient: Bart Blair is under my care and that I, or a nurse practitioner or physician's assistant working with me, had a face-to-face encounter that meets the physician  face-to-face encounter requirements with this patient on: April 5, 2019.    This encounter with the patient was in whole, or in part, for the following medical condition, which is the primary reason for home health care: Leg Cellulitis.    I certify that, based on my findings, the following services are medically necessary home health services: Nursing and Physical Therapy.    My clinical findings support the need for the above services because: Nurse is needed: To provide assessment and oversight required in the home to assure adherence to the medical plan due to: Cellulitis and Physical Therapy Services are needed to assess and treat the following functional impairments: Lymphedema.    Further, I certify that my clinical findings support that this patient is homebound (i.e. absences from home require considerable and taxing effort and are for medical reasons or [employer] services or infrequently or of short duration when for other reasons) because: Requires assistance of another person or specialized equipment to access medical services because patient: Is unable to operate assistive equipment on their own., Range of motion limitations prevents ability to exit home safely. and Requires supervision of another for safe transfer...    Based on the above findings. I certify that this patient is confined to the home and needs intermittent skilled nursing care, physical therapy and/or speech therapy.  The patient is under my care, and I have initiated the establishment of the plan of care.  This patient will be followed by a physician who will periodically review the plan of care.  Physician/Provider to provide follow up care: Chuy Stewart    Attending hospital physician (the Medicare certified PECOS provider): Ruben Jarrett MD  Physician Signature: See electronic signature associated with these discharge orders.  Date: 4/5/2019     Reason for your hospital stay    Admitted with leg cellulitis  . Had IV  antibiotics which were transitioned to PO on discharge     Adult [facility] Follow-up and recommended labs and tests    Follow up with primary care provider, Chuy Stewart MD, within 7 days for hospital follow- up.  The following labs/tests are recommended: CBC (follow thrombocytopenia) and CRP. Consider evaluation for lymphedema.    Follow with lymphedema clinic      Appointments on [place] and/or [facility] (with [facility] or [facility] provider or service). Call [phone] if you haven't heard regarding these appointments within 7 days of discharge.     Activity    Your activity upon discharge: activity as tolerated     When to contact your care team    Call your primary doctor if you have any of the following: temperature greater than 100.4 or less than 96, increased drainage, increased swelling or increased pain.     Wound care and dressings    Instructions to care for your wound at home: as directed.     Diet    Follow this diet upon discharge: Orders Placed This Encounter      Combination Diet Regular Diet Adult       Significant Results and Procedures   Results for orders placed or performed during the hospital encounter of 04/04/19   US Extremity Non Vascular Left    Narrative    Exam: US EXTREMITY NON VASCULAR LEFT, 4/6/2019 4:33 PM    Indication: left foot swelling. evaluate for any abscess    Comparison: None    Findings:   Grayscale and color evaluation of the dorsal foot. Extensive  subcutaneous edema with increased vascular flow. No discrete abscess  identified.      Impression    Impression:   Extensive soft tissue edema and cellulitis of the dorsum of the foot.  No discrete drainable pocket of fluid identified.     I have personally reviewed the examination and initial interpretation  and I agree with the findings.    NAVA COLLAZO MD       Discharge Medications   Current Discharge Medication List      START taking these medications    Details   amoxicillin-clavulanate (AUGMENTIN) 694-127  MG tablet Take 1 tablet by mouth 2 times daily for 14 days  Qty: 28 tablet, Refills: 0    Associated Diagnoses: Cellulitis of lower extremity, unspecified laterality         CONTINUE these medications which have NOT CHANGED    Details   aspirin 81 MG tablet Take 1 tablet by mouth daily.  Refills: 3    Associated Diagnoses: Hypertension goal BP (blood pressure) < 130/80      atorvastatin (LIPITOR) 10 MG tablet Take 1 tablet (10 mg) by mouth daily Refill when requested  Qty: 90 tablet, Refills: 3    Associated Diagnoses: Hyperlipidemia LDL goal <100; Type 2 diabetes mellitus with other ophthalmic complication, without long-term current use of insulin (H)      cilostazol (PLETAL) 50 MG tablet Take 1 tablet (50 mg) by mouth 2 times daily  Qty: 180 tablet, Refills: 3    Associated Diagnoses: Atherosclerosis of native artery of both lower extremities with intermittent claudication (H)      diclofenac (VOLTAREN) 1 % topical gel APPLY 4 GRAMS TO KNEES OR 2 GRAMS TO HANDS FOUR TIMES A DAY USING ENCLOSED DOSING CARD  Qty: 300 g, Refills: 3    Associated Diagnoses: Arthritis      fluticasone (FLONASE) 50 MCG/ACT spray Spray 1-2 sprays into both nostrils daily  Qty: 16 g, Refills: 5    Associated Diagnoses: Chronic rhinitis      folic acid (FOLVITE) 1 MG tablet Take 1 mg by mouth daily      furosemide (LASIX) 20 MG tablet 1/2 tablet per day. If weight increases by 3-5 pounds then increase to 20 mg (1 tablet). If weight decreases to 205 then ok to hold.  Qty: 90 tablet, Refills: 3    Associated Diagnoses: Edema, unspecified type      glipiZIDE (GLUCOTROL) 5 MG tablet Take 1 tablet (5 mg) by mouth every morning (before breakfast)  Qty: 90 tablet, Refills: 3    Associated Diagnoses: Type 2 diabetes mellitus with hyperglycemia, without long-term current use of insulin (H)      linagliptin (TRADJENTA) 5 MG TABS tablet Take 1 tablet (5 mg) by mouth daily  Qty: 30 tablet, Refills: 0    Associated Diagnoses: Type 2 diabetes mellitus  with other ophthalmic complication, without long-term current use of insulin (H)      Multiple Vitamin (MULTI VITAMIN  MENS) TABS Take  by mouth. Takes one daily        predniSONE (DELTASONE) 5 MG tablet Take 10 mg by mouth daily 10 mg po daily until rheumatology appointment.    Comments: Please include the quantity of tablets and (strength) per dose in sig.      VITAMIN D 1000 UNIT OR TABS 1 TABLET DAILY  Qty: 100, Refills: 4    Associated Diagnoses: Contusion of ribs      amoxicillin (AMOXIL) 500 MG capsule 4 tablets prior to dental appointment. Use for dental appointments  Qty: 16 capsule, Refills: 4    Associated Diagnoses: Status post total left knee replacement      blood glucose monitoring (ACCU-CHEK COMPACT STRIPS) test strip 1 strip by In Vitro route daily  Qty: 100 each, Refills: 11    Associated Diagnoses: Type 2 diabetes mellitus with other ophthalmic complication, without long-term current use of insulin (H)      blood glucose monitoring (SOFTCLIX) lancets Check blood sugar once daily  Qty: 100 each, Refills: 3    Associated Diagnoses: Type 2 diabetes mellitus with other ophthalmic complication, without long-term current use of insulin (H)      COMPRESSION STOCKINGS 1 each daily Lower Extremity:   Knee High;  bilateral;  20-30 mm Hg.  Measure and fit.  Style and color per patient preference.  Doff n Nando per patient need.  Qty: 6 each, Refills: 11    Associated Diagnoses: Venous incompetence; Localized edema      Cyanocobalamin (VITAMIN B-12 CR) 1000 MCG TBCR TAKE ONE TABLET BY MOUTH EVERY DAY  Qty: 60 tablet, Refills: 4    Associated Diagnoses: Parkinson disease (H)      irbesartan (AVAPRO) 75 MG tablet Take 1 tablet (75 mg) by mouth daily  Qty: 90 tablet, Refills: 3    Associated Diagnoses: Hypertension goal BP (blood pressure) < 150/90      !! order for DME Glucometer, brand as covered by insurance.  Qty: 1 each, Refills: 0    Associated Diagnoses: Type 2 diabetes mellitus with hyperglycemia,  without long-term current use of insulin (H)      !! order for DME Test strips for pt's glucometer, brand as covered by insurance. Test daily and prn.  Qty: 100 each, Refills: 4    Associated Diagnoses: Type 2 diabetes mellitus with hyperglycemia, without long-term current use of insulin (H)      !! order for DME Lancets.  Daily and prn.  Qty: 100 each, Refills: 4    Associated Diagnoses: Type 2 diabetes mellitus with hyperglycemia, without long-term current use of insulin (H)      senna-docusate (SENOKOT-S;PERICOLACE) 8.6-50 MG per tablet Take 1-2 tablets by mouth daily as needed for constipation Takes about 2-3 times weekly      sertraline (ZOLOFT) 25 MG tablet TAKE ONE TABLET BY MOUTH EVERY DAY  Qty: 90 tablet, Refills: 3    Associated Diagnoses: Dysthymia      tocilizumab (ACTEMRA) 162 MG/0.9ML subcutaneous injection Inject 162 mg Subcutaneous       !! - Potential duplicate medications found. Please discuss with provider.        Allergies   Allergies   Allergen Reactions     No Known Drug Allergies

## 2019-04-08 NOTE — PROGRESS NOTES
Pt appropriate for discharge per Dr. Joyce.Discharge instructions given to wife and daughter with pt's consent.Family will  medications at [facility] in [place].PIV removed.Pt discharged to home in stable condition accompanied by wife @ 3160.

## 2019-04-09 ENCOUNTER — PATIENT OUTREACH (OUTPATIENT)
Dept: CARE COORDINATION | Facility: CLINIC | Age: 84
End: 2019-04-09

## 2019-04-09 NOTE — PLAN OF CARE
Physical Therapy Discharge Summary    Reason for therapy discharge:    Discharged to home with home therapy.    Progress towards therapy goal(s). See goals on Care Plan in [facility] electronic health record for goal details.  Goals partially met.  Barriers to achieving goals:   discharge from facility.    Therapy recommendation(s):    Continued therapy is recommended.  Rationale/Recommendations:  Home PT for safety evaluation and progression.

## 2019-04-09 NOTE — PROGRESS NOTES
Clinic Care Coordination Contact  Care Coordination Communication         Clinical Data: Patient was hospitalized at [facility] from 4-4-19 to 4-8-19 with diagnosis of cellulitis of left lower extremity.     Home Care Contact:  [facility]  Phone  [phone]  Fax  [phone]              Care Coordination contacted home care: Yes              Anticipated start of care date: 4-9-19    Patient Contact:               Introduced self and role of care coordination.               Discharge instructions were reviewed with patient/caregiver.               Do you have any questions about your medications? no              Follow up appointment is scheduled for 5-7-19.              Provided 24 Hour Nurse Line and/or 24 Hour Appointment Scheduling: Yes              Home care has contacted patient: Yes              Patient questions/concerns: none    Plan: RN/SW Care Coordinator will await notification from home care staff informing RN/SW Care Coordinator of patients discharge plans/needs. RN/SW Care Coordinator will review chart and outreach to home care every 4 weeks and as needed.      Alberto Tobias MSN, RN, PHN, CCM   Primary Care Clinical RN Care Coordinator  [facility]   [email or web address]  Office: [phone]

## 2019-04-09 NOTE — LETTER
[place] CARE COORDINATION  [address] [address]    April 9, 2019    Bartemmie Blair  [address] [address]      Dear Bart,    I am a clinic care coordinator who works with Chuy Stewart MD, MD at [facility]. I wanted to introduce myself and provide you with my contact information so that you can call me with questions or concerns about your health care. Below is a description of clinic care coordination and how I can further assist you.     The clinic care coordinator is a registered nurse and/or  who understand the health care system. The goal of clinic care coordination is to help you manage your health and improve access to the [facility] in the most efficient manner. The registered nurse can assist you in meeting your health care goals by providing education, coordinating services, and strengthening the communication among your providers. The  can assist you with financial, behavioral, psychosocial, chemical dependency, counseling, and/or psychiatric resources.    Please feel free to contact me at [phone], with any questions or concerns. We at [facility] are focused on providing you with the highest-quality healthcare experience possible and that all starts with you.     Sincerely,     Alberto Tobias MSN, RN, PHN, [facility]   Primary Care Clinical RN Care Coordinator  [facility]   [email or web address]  Office: [phone]    Enclosed: I have enclosed a copy of a 24 Hour Access Plan. This has helpful phone numbers for you to call when needed. Please keep this in an easy to access place to use as needed.

## 2019-04-09 NOTE — LETTER
Health Care Home - Access Care Plan    About Me  Patient Name:  Bart Calix    YOB: 1931  Age:                             88 year old   Jeff MRN:            [id number] Telephone Information:   Home Phone [phone]   Mobile [phone]       Address:    [address] [facility] [address] Email address:  [email or web address]      Emergency Contact(s)  Name Relationship Lgl Grd Work Phone Home Phone Mobile Phone   1. CLARISSA CALIX Spouse No  [phone]    2. SHANTI CALVERT Daughter No      3. EMILY CALIX Daughter No      4. SHAJI MARTINEZ Daughter No                Health Maintenance:      My Access Plan  Medical Emergency 911   Questions or concerns during clinic hours Primary Clinic Line, I will call the clinic directly: [facility] [place] - [phone]   24 Hour Appointment Line [phone] or  [phone]) (toll free)   24 Hour Nurse Line [phone] (toll free)   Questions or concerns outside clinic hours 24 Hour Appointment Line, I will call the after-hours on-call line:   [facility] [phone] or [phone]) (toll-free)   Preferred Urgent Care     Preferred Hospital [facility]  [phone]   Preferred Pharmacy [facility] - PHARMACY CLOSED - [facility]     Behavioral Health Crisis Line The National Suicide Prevention Lifeline at 1-691.895.2242 or 912     My Care Team Members  Patient Care Team       Relationship Specialty Notifications Start End    Chuy Stewart MD PCP - General   6/[birth year]/03     Phone: [phone] Fax: [phone] [address] [facility] [address]    Chuy Stewart MD Assigned PCP   10/4/12     Phone: [phone] Fax: [phone] [address] [facility] [address] [facility] [address]    Rosalee Aggarwal RN Nurse Coordinator  Admissions 4/1/16     Pager: [phone]           [facility] [address]    Alberto Jett, RN Clinic Care Coordinator Primary Care - [facility] Admissions 4/9/19     Phone: [phone] Fax: [phone]               My Medical and Care Information  Problem List   Patient Active Problem List   Diagnosis     Retinal ischemia     Polyp of vocal cord or larynx     Other specified disorder of skin     HYPERLIPIDEMIA LDL GOAL <100     Parkinson disease (H)     S/P total knee replacement     Anemia due to blood loss, acute     Dysthymia     BPH (benign prostatic hyperplasia)     Syncope     Health Care Home     Hypertension goal BP (blood pressure) < 150/90     Fall     Type 2 diabetes mellitus with other diabetic ophthalmic complication (H)     Varicose vein     Neck pain     Implantable loop recorder, [employer] LINQ     Advance Care Planning     Temporal arteritis (H)     Type 2 diabetes mellitus with hyperglycemia, without long-term current use of insulin (H)     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

## 2019-04-10 LAB
BACTERIA SPEC CULT: NO GROWTH
BACTERIA SPEC CULT: NO GROWTH
Lab: NORMAL
Lab: NORMAL
SPECIMEN SOURCE: NORMAL
SPECIMEN SOURCE: NORMAL

## 2019-04-11 ENCOUNTER — DOCUMENTATION ONLY (OUTPATIENT)
Dept: CARE COORDINATION | Facility: CLINIC | Age: 84
End: 2019-04-11

## 2019-04-11 NOTE — PROGRESS NOTES
[facility] now requests orders and shares plan of care/discharge summaries for some patients through [employer].  Please REPLY TO THIS MESSAGE OR ROUTE BACK TO THE AUTHOR in order to give authorization for orders when needed.  This is considered a verbal order, you will still receive a faxed copy of orders for signature.  Thank you for your assistance in improving collaboration for our patients.    ORDER    Skilled nursing 2 week 2, 1 week 3, 3 PRN for disease and symptom management, medication teaching and assess effects.   PT to evaluate and treat to include strengthening, ambulation, balance, and HEP.   OT to evaluate and treat to include lymphedema therapy treatment.     Pamella Harkins, TALON Admission Clinician  [facility]  [phone]

## 2019-04-12 ENCOUNTER — TELEPHONE (OUTPATIENT)
Dept: FAMILY MEDICINE | Facility: CLINIC | Age: 84
End: 2019-04-12

## 2019-04-12 DIAGNOSIS — E11.39 TYPE 2 DIABETES MELLITUS WITH OTHER OPHTHALMIC COMPLICATION, WITHOUT LONG-TERM CURRENT USE OF INSULIN (H): ICD-10-CM

## 2019-04-12 NOTE — TELEPHONE ENCOUNTER
Reason for Call:  Other appointment    Detailed comments: Spouse called for patient regarding an appointment that is needed for a hospital follow up regarding a recent diagnosis for cellulitis and was told by the hospital that they need to be seen as soon as possible. Provider is booked out until April 23rd.    Phone Number Patient can be reached at: Home number on file [phone] (home)    Best Time: As soon as possible.    Can we leave a detailed message on this number? YES    Call taken on 4/12/2019 at 4:06 PM by Rohan Plummer

## 2019-04-12 NOTE — TELEPHONE ENCOUNTER
Routing refill request to provider for review/approval because:  Labs out of range:  See below    Gaurav Mclaughlin RN

## 2019-04-12 NOTE — TELEPHONE ENCOUNTER
Reason for Call:  Other prescription    Detailed comments: Spouse called in regarding a refill for linagliptin (TRADJENTA) 5 MG TABS tablet. Spouse would like a call back when complete.    Phone Number Patient can be reached at: Home number on file [phone] (home)    Best Time: As soon as possible.    Can we leave a detailed message on this number? YES    Call taken on 4/12/2019 at 4:01 PM by Rohan Plummer

## 2019-04-12 NOTE — TELEPHONE ENCOUNTER
"Requested Prescriptions   Pending Prescriptions Disp Refills     TRADJENTA 5 MG TABS tablet [Pharmacy Med Name: TRADJENTA 5MG TABLETS]  Last Written Prescription Date:  3/7/2019  Last Fill Quantity: 30 tablet,  # refills: 0   Last office visit: 3/25/2019 with prescribing provider:  BERKLEY Stewart   Future Office Visit:   Next 5 appointments (look out 90 days)    Jul 02, 2019 11:00 AM CDT  SHORT with Chuy Stewart MD  [facility] [address] [place] [address]  [phone]          30 tablet 0     Sig: TAKE 1 TABLET BY MOUTH EVERY DAY       DPP4 Inhibitors Protocol Failed - 4/12/2019  4:02 PM        Failed - Blood pressure less than 140/90 in past 6 months     BP Readings from Last 3 Encounters:   04/08/19 154/71   03/25/19 116/68   02/05/19 122/61                 Failed - Normal serum creatinine in past 12 months     Recent Labs   Lab Test 04/08/19  0644   CR 1.27*             Passed - LDL on file in past 12 months     Recent Labs   Lab Test 10/09/18  1031   LDL 21             Passed - Microalbumin on file in past 12 months     Recent Labs   Lab Test 03/29/18  1259   MICROL 19   UMALCR 15.32             Passed - HgbA1C in past 3 or 6 months     If HgbA1C is 8 or greater, it needs to be on file within the past 3 months.  If less than 8, must be on file within the past 6 months.     Recent Labs   Lab Test 04/04/19  1707   A1C 7.6*             Passed - Medication is active on med list        Passed - Patient is age 18 or older        Passed - Recent (6 mo) or future (30 days) visit within the authorizing provider's specialty     Patient had office visit in the last 6 months or has a visit in the next 30 days with authorizing provider.  See \"Patient Info\" tab in inbasket, or \"Choose Columns\" in Meds & Orders section of the refill encounter.              "

## 2019-04-15 NOTE — TELEPHONE ENCOUNTER
Call was returned to  line, patient scheduled for 04/17.    Thank you,  Joslyn HUGGINS    NE Team Kendra

## 2019-04-15 NOTE — TELEPHONE ENCOUNTER
lmom to call back TC line at [phone] to help schedule apt.    Thank you,  Joslyn HUGGINS    NE Team Kendra

## 2019-04-17 ENCOUNTER — OFFICE VISIT (OUTPATIENT)
Dept: FAMILY MEDICINE | Facility: CLINIC | Age: 84
End: 2019-04-17
Payer: MEDICARE

## 2019-04-17 VITALS
WEIGHT: 217.2 LBS | OXYGEN SATURATION: 94 % | TEMPERATURE: 98.5 F | SYSTOLIC BLOOD PRESSURE: 144 MMHG | HEIGHT: 68 IN | HEART RATE: 110 BPM | BODY MASS INDEX: 32.92 KG/M2 | DIASTOLIC BLOOD PRESSURE: 68 MMHG

## 2019-04-17 DIAGNOSIS — I47.10 SUPRAVENTRICULAR TACHYCARDIA (H): ICD-10-CM

## 2019-04-17 DIAGNOSIS — M31.6 TEMPORAL ARTERITIS (H): ICD-10-CM

## 2019-04-17 DIAGNOSIS — D69.6 THROMBOCYTOPENIA (H): ICD-10-CM

## 2019-04-17 DIAGNOSIS — F10.21 ALCOHOLISM IN REMISSION (H): ICD-10-CM

## 2019-04-17 DIAGNOSIS — Z09 HOSPITAL DISCHARGE FOLLOW-UP: Primary | ICD-10-CM

## 2019-04-17 DIAGNOSIS — L02.419 CELLULITIS AND ABSCESS OF LEG, EXCEPT FOOT: ICD-10-CM

## 2019-04-17 DIAGNOSIS — E11.65 TYPE 2 DIABETES MELLITUS WITH HYPERGLYCEMIA, WITHOUT LONG-TERM CURRENT USE OF INSULIN (H): ICD-10-CM

## 2019-04-17 DIAGNOSIS — L03.119 CELLULITIS AND ABSCESS OF LEG, EXCEPT FOOT: ICD-10-CM

## 2019-04-17 DIAGNOSIS — I87.8 VENOUS STASIS: ICD-10-CM

## 2019-04-17 LAB
BASOPHILS # BLD AUTO: 0 10E9/L (ref 0–0.2)
BASOPHILS NFR BLD AUTO: 0.6 %
CRP SERPL-MCNC: <2.9 MG/L (ref 0–8)
DIFFERENTIAL METHOD BLD: ABNORMAL
EOSINOPHIL # BLD AUTO: 0.1 10E9/L (ref 0–0.7)
EOSINOPHIL NFR BLD AUTO: 0.8 %
ERYTHROCYTE [DISTWIDTH] IN BLOOD BY AUTOMATED COUNT: 13.9 % (ref 10–15)
ERYTHROCYTE [SEDIMENTATION RATE] IN BLOOD BY WESTERGREN METHOD: 12 MM/H (ref 0–20)
HCT VFR BLD AUTO: 36.7 % (ref 40–53)
HGB BLD-MCNC: 12 G/DL (ref 13.3–17.7)
LYMPHOCYTES # BLD AUTO: 2.2 10E9/L (ref 0.8–5.3)
LYMPHOCYTES NFR BLD AUTO: 30.9 %
MCH RBC QN AUTO: 33.2 PG (ref 26.5–33)
MCHC RBC AUTO-ENTMCNC: 32.7 G/DL (ref 31.5–36.5)
MCV RBC AUTO: 102 FL (ref 78–100)
MONOCYTES # BLD AUTO: 1.2 10E9/L (ref 0–1.3)
MONOCYTES NFR BLD AUTO: 16.1 %
NEUTROPHILS # BLD AUTO: 3.7 10E9/L (ref 1.6–8.3)
NEUTROPHILS NFR BLD AUTO: 51.6 %
PLATELET # BLD AUTO: 206 10E9/L (ref 150–450)
RBC # BLD AUTO: 3.61 10E12/L (ref 4.4–5.9)
WBC # BLD AUTO: 7.2 10E9/L (ref 4–11)

## 2019-04-17 PROCEDURE — 85652 RBC SED RATE AUTOMATED: CPT | Performed by: FAMILY MEDICINE

## 2019-04-17 PROCEDURE — 36415 COLL VENOUS BLD VENIPUNCTURE: CPT | Performed by: FAMILY MEDICINE

## 2019-04-17 PROCEDURE — 85025 COMPLETE CBC W/AUTO DIFF WBC: CPT | Performed by: FAMILY MEDICINE

## 2019-04-17 PROCEDURE — 99214 OFFICE O/P EST MOD 30 MIN: CPT | Performed by: FAMILY MEDICINE

## 2019-04-17 PROCEDURE — 86140 C-REACTIVE PROTEIN: CPT | Performed by: FAMILY MEDICINE

## 2019-04-17 ASSESSMENT — MIFFLIN-ST. JEOR: SCORE: 1629.71

## 2019-04-17 NOTE — PATIENT INSTRUCTIONS
Orders Placed This Encounter     CRP, inflammation     CBC with platelets and differential     Last Lab Result: Hemoglobin (g/dL)       Date                     Value                 04/08/2019               10.5 (L)         ----------     ESR: Erythrocyte sedimentation rate      Continue present medications  [facility]     Discharged by : Erma Jeffers CMA    If you have any questions regarding your visit please contact your care team:     Team Gold                Clinic Hours Telephone Number     Dr. Chuy Ward, CNP   7am-7pm  Monday - Thursday   7am-5pm  Fridays  [phone]   (Appointment scheduling available 24/7)     RN Line  [phone] option 2     Urgent Care - [facility] and [facility] [place] - 11am-9pm Monday-Friday Saturday-Sunday- 9am-5pm     [facility] -   5pm-9pm Monday-Friday Saturday-Sunday- 9am-5pm    [phone] - [facility]    [phone] - [facility]     For a Price Quote for your services, please call our Consumer Price Line at [phone].     What options do I have for visits at the clinic other than the traditional office visit?     To expand how we care for you, many of our providers are utilizing electronic visits (e-visits) and telephone visits, when medically appropriate, for interactions with their patients rather than a visit in the clinic. We also offer nurse visits for many medical concerns. Just like any other service, we will bill your insurance company for this type of visit based on time spent on the phone with your provider. Not all insurance companies cover these visits. Please check with your medical insurance if this type of visit is covered. You will be responsible for any charges that are not paid by your insurance.     E-visits via [employer]: generally incur a $45.00 fee.     Telephone visits:  Time spent on the phone: *charged based on time that is spent on the phone in increments of 10 minutes.  Estimated cost:   5-10 mins $30.00   11-20 mins. $59.00   21-30 mins. $85.00       Use [facility] (secure email communication and access to your chart) to send your primary care provider a message or make an appointment. Ask someone on your Team how to sign up for [facility].     As always, Thank you for trusting us with your health care needs!      [facility]:    Scheduling Appointments  [facility]  Call: [phone]    [facility]  Call: [phone]    [facility]  Call: [phone]    For Gastroenterology referrals   [facility]   Clinics and Surgery Center, 4th Floor   [address] [place] [address]   Appointments: [phone]    WHERE TO GO FOR CARE?    Clinic    Make an appointment if you:       Are sick (cold, cough, flu, sore throat, earache or in pain).       Have a small injury (sprain, small cut, burn or broken bone).       Need a physical exam, Pap smear, vaccine or prescription refill.       Have questions about your health or medicines.    To reach us:      Call [phone] ([phone]). Open 24 hours every day. (For counseling services, call [phone].)    Log into [facility] at [email or web address]. (Visit [email or web address] to create an account.) Hospital emergency room    An emergency is a serious or life- threatening problem that must be treated right away.    Call 199 or get to the hospital if you have:      Very bad or sudden:            - Chest pain or pressure         - Bleeding         - Head or belly pain         - Dizziness or trouble seeing, walking or                          Speaking      Problems breathing      Blood in your vomit or you are coughing up blood      A major injury (knocked out, loss of a finger or limb, rape, broken bone protruding from skin)    A mental health crisis. (Or call the Mental Health Crisis line at 1-140.945.6557 or Suicide Prevention  Hotline at 1-142.993.8204.)    Open 24 hours every day. You don't need an appointment.     Urgent care    Visit urgent care for sickness or small injuries when the clinic is closed. You don't need an appointment. To check hours or find an urgent care near you, visit [email or web address]. Online care    Get online care from [facility] for more than 70 common problems, like colds, allergies and infections. Open 24 hours every day at:   [email or web address]   Need help deciding?    For advice about where to be seen, you may call your clinic and ask to speak with a nurse. We're here for you 24 hours every day.         If you are deaf or hard of hearing, please let us know. We provide many free services including sign language interpreters, oral interpreters, TTYs, telephone amplifiers, note takers and written materials.

## 2019-04-17 NOTE — PROGRESS NOTES
SUBJECTIVE:   Bart Blair is a 88 year old male who presents to clinic today for the following   health issues:      Hospital Follow-up Visit:    Hospital/Nursing Home/IP Rehab Facility: [facility]  Date of Admission: 4/4/19  Date of Discharge: 4/8/19  Reason(s) for Admission: LLE cellulitis            Problems taking medications regularly:  None       Medication changes since discharge: None       Problems adhering to non-medication therapy:  None    Summary of hospitalization:  [facility] discharge summary reviewed  Diagnostic Tests/Treatments reviewed.  Follow up needed: need labs and is now working with edema clinic. They are coming to his house  Other Healthcare Providers Involved in Patient s Care:         [facility]  Update since discharge: improved.     Post Discharge Medication Reconciliation: discharge medications reconciled, continue medications without change.  Plan of care communicated with patient and family     Coding guidelines for this visit:  Type of Medical   Decision Making Face-to-Face Visit       within 7 Days of discharge Face-to-Face Visit        within 14 days of discharge   Moderate Complexity [id number]   High Complexity [id number]            Reviewed and he continues to be in remission for ETOH use. Had loope recorder evaluated and no SVT. Diabetes in good control    Lab Results   Component Value Date    A1C 7.6 04/04/2019    A1C 8.8 11/14/2018    A1C 8.3 10/09/2018    A1C 8.5 05/29/2018    A1C 9.3 03/29/2018         Additional history: as documented    Reviewed  and updated as needed this visit by clinical staff  Tobacco  Allergies  Meds  Med Hx  Surg Hx  Fam Hx  Soc Hx        Reviewed and updated as needed this visit by Provider         Recent Labs   Lab Test 04/08/19  0644 04/07/19  0538 04/06/19  0537  04/04/19  1707 12/08/18  1346 11/14/18  1400 10/09/18  1031  12/22/17  1830 10/24/17  1050  11/22/16  0948  11/18/14  1238   A1C  --   --   --  "  --  7.6*  --  8.8* 8.3*   < >  --  6.6*   < > 6.3*   < > 6.0   LDL  --   --   --   --   --   --   --  21  --   --  32  --   --   --  37   HDL  --   --   --   --   --   --   --  49  --   --  75  --   --   --  45   TRIG  --   --   --   --   --   --   --  287*  --   --  226*  --   --   --  171*   ALT  --   --   --   --  16 38  --   --   --  16  --   --   --   --  10   CR 1.27* 1.26* 1.46*   < > 1.56* 1.41* 1.53* 1.55*   < > 1.13 1.29*  --   --    < > 1.31*   GFRESTIMATED 50* 51* 42*   < > 39* 47* 43* 43*   < > 61 53*  --   --    < > 52*   GFRESTBLACK 58* 59* 49*   < > 45* 57* 52* 52*   < > 74 64  --   --    < > 63   POTASSIUM 4.2  --  4.2   < > 4.4 4.6 4.8 5.1   < > 5.1 4.9  --   --    < > 4.5   TSH  --   --   --   --   --   --  1.38  --   --   --   --   --  1.73  --  2.42    < > = values in this interval not displayed.      BP Readings from Last 3 Encounters:   04/17/19 144/68   04/08/19 154/71   03/25/19 116/68    Wt Readings from Last 3 Encounters:   04/17/19 98.5 kg (217 lb 3.2 oz)   04/04/19 95.7 kg (211 lb)   04/04/19 98 kg (216 lb)                    ROS:  Constitutional, HEENT, cardiovascular, pulmonary, gi and gu systems are negative, except as otherwise noted.    OBJECTIVE:     /68 (BP Location: Right arm, Patient Position: Sitting, Cuff Size: Adult Large)   Pulse 110   Temp 98.5  F (36.9  C) (Oral)   Ht 1.727 m (5' 8\")   Wt 98.5 kg (217 lb 3.2 oz)   SpO2 94%   BMI 33.03 kg/m    Body mass index is 33.03 kg/m .   EYES: Eyes grossly normal to inspection, PERRL and conjunctivae and sclerae normal  NECK: no adenopathy, no asymmetry, masses, or scars and thyroid normal to palpation  RESP: lungs clear to auscultation - no rales, rhonchi or wheezes  CV: regular rate and rhythm, normal S1 S2, no S3 or S4, no murmur, click or rub, no peripheral edema and peripheral pulses strong  ABDOMEN: soft, nontender, no hepatosplenomegaly, no masses and bowel sounds normal  MS: no gross musculoskeletal defects " noted, no edema  SKIN: no suspicious lesions or rashes    Diagnostic Test Results:  none     ASSESSMENT:       PLAN:   (Z09) Hospital discharge follow-up  (primary encounter diagnosis)  Comment: doing well  Plan:  Continue present medications and lymph edema support    (L03.119,  L02.419) Cellulitis and abscess of leg, except foot  Comment: improved  Plan: CRP, inflammation, CBC with platelets and         differential, ESR: Erythrocyte sedimentation         rate            (D69.6) Thrombocytopenia (H)  Comment: chronic will recheck  Plan: CBC with platelets and differential            (I87.8) Venous stasis  Comment:   Plan: improved    (M31.6) Temporal arteritis (H)  Comment: seeing specialist Friday. 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)  Comment:   Plan: continue no ETOOH    (E11.65) Type 2 diabetes mellitus with hyperglycemia, without long-term current use of insulin (H)  Comment: controlled  Plan:  Continue present medications      (I47.1) Supraventricular tachycardia (H)  Comment: stable  Plan:  Continue present medications and monitoring        Patient Instructions     Orders Placed This Encounter     CRP, inflammation     CBC with platelets and differential     Last Lab Result: Hemoglobin (g/dL)       Date                     Value                 04/08/2019               10.5 (L)         ----------     ESR: Erythrocyte sedimentation rate      Continue present medications  [facility]     Discharged by : Erma Jeffers CMA    If you have any questions regarding your visit please contact your care team:     Team Gold                Clinic Hours Telephone Number     Dr. Chuy Ward, PATY   7am-7pm  Monday - Thursday   7am-5pm  Fridays  [phone]   (Appointment scheduling available 24/7)     RN Line  [phone] option 2     Urgent Care - [facility]  [place] - 11am-9pm Monday-Friday Saturday-Sunday- 9am-5pm     [place] -   5pm-9pm Monday-Friday Saturday-Sunday- 9am-5pm    [phone] - [place]    [phone] - [place]     For a Price Quote for your services, please call our Consumer Price Line at [phone].     What options do I have for visits at the clinic other than the traditional office visit?     To expand how we care for you, many of our providers are utilizing electronic visits (e-visits) and telephone visits, when medically appropriate, for interactions with their patients rather than a visit in the clinic. We also offer nurse visits for many medical concerns. Just like any other service, we will bill your insurance company for this type of visit based on time spent on the phone with your provider. Not all insurance companies cover these visits. Please check with your medical insurance if this type of visit is covered. You will be responsible for any charges that are not paid by your insurance.     E-visits via [facility]: generally incur a $45.00 fee.     Telephone visits:  Time spent on the phone: *charged based on time that is spent on the phone in increments of 10 minutes. Estimated cost:   5-10 mins $30.00   11-20 mins. $59.00   21-30 mins. $85.00       Use [employer] (secure email communication and access to your chart) to send your primary care provider a message or make an appointment. Ask someone on your Team how to sign up for [employer].     As always, Thank you for trusting us with your health care needs!      [facility]:    Scheduling Appointments  [facility] [place]  Call: [phone]    [facility]  Call: [phone]    [facility]  Call: [phone]    For Gastroenterology referrals   [facility] Gastroenterology   Clinics and Surgery Center, 4th Floor   [address] [place] [address]   Appointments: [phone]    WHERE TO GO  FOR CARE?    Clinic    Make an appointment if you:       Are sick (cold, cough, flu, sore throat, earache or in pain).       Have a small injury (sprain, small cut, burn or broken bone).       Need a physical exam, Pap smear, vaccine or prescription refill.       Have questions about your health or medicines.    To reach us:      Call [phone] ([phone]). Open 24 hours every day. (For counseling services, call [phone].)    Log into [employer] at [employer]. (Visit [employer] to create an account.) Hospital emergency room    An emergency is a serious or life- threatening problem that must be treated right away.    Call 048 or get to the hospital if you have:      Very bad or sudden:            - Chest pain or pressure         - Bleeding         - Head or belly pain         - Dizziness or trouble seeing, walking or                          Speaking      Problems breathing      Blood in your vomit or you are coughing up blood      A major injury (knocked out, loss of a finger or limb, rape, broken bone protruding from skin)    A mental health crisis. (Or call the Mental Health Crisis line at 1-530.840.5794 or Suicide Prevention Hotline at 1-657.858.5943.)    Open 24 hours every day. You don't need an appointment.     Urgent care    Visit urgent care for sickness or small injuries when the clinic is closed. You don't need an appointment. To check hours or find an urgent care near you, visit [email or web address]. Online care    Get [facility] from [facility] for more than 70 common problems, like colds, allergies and infections. Open 24 hours every day at:   [email or web address]   Need help deciding?    For advice about where to be seen, you may call your clinic and ask to speak with a nurse. We're here for you 24 hours every day.         If you are deaf or hard of hearing, please let us know. We provide many free services including sign language interpreters, oral interpreters, TTYs, telephone  amplifiers, note takers and written materials.                 hCuy Stewart MD, MD  [facility]

## 2019-04-19 ENCOUNTER — TRANSFERRED RECORDS (OUTPATIENT)
Dept: HEALTH INFORMATION MANAGEMENT | Facility: CLINIC | Age: 84
End: 2019-04-19

## 2019-04-19 ENCOUNTER — TELEPHONE (OUTPATIENT)
Dept: FAMILY MEDICINE | Facility: CLINIC | Age: 84
End: 2019-04-19

## 2019-04-19 LAB — RETINOPATHY: NORMAL

## 2019-04-19 NOTE — TELEPHONE ENCOUNTER
Approved requested Home Care orders per RN protocol for continuation of care.  Martin Murillo RN

## 2019-04-19 NOTE — TELEPHONE ENCOUNTER
Reason for Call: Request for an order or referral:    Order or referral being requested: Extension of OT orders    Date needed: as soon as possible    Has the patient been seen by the PCP for this problem? YES    Additional comments: Rosa from [facility] called to extend orders for OT    Phone number Patient can be reached at:  Other phone number:  [phone]*    Best Time:  anytime    Can we leave a detailed message on this number?  YES    Call taken on 4/19/2019 at 2:32 PM by Osman Coates

## 2019-04-23 ENCOUNTER — TELEPHONE (OUTPATIENT)
Dept: FAMILY MEDICINE | Facility: CLINIC | Age: 84
End: 2019-04-23

## 2019-04-23 NOTE — TELEPHONE ENCOUNTER
Reason for Call: Request for an order or referral:    Order or referral being requested: Occupational Therapy for lower extremity swelling - 2x weekly for 4 weeks    Date needed: as soon as possible    Has the patient been seen by the PCP for this problem? YES    Additional comments: Kely from [facility] called to place verbal orders    Phone number Patient can be reached at:  Other phone number:  [phone]*    Best Time:  anytime    Can we leave a detailed message on this number?  YES    Call taken on 4/23/2019 at 11:01 AM by Osman Coates

## 2019-04-23 NOTE — TELEPHONE ENCOUNTER
Forms received from: [facility]    Fax listed: [phone]  Date received: April 23, 2019  Form description: Orders OT 1 every 10 days 1  Once forms are completed, please return to [facility] via fax.  Form placed: Chuy Stewart MD sign me folder.      Joslyn ARECHIGA Team Kendra

## 2019-04-25 ENCOUNTER — TELEPHONE (OUTPATIENT)
Dept: FAMILY MEDICINE | Facility: CLINIC | Age: 84
End: 2019-04-25

## 2019-04-25 DIAGNOSIS — Z53.9 DIAGNOSIS NOT YET DEFINED: Primary | ICD-10-CM

## 2019-04-25 PROCEDURE — G0180 MD CERTIFICATION HHA PATIENT: HCPCS | Performed by: FAMILY MEDICINE

## 2019-04-25 NOTE — TELEPHONE ENCOUNTER
Medication reconciliation done placed in pcp sign me folder  Wendy Clark,Clinic Rn  [place]

## 2019-04-25 NOTE — TELEPHONE ENCOUNTER
Forms received from: [facility]    Fax listed: [phone]  Date received: April 25, 2019  Form description: HHC and POC 04/11/19-06/09/19  Once forms are completed, please return to [facility] via fax.  Form placed: RN Med Rec Folder    Joslyn Gardner

## 2019-04-26 NOTE — TELEPHONE ENCOUNTER
Form signed and faxed to [phone]. Copy made for chart.    Thank you,  Joslyn HUGGINS    NE Team Kendra

## 2019-05-07 ENCOUNTER — OFFICE VISIT (OUTPATIENT)
Dept: CARDIOLOGY | Facility: CLINIC | Age: 84
End: 2019-05-07
Attending: INTERNAL MEDICINE
Payer: MEDICARE

## 2019-05-07 VITALS
WEIGHT: 219.3 LBS | HEART RATE: 96 BPM | OXYGEN SATURATION: 94 % | SYSTOLIC BLOOD PRESSURE: 127 MMHG | DIASTOLIC BLOOD PRESSURE: 68 MMHG | BODY MASS INDEX: 35.24 KG/M2 | HEIGHT: 66 IN

## 2019-05-07 DIAGNOSIS — I73.9 PERIPHERAL ARTERY DISEASE (H): ICD-10-CM

## 2019-05-07 DIAGNOSIS — S20.219A CONTUSION OF RIB, UNSPECIFIED LATERALITY, INITIAL ENCOUNTER: ICD-10-CM

## 2019-05-07 DIAGNOSIS — I89.0 LYMPHEDEMA OF BOTH LOWER EXTREMITIES: Primary | ICD-10-CM

## 2019-05-07 DIAGNOSIS — I70.203 ATHEROSCLEROSIS OF ARTERY OF BOTH LOWER EXTREMITIES (H): ICD-10-CM

## 2019-05-07 DIAGNOSIS — I87.2 VENOUS INCOMPETENCE: ICD-10-CM

## 2019-05-07 DIAGNOSIS — Z98.890 HISTORY OF LOOP RECORDER: ICD-10-CM

## 2019-05-07 DIAGNOSIS — R60.0 LOCALIZED EDEMA: ICD-10-CM

## 2019-05-07 DIAGNOSIS — J31.0 CHRONIC RHINITIS: ICD-10-CM

## 2019-05-07 DIAGNOSIS — E11.39 TYPE 2 DIABETES MELLITUS WITH OTHER OPHTHALMIC COMPLICATION, WITHOUT LONG-TERM CURRENT USE OF INSULIN (H): ICD-10-CM

## 2019-05-07 DIAGNOSIS — E78.5 HYPERLIPIDEMIA LDL GOAL <100: ICD-10-CM

## 2019-05-07 DIAGNOSIS — I10 HYPERTENSION GOAL BP (BLOOD PRESSURE) < 150/90: ICD-10-CM

## 2019-05-07 DIAGNOSIS — R55 SYNCOPE AND COLLAPSE: ICD-10-CM

## 2019-05-07 PROCEDURE — G0463 HOSPITAL OUTPT CLINIC VISIT: HCPCS | Mod: ZF

## 2019-05-07 PROCEDURE — 99214 OFFICE O/P EST MOD 30 MIN: CPT | Mod: ZP | Performed by: INTERNAL MEDICINE

## 2019-05-07 RX ORDER — IRBESARTAN 75 MG/1
75 TABLET ORAL AT BEDTIME
COMMUNITY
Start: 2019-05-07 | End: 2019-11-12

## 2019-05-07 RX ORDER — ATORVASTATIN CALCIUM 10 MG/1
10 TABLET, FILM COATED ORAL AT BEDTIME
COMMUNITY
Start: 2019-05-07 | End: 2019-11-02

## 2019-05-07 RX ORDER — FLUTICASONE PROPIONATE 50 MCG
1-2 SPRAY, SUSPENSION (ML) NASAL DAILY PRN
COMMUNITY
Start: 2019-05-07

## 2019-05-07 ASSESSMENT — PAIN SCALES - GENERAL: PAINLEVEL: MILD PAIN (2)

## 2019-05-07 ASSESSMENT — MIFFLIN-ST. JEOR: SCORE: 1607.49

## 2019-05-07 NOTE — LETTER
5/7/2019      RE: Bart Blair  [address] [facility] [id number]       Dear Colleague,    Thank you for the opportunity to participate in the care of your patient, Bart Blair, at the [facility] at [facility]. Please see a copy of my visit note below.         Vascular Cardiology Consultation      CARDIOLOGY CLINIC NOTE  05/07/2019    HPI:    Bart Blair is a 87 year old male with a history of Parkinson's disease, HTN, CKD III, DM II, peripheral artery disease, chronic LE edema, and anemia who presents for follow up of PAD and LE edema.      Since last visit Dionisio was hospitalized for cellulitis, treated with IV antibiotics.  Outpatient lymphedema rehab and therapy was set up for him, which he and his wife state has really helped his legs.  Nurse comes a few times a week to wrap his legs, which they have also taught his wife how to do.  The nurse also took measurements to get Circaids.  Thus, since his last visit, they have been doing better from a edema stand point.    His claudication is quite stable, and he is able to walk through the discomfort.  He has been working with physical therapy and doing the treadmill with his son as motivation. He denies fevers chills, lightheadedness dizziness, chest pain, shortness of breath, abdominal fullness, or changes in urination.  He is still taking oral diuretics.      ROS:  A complete 10-point ROS was negative except as above.    PAST MEDICAL HISTORY:  Past Medical History:   Diagnosis Date     Anemia      Anemia due to blood loss, acute      CKD (chronic kidney disease) stage 3, GFR 30-59 ml/min (H) 5/31/2010     Essential hypertension, benign      Other and unspecified hyperlipidemia      Other and unspecified hyperlipidemia      Other specified disorder of skin      Pain in joint, shoulder region      Parkinson disease (H) 9/2/2010     Polyp of vocal cord or larynx      Retinal ischemia     right sided thrombotic  plaque     Rosacea      Type II or unspecified type diabetes mellitus without mention of complication, not stated as uncontrolled        PAST SURGICAL HISTORY:  Past Surgical History:   Procedure Laterality Date     ARTHROPLASTY KNEE  1/31/2012    Procedure:ARTHROPLASTY KNEE; LEFT TOTAL KNEE ARTHROPLASTY (IRASEMA)^; Surgeon:SKIP FAIR; Location: OR     ARTHROSCOPY SHOULDER, OPEN ROTATOR CUFF REPAIR, COMBINED  4/24/2012    Procedure:COMBINED ARTHROSCOPY SHOULDER, OPEN ROTATOR CUFF REPAIR; RIGHT SHOULDER ARTHROSCOPY OPEN ROTATOR CUFF DEBRIDEMENT, SUBCROMIAL DECOMPRESSION, AND BICEPS TENODESIS REPAIR; Surgeon:SKIP FAIR; Location: [facility]     [facility] AFF SURGICAL PATHOLOGY  6-    Dr. Bowers; left hand     ENT SURGERY       INCISE FINGER TENDON SHEATH  2008    Dr. Bowers; right AND LEFT hand     THROAT SURGERY      vocal cord polyps       FAMILY HISTORY:  Family History   Problem Relation Age of Onset     Family History Negative Other         unknown family history per patient       SOCIAL HISTORY:  Social History     Socioeconomic History     Marital status:      Spouse name: ford     Number of children: 6     Years of education: 12     Highest education level: None   Social Needs     Financial resource strain: None     Food insecurity - worry: None     Food insecurity - inability: None     Transportation needs - medical: None     Transportation needs - non-medical: None   Occupational History     Occupation: [employer]     Employer: RETIRED   Tobacco Use     Smoking status: Former Smoker     Smokeless tobacco: Never Used   Substance and Sexual Activity     Alcohol use: Yes     Comment: rarely     Drug use: No     Sexual activity: Not Currently     Partners: Female   Other Topics Concern     Parent/sibling w/ CABG, MI or angioplasty before 65F 55M? No   Social History Narrative     None       MEDICATIONS:  Current Outpatient Medications   Medication     amoxicillin (AMOXIL) 500 MG capsule     aspirin  "81 MG tablet     atorvastatin (LIPITOR) 10 MG tablet     blood glucose monitoring (ACCU-CHEK COMPACT STRIPS) test strip     blood glucose monitoring (SOFTCLIX) lancets     cilostazol (PLETAL) 50 MG tablet     Cyanocobalamin (VITAMIN B-12 CR) 1000 MCG TBCR     diclofenac (VOLTAREN) 1 % topical gel     fluticasone (FLONASE) 50 MCG/ACT spray     folic acid (FOLVITE) 1 MG tablet     furosemide (LASIX) 20 MG tablet     glipiZIDE (GLUCOTROL) 5 MG tablet     irbesartan (AVAPRO) 75 MG tablet     linagliptin (TRADJENTA) 5 MG TABS tablet     Multiple Vitamin (MULTI VITAMIN  MENS) TABS     order for DME     order for DME     order for DME     predniSONE (DELTASONE) 5 MG tablet     tocilizumab (ACTEMRA) 162 MG/0.9ML subcutaneous injection     VITAMIN D 1000 UNIT OR TABS     COMPRESSION STOCKINGS     senna-docusate (SENOKOT-S;PERICOLACE) 8.6-50 MG per tablet     sertraline (ZOLOFT) 25 MG tablet     No current facility-administered medications for this visit.        ALLERGIES:     Allergies   Allergen Reactions     No Known Drug Allergies        PHYSICAL EXAM:  /68 (BP Location: Left arm, Patient Position: Chair, Cuff Size: Adult Regular)   Pulse 96   Ht 1.676 m (5' 6\")   Wt 99.5 kg (219 lb 4.8 oz)   SpO2 94%   BMI 35.40 kg/m        Gen: alert, interactive, NAD  HEENT: atraumatic, EOMI, MMM  Neck: supple, no JVD  CV: RRR, no m/r/g  Chest: CTAB, no wheezes or crackles  Abd: soft, NT, ND, +BS  Ext: 1+ bilateral lower extremity edema, venous engorgement, dry skin, no ulcerations.  + stemmer sign  Skin: warm and dry, no rashes on exposed surfaces  Neuro: alert and oriented, no focal deficits    LABS:    CMP  Last Comprehensive Metabolic Panel:  Sodium   Date Value Ref Range Status   04/08/2019 145 (H) 133 - 144 mmol/L Final     Potassium   Date Value Ref Range Status   04/08/2019 4.2 3.4 - 5.3 mmol/L Final     Chloride   Date Value Ref Range Status   04/08/2019 111 (H) 94 - 109 mmol/L Final     Carbon Dioxide   Date Value " Ref Range Status   04/08/2019 27 20 - 32 mmol/L Final     Anion Gap   Date Value Ref Range Status   04/08/2019 7 3 - 14 mmol/L Final     Glucose   Date Value Ref Range Status   04/08/2019 87 70 - 99 mg/dL Final     Urea Nitrogen   Date Value Ref Range Status   04/08/2019 24 7 - 30 mg/dL Final     Creatinine   Date Value Ref Range Status   04/08/2019 1.27 (H) 0.66 - 1.25 mg/dL Final     GFR Estimate   Date Value Ref Range Status   04/08/2019 50 (L) >60 mL/min/[1.73_m2] Final     Comment:     Non  GFR Calc  Starting 12/18/2018, serum creatinine based estimated GFR (eGFR) will be   calculated using the Chronic Kidney Disease Epidemiology Collaboration   (CKD-EPI) equation.       Calcium   Date Value Ref Range Status   04/08/2019 8.4 (L) 8.5 - 10.1 mg/dL Final     Bilirubin Total   Date Value Ref Range Status   04/04/2019 0.6 0.2 - 1.3 mg/dL Final     Alkaline Phosphatase   Date Value Ref Range Status   04/04/2019 44 40 - 150 U/L Final     ALT   Date Value Ref Range Status   04/04/2019 16 0 - 70 U/L Final     AST   Date Value Ref Range Status   04/04/2019 11 0 - 45 U/L Final       CBC  CBC RESULTS:   Recent Labs   Lab Test 04/17/19  1205   WBC 7.2   RBC 3.61*   HGB 12.0*   HCT 36.7*   *   MCH 33.2*   MCHC 32.7   RDW 13.9          LIPIDS  Recent Labs   Lab Test 10/09/18  1031 10/24/17  1050 11/18/14  1238 06/25/13  0829   CHOL 127 152 116 112   HDL 49 75 45 41   LDL 21 32 37 52   TRIG 287* 226* 171* 92   CHOLHDLRATIO  --   --  2.6 2.7       TSH  TSH   Date Value Ref Range Status   11/14/2018 1.38 0.40 - 4.00 mU/L Final       HBA1C  Lab Results   Component Value Date    A1C 8.8 11/14/2018    A1C 8.3 10/09/2018    A1C 8.5 05/29/2018    A1C 9.3 03/29/2018    A1C 6.6 10/24/2017       CARDIAC DATA:  MRA angiogram Low Ext Bilat w/ Contrast 11/27/18:  Impression:     1. Abdominal aorta: No MRA abnormality identified.      2. Right leg: Posterior tibial and peroneal arteries are occluded at  the  proximal/mid calf. Single vessel runoff by anterior tibial artery  posterior tibial artery is reconstituted at the ankle via collaterals.        3. Left leg: Posterior tibial and peroneal arteries are occluded at  the proximal to mid calf. Single vessel runoff by anterior tibial  artery. Reconstitution of the posterior tibial artery at the ankle  through the collaterals.      4. Nonenhancing foci in both kidneys may represent renal cysts, but  are inadequately evaluated on this study. Renal ultrasound could  further evaluate.     5. Decreased T1 in the articular surfaces in the bilateral femurs and  tibia as described, which is most likely related to degenerative  disease. Knee radiographs would better evaluate.     [Consider Follow Up: Renal lesions and probable degenerative changes  in the bilateral knees]    US LE 11/2/18  RIGHT:       EXTERNAL ILIAC ARTERY: 110/0 cm/s, triphasic       COMMON FEMORAL ARTERY: 63/0 cm/s, triphasic       PROFUNDUS FEMORAL ARTERY: 73/0 cm/s, biphasic          SUPERFICIAL FEMORAL ARTERY, proximal: 67/0 cm/s, triphasic       SUPERFICIAL FEMORAL ARTERY, mid: 92/0 cm/s, triphasic       SUPERFICIAL FEMORAL ARTERY, distal: 66/0 cm/s, triphasic          POPLITEAL: 73/0 cm/s, biphasic          POSTERIOR TIBIAL ARTERY, proximal: 62/0 cm/s, biphasic          POSTERIOR TIBIAL ARTERY: occluded from mid to distal          ANTERIOR TIBIAL ARTERY, ankle: 98/26 cm/s, triphasic     LEFT:       EXTERNAL ILIAC ARTERY: 79/0 cm/s, triphasic       COMMON FEMORAL ARTERY: 68/0 cm/s, triphasic       PROFUNDUS FEMORAL ARTERY: 77/0 cm/s, triphasic          SUPERFICIAL FEMORAL ARTERY, proximal: 79/0 cm/s, triphasic       SUPERFICIAL FEMORAL ARTERY, mid: 78/0 cm/s, triphasic       SUPERFICIAL FEMORAL ARTERY, distal: 76/0 cm/s, triphasic          POPLITEAL: 73/0 cm/s, biphasic          POSTERIOR TIBIAL ARTERY, ankle: 43/0 cm/s, biphasic          ANTERIOR TIBIAL ARTERY, ankle: 103/19 cm/s, triphasic                                                                       IMPRESSION:  1. Right posterior tibial artery occluded from the mid calf.     2. Biphasic waveforms reflect noncompliance due to heavily calcified  arteries.     3. No significant inflow disease demonstrated.    Echocardiogram 10/17/18  Interpretation Summary  Global and regional left ventricular function is normal with an EF of 60-65%.  Right ventricular function, chamber size, wall motion, and thickness are  normal.  The inferior vena cava is normal.    ASSESSMENT/PLAN:  iDonisio returns to clinic for evaluation of his peripheral artery disease and lower extremity edema.  His lower extremity ultrasounds and MRA of the lower extremities show occlusion of the calf arteries with reconstitution distally.  He seems to be doing better with the addition of cilostazol and with physical therapy.      As for the lower extremity swelling, this has improved greatly with the lymphedema treatment.  He will continue with therapy. Review of the echo and venous lower extremity studies shows that the lower extremity swelling is due to venous insufficiency and not related to heart failure.  As such, he will be referred for mechanical pneumatic external compression devices, which the representative has met with them today in clinic.  They seemed quite intrigued by it.      -Encouraged daily use of compression stockings.   -referral for pneumatic external compression devices for home use  -Continued exercise with PT was also encouraged  -Continue cilostazol  -Continue daily aspirin  -Continue irbesartan  -Continue Lasix  -Advised to avoid salty foods    Follow up in 6 months.    Pt seen and discussed with Dr. Meade.    Dmitri Weaver MD PGY5  Cardiology Fellow    ATTENDING ATTESTATION    Patient was seen and evaluated in clinic today with the cardiology fellow. The note has been edited to reflect the history taking performed by me, my personal review of imaging, EKGs, labs and  procedures, and our joint assessment and plan.     Total time spent 45 minutes, of which >50% was spent in face-to-face patient evaluation, reviewing data with patient, prolonged counseling of lifestyle modifications, any necessary genetic testing and screening of family members, and coordination of care.       Azul Meade MD MSc    Division of Cardiology  Vascular Section  [facility]

## 2019-05-07 NOTE — PATIENT INSTRUCTIONS
You were seen today in the Cardiovascular Clinic at the [facility].      Cardiology Providers you saw during your visit:   Dr. Meade    Diagnosis:    Venous incompetence    Localized edema    Lymphedema    Results:  None today    Recommendations:   Continue with compression hosiery daily.  Circaids would be a good option.  Review with person today.     Continue to wrap legs at night.     Start pneumatic compression pumps.  Mac, [employer] rep will be in contact with you.     Continue to walk on treadmill as much as possible.    Follow-up:  6 months with Dr. Meade      For emergencies call [phone].    For any scheduling needs, please call [phone].     Thank you for your visit today!     Please call if you have any questions or concerns.  Brock Hinojosa RN

## 2019-05-07 NOTE — PROGRESS NOTES
Vascular Cardiology Consultation      CARDIOLOGY CLINIC NOTE  05/07/2019    HPI:    Bart Blair is a 87 year old male with a history of Parkinson's disease, HTN, CKD III, DM II, peripheral artery disease, chronic LE edema, and anemia who presents for follow up of PAD and LE edema.      Since last visit Dionisio was hospitalized for cellulitis, treated with IV antibiotics.  Outpatient lymphedema rehab and therapy was set up for him, which he and his wife state has really helped his legs.  Nurse comes a few times a week to wrap his legs, which they have also taught his wife how to do.  The nurse also took measurements to get Circaids.  Thus, since his last visit, they have been doing better from a edema stand point.    His claudication is quite stable, and he is able to walk through the discomfort.  He has been working with physical therapy and doing the treadmill with his son as motivation. He denies fevers chills, lightheadedness dizziness, chest pain, shortness of breath, abdominal fullness, or changes in urination.  He is still taking oral diuretics.      ROS:  A complete 10-point ROS was negative except as above.    PAST MEDICAL HISTORY:  Past Medical History:   Diagnosis Date     Anemia      Anemia due to blood loss, acute      CKD (chronic kidney disease) stage 3, GFR 30-59 ml/min (H) 5/31/2010     Essential hypertension, benign      Other and unspecified hyperlipidemia      Other and unspecified hyperlipidemia      Other specified disorder of skin      Pain in joint, shoulder region      Parkinson disease (H) 9/2/2010     Polyp of vocal cord or larynx      Retinal ischemia     right sided thrombotic plaque     Rosacea      Type II or unspecified type diabetes mellitus without mention of complication, not stated as uncontrolled        PAST SURGICAL HISTORY:  Past Surgical History:   Procedure Laterality Date     ARTHROPLASTY KNEE  1/31/2012    Procedure:ARTHROPLASTY KNEE; LEFT TOTAL KNEE ARTHROPLASTY  (IRASEMA)^; Surgeon:SKIP FAIR; Location:[facility]     ARTHROSCOPY SHOULDER, OPEN ROTATOR CUFF REPAIR, COMBINED  4/24/2012    Procedure:COMBINED ARTHROSCOPY SHOULDER, OPEN ROTATOR CUFF REPAIR; RIGHT SHOULDER ARTHROSCOPY OPEN ROTATOR CUFF DEBRIDEMENT, SUBCROMIAL DECOMPRESSION, AND BICEPS TENODESIS REPAIR; Surgeon:SKIP FAIR; Location: [facility]     [facility] AFF SURGICAL PATHOLOGY  6-    Dr. Bowers; left hand     ENT SURGERY       INCISE FINGER TENDON SHEATH  2008    Dr. Bowers; right AND LEFT hand     THROAT SURGERY      vocal cord polyps       FAMILY HISTORY:  Family History   Problem Relation Age of Onset     Family History Negative Other         unknown family history per patient       SOCIAL HISTORY:  Social History     Socioeconomic History     Marital status:      Spouse name: ford     Number of children: 6     Years of education: 12     Highest education level: None   Social Needs     Financial resource strain: None     Food insecurity - worry: None     Food insecurity - inability: None     Transportation needs - medical: None     Transportation needs - non-medical: None   Occupational History     Occupation: [employer]     Employer: RETIRED   Tobacco Use     Smoking status: Former Smoker     Smokeless tobacco: Never Used   Substance and Sexual Activity     Alcohol use: Yes     Comment: rarely     Drug use: No     Sexual activity: Not Currently     Partners: Female   Other Topics Concern     Parent/sibling w/ CABG, MI or angioplasty before 65F 55M? No   Social History Narrative     None       MEDICATIONS:  Current Outpatient Medications   Medication     amoxicillin (AMOXIL) 500 MG capsule     aspirin 81 MG tablet     atorvastatin (LIPITOR) 10 MG tablet     blood glucose monitoring (ACCU-CHEK COMPACT STRIPS) test strip     blood glucose monitoring (SOFTCLIX) lancets     cilostazol (PLETAL) 50 MG tablet     Cyanocobalamin (VITAMIN B-12 CR) 1000 MCG TBCR     diclofenac (VOLTAREN) 1 % topical gel      "fluticasone (FLONASE) 50 MCG/ACT spray     folic acid (FOLVITE) 1 MG tablet     furosemide (LASIX) 20 MG tablet     glipiZIDE (GLUCOTROL) 5 MG tablet     irbesartan (AVAPRO) 75 MG tablet     linagliptin (TRADJENTA) 5 MG TABS tablet     Multiple Vitamin (MULTI VITAMIN  MENS) TABS     order for DME     order for DME     order for DME     predniSONE (DELTASONE) 5 MG tablet     tocilizumab (ACTEMRA) 162 MG/0.9ML subcutaneous injection     VITAMIN D 1000 UNIT OR TABS     COMPRESSION STOCKINGS     senna-docusate (SENOKOT-S;PERICOLACE) 8.6-50 MG per tablet     sertraline (ZOLOFT) 25 MG tablet     No current facility-administered medications for this visit.        ALLERGIES:     Allergies   Allergen Reactions     No Known Drug Allergies        PHYSICAL EXAM:  /68 (BP Location: Left arm, Patient Position: Chair, Cuff Size: Adult Regular)   Pulse 96   Ht 1.676 m (5' 6\")   Wt 99.5 kg (219 lb 4.8 oz)   SpO2 94%   BMI 35.40 kg/m       Gen: alert, interactive, NAD  HEENT: atraumatic, EOMI, MMM  Neck: supple, no JVD  CV: RRR, no m/r/g  Chest: CTAB, no wheezes or crackles  Abd: soft, NT, ND, +BS  Ext: 1+ bilateral lower extremity edema, venous engorgement, dry skin, no ulcerations.  + stemmer sign  Skin: warm and dry, no rashes on exposed surfaces  Neuro: alert and oriented, no focal deficits    LABS:    CMP  Last Comprehensive Metabolic Panel:  Sodium   Date Value Ref Range Status   04/08/2019 145 (H) 133 - 144 mmol/L Final     Potassium   Date Value Ref Range Status   04/08/2019 4.2 3.4 - 5.3 mmol/L Final     Chloride   Date Value Ref Range Status   04/08/2019 111 (H) 94 - 109 mmol/L Final     Carbon Dioxide   Date Value Ref Range Status   04/08/2019 27 20 - 32 mmol/L Final     Anion Gap   Date Value Ref Range Status   04/08/2019 7 3 - 14 mmol/L Final     Glucose   Date Value Ref Range Status   04/08/2019 87 70 - 99 mg/dL Final     Urea Nitrogen   Date Value Ref Range Status   04/08/2019 24 7 - 30 mg/dL Final "     Creatinine   Date Value Ref Range Status   04/08/2019 1.27 (H) 0.66 - 1.25 mg/dL Final     GFR Estimate   Date Value Ref Range Status   04/08/2019 50 (L) >60 mL/min/[1.73_m2] Final     Comment:     Non  GFR Calc  Starting 12/18/2018, serum creatinine based estimated GFR (eGFR) will be   calculated using the Chronic Kidney Disease Epidemiology Collaboration   (CKD-EPI) equation.       Calcium   Date Value Ref Range Status   04/08/2019 8.4 (L) 8.5 - 10.1 mg/dL Final     Bilirubin Total   Date Value Ref Range Status   04/04/2019 0.6 0.2 - 1.3 mg/dL Final     Alkaline Phosphatase   Date Value Ref Range Status   04/04/2019 44 40 - 150 U/L Final     ALT   Date Value Ref Range Status   04/04/2019 16 0 - 70 U/L Final     AST   Date Value Ref Range Status   04/04/2019 11 0 - 45 U/L Final       CBC  CBC RESULTS:   Recent Labs   Lab Test 04/17/19  1205   WBC 7.2   RBC 3.61*   HGB 12.0*   HCT 36.7*   *   MCH 33.2*   MCHC 32.7   RDW 13.9          LIPIDS  Recent Labs   Lab Test 10/09/18  1031 10/24/17  1050 11/18/14  1238 06/25/13  0829   CHOL 127 152 116 112   HDL 49 75 45 41   LDL 21 32 37 52   TRIG 287* 226* 171* 92   CHOLHDLRATIO  --   --  2.6 2.7       TSH  TSH   Date Value Ref Range Status   11/14/2018 1.38 0.40 - 4.00 mU/L Final       HBA1C  Lab Results   Component Value Date    A1C 8.8 11/14/2018    A1C 8.3 10/09/2018    A1C 8.5 05/29/2018    A1C 9.3 03/29/2018    A1C 6.6 10/24/2017       CARDIAC DATA:  MRA angiogram Low Ext Bilat w/ Contrast 11/27/18:  Impression:     1. Abdominal aorta: No MRA abnormality identified.      2. 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 Single vessel runoff by anterior tibial artery  posterior tibial artery is reconstituted at the ankle via collaterals.        3. Left leg: Posterior tibial and peroneal arteries are occluded at  the proximal to mid calf. Single vessel runoff by anterior tibial  artery.  Reconstitution of the posterior tibial artery at the ankle  through the collaterals.      4. Nonenhancing foci in both kidneys may represent renal cysts, but  are inadequately evaluated on this study. Renal ultrasound could  further evaluate.     5. Decreased T1 in the articular surfaces in the bilateral femurs and  tibia as described, which is most likely related to degenerative  disease. Knee radiographs would better evaluate.     [Consider Follow Up: Renal lesions and probable degenerative changes  in the bilateral knees]    US LE 11/2/18  RIGHT:       EXTERNAL ILIAC ARTERY: 110/0 cm/s, triphasic       COMMON FEMORAL ARTERY: 63/0 cm/s, triphasic       PROFUNDUS FEMORAL ARTERY: 73/0 cm/s, biphasic          SUPERFICIAL FEMORAL ARTERY, proximal: 67/0 cm/s, triphasic       SUPERFICIAL FEMORAL ARTERY, mid: 92/0 cm/s, triphasic       SUPERFICIAL FEMORAL ARTERY, distal: 66/0 cm/s, triphasic          POPLITEAL: 73/0 cm/s, biphasic          POSTERIOR TIBIAL ARTERY, proximal: 62/0 cm/s, biphasic          POSTERIOR TIBIAL ARTERY: occluded from mid to distal          ANTERIOR TIBIAL ARTERY, ankle: 98/26 cm/s, triphasic     LEFT:       EXTERNAL ILIAC ARTERY: 79/0 cm/s, triphasic       COMMON FEMORAL ARTERY: 68/0 cm/s, triphasic       PROFUNDUS FEMORAL ARTERY: 77/0 cm/s, triphasic          SUPERFICIAL FEMORAL ARTERY, proximal: 79/0 cm/s, triphasic       SUPERFICIAL FEMORAL ARTERY, mid: 78/0 cm/s, triphasic       SUPERFICIAL FEMORAL ARTERY, distal: 76/0 cm/s, triphasic          POPLITEAL: 73/0 cm/s, biphasic          POSTERIOR TIBIAL ARTERY, ankle: 43/0 cm/s, biphasic          ANTERIOR TIBIAL ARTERY, ankle: 103/19 cm/s, triphasic                                                                      IMPRESSION:  1. Right posterior tibial artery occluded from the mid calf.     2. Biphasic waveforms reflect noncompliance due to heavily calcified  arteries.     3. No significant inflow disease demonstrated.    Echocardiogram  10/17/18  Interpretation Summary  Global and regional left ventricular function is normal with an EF of 60-65%.  Right ventricular function, chamber size, wall motion, and thickness are  normal.  The inferior vena cava is normal.    ASSESSMENT/PLAN:  Dionisio returns to clinic for evaluation of his peripheral artery disease and lower extremity edema.  His lower extremity ultrasounds and MRA of the lower extremities show occlusion of the calf arteries with reconstitution distally.  He seems to be doing better with the addition of cilostazol and with physical therapy.      As for the lower extremity swelling, this has improved greatly with the lymphedema treatment.  He will continue with therapy. Review of the echo and venous lower extremity studies shows that the lower extremity swelling is due to venous insufficiency and not related to heart failure.  As such, he will be referred for mechanical pneumatic external compression devices, which the representative has met with them today in clinic.  They seemed quite intrigued by it.      -Encouraged daily use of compression stockings.   -referral for pneumatic external compression devices for home use  -Continued exercise with PT was also encouraged  -Continue cilostazol  -Continue daily aspirin  -Continue irbesartan  -Continue Lasix  -Advised to avoid salty foods    Follow up in 6 months.    Pt seen and discussed with Dr. Meade.    Dmitri Weaver MD PGY5  Cardiology Fellow    ATTENDING ATTESTATION    Patient was seen and evaluated in clinic today with the cardiology fellow. The note has been edited to reflect the history taking performed by me, my personal review of imaging, EKGs, labs and procedures, and our joint assessment and plan.     Total time spent 45 minutes, of which >50% was spent in face-to-face patient evaluation, reviewing data with patient, prolonged counseling of lifestyle modifications, any necessary genetic testing and screening of family members, and  coordination of care.       Azul Meade MD MSc    Division of Cardiology  Vascular Section  [facility]

## 2019-05-07 NOTE — NURSING NOTE
Med Reconcile: Reviewed and verified all current medications with the patient. The updated medication list was printed and given to the patient.  Compression Reconcile:  Reviewed and verified all current compression therapy with the patient.  The patient will continue with compression hosiery daily and wraps nightly.  The updated compression was placed in the medication list.  New Compression Therapy: Patient was educated regarding newly prescribed pneumatic compression device, including discussion of  the indication, administration, side effects, and when to report to MD or RN. Patient demonstrated understanding of this information and agreed to call with further questions or concerns.  Return Appointment: 6 months with Dr. Meade.  Patient given instructions regarding scheduling next clinic visit. Patient demonstrated understanding of this information and agreed to call with further questions or concerns.  Patient stated he understood all health information given and agreed to call with further questions or concerns.

## 2019-05-09 ENCOUNTER — PATIENT OUTREACH (OUTPATIENT)
Dept: CARE COORDINATION | Facility: CLINIC | Age: 84
End: 2019-05-09

## 2019-05-09 NOTE — PROGRESS NOTES
Clinic Care Coordination Contact  [facility]/Voicemail    Referral Source: IP Handoff  Clinical Data: Care Coordinator Outreach  Outreach attempted x 1.  Left message on voicemail with call back information and requested return call.  Plan: Care Coordinator mailed out care coordination introduction letter on 4/9/19. Care Coordinator will try to reach patient again in 3-5 business days.        Alberto Tobias MSN, RN, PHN, CCM   Primary Care Clinical RN Care Coordinator  [facility]   [email or web address]  Office: [phone]

## 2019-05-13 NOTE — PROGRESS NOTES
Clinic Care Coordination Contact    Care Coordination Communication     Clinical Data: Patient was hospitalized at [facility] from 4-4-19 to 4-8-19 with diagnosis of cellulitis of left lower extremity.      Home Care Contact:  [facility]  Phone  [phone]  Fax  [phone]              Care Coordination contacted home care: Yes              Anticipated start of care date: 4-9-19     Patient Contact:               Introduced self and role of care coordination.               Discharge instructions were reviewed with patient/caregiver.               Do you have any questions about your medications? no              Follow up appointment is scheduled for 5-7-19.              Provided 24 Hour Nurse Line and/or 24 Hour Appointment Scheduling: Yes              Home care has contacted patient: Yes              Patient questions/concerns: none    Attempted to contact the patient and was unsuccessful.  A call to the homecare company verified that the patient is a current patient of their's.       Plan: RN/SW Care Coordinator will await notification from home care staff informing RN/SW Care Coordinator of patients discharge plans/needs. RN/SW Care Coordinator will review chart and outreach to home care every 4 weeks and as needed.       Alberto Tobias MSN, RN, PHN, [facility]   Primary Care Clinical RN Care Coordinator  [facility]   [email or web address]  Office: [phone]

## 2019-05-14 ENCOUNTER — TELEPHONE (OUTPATIENT)
Dept: FAMILY MEDICINE | Facility: CLINIC | Age: 84
End: 2019-05-14

## 2019-05-14 NOTE — TELEPHONE ENCOUNTER
Reason for Call: Request for an order or referral:    Order or referral being requested: Physical Therapy     Date needed: as soon as possible    Has the patient been seen by the PCP for this problem? YES    Additional comments: Kely is a physical therapist wanting to get verbal orders to discharge patient, due to goals met. Please call to give orders to discontinue physical therapy.     Phone number Patient can be reached at:  Other phone number: [phone]    Best Time:  Anytime    Can we leave a detailed message on this number?  YES    Call taken on 5/14/2019 at 12:39 PM by Swapna Flynn

## 2019-06-12 ENCOUNTER — PATIENT OUTREACH (OUTPATIENT)
Dept: CARE COORDINATION | Facility: CLINIC | Age: 84
End: 2019-06-12

## 2019-06-12 NOTE — PROGRESS NOTES
Clinic Care Coordination Contact  [facility]/Voicemail    Referral Source: IP Handoff  Clinical Data: Care Coordinator Outreach  Outreach attempted x 1.  Left message on voicemail with call back information and requested return call.  Plan: Care Coordinator mailed out care coordination introduction letter on 4/9/19. Care Coordinator will try to reach patient again in 3-5 business days.        Alberto Tobias MSN, RN, PHN, CCM   Primary Care Clinical RN Care Coordinator  [facility]   [email or web address]  Office: [phone]

## 2019-06-12 NOTE — LETTER
Health Care Home - Access Care Plan    About Me:    Patient Name:  Bart Calix    YOB: 1931  Age:                      88 year old   Jeff MRN:     [id number] Telephone Information:   Home Phone [phone]   Mobile [phone]       Address:  [address] [place], [place] [address] [address] Email address:  [email or web address]      Emergency Contact(s)   Name Relationship Lgl Grd Work Phone Home Phone Mobile Phone   1. CLARISSA CALIX Spouse No  [phone]    2. SHANTI CALVERT Daughter No      3. EMILY CALIX Daughter No      4. SHAJI MARTINEZ Daughter No                Health Maintenance: Routine Health maintenance Reviewed: Due/Overdue   Health Maintenance Due   Topic Date Due     DEPRESSION ACTION PLAN  02/14/[birth year]31     ZOSTER IMMUNIZATION (1 of 2) 02/14/1981     MEDICARE ANNUAL WELLNESS VISIT  02/14/1996     PHQ-9  05/22/2017     MICROALBUMIN  03/29/2019     DIABETIC FOOT EXAM  03/29/2019         My Access Plan  Medical Emergency 911   Questions or concerns during clinic hours Primary Clinic Line, I will call the clinic directly: [facility] [place] - [phone]   24 Hour Appointment Line [phone] or  [phone]) (toll free)   24 Hour Nurse Line [phone] (toll free)   Questions or concerns outside clinic hours 24 Hour Appointment Line, I will call the after-hours on-call line:   [facility] [phone] or [phone]) (toll-free)   Preferred Urgent Care     Preferred Hospital [facility]  [phone]   Preferred Pharmacy [place] [facility] - PHARMACY CLOSED - [facility]OCATED TO [facility]     Behavioral Health Crisis Line The National Suicide Prevention Lifeline at 1-489.990.2121 or 911                     My Care Team Members  Patient Care Team       Relationship Specialty Notifications Start Chuy Melo MD PCP - General   6/3/03     Phone:  [phone] Fax: [phone] [address] [facility] [address]    Rosalee Aggarwal, RN Nurse Coordinator  Admissions 4/1/16     Pager: [phone]          [facility] [address]    Alberto Jett, RN Clinic Care Coordinator Primary Care - [facility] Admissions 4/9/19     Phone: [phone] Fax: [phone]        Chuy Stewart MD Assigned PCP   4/26/19     Phone: [phone] Fax: [phone]         [address] [place] [address]           My Medical and Care Information  Problem List   Patient Active Problem List   Diagnosis     Retinal ischemia     Polyp of vocal cord or larynx     Other specified disorder of skin     HYPERLIPIDEMIA LDL GOAL <100     Parkinson disease (H)     S/P total knee replacement     Anemia due to blood loss, acute     Dysthymia     BPH (benign prostatic hyperplasia)     Syncope     Health Care Home     Hypertension goal BP (blood pressure) < 150/90     Fall     Type 2 diabetes mellitus with other diabetic ophthalmic complication (H)     Varicose vein     Neck pain     Implantable loop recorder, [employer] LINQ     Advance Care Planning     Temporal arteritis (H)     Type 2 diabetes mellitus with hyperglycemia, without long-term current use of insulin (H)     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

## 2019-06-12 NOTE — LETTER
[facility] CARE COORDINATION  [address] [facility] [address]    June 20, 2019    Bart Blair  [address] [address]      Dear Bart,    I am a clinic care coordinator who works with Chuy Stewart MD, MD at [facility]. I wanted to thank you for spending the time to talk with me.  I wanted to introduce myself and provide you with my contact information so that you can call me with questions or concerns about your health care. Below is a description of clinic care coordination and how I can further assist you.     The clinic care coordinator is a registered nurse and/or  who understand the health care system. The goal of clinic care coordination is to help you manage your health and improve access to the [facility] in the most efficient manner. The registered nurse can assist you in meeting your health care goals by providing education, coordinating services, and strengthening the communication among your providers. The  can assist you with financial, behavioral, psychosocial, chemical dependency, counseling, and/or psychiatric resources.    Please feel free to contact me at [phone], with any questions or concerns. We at [facility] are focused on providing you with the highest-quality healthcare experience possible and that all starts with you.     Sincerely,     Alberto Tobias MSN, RN, PHN, [facility]   Primary Care Clinical RN Care Coordinator  [facility]   [email or web address]  Office: [phone]    Enclosed: I have enclosed a copy of a 24 Hour Access Plan. This has helpful phone numbers for you to call when needed. Please keep this in an easy to access place to use as needed.

## 2019-06-20 ASSESSMENT — ACTIVITIES OF DAILY LIVING (ADL): DEPENDENT_IADLS:: CLEANING;COOKING;LAUNDRY;SHOPPING;MEAL PREPARATION;TRANSPORTATION

## 2019-06-20 NOTE — PROGRESS NOTES
Clinic Care Coordination Contact    Clinic Care Coordination Contact  OUTREACH    Referral Information:  Referral Source: IP Handoff    Primary Diagnosis: SIRS/Sepsis    Chief Complaint   Patient presents with     Clinic Care Coordination - Follow-up     primary care RN CC        [place] Utilization: The patient uses the [facility] [employer] system as well as the [facility] hospitals.  Clinic Utilization  Difficulty keeping appointments:: No  Compliance Concerns: No  No-Show Concerns: No  No PCP office visit in Past Year: No  Utilization    Last refreshed: 6/15/2019  7:24 AM:  Hospital Admissions 1           Last refreshed: 6/15/2019  7:24 AM:  ED Visits 2           Last refreshed: 6/15/2019  7:24 AM:  No Show Count (past year) 1              Current as of: 6/15/2019  7:24 AM              Clinical Concerns:  Current Medical Concerns: The patient has a history of Parkinson's disease and cellulitis of the lower extremities.  He was hospitalized in April and he is home with home care already finished.  He has lymphedema of both lower extremities.  He uses the Velcro wraps as well as compression stockings.  He is wife states that they have a system now that actually works very well to keep that lymphedema out of his lower extremities and make it easier for him to walk.  There is a consent to communicate on file for the spouse of the patient.  Today the patient was out to lunch and was enjoying himself with his friends.  Current Behavioral Concerns: None currently noted.  Education Provided to patient: Reviewed the use of lymphedema wraps with the spouse.  Pain  Pain (GOAL):: Yes  Type: Acute on Chronic  Location of chronic pain:: legs  Radiating: No  Progression: Constant  Description of pain: Aching, Gnawing  Chronic pain severity:: 6  Limitation of routine activities due to chronic pain:: Yes  Description: Unable to perform most daily activities (chores, hobbies, social activities, driving)  Alleviating Factors:  Rest  Aggravating Factors: Activity  Health Maintenance Reviewed: Due/Overdue   Health Maintenance Due   Topic Date Due     DEPRESSION ACTION PLAN  02/[birth year]31     ZOSTER IMMUNIZATION (1 of 2) 02/14/1981     MEDICARE ANNUAL WELLNESS VISIT  02/14/1996     PHQ-9  05/22/2017     MICROALBUMIN  03/29/2019     DIABETIC FOOT EXAM  03/29/2019       Clinical Pathway: None    Medication Management:  Patient is knowledgeable on medications and is adherent.  No financial concerns reported at this time.  The spouse of the patient sets of his medications for him and assists him with them.    Functional Status:  Dependent ADLs:: Ambulation-walker, Independent  Dependent IADLs:: Cleaning, Cooking, Laundry, Shopping, Meal Preparation, Transportation  Bed or wheelchair confined:: No  Mobility Status: Independent w/Device  Fallen 2 or more times in the past year?: No  Any fall with injury in the past year?: No    Living Situation:  Current living arrangement:: I live in a private home with family  Type of residence:: Private home - stairs    Diet/Exercise/Sleep:  Diet:: Diabetic diet  Inadequate nutrition (GOAL):: No  Food Insecurity: No  Tube Feeding: No  Exercise:: Unable to exercise  Inadequate activity/exercise (GOAL):: No  Significant changes in sleep pattern (GOAL): No    Transportation:  Transportation concerns (GOAL):: No  Transportation means:: Regular car, Family     Psychosocial:  Christian or spiritual beliefs that impact treatment:: No  Mental health DX:: No  Mental health management concern (GOAL):: No  Informal Support system:: Children, Spouse     Financial/Insurance:   Financial/Insurance concerns (GOAL):: No     Resources and Interventions:  Current Resources:    ;   Community Resources: None  Supplies used at home:: Diabetic Supplies  Equipment Currently Used at Home: walker, rolling    Advance Care Plan/Directive  Advanced Care Plans/Directives on file:: Yes  Type Advanced Care Plans/Directives: Advanced Directive -  On File    Referrals Placed: Home Care     Goals:         Patient/Caregiver understanding: The patient and his spouse have a good understanding of the cause and the fact for the lower limb lymphedema.  And the necessity of wrapping and using the compression stockings and elevating the legs.    Outreach Frequency: monthly  Future Appointments              In 1 week Chuy Stewart MD [place]    In 3 weeks [facility]    In 4 months Azul Meade MD [facility]          Plan: 1.  The patient will use the lymphedema wraps compression stockings on a daily basis.  2.  The patient will continue to move around as much as he can.  The information stated by the spouse sounds like he is spending more and more time in the recliner.  3.  The patient will make and attend all follow-up appointments as recommended by the providers.  4.  Care Coordination to remain available for the patient to contact in the event of future needs.          Alberto Tobias MSN, RN, PHN, [facility]   Primary Care Clinical RN Care Coordinator  [facility]   [email or web address]  Office: [phone]

## 2019-07-02 ENCOUNTER — TELEPHONE (OUTPATIENT)
Dept: FAMILY MEDICINE | Facility: CLINIC | Age: 84
End: 2019-07-02

## 2019-07-02 ENCOUNTER — OFFICE VISIT (OUTPATIENT)
Dept: FAMILY MEDICINE | Facility: CLINIC | Age: 84
End: 2019-07-02
Payer: MEDICARE

## 2019-07-02 VITALS
HEIGHT: 66 IN | BODY MASS INDEX: 34.68 KG/M2 | SYSTOLIC BLOOD PRESSURE: 126 MMHG | DIASTOLIC BLOOD PRESSURE: 68 MMHG | WEIGHT: 215.8 LBS | HEART RATE: 97 BPM | TEMPERATURE: 99.2 F | OXYGEN SATURATION: 96 %

## 2019-07-02 DIAGNOSIS — L03.119 CELLULITIS OF LOWER EXTREMITY, UNSPECIFIED LATERALITY: ICD-10-CM

## 2019-07-02 DIAGNOSIS — G20.A1 PARKINSON DISEASE (H): ICD-10-CM

## 2019-07-02 DIAGNOSIS — M31.6 TEMPORAL ARTERITIS (H): ICD-10-CM

## 2019-07-02 DIAGNOSIS — E11.65 TYPE 2 DIABETES MELLITUS WITH HYPERGLYCEMIA, WITHOUT LONG-TERM CURRENT USE OF INSULIN (H): Primary | ICD-10-CM

## 2019-07-02 DIAGNOSIS — I89.0 LYMPHEDEMA: ICD-10-CM

## 2019-07-02 LAB — HBA1C MFR BLD: 6.3 % (ref 0–5.6)

## 2019-07-02 PROCEDURE — 82043 UR ALBUMIN QUANTITATIVE: CPT | Performed by: FAMILY MEDICINE

## 2019-07-02 PROCEDURE — 99214 OFFICE O/P EST MOD 30 MIN: CPT | Performed by: FAMILY MEDICINE

## 2019-07-02 PROCEDURE — 83036 HEMOGLOBIN GLYCOSYLATED A1C: CPT | Performed by: FAMILY MEDICINE

## 2019-07-02 PROCEDURE — 80048 BASIC METABOLIC PNL TOTAL CA: CPT | Performed by: FAMILY MEDICINE

## 2019-07-02 PROCEDURE — 36415 COLL VENOUS BLD VENIPUNCTURE: CPT | Performed by: FAMILY MEDICINE

## 2019-07-02 RX ORDER — FUROSEMIDE 20 MG
20 TABLET ORAL DAILY
Qty: 90 TABLET | Refills: 3 | Status: SHIPPED | OUTPATIENT
Start: 2019-07-02 | End: 2020-06-18

## 2019-07-02 ASSESSMENT — MIFFLIN-ST. JEOR: SCORE: 1591.61

## 2019-07-02 NOTE — TELEPHONE ENCOUNTER
FMLA paperwork filled out. Copy made for chart, original mailed to Amanda Vergara.    Thank you,  Joslyn HUGGINS    [place] Team Kendra

## 2019-07-02 NOTE — PROGRESS NOTES
Subjective     Bart Blair is a 88 year old male who presents to clinic today for the following health issues:   He is accompanied by his wife and daughter.      HPI     Patient is currently taking lasix 20 mg daily for lymphedema of both LE. He is wrapping his legs at night, and wearing compression stockings. Patient is on a prednisone taper for temporal arteritis, and is contact with his rheumatologist.     Diabetes Follow-up      How often are you checking your blood sugar? One time daily    What time of day are you checking your blood sugars (select all that apply)?  Before meals    Have you had any blood sugars above 200?  No    Have you had any blood sugars below 70?  No    What symptoms do you notice when your blood sugar is low?  None    What concerns do you have today about your diabetes? None     Do you have any of these symptoms? (Select all that apply)  No numbness or tingling in feet.  No redness, sores or blisters on feet.  No complaints of excessive thirst.  No reports of blurry vision.  No significant changes to weight.     Have you had a diabetic eye exam in the last 12 months? Yes- Date of last eye exam: 2019    Diabetes Management Resources    Hyperlipidemia Follow-Up      Are you having any of the following symptoms? (Select all that apply)  Left-sided neck or arm pain    Are you regularly taking any medication or supplement to lower your cholesterol?   Yes- Lipitor    Are you having muscle aches or other side effects that you think could be caused by your cholesterol lowering medication?  No    Hypertension Follow-up      Do you check your blood pressure regularly outside of the clinic? No     Are you following a low salt diet? Yes    Are your blood pressures ever more than 140 on the top number (systolic) OR more   than 90 on the bottom number (diastolic), for example 140/90? No    BP Readings from Last 2 Encounters:   07/02/19 126/68   05/07/19 127/68     Hemoglobin A1C (%)   Date Value  "  04/04/2019 7.6 (H)   11/14/2018 8.8 (H)     LDL Cholesterol Calculated (mg/dL)   Date Value   10/09/2018 21   10/24/2017 32       Amount of exercise or physical activity: None    Problems taking medications regularly: No    Medication side effects: none    Diet: low salt      Reviewed and updated as needed this visit by Provider  This document serves as a record of the services and decisions personally performed and made by Chuy Stewart MD. It was created on his behalf by Lisa Small, a trained medical scribe. The creation of this document is based the provider's statements to the medical scribe.  Lisa Small, July 2, 2019 11:24 AM            Review of Systems   ROS COMP: Constitutional, HEENT, cardiovascular, pulmonary, gi and gu systems are negative, except as otherwise noted.      Objective    /68 (BP Location: Right arm, Patient Position: Sitting, Cuff Size: Adult Large)   Pulse 97   Temp 99.2  F (37.3  C) (Oral)   Ht 1.676 m (5' 6\")   Wt 97.9 kg (215 lb 12.8 oz)   SpO2 96%   BMI 34.83 kg/m    Body mass index is 34.83 kg/m .  Physical Exam   GENERAL: healthy, alert and no distress  NECK: no adenopathy, no asymmetry, masses, or scars and thyroid normal to palpation  RESP: lungs clear to auscultation - no rales, rhonchi or wheezes  CV: regular rate and rhythm, normal S1 S2, no S3 or S4, no murmur, click or rub, no peripheral edema and peripheral pulses strong  ABDOMEN: soft, nontender, no hepatosplenomegaly, no masses and bowel sounds normal  MS: no gross musculoskeletal defects noted, no edema  PSYCH: mentation appears normal, affect normal/bright    Diagnostic Test Results:  Labs reviewed in Epic        Assessment & Plan     (E11.65) Type 2 diabetes mellitus with hyperglycemia, without long-term current use of insulin (H)  (primary encounter diagnosis)  Comment: Stable  Plan: Albumin Random Urine Quantitative with Creat         Ratio, Hemoglobin A1c, Basic metabolic panel        Continue " "with current medications    (M31.6) Temporal arteritis (H)  Comment: Improved with prednisone  Plan: Patient will continue to follow prednisone taper as directed by rheumatology    (G20) Parkinson disease (H)  Comment: progressing  Plan: Will continue to monitor    Cellulitis of lower leg  Comment: Improve significantly with lymphedema treatment  Plan: furosemide (LASIX) 20 MG tablet        Continue with lasix. Will continue to monitor     Lymphedema  Improved with treatment and compression devices  BMI:   Estimated body mass index is 34.83 kg/m  as calculated from the following:    Height as of this encounter: 1.676 m (5' 6\").    Weight as of this encounter: 97.9 kg (215 lb 12.8 oz).   Weight management plan: Discussed healthy diet and exercise guidelines        Patient Instructions     Continue with compression socks    Follow up in November Return in about 4 months (around 11/2/2019).    The information in this document, created by the medical scribe for me, accurately reflects the services I personally performed and the decisions made by me. I have reviewed and approved this document for accuracy.      Chuy Stewart MD, MD  [facility]    "

## 2019-07-03 LAB
ANION GAP SERPL CALCULATED.3IONS-SCNC: 13 MMOL/L (ref 3–14)
BUN SERPL-MCNC: 32 MG/DL (ref 7–30)
CALCIUM SERPL-MCNC: 8.6 MG/DL (ref 8.5–10.1)
CHLORIDE SERPL-SCNC: 108 MMOL/L (ref 94–109)
CO2 SERPL-SCNC: 20 MMOL/L (ref 20–32)
CREAT SERPL-MCNC: 1.58 MG/DL (ref 0.66–1.25)
CREAT UR-MCNC: 169 MG/DL
GFR SERPL CREATININE-BSD FRML MDRD: 38 ML/MIN/{1.73_M2}
GLUCOSE SERPL-MCNC: 186 MG/DL (ref 70–99)
MICROALBUMIN UR-MCNC: 16 MG/L
MICROALBUMIN/CREAT UR: 9.47 MG/G CR (ref 0–17)
POTASSIUM SERPL-SCNC: 4.3 MMOL/L (ref 3.4–5.3)
SODIUM SERPL-SCNC: 141 MMOL/L (ref 133–144)

## 2019-07-12 ENCOUNTER — ANCILLARY PROCEDURE (OUTPATIENT)
Dept: CARDIOLOGY | Facility: CLINIC | Age: 84
End: 2019-07-12
Attending: INTERNAL MEDICINE
Payer: MEDICARE

## 2019-07-12 DIAGNOSIS — R55 SYNCOPE: ICD-10-CM

## 2019-07-12 PROCEDURE — 93298 REM INTERROG DEV EVAL SCRMS: CPT | Performed by: INTERNAL MEDICINE

## 2019-07-12 PROCEDURE — 93299 CARDIAC DEVICE CHECK - REMOTE: CPT | Mod: ZF

## 2019-07-16 ENCOUNTER — PATIENT OUTREACH (OUTPATIENT)
Dept: CARE COORDINATION | Facility: CLINIC | Age: 84
End: 2019-07-16

## 2019-07-16 NOTE — PROGRESS NOTES
Clinic Care Coordination Contact      Follow Up Progress Note      Assessment: The patient was recently seen by the PCP and there were no changes in medications, medication reconciliation was completed.  The patient is using the compression garments as well as the Velcro compression bandages.  Patient is not very stable in his gait and therefore uses a walker at all times whether he is in the house or outside.  There are no questions or concerns at this time.  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     Goals:   Goals        General    #1  Medication 1 (pt-stated)     Notes - Note created  7/16/2019  1:32 PM by Alberto Jett, RN    Goal Statement: I will take all medications as prescribed by the provider.  I will report all the side effects especially those of dizziness to the provider so changes can be made if needed.  Measure of Success: All medications are taken as prescribed by the providers.  Supportive Steps to Achieve: Reviewed the rationale and reason for reporting any side effects especially those with dizziness due to the patient's balance issue.  Barriers: Balance issues which can be involved with the side effects.  Strengths: Motivated and engaged in care coordination.  Date to Achieve By: 7/20/2020  Patient expressed understanding of goal: Yes          #2  Reducing Risks (pt-stated)     Notes - Note created  7/16/2019  1:35 PM by Alberto Jett, RN    Goal Statement: I will use my walker wherever I go to reduce the risk of falling, even in our own home.  The patient will practice his balance exercises to prevent falling as well.  Patient measure of Success: The patient does not fall.  Supportive Steps to Achieve: Rationale for using the walker at all times was explained to the patient  Barriers: Balance issues.  Strengths: Motivated and engaged in care coordination.  Date to Achieve By: 7/20/2020  Patient expressed understanding of goal:  Yes                  Goal Progression: As of today's date 7/16/2019 goal is met at 26 - 50%.   Goal Status:  Active     Goal Progression: As of today's date 7/16/2019 goal is met at 26 - 50%.   Goal Status:  Active      Intervention/Education provided during outreach: Goals were set for medication administration and safety with using his walker all the time.     Outreach Frequency: Monthly     Plan:   The patient will use his walker at all times to prevent falls and to assist in his balance issues.  And the patient will take all medications as prescribed by the providers.  Care Coordinator will follow up in 1 month.        Alberto Tobias MSN, RN, PHN, [facility]   Primary Care Clinical RN Care Coordinator  [facility]   [email or web address]  Office: [phone]

## 2019-07-16 NOTE — PROGRESS NOTES
Clinic Care Coordination Contact  [facility]/Voicemail       Clinical Data: Care Coordinator Outreach  Outreach attempted x 1.  Left message on voicemail with call back information and requested return call.  Plan: Care Coordinator mailed out care coordination introduction letter on 6/20/19. Care Coordinator will try to reach patient again in 3-5 business days.        Alberto Tobias MSN, RN, PHN, CCM   Primary Care Clinical RN Care Coordinator  [facility]   [email or web address]  Office: [phone]

## 2019-07-16 NOTE — LETTER
[facility]  Complex Care Plan  About Me:    Patient Name:  Bart Calix    YOB: 1931  Age:         88 year old   Jeff MRN:    [id number] Telephone Information:  Home Phone [phone]   Mobile [phone]       Address:  [address] [place] [id number] Email address:  [email or web address]      Emergency Contact(s)    Name Relationship Lgl Grd Work Phone Home Phone Mobile Phone   1. CLARISSA CALIX Spouse No  [phone]    2. SHANTI CALVERT Daughter No      3. EMILY CALIX Daughter No      4. SHAJI MARTINEZ Daughter No              Primary language:  English     needed? No   [place] [facility]:  [phone] op. 1  Other communication barriers:    Preferred Method of Communication:  Mail  Current living arrangement:    Mobility Status/ Medical Equipment:      Health Maintenance  Health Maintenance Reviewed:      My Access Plan  Medical Emergency 911   Primary Clinic Line [facility] [place] - [phone]   24 Hour Appointment Line [phone] or  [phone]) (toll-free)   24 Hour Nurse Line [phone] (toll-free)   Preferred Urgent Care     Preferred Hospital     Preferred Pharmacy [facility] - PHARMACY CLOSED - RELOCATED TO [facility]     Behavioral Health Crisis Line The National Suicide Prevention Lifeline at 1-787.763.4713 or 911             My Care Team Members  Patient Care Team       Relationship Specialty Notifications Start End    Chuy Stewart MD PCP - General   6/3/03     Phone: [phone] Fax: [phone]         [address] [place] [id number]    Chuy Stewart MD Assigned PCP   10/4/12     Phone: [phone] Fax: [phone]         [facility] [id number]    Rosalee Aggarwal RN Nurse Coordinator  Admissions 4/1/16     Pager: [phone]          [facility] [address]    Alberto Jett, RN  Lead Care Coordinator Primary Care -  Admissions 4/9/19     Phone: [phone] Fax: [phone]                My Care Plans  Self Management and Treatment Plan  Goals and (Comments)  Goals        General    #1  Medication 1 (pt-stated)     Notes - Note created  7/16/2019  1:32 PM by Alberto Jett, RN    Goal Statement: I will take all medications as prescribed by the provider.  I will report all the side effects especially those of dizziness to the provider so changes can be made if needed.  Measure of Success: All medications are taken as prescribed by the providers.  Supportive Steps to Achieve: Reviewed the rationale and reason for reporting any side effects especially those with dizziness due to the patient's balance issue.  Barriers: Balance issues which can be involved with the side effects.  Strengths: Motivated and engaged in care coordination.  Date to Achieve By: 7/20/2020  Patient expressed understanding of goal: Yes          #2  Reducing Risks (pt-stated)     Notes - Note created  7/16/2019  1:35 PM by Alberto Jett RN    Goal Statement: I will use my walker wherever I go to reduce the risk of falling, even in our own home.  The patient will practice his balance exercises to prevent falling as well.  Patient measure of Success: The patient does not fall.  Supportive Steps to Achieve: Rationale for using the walker at all times was explained to the patient  Barriers: Balance issues.  Strengths: Motivated and engaged in care coordination.  Date to Achieve By: 7/20/2020  Patient expressed understanding of goal: Yes                 Action Plans on File:                       Advance Care Plans/Directives Type:        My Medical and Care Information  Problem List   Patient Active Problem List   Diagnosis     Retinal ischemia     Polyp of vocal cord or larynx     Other specified disorder of skin     HYPERLIPIDEMIA LDL GOAL <100     Parkinson disease (H)     S/P total knee replacement     Anemia due to  blood loss, acute     Dysthymia     BPH (benign prostatic hyperplasia)     Syncope     Health Care Home     Hypertension goal BP (blood pressure) < 150/90     Fall     Type 2 diabetes mellitus with other diabetic ophthalmic complication (H)     Varicose vein     Neck pain     Implantable loop recorder, [employer]     Advance Care Planning     Temporal arteritis (H)     Type 2 diabetes mellitus with hyperglycemia, without long-term current use of insulin (H)     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    Care Coordination Start Date: 7/16/2019   Frequency of Care Coordination: monthly   Form Last Updated: 07/16/2019

## 2019-07-27 LAB
MDC_IDC_EPISODE_DTM: NORMAL
MDC_IDC_EPISODE_DURATION: 720 S
MDC_IDC_EPISODE_ID: 41
MDC_IDC_EPISODE_TYPE: NORMAL
MDC_IDC_MSMT_BATTERY_DTM: NORMAL
MDC_IDC_MSMT_BATTERY_STATUS: NORMAL
MDC_IDC_PG_IMPLANT_DTM: NORMAL
MDC_IDC_PG_MFG: NORMAL
MDC_IDC_PG_MODEL: NORMAL
MDC_IDC_PG_SERIAL: NORMAL
MDC_IDC_PG_TYPE: NORMAL
MDC_IDC_SESS_CLINIC_NAME: NORMAL
MDC_IDC_SESS_DTM: NORMAL
MDC_IDC_SESS_TYPE: NORMAL
MDC_IDC_SET_ZONE_DETECTION_INTERVAL: 2000 MS
MDC_IDC_SET_ZONE_DETECTION_INTERVAL: 3000 MS
MDC_IDC_SET_ZONE_DETECTION_INTERVAL: 410 MS
MDC_IDC_SET_ZONE_TYPE: NORMAL
MDC_IDC_STAT_AT_BURDEN_PERCENT: 0.3 %
MDC_IDC_STAT_AT_DTM_END: NORMAL
MDC_IDC_STAT_AT_DTM_START: NORMAL
MDC_IDC_STAT_EPISODE_RECENT_COUNT: 0
MDC_IDC_STAT_EPISODE_RECENT_COUNT: 1
MDC_IDC_STAT_EPISODE_RECENT_COUNT: 22
MDC_IDC_STAT_EPISODE_RECENT_COUNT_DTM_END: NORMAL
MDC_IDC_STAT_EPISODE_RECENT_COUNT_DTM_START: NORMAL
MDC_IDC_STAT_EPISODE_TOTAL_COUNT: 0
MDC_IDC_STAT_EPISODE_TOTAL_COUNT: 10
MDC_IDC_STAT_EPISODE_TOTAL_COUNT: 235
MDC_IDC_STAT_EPISODE_TOTAL_COUNT_DTM_END: NORMAL
MDC_IDC_STAT_EPISODE_TOTAL_COUNT_DTM_START: NORMAL
MDC_IDC_STAT_EPISODE_TYPE: NORMAL

## 2019-08-06 ENCOUNTER — TELEPHONE (OUTPATIENT)
Dept: FAMILY MEDICINE | Facility: CLINIC | Age: 84
End: 2019-08-06

## 2019-08-06 NOTE — TELEPHONE ENCOUNTER
Forms received from: [facility]    Fax listed: [phone]  Date received: August 6, 2019  Form description: Orders OT 2 week 4 04/23/19  Once forms are completed, please return to [facility] via fax.  Form placed: Rosey Cerna, DO sign me folder.      Joslyn Randall  TC Team Kendra

## 2019-08-07 NOTE — TELEPHONE ENCOUNTER
Form signed and faxed to [facility] at [phone]. Copy made for chart.    Thank you,  Joslyn HUGGINS    NE Team Kendra

## 2019-08-07 NOTE — TELEPHONE ENCOUNTER
"Requested Prescriptions   Pending Prescriptions Disp Refills     sertraline (ZOLOFT) 25 MG tablet [Pharmacy Med Name: SERTRALINE 25MG TABLETS]  DISCONTINUED        Last Written Prescription Date:  6/5/2018  Last Fill Quantity: 90 tablet,   # refills: 3  Last Office Visit: 7/2/2019  candace/ BERKLEY Stewart    Future Office visit:       Routing refill request to provider for review/approval because:  Drug not active on patient's medication list   90 tablet 0     Sig: TAKE 1 TABLET BY MOUTH EVERY DAY       SSRIs Protocol Failed - 8/7/2019  6:33 PM  PHQ-9 SCORE 5/1/2013 1/15/2014 11/22/2016   PHQ-9 Total Score 6 0 -   PHQ-9 Total Score - - 3     No flowsheet data found.              Failed - Medication is active on med list        Passed - Recent (12 mo) or future (30 days) visit within the authorizing provider's specialty     Patient had office visit in the last 12 months or has a visit in the next 30 days with authorizing provider or within the authorizing provider's specialty.  See \"Patient Info\" tab in inbasket, or \"Choose Columns\" in Meds & Orders section of the refill encounter.              Passed - Patient is age 18 or older          "

## 2019-08-09 RX ORDER — SERTRALINE HYDROCHLORIDE 25 MG/1
TABLET, FILM COATED ORAL
Qty: 90 TABLET | Refills: 0 | OUTPATIENT
Start: 2019-08-09

## 2019-08-19 ENCOUNTER — PATIENT OUTREACH (OUTPATIENT)
Dept: CARE COORDINATION | Facility: CLINIC | Age: 84
End: 2019-08-19

## 2019-08-19 NOTE — PROGRESS NOTES
Clinic Care Coordination Contact  [facility]/Voicemail       Clinical Data: Care Coordinator Outreach  Outreach attempted x 1.  Left message on voicemail with call back information and requested return call.  Plan: Care Coordinator mailed out care coordination introduction letter on 7/16/19. Care Coordinator will try to reach patient again in 3-5 business days.        Alberto Tobias MSN, RN, PHN, CCM   Primary Care Clinical RN Care Coordinator  [facility]   [email or web address]  Office: [phone]

## 2019-08-23 NOTE — PROGRESS NOTES
Clinic Care Coordination Contact    Follow Up Progress Note      Assessment: The patient is feeling much better and taking all of his medications as per the providers orders.  The patient is up and about with the walker and using it for safety, especially with the balance issues that he is dealing with.  The patient is in agreement with having the RN CC contact him again in 4 weeks.    Goals addressed this encounter:   Goals Addressed                 This Visit's Progress      #1  Medication 1 (pt-stated)   On track     Goal Statement: I will take all medications as prescribed by the provider.  I will report all the side effects especially those of dizziness to the provider so changes can be made if needed.  Measure of Success: All medications are taken as prescribed by the providers.  Supportive Steps to Achieve: Reviewed the rationale and reason for reporting any side effects especially those with dizziness due to the patient's balance issue.  Barriers: Balance issues which can be involved with the side effects.  Strengths: Motivated and engaged in care coordination.  Date to Achieve By: 7/20/2020  Patient expressed understanding of goal: Yes            #2  Reducing Risks (pt-stated)   On track     Goal Statement: I will use my walker wherever I go to reduce the risk of falling, even in our own home.  The patient will practice his balance exercises to prevent falling as well.  Patient measure of Success: The patient does not fall.  Supportive Steps to Achieve: Rationale for using the walker at all times was explained to the patient  Barriers: Balance issues.  Strengths: Motivated and engaged in care coordination.  Date to Achieve By: 7/20/2020  Patient expressed understanding of goal: Yes                 Intervention/Education provided during outreach: Reviewed safety measures with using the walker and practicing the balance exercises.     Outreach Frequency: monthly    Plan:   1.  The patient will use his walker  at all times as a safety measure.  2.  The patient will practice all of his balance exercises in order to strengthen his core and his legs.  3.  Care Coordination to remain available for the patient to contact in the event of future needs.      Care Coordinator will follow up in 4 weeks.          Alberto Tobias MSN, RN, PHN, [facility]   Primary Care Clinical RN Care Coordinator  [facility]   [email or web address]  Office: [phone]

## 2019-09-04 ENCOUNTER — TELEPHONE (OUTPATIENT)
Dept: FAMILY MEDICINE | Facility: CLINIC | Age: 84
End: 2019-09-04

## 2019-09-04 DIAGNOSIS — E11.39 TYPE 2 DIABETES MELLITUS WITH OTHER DIABETIC OPHTHALMIC COMPLICATION (H): Primary | ICD-10-CM

## 2019-09-04 NOTE — TELEPHONE ENCOUNTER
[facility] faxed a Message to Prescriber re: blood glucose monitoring (ACCU-CHEK COMPACT STRIPS) test strip    Hi!  Pt needs updated script for lancets to match machine / what insurance will pay for.  We are also sending a CMN form re: these strips.    Thanks, so much!

## 2019-09-06 ENCOUNTER — TELEPHONE (OUTPATIENT)
Dept: FAMILY MEDICINE | Facility: CLINIC | Age: 84
End: 2019-09-06

## 2019-09-06 NOTE — TELEPHONE ENCOUNTER
Forms received from [facility]/ [facility] [employer] for Quentin Stewart MD.  Forms placed in provider 'sign me' folder.  Please fax forms to [phone] after completion.    Cecelia Morel  Patient Representative

## 2019-09-06 NOTE — TELEPHONE ENCOUNTER
"Requested Prescriptions   Pending Prescriptions Disp Refills     sertraline (ZOLOFT) 25 MG tablet [Pharmacy Med Name: SERTRALINE 25MG TABLETS]  DISCONTINUED        Last Written Prescription Date:  6/5/2018  Last Fill Quantity: 90 tablet,   # refills: 3  Last Office Visit: 7/2/2019  candace/ BERKLEY Stewart    Future Office visit:    Next 5 appointments (look out 90 days)    Nov 12, 2019 10:00 AM CST  SHORT with Chuy Stewart MD  [facility] ([address] [id number] [phone]           Routing refill request to provider for review/approval because:  Drug not active on patient's medication list   90 tablet 0     Sig: TAKE 1 TABLET BY MOUTH EVERY DAY       SSRIs Protocol Failed - 9/6/2019  2:21 PM  PHQ-9 SCORE 5/1/2013 1/15/2014 11/22/2016   PHQ-9 Total Score 6 0 -   PHQ-9 Total Score - - 3     No flowsheet data found.              Failed - Medication is active on med list        Passed - Recent (12 mo) or future (30 days) visit within the authorizing provider's specialty     Patient had office visit in the last 12 months or has a visit in the next 30 days with authorizing provider or within the authorizing provider's specialty.  See \"Patient Info\" tab in inbasket, or \"Choose Columns\" in Meds & Orders section of the refill encounter.              Passed - Patient is age 18 or older          "

## 2019-09-11 RX ORDER — SERTRALINE HYDROCHLORIDE 25 MG/1
TABLET, FILM COATED ORAL
Qty: 90 TABLET | Refills: 0 | OUTPATIENT
Start: 2019-09-11

## 2019-09-11 NOTE — TELEPHONE ENCOUNTER
Per wife, patient has not been taking zoloft for many months.  Was discontinued 5/7.  Wife will discuss with Dr. Stewart at next appointment, because she states he is doing well off the medication.    Consent to communicate on file.    Ivan Shanks RN

## 2019-09-24 ENCOUNTER — PATIENT OUTREACH (OUTPATIENT)
Dept: CARE COORDINATION | Facility: CLINIC | Age: 84
End: 2019-09-24

## 2019-09-24 NOTE — PROGRESS NOTES
Clinic Care Coordination Contact    Follow Up Progress Note      Assessment: The RN CC spoke with the spouse of the patient due to his hearing difficulty.  She states that he is doing very well, and they were able to get their flu shots today.  Tomorrow he has some lab work done and then he has an appointment with rheumatology.  The patient is taking all the medications as prescribed by the providers this was verified with the spouse.  The patient is using his walker wherever he goes this is a priority as he is so afraid of falling.  Patient is working on balance issues that he is been having by using the walker and thinking about his gait.  The spouse of the patient is most concerned with the money aspect with what is coming in and what is going out and having enough.  It is agreed upon that the RN CC will contact them again in 4 weeks.    Goals addressed this encounter:   Goals Addressed                 This Visit's Progress      #1  Medication 1 (pt-stated)   On track     Goal Statement: I will take all medications as prescribed by the provider.  I will report all the side effects especially those of dizziness to the provider so changes can be made if needed.  Measure of Success: All medications are taken as prescribed by the providers.  Supportive Steps to Achieve: Reviewed the rationale and reason for reporting any side effects especially those with dizziness due to the patient's balance issue.  Barriers: Balance issues which can be involved with the side effects.  Strengths: Motivated and engaged in care coordination.  Date to Achieve By: 7/20/2020  Patient expressed understanding of goal: Yes            #2  Reducing Risks (pt-stated)   On track     Goal Statement: I will use my walker wherever I go to reduce the risk of falling, even in our own home.  The patient will practice his balance exercises to prevent falling as well.  Patient measure of Success: The patient does not fall.  Supportive Steps to  Achieve: Rationale for using the walker at all times was explained to the patient  Barriers: Balance issues.  Strengths: Motivated and engaged in care coordination.  Date to Achieve By: 7/20/2020  Patient expressed understanding of goal: Yes                 Intervention/Education provided during outreach: Reviewed the importance of balance exercises in the use of his walker.     Outreach Frequency: monthly    Plan:   1.  Patient will take all medications as prescribed by the provider.  The patient is using Tylenol for pain in his legs twice daily.  2.  Patient will use his walker everywhere he goes to prevent falls, and help with his balance issues.  3.  Care Coordination to remain available for the patient to contact in the event of future needs.      Care Coordinator will follow up in 4 weeks.          Alberto Tobias MSN, RN, PHN, CCM   Primary Care Clinical RN Care Coordinator  [facility]   [email or web address]  Office: [phone]

## 2019-09-30 ENCOUNTER — HEALTH MAINTENANCE LETTER (OUTPATIENT)
Age: 84
End: 2019-09-30

## 2019-10-25 ENCOUNTER — PATIENT OUTREACH (OUTPATIENT)
Dept: CARE COORDINATION | Facility: CLINIC | Age: 84
End: 2019-10-25

## 2019-10-25 NOTE — PROGRESS NOTES
Clinic Care Coordination Contact    Follow Up Progress Note      Assessment: The RN CC contacted the patient by phone speaking with his spouse as he is so hard of hearing.  The patient is doing well using his walker at all times to prevent any falls from the dizziness and balance issues that he suffers from.  The patient is practicing his balance exercises every day.  Although he is not seeing much of a change at this point in time in his balance issue.  The patient is decreasing his prednisone dose as he has started a new medication for his arthritis.    Medication reconciliation was completed with the spouse who sets up all the medications for the patient.  There were no new questions or concerns at this time.  The spouse is somewhat relieved as his medications are decreasing in some respects and therefore the cost is decreasing.    Goals addressed this encounter:   Goals Addressed                 This Visit's Progress      #1  Medication 1 (pt-stated)   On track     Goal Statement: I will take all medications as prescribed by the provider.  I will report all the side effects especially those of dizziness to the provider so changes can be made if needed.  Measure of Success: All medications are taken as prescribed by the providers.  Supportive Steps to Achieve: Reviewed the rationale and reason for reporting any side effects especially those with dizziness due to the patient's balance issue.  Barriers: Balance issues which can be involved with the side effects.  Strengths: Motivated and engaged in care coordination.  Date to Achieve By: 7/20/2020  Patient expressed understanding of goal: Yes            #2  Reducing Risks (pt-stated)   On track     Goal Statement: I will use my walker wherever I go to reduce the risk of falling, even in our own home.  The patient will practice his balance exercises to prevent falling as well.  Patient measure of Success: The patient does not fall.  Supportive Steps to Achieve:  Rationale for using the walker at all times was explained to the patient  Barriers: Balance issues.  Strengths: Motivated and engaged in care coordination.  Date to Achieve By: 7/20/2020  Patient expressed understanding of goal: Yes                 Intervention/Education provided during outreach: The RN CC encouraged the patient and his spouse to make an appointment for follow-up with Dr. Stewart as he will be returning to the office and very busy.     Outreach Frequency: monthly    Plan:   1.  The patient will take all medications as prescribed by the providers.  2.  The patient will use his walker at all times to decrease the chance of falling especially with his balance issues.  3.  The patient will practice his balance exercises every day.  4.  Care Coordination to remain available for the patient to contact in the event of future needs.      RN Care Coordinator will follow up in 4 weeks.        Alberto Tobias MSN, RN, PHN, CCM   Primary Care Clinical RN Care Coordinator  [facility]  Employee of [facility]   [email or web address]  Office: [phone]

## 2019-10-25 NOTE — LETTER
[facility]  Complex Care Plan  About Me:    Patient Name:  Bart Calix    YOB: 1931  Age:         88 year old   Jeff MRN:    [id number] Telephone Information:  Home Phone [phone]   Mobile [phone]       Address:  [address] [place] [address] Email address:  [email or web address]      Emergency Contact(s)    Name Relationship Lgl Grd Work Phone Home Phone Mobile Phone   1. CLARISSA CALIX Spouse No  [phone]    2. SHANTI CALVERT Daughter No      3. EMILY CALIX Daughter No      4. SHAJI MARTINEZ Daughter No              Primary language:  English     needed? No   [facility]:  [phone] op. 1  Other communication barriers:    Preferred Method of Communication:  Mail  Current living arrangement:    Mobility Status/ Medical Equipment:      Health Maintenance  Health Maintenance Reviewed:      My Access Plan  Medical Emergency 911   Primary Clinic Line [facility] [place] - [phone]   24 Hour Appointment Line [phone] or  [phone]) (toll-free)   24 Hour Nurse Line [phone] (toll-free)   Preferred Urgent Care     Preferred Hospital     Preferred Pharmacy [facility] - PHARMACY CLOSED - [facility]OCATED TO [facility]     Behavioral Health Crisis Line The National Suicide Prevention Lifeline at 1-517.465.2322 or 911             My Care Team Members  Patient Care Team       Relationship Specialty Notifications Start End    Chuy Stewart MD PCP - General   6/[birth year]/03     Phone: [phone] Fax: [phone]         [address] [place] [address]    Chuy Stewart MD Assigned PCP   10/4/12     Phone: [phone] Fax: [phone]         [facility] [address]    Rosalee Aggarwal RN Nurse Coordinator  Admissions 4/1/16     Pager: [phone]          [facility] [address]    Alberto Jett, RN  Lead Care Coordinator Primary Care -  Admissions 4/9/19     Phone: [phone] Fax: [phone]        [facility] Community [facility] Worker Primary Care -  Admissions 7/29/19     [phone]            My Care Plans  Self Management and Treatment Plan  Goals and (Comments)  Goals        General    #1  Medication 1 (pt-stated)     Notes - Note created  7/16/2019  1:32 PM by Alberto Jett, RN    Goal Statement: I will take all medications as prescribed by the provider.  I will report all the side effects especially those of dizziness to the provider so changes can be made if needed.  Measure of Success: All medications are taken as prescribed by the providers.  Supportive Steps to Achieve: Reviewed the rationale and reason for reporting any side effects especially those with dizziness due to the patient's balance issue.  Barriers: Balance issues which can be involved with the side effects.  Strengths: Motivated and engaged in care coordination.  Date to Achieve By: 7/20/2020  Patient expressed understanding of goal: Yes          #2  Reducing Risks (pt-stated)     Notes - Note created  7/16/2019  1:35 PM by Alberto Jett, RN    Goal Statement: I will use my walker wherever I go to reduce the risk of falling, even in our own home.  The patient will practice his balance exercises to prevent falling as well.  Patient measure of Success: The patient does not fall.  Supportive Steps to Achieve: Rationale for using the walker at all times was explained to the patient  Barriers: Balance issues.  Strengths: Motivated and engaged in care coordination.  Date to Achieve By: 7/20/2020  Patient expressed understanding of goal: Yes                 Action Plans on File:                       Advance Care Plans/Directives Type:        My Medical and Care Information  Problem List   Patient Active Problem List   Diagnosis     Retinal ischemia     Polyp of vocal cord or larynx     Other specified disorder of skin      HYPERLIPIDEMIA LDL GOAL <100     Parkinson disease (H)     S/P total knee replacement     Anemia due to blood loss, acute     Dysthymia     BPH (benign prostatic hyperplasia)     Syncope     Health Care Home     Hypertension goal BP (blood pressure) < 150/90     Fall     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    Care Coordination Start Date: 7/16/2019   Frequency of Care Coordination: monthly   Form Last Updated: 10/25/2019

## 2019-10-28 ENCOUNTER — HEALTH MAINTENANCE LETTER (OUTPATIENT)
Age: 84
End: 2019-10-28

## 2019-11-12 ENCOUNTER — OFFICE VISIT (OUTPATIENT)
Dept: FAMILY MEDICINE | Facility: CLINIC | Age: 84
End: 2019-11-12
Payer: MEDICARE

## 2019-11-12 VITALS
WEIGHT: 212 LBS | DIASTOLIC BLOOD PRESSURE: 58 MMHG | BODY MASS INDEX: 34.22 KG/M2 | TEMPERATURE: 97.4 F | HEART RATE: 80 BPM | SYSTOLIC BLOOD PRESSURE: 104 MMHG

## 2019-11-12 DIAGNOSIS — E78.5 HYPERLIPIDEMIA LDL GOAL <100: ICD-10-CM

## 2019-11-12 DIAGNOSIS — R29.898 WEAKNESS OF BOTH LOWER EXTREMITIES: ICD-10-CM

## 2019-11-12 DIAGNOSIS — G20.A1 PARKINSON DISEASE (H): ICD-10-CM

## 2019-11-12 DIAGNOSIS — M54.2 CERVICALGIA: ICD-10-CM

## 2019-11-12 DIAGNOSIS — R26.89 BALANCE PROBLEMS: ICD-10-CM

## 2019-11-12 DIAGNOSIS — E11.65 TYPE 2 DIABETES MELLITUS WITH HYPERGLYCEMIA, WITHOUT LONG-TERM CURRENT USE OF INSULIN (H): Primary | ICD-10-CM

## 2019-11-12 DIAGNOSIS — L84 CALLUS OF FOOT: ICD-10-CM

## 2019-11-12 DIAGNOSIS — I10 HYPERTENSION GOAL BP (BLOOD PRESSURE) < 150/90: ICD-10-CM

## 2019-11-12 DIAGNOSIS — R60.9 EDEMA, UNSPECIFIED TYPE: ICD-10-CM

## 2019-11-12 LAB — HBA1C MFR BLD: 5.5 % (ref 0–5.6)

## 2019-11-12 PROCEDURE — 80061 LIPID PANEL: CPT | Performed by: FAMILY MEDICINE

## 2019-11-12 PROCEDURE — 36415 COLL VENOUS BLD VENIPUNCTURE: CPT | Performed by: FAMILY MEDICINE

## 2019-11-12 PROCEDURE — 11055 PARING/CUTG B9 HYPRKER LES 1: CPT | Performed by: FAMILY MEDICINE

## 2019-11-12 PROCEDURE — 83036 HEMOGLOBIN GLYCOSYLATED A1C: CPT | Performed by: FAMILY MEDICINE

## 2019-11-12 PROCEDURE — 99214 OFFICE O/P EST MOD 30 MIN: CPT | Mod: 25 | Performed by: FAMILY MEDICINE

## 2019-11-12 RX ORDER — IRBESARTAN 75 MG/1
37.5 TABLET ORAL AT BEDTIME
COMMUNITY
Start: 2019-11-12 | End: 2020-02-14

## 2019-11-12 ASSESSMENT — PAIN SCALES - GENERAL: PAINLEVEL: MODERATE PAIN (5)

## 2019-11-12 NOTE — PROGRESS NOTES
Subjective     Bart Blair is a 88 year old male who presents to clinic today for the following health issues:  He is accompanied by his wife.      HPI   Diabetes Follow-up    How often are you checking your blood sugar? One time daily  What time of day are you checking your blood sugars (select all that apply)?  Before meals  Have you had any blood sugars above 200?  No  Have you had any blood sugars below 70?  No    What symptoms do you notice when your blood sugar is low?  None    What concerns do you have today about your diabetes? None     Do you have any of these symptoms? (Select all that apply)  Redness, sores, or blisters on feet     Have you had a diabetic eye exam in the last 12 months? yes    Diabetes Management Resources    Hyperlipidemia Follow-Up      Are you having any of the following symptoms? (Select all that apply)  Chest pain or pressure    Are you regularly taking any medication or supplement to lower your cholesterol?   Yes- Lipitor    Are you having muscle aches or other side effects that you think could be caused by your cholesterol lowering medication?  No    Hypertension Follow-up      Do you check your blood pressure regularly outside of the clinic? No     Are you following a low salt diet? Yes    Are your blood pressures ever more than 140 on the top number (systolic) OR more   than 90 on the bottom number (diastolic), for example 140/90? Yes     Musculoskeletal   Muscle pain noted at the base of the head ,along with weakness of the LE.   Issues with balance as well. Patient is open to physical therapy to improve symptoms.     BP Readings from Last 2 Encounters:   11/12/19 104/58   07/02/19 126/68     Hemoglobin A1C (%)   Date Value   11/12/2019 5.5   07/02/2019 6.3 (H)     LDL Cholesterol Calculated (mg/dL)   Date Value   10/09/2018 21   10/24/2017 32         How many servings of fruits and vegetables do you eat daily?  2-3    On average, how many sweetened beverages do you drink each day  (soda, juice, sweet tea, etc)?   0    How many days per week do you miss taking your medication? 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)     Varicose vein     Neck pain     Implantable loop recorder, One Touch EMR LINQ     Advance Care Planning     Temporal arteritis (H)     Type 2 diabetes mellitus with hyperglycemia, without long-term current use of insulin (H)     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)     PAD (peripheral artery disease) (H)     Cellulitis, leg     Supraventricular tachycardia (H)     Lymphedema     Past Surgical History:   Procedure Laterality Date     ARTHROPLASTY KNEE  1/31/2012    Procedure:ARTHROPLASTY KNEE; LEFT TOTAL KNEE ARTHROPLASTY (iLink)^; Surgeon:SKIP FAIR; Location: OR     ARTHROSCOPY SHOULDER, OPEN ROTATOR CUFF REPAIR, COMBINED  4/24/2012    Procedure:COMBINED ARTHROSCOPY SHOULDER, OPEN ROTATOR CUFF REPAIR; RIGHT SHOULDER ARTHROSCOPY OPEN ROTATOR CUFF DEBRIDEMENT, SUBCROMIAL DECOMPRESSION, AND BICEPS TENODESIS REPAIR; Surgeon:SKIP FAIR; Location: [facility]     [facility] SURGICAL PATHOLOGY  6-    Dr. Bowers; left hand     ENT SURGERY       INCISE FINGER TENDON SHEATH  2008    Dr. Bowers; right AND LEFT hand     THROAT SURGERY      vocal cord polyps       Social History     Tobacco Use     Smoking status: Former Smoker     Smokeless tobacco: Never Used   Substance Use Topics     Alcohol use: Yes     Comment: rarely     Family History   Problem Relation Age of Onset     Family History Negative Other         unknown family history per patient          Current Outpatient Medications   Medication Sig Dispense Refill     aspirin 81 MG tablet Take 1 tablet by mouth daily.  3     atorvastatin (LIPITOR) 10 MG tablet TAKE 1 TABLET BY MOUTH EVERY DAY 90 tablet 3     blood glucose (NO BRAND SPECIFIED) lancets standard Use to test blood sugar one time daily or as directed. 100 each 3     blood glucose (NO BRAND SPECIFIED) test strip Use to test blood sugar one times daily or as directed. 100 strip 3     cilostazol (PLETAL) 50 MG tablet Take 1 tablet (50 mg) by mouth 2 times daily 180 tablet 3     COMPRESSION STOCKINGS 1 each daily Lower Extremity:   Knee High;  bilateral;  20-30 mm Hg.  Measure and fit.  Style and color per patient preference.  D[place] per patient need. 6 each 11     Cyanocobalamin (VITAMIN B-12 CR) 1000 MCG TBCR TAKE ONE TABLET BY MOUTH EVERY DAY (Patient taking differently: TAKE ONE TABLET BY MOUTH EVERY DAY. Pt's taking OCT vitamin B12 and the dose is not clear.) 60 tablet 4     diclofenac (VOLTAREN) 1 % topical gel APPLY 4 GRAMS TO KNEES OR 2 GRAMS TO HANDS FOUR TIMES A DAY USING ENCLOSED DOSING CARD 300 g 3     fluticasone (FLONASE) 50 MCG/ACT nasal spray Spray 1-2 sprays into both nostrils daily as needed       folic acid (FOLVITE) 1 MG tablet Take 1 mg by mouth daily       furosemide (LASIX) 20 MG tablet Take 1 tablet (20 mg) by mouth daily 90 tablet 3     irbesartan (AVAPRO) 75 MG tablet Take 0.5 tablets (37.5 mg) by mouth At Bedtime       linagliptin (TRADJENTA) 5 MG TABS tablet Take 1 tablet (5 mg) by mouth daily (with lunch) 90 tablet 3     Multiple Vitamin (MULTI VITAMIN  MENS) TABS Take  by mouth. Takes one daily         order for DME Equipment being ordered: pneumatic compression pumps.  Thigh length.  Measure and fit.  Pressure per protocol. 1 Units 1     order for DME Glucometer, brand as covered by insurance. 1 each 0     order for DME Test strips for pt's glucometer, brand as covered by insurance. Test daily and prn. 100 each 4      order for DME Lancets.  Daily and prn. 100 each 4     predniSONE (DELTASONE) 5 MG tablet Take 5 mg by mouth daily        sertraline (ZOLOFT) 25 MG tablet TAKE 1 TABLET BY MOUTH EVERY DAY 90 tablet 0     tocilizumab (ACTEMRA) 162 MG/0.9ML subcutaneous injection Inject 162 mg Subcutaneous       vitamin D3 (CHOLECALCIFEROL) 1000 units (25 mcg) tablet Take 1 tablet (1,000 Units) by mouth at bedtime       amoxicillin (AMOXIL) 500 MG capsule 4 tablets prior to dental appointment. Use for dental appointments (Patient not taking: Reported on 11/12/2019) 16 capsule 4     senna-docusate (SENOKOT-S;PERICOLACE) 8.6-50 MG per tablet Take 1-2 tablets by mouth daily as needed for constipation Takes about 2-3 times weekly       BP Readings from Last 3 Encounters:   11/12/19 104/58   07/02/19 126/68   05/07/19 127/68    Wt Readings from Last 3 Encounters:   11/12/19 96.2 kg (212 lb)   07/02/19 97.9 kg (215 lb 12.8 oz)   05/07/19 99.5 kg (219 lb 4.8 oz)                    Reviewed and updated as needed this visit by Provider         Review of Systems   ROS COMP: Constitutional, HEENT, cardiovascular, pulmonary, gi and gu systems are negative, except as otherwise noted.      Objective    /58 (BP Location: Right arm, Patient Position: Sitting, Cuff Size: Adult Regular)   Pulse 80   Temp 97.4  F (36.3  C) (Oral)   Wt 96.2 kg (212 lb)   BMI 34.22 kg/m    Body mass index is 34.22 kg/m .  Physical Exam   GENERAL: alert, no distress and over weight  NECK: no adenopathy, no asymmetry, masses, or scars and thyroid normal to palpation  RESP: lungs clear to auscultation - no rales, rhonchi or wheezes  CV: regular rate and rhythm, normal S1 S2, no S3 or S4, no murmur, click or rub, no peripheral edema and peripheral pulses strong  ABDOMEN: soft, nontender, no hepatosplenomegaly, no masses and bowel sounds normal  MS: tenderness at the insertion points of the occipital muscles,  1+ edema to LE. Generalized weakness of lower  "extremities.   SKIN: callus on the tip of the right great toe, 1 cm in size. Trimmed with 15 blade  PSYCH: mentation appears normal, affect normal/bright      Diagnostic Test Results:  Labs reviewed in Epic        Assessment & Plan     (E11.65) Type 2 diabetes mellitus with hyperglycemia, without long-term current use of insulin (H)  (primary encounter diagnosis)  Comment: Well controlled   Plan: HEMOGLOBIN A1C, Lipid panel reflex to direct         LDL Fasting        Glipizide discontinued today. Will recheck in 3 months.     (I10) Hypertension goal BP (blood pressure) < 150/90  Comment: Well controlled   Plan: irbesartan (AVAPRO) 75 MG tablet        Patient to decrease irbesartan to 1/2 tablet. Recheck in 3 months.     (G20) Parkinson disease (H)  Comment: balance diffiulties  Plan: [facility]            (E78.5) Hyperlipidemia LDL goal <100  Comment: Patient taking atorvastatin for management   Plan: Adjust treatment based on lab results     (L84) Callus of foot  Comment: Callus present on the tip of the right great toe   Plan: no infection. Trimmed with 15 blade    (R60.9) Edema, unspecified type  Comment: Exam showed 1+ edema to LE  Plan: Patient is encouraged ace wrap his feet at night     (M54.2) Cervicalgia  Comment:   Plan: [facility] REFERRAL            (R29.898) Weakness of both lower extremities  Comment:   Plan: [facility] REFERRAL            (R26.89) Balance problems  Comment:   Plan: [facility] REFERR[facility]               BMI:   Estimated body mass index is 34.22 kg/m  as calculated from the following:    Height as of 7/2/19: 1.676 m (5' 6\").    Weight as of this encounter: 96.2 kg (212 lb).   Weight management plan: Discussed healthy diet and exercise guidelines        Patient Instructions       Discontinue GLIPIZIDE.     Decrease AVAPRO to 1/2 tablet.     Ace wrap your feet at night to improve swelling.     Lab Results "   Component Value Date    A1C 5.5 11/12/2019    A1C 6.3 07/02/2019    A1C 7.6 04/04/2019    A1C 8.8 11/14/2018    A1C 8.3 10/09/2018         Orders Placed This Encounter     HEMOGLOBIN A1C     Last Lab Result: Hemoglobin A1C (%)       Date                     Value                 07/02/2019               6.3 (H)          ----------     Lipid panel reflex to direct LDL Fasting     Last Lab Result: LDL Cholesterol Calculated (mg/dL)       Date                     Value                 10/09/2018               21               ----------     Order Specific Question:   Perform labs while fasting or non-fasting?     Answer:   Fasting     [facility]     Standing Status:   Future     Standing Expiration Date:   11/12/2020     Referral Priority:   Routine     Referral Type:   [facility]     Number of Visits Requested:   1     irbesartan (AVAPRO) 75 MG tablet     Sig: Take 0.5 tablets (37.5 mg) by mouth At Bedtime               Return in about 3 months (around 2/12/2020) for Diabetes follow up.    The information in this document, created by the medical scribe for me, accurately reflects the services I personally performed and the decisions made by me. I have reviewed and approved this document for accuracy.     Chuy Stewart MD, MD  [facility]

## 2019-11-12 NOTE — PATIENT INSTRUCTIONS
Discontinue GLIPIZIDE.     Decrease AVAPRO to 1/2 tablet.     Ace wrap your feet at night to improve swelling.     Lab Results   Component Value Date    A1C 5.5 11/12/2019    A1C 6.3 07/02/2019    A1C 7.6 04/04/2019    A1C 8.8 11/14/2018    A1C 8.3 10/09/2018         Orders Placed This Encounter     HEMOGLOBIN A1C     Last Lab Result: Hemoglobin A1C (%)       Date                     Value                 07/02/2019               6.3 (H)          ----------     Lipid panel reflex to direct LDL Fasting     Last Lab Result: LDL Cholesterol Calculated (mg/dL)       Date                     Value                 10/09/2018               21               ----------     Order Specific Question:   Perform labs while fasting or non-fasting?     Answer:   Fasting     [facility]     Standing Status:   Future     Standing Expiration Date:   11/12/2020     Referral Priority:   Routine     Referral Type:   [facility]     Number of Visits Requested:   1     irbesartan (AVAPRO) 75 MG tablet     Sig: Take 0.5 tablets (37.5 mg) by mouth At Bedtime

## 2019-11-13 LAB
CHOLEST SERPL-MCNC: 138 MG/DL
HDLC SERPL-MCNC: 44 MG/DL
LDLC SERPL CALC-MCNC: 63 MG/DL
NONHDLC SERPL-MCNC: 94 MG/DL
TRIGL SERPL-MCNC: 157 MG/DL

## 2019-11-16 ENCOUNTER — ANCILLARY PROCEDURE (OUTPATIENT)
Dept: CARDIOLOGY | Facility: CLINIC | Age: 84
End: 2019-11-16
Attending: INTERNAL MEDICINE
Payer: MEDICARE

## 2019-11-16 DIAGNOSIS — R55 SYNCOPE: ICD-10-CM

## 2019-11-16 PROCEDURE — 93299 CARDIAC DEVICE CHECK - REMOTE: CPT | Mod: ZF

## 2019-11-16 PROCEDURE — 93298 REM INTERROG DEV EVAL SCRMS: CPT | Performed by: INTERNAL MEDICINE

## 2019-11-19 ENCOUNTER — HOSPITAL ENCOUNTER (OUTPATIENT)
Dept: PHYSICAL THERAPY | Facility: CLINIC | Age: 84
Setting detail: THERAPIES SERIES
End: 2019-11-19
Attending: FAMILY MEDICINE
Payer: MEDICARE

## 2019-11-19 DIAGNOSIS — M54.2 CERVICALGIA: ICD-10-CM

## 2019-11-19 DIAGNOSIS — G20.A1 PARKINSON DISEASE (H): ICD-10-CM

## 2019-11-19 DIAGNOSIS — R29.898 WEAKNESS OF BOTH LOWER EXTREMITIES: ICD-10-CM

## 2019-11-19 DIAGNOSIS — R26.89 BALANCE PROBLEMS: ICD-10-CM

## 2019-11-19 LAB
MDC_IDC_MSMT_BATTERY_DTM: NORMAL
MDC_IDC_MSMT_BATTERY_STATUS: NORMAL
MDC_IDC_PG_IMPLANT_DTM: NORMAL
MDC_IDC_PG_MFG: NORMAL
MDC_IDC_PG_MODEL: NORMAL
MDC_IDC_PG_SERIAL: NORMAL
MDC_IDC_PG_TYPE: NORMAL
MDC_IDC_SESS_CLINIC_NAME: NORMAL
MDC_IDC_SESS_DTM: NORMAL
MDC_IDC_SESS_TYPE: NORMAL
MDC_IDC_SET_ZONE_TYPE: NORMAL
MDC_IDC_STAT_AT_BURDEN_PERCENT: 0 %
MDC_IDC_STAT_AT_DTM_END: NORMAL
MDC_IDC_STAT_AT_DTM_START: NORMAL
MDC_IDC_STAT_EPISODE_RECENT_COUNT: 0
MDC_IDC_STAT_EPISODE_RECENT_COUNT_DTM_END: NORMAL
MDC_IDC_STAT_EPISODE_RECENT_COUNT_DTM_START: NORMAL
MDC_IDC_STAT_EPISODE_TOTAL_COUNT: 0
MDC_IDC_STAT_EPISODE_TOTAL_COUNT: 1
MDC_IDC_STAT_EPISODE_TOTAL_COUNT: 10
MDC_IDC_STAT_EPISODE_TOTAL_COUNT: 235
MDC_IDC_STAT_EPISODE_TOTAL_COUNT_DTM_END: NORMAL
MDC_IDC_STAT_EPISODE_TOTAL_COUNT_DTM_START: NORMAL
MDC_IDC_STAT_EPISODE_TYPE: NORMAL

## 2019-11-19 PROCEDURE — 97161 PT EVAL LOW COMPLEX 20 MIN: CPT | Mod: GP | Performed by: PHYSICAL THERAPIST

## 2019-11-19 PROCEDURE — 97110 THERAPEUTIC EXERCISES: CPT | Mod: GP | Performed by: PHYSICAL THERAPIST

## 2019-11-19 NOTE — PROGRESS NOTES
11/19/19 0900   Quick Adds   Type of Visit Initial OP PT Evaluation   General Information   Start of Care Date 11/19/19   Referring Physician Chuy Stewart MD   Orders Evaluate and Treat as Indicated   Additional Orders Neck pain, balance exercises and lower leg strengthening   Order Date 11/12/19   Medical Diagnosis Parkinson disease, cervicalgia, weakness of both lower extremities, balance problems   Onset of illness/injury or Date of Surgery 11/12/19  (Order date used)   Surgical/Medical history reviewed Yes   Pertinent history of current problem (include personal factors and/or comorbidities that impact the POC) Pt presents to PT evaluation today with chief complaint of B LE weakness and pain in calves/feet - worse with longer duration walking, better with rest.  He feels he is not able to walk as far as he would like because of the pain. Constant throbbing in R great toe at baseline, but not in calves/feet. Previous PT last winter at this clinic for similar complaint. PT was beneficial for complaint with improved tolerance to walking at discharge, but discontinued treadmill walking a couple months after discharge. Treadmill is at his son's house and he was traveling frequently. Reports LE edema has decreased since a year ago, but is more in toes/feet now. Wife assists in wrapping toes/LEs with compression wrap at night. Daughter assists to don compression socks in morning. Not wearing compression socks today - felt it would be easier to don/doff normal socks for eval.  Currently amb 4WW outside of home, 2WW in home, and carries SEC with him for transfers or very short distance.  PMHx: PAD, HTN, hyperlipidemia, DM II, diabetic neuropathy, CKD, lumbar spinal stenosis, R sided blindness, giant cell arteritis   Pertinent Visual History  Blind in R eye   Previous/Current Treatment Physical Therapy   Improvement after PT Other  (Yes; improved walking tolerance with decreased LE pain)   Current Community Support  Family/friend caregiver  (Lives with wife; adult children nearby that assist)   Patient role/Employment history Retired   Living environment House/[facility]   Home/Community Accessibility Comments 2-3 stairs to enter with rail   Current Assistive Devices Four Wheeled Walker;Front Wheeled Walker;Standard Cane   ADL Devices Shower/Tub Chair;Shower/Tub Grab Bar   Assistive Devices Comments 2WW in home, 4WW outside home; also has SEC   Patient/Family Goals Statement Decrease leg pain and walk longer distances   Fall Risk Screen   Fall screen completed by PT   Have you fallen 2 or more times in the past year? No   Have you fallen and had an injury in the past year? No   Timed Up and Go score (seconds) 23   Is patient a fall risk? Yes;Department fall risk interventions implemented   Pain   Patient currently in pain Yes   Pain comments Constant pain in R great toe (throbbing), worsens with WB, alleviated to baseline pain with rest. Intermittent pain in R toes 2-5 with WB. R knee pain.    Vitals Signs   Heart Rate 97   SpO2 96   Blood Pressure 127/58  (L UE; seated)   Vital Signs Comments After 3MWT: /64, HR 97, O2 97%   Cognitive Status Examination   Orientation orientation to person, place and time   Level of Consciousness alert   Follows Commands and Answers Questions 100% of the time;able to follow multistep instructions   Personal Safety and Judgment intact   Memory intact   Integumentary   Integumentary Comments Edema in B feet (R>L). Purple skin B LEs and feet (R>L) indicating decreased blood flow. Yellow toenails.    Posture   Posture Forward head position;Protracted shoulders;Kyphosis   Range of Motion (ROM)   ROM Comment Decreased AROM B shoulder elevation; pain in lateral R shoulder with flex. Dec passive knee ext B (R>L) - no measurements taken today. ROM within functional limits for today's testing.   Strength   Strength Comments Grossly 4/5 shld flex/abd/IR. Weak shoulder ER B (~3+/5). Grossly 4+/5 B LE  strength.   Bed Mobility   Bed Mobility Comments Reported IND; min assist supine to sit today using PT arm to pull up on. Slow, but IND sit to supine.    Transfer Skills   Transfer Comments Uses arm rests for sit/stand. Difficulty standing from low chairs.   Gait   Gait Comments With 4WW: Short step length B, dec heel strike B, fwd flexed and kyphotic posture - leans on handles of 4WW. Has band tied to bottom of walker to cue for bigger steps. Improved gait with cue for bigger steps.    Gait Special Tests 25 Foot Timed Walk   Seconds 13   Steps 19 Steps   Comments Used 4WW   Balance Special Tests Timed Up and Go   Seconds 23 Seconds   Comments Used 4WW   Sensory Examination   Sensory Perception Comments Pressure sense: intact B LE; Monofilament (5.07): diminished plantar aspect R great toe, otherwise intact B feet.   Planned Therapy Interventions   Planned Therapy Interventions gait training;neuromuscular re-education;ROM;strengthening;stretching;bed mobility training;balance training;manual therapy;transfer training   Clinical Impression   Criteria for Skilled Therapeutic Interventions Met yes, treatment indicated   Predicted Duration of Therapy Intervention (days/wks) Up to 90 days   Risk & Benefits of therapy have been explained Yes   Patient, Family & other staff in agreement with plan of care Yes   GOALS   PT Eval Goals 1;2;3;4;5   Goal 1   Goal Identifier 3MWT   Goal Description Pt will increase 3MWT to 274' (20% increase from 228') to demonstrate improved tolerance to community ambulation.   Target Date 02/16/20   Goal 2   Goal Identifier TUG   Goal Description Pt will perform TUG with 4WW in less than 19 sec in order to demonstrate improved transfers and decreased risk for falls.   Target Date 02/16/20   Goal 3   Goal Identifier LE strength   Goal Description Pt will perform 10 STS in 30 seconds in order to demonstrate improved functional LE strength.   Target Date 02/16/20   Goal 4   Goal Identifier Pain    Goal Description Pt to report <3/10 pain in distal LEs during 3MWT (decreased from 5/10 initially) for improved community ambulation.   Target Date 02/16/20   Goal 5   Goal Identifier HEP   Goal Description Pt will demonstrate independent compliance to HEP including strengthening, stretching, and walking for continued wellbeing and improved QOL after discharge from skilled PT services.   Target Date 02/16/20   Total Evaluation Time   PT Vielkaal, Low Complexity Minutes (40570) 40

## 2019-11-19 NOTE — PROGRESS NOTES
11/19/19 0900   Quick Adds   Type of Visit Initial OP PT Evaluation   General Information   Start of Care Date 11/19/19   Referring Physician Chuy Stewart MD   Orders Evaluate and Treat as Indicated   Additional Orders Neck pain, balance exercises and lower leg strengthening   Order Date 11/12/19   Medical Diagnosis Parkinson disease, cervicalgia, weakness of both lower extremities, balance problems   Onset of illness/injury or Date of Surgery 11/12/19  (Order date used)   Surgical/Medical history reviewed Yes   Pertinent history of current problem (include personal factors and/or comorbidities that impact the POC) Pt presents to PT evaluation today with chief complaint of B LE weakness and pain in calves/feet - worse with longer duration walking, better with rest.  He feels he is not able to walk as far as he would like because of the pain. Constant throbbing in R great toe at baseline, but not in calves/feet. Previous PT last winter at this clinic for similar complaint. PT was beneficial for complaint with improved tolerance to walking at discharge, but discontinued treadmill walking a couple months after discharge. Treadmill is at his son's house and he was traveling frequently. Reports LE edema has decreased since a year ago, but is more in toes/feet now. Wife assists in wrapping toes/LEs with compression wrap at night. Daughter assists to don compression socks in morning. Not wearing compression socks today - felt it would be easier to don/doff normal socks for eval.  Currently amb 4WW outside of home, 2WW in home, and carries SEC with him for transfers or very short distance.  PMHx: PAD, HTN, hyperlipidemia, DM II, diabetic neuropathy, CKD, lumbar spinal stenosis, R sided blindness, giant cell arteritis   Pertinent Visual History  Blind in R eye   Previous/Current Treatment Physical Therapy   Improvement after PT Other  (Yes; improved walking tolerance with decreased LE pain)   Current Community Support  Family/friend caregiver  (Lives with wife; adult children nearby that assist)   Patient role/Employment history Retired   Living environment House/[facility]   Home/Community Accessibility Comments 2-3 stairs to enter with rail   Current Assistive Devices Four Wheeled Walker;Front Wheeled Walker;Standard Cane   ADL Devices Shower/Tub Chair;Shower/Tub Grab Bar   Assistive Devices Comments 2WW in home, 4WW outside home; also has SEC   Patient/Family Goals Statement Decrease leg pain and walk longer distances   Fall Risk Screen   Fall screen completed by PT   Have you fallen 2 or more times in the past year? No   Have you fallen and had an injury in the past year? No   Timed Up and Go score (seconds) 23   Is patient a fall risk? Yes;Department fall risk interventions implemented   Pain   Patient currently in pain Yes   Pain comments Constant pain in R great toe (throbbing), worsens with WB, alleviated to baseline pain with rest. Intermittent pain in R toes 2-5 with WB. R knee pain.    Vitals Signs   Heart Rate 97   SpO2 96   Blood Pressure 127/58  (L UE; seated)   Vital Signs Comments After 3MWT: /64, HR 97, O2 97%   Cognitive Status Examination   Orientation orientation to person, place and time   Level of Consciousness alert   Follows Commands and Answers Questions 100% of the time;able to follow multistep instructions   Personal Safety and Judgment intact   Memory intact   Integumentary   Integumentary Comments Edema in B feet (R>L). Purple skin B LEs and feet (R>L) indicating decreased blood flow. Yellow toenails.    Posture   Posture Forward head position;Protracted shoulders;Kyphosis   Range of Motion (ROM)   ROM Comment Decreased AROM B shoulder elevation; pain in lateral R shoulder with flex. Dec passive knee ext B (R>L) - no measurements taken today. ROM within functional limits for today's testing.   Strength   Strength Comments Grossly 4/5 shld flex/abd/IR. Weak shoulder ER B (~3+/5). Grossly 4+/5 B LE  strength.   Bed Mobility   Bed Mobility Comments Reported IND; min assist supine to sit today using PT arm to pull up on. Slow, but IND sit to supine.    Transfer Skills   Transfer Comments Uses arm rests for sit/stand. Difficulty standing from low chairs.   Gait   Gait Comments With 4WW: Short step length B, dec heel strike B, fwd flexed and kyphotic posture - leans on handles of 4WW. Has band tied to bottom of walker to cue for bigger steps. Improved gait with cue for bigger steps.    Gait Special Tests 25 Foot Timed Walk   Seconds 13   Steps 19 Steps   Comments Used 4WW   Balance Special Tests Timed Up and Go   Seconds 23 Seconds   Comments Used 4WW   Sensory Examination   Sensory Perception Comments Pressure sense: intact B LE; Monofilament (5.07): diminished plantar aspect R great toe, otherwise intact B feet.   Planned Therapy Interventions   Planned Therapy Interventions gait training;neuromuscular re-education;ROM;strengthening;stretching;bed mobility training;balance training;manual therapy;transfer training   Clinical Impression   Criteria for Skilled Therapeutic Interventions Met yes, treatment indicated   PT Diagnosis deconditioning, impaired balance, leg pain   Influenced by the following impairments LE pain (likely claudication), mild LE edema, decreased activity tolerance, decreased LE strength.   Functional limitations due to impairments Impaired gait, impaired balance.   Clinical Presentation Evolving/Changing   Clinical Presentation Rationale increased LE pain the past few months   Clinical Decision Making (Complexity) Low complexity   Therapy Frequency 2 times/Week  (taper as appropriate)   Predicted Duration of Therapy Intervention (days/wks) Up to 90 days   Risk & Benefits of therapy have been explained Yes   Patient, Family & other staff in agreement with plan of care Yes   GOALS   PT Eval Goals 1;2;3;4;5   Goal 1   Goal Identifier 3MWT   Goal Description Pt will increase 3MWT to 274' (20%  increase from 228') to demonstrate improved tolerance to community ambulation.   Target Date 02/16/20   Goal 2   Goal Identifier TUG   Goal Description Pt will perform TUG with 4WW in less than 19 sec in order to demonstrate improved transfers and decreased risk for falls.   Target Date 02/16/20   Goal 3   Goal Identifier LE strength   Goal Description Pt will perform 10 STS in 30 seconds in order to demonstrate improved functional LE strength.   Target Date 02/16/20   Goal 4   Goal Identifier Pain   Goal Description Pt to report <3/10 pain in distal LEs during 3MWT (decreased from 5/10 initially) for improved community ambulation.   Target Date 02/16/20   Goal 5   Goal Identifier HEP   Goal Description Pt will demonstrate independent compliance to HEP including strengthening, stretching, and walking for continued wellbeing and improved QOL after discharge from skilled PT services.   Target Date 02/16/20   Total Evaluation Time   PT Eval, Low Complexity Minutes (25261) 40

## 2019-11-19 NOTE — PROGRESS NOTES
[facility]        OUTPATIENT PHYSICAL THERAPY FUNCTIONAL EVALUATION  PLAN OF TREATMENT FOR OUTPATIENT REHABILITATION  (COMPLETE FOR INITIAL CLAIMS ONLY)  Patient's Last Name, First Name, M.I.  YOB: 1931  Bart Blair        Provider's Name   [facility]   Medical Record No.  [id number]     Start of Care Date:  11/19/19   Onset Date:  11/12/19(Order date used)   Type:     _X__PT   ____OT  ____SLP Medical Diagnosis:   Parkinson disease, cervicalgia, weakness of both lower extremities, balance problems      PT Diagnosis:   deconditioning, impaired balance, leg pain Visits from SOC:  1                              __________________________________________________________________________________  Plan of Treatment/Functional Goals:  gait training, neuromuscular re-education, ROM, strengthening, stretching, bed mobility training, balance training, manual therapy, transfer training           GOALS  3MWT  Pt will increase 3MWT to 274' (20% increase from 228') to demonstrate improved tolerance to community ambulation.  02/16/20    TUG  Pt will perform TUG with 4WW in less than 19 sec in order to demonstrate improved transfers and decreased risk for falls.  02/16/20    LE strength  Pt will perform 10 STS in 30 seconds in order to demonstrate improved functional LE strength.  02/16/20    Pain  Pt to report <3/10 pain in distal LEs during 3MWT (decreased from 5/10 initially) for improved community ambulation.  02/16/20    HEP  Pt will demonstrate independent compliance to HEP including strengthening, stretching, and walking for continued wellbeing and improved QOL after discharge from skilled PT services.  02/16/20       Therapy Frequency:    2x per week, taper as appropriate  Predicted Duration of Therapy Intervention:  Up to 90 days    Masha Sherwood, PT                                     I CERTIFY THE NEED FOR THESE SERVICES FURNISHED UNDER        THIS PLAN OF TREATMENT AND WHILE UNDER MY CARE     (Physician co-signature of this document indicates review and certification of the therapy plan).                Certification Date From:    11/19/19  Certification Date To:   2/16/20    Referring Provider:  Chuy Stewart MD    Initial Assessment  See Epic Evaluation- Start of Care Date: 11/19/19

## 2019-11-22 ENCOUNTER — PATIENT OUTREACH (OUTPATIENT)
Dept: CARE COORDINATION | Facility: CLINIC | Age: 84
End: 2019-11-22

## 2019-11-22 NOTE — PROGRESS NOTES
Clinic Care Coordination Contact  [facility]/Voicemail       Clinical Data: Care Coordinator Outreach  Outreach attempted x 1.  Left message on patient's voicemail with call back information and requested return call.  Plan: Care Coordinator sent care coordination introduction letter on 10/25/19 via mail. Care Coordinator will try to reach patient again in 3-5 business days.          Alberto Tobias MSN, RN, PHN, CCM   Primary Care Clinical RN Care Coordinator  [facility]  [email or web address]  Office: [phone]

## 2019-12-05 ENCOUNTER — HOSPITAL ENCOUNTER (OUTPATIENT)
Dept: PHYSICAL THERAPY | Facility: CLINIC | Age: 84
Setting detail: THERAPIES SERIES
End: 2019-12-05
Attending: FAMILY MEDICINE
Payer: MEDICARE

## 2019-12-05 ENCOUNTER — TELEPHONE (OUTPATIENT)
Dept: FAMILY MEDICINE | Facility: CLINIC | Age: 84
End: 2019-12-05

## 2019-12-05 PROCEDURE — 97110 THERAPEUTIC EXERCISES: CPT | Mod: GP | Performed by: PHYSICAL THERAPIST

## 2019-12-05 NOTE — TELEPHONE ENCOUNTER
Reason for Call:  Other call back    Detailed comments: Patient's wife is calling because patient's blood sugar has been higher than normal for him (140 today), and at his last appointment on 11/12 Dr. Stewart had discontinued patient's glipizide.    Malathi is wondering if patient should restart the glipizide since his sugar has gone up?    Phone Number Patient can be reached at: Home number on file [phone] (home)    Best Time: anytime    Can we leave a detailed message on this number? YES    Call taken on 12/5/2019 at 10:00 AM by Gaurav Giron

## 2019-12-05 NOTE — TELEPHONE ENCOUNTER
Patient called back.  He is starting physical therapy and has noticed that blood sugars were increasing.  Highest has been 140.     Encouraged to monitor types of food intake, drinking plenty of water especially if doing PT.      Call back if any other concerns.    Hanh Funez RN

## 2019-12-09 NOTE — PROGRESS NOTES
Clinic Care Coordination Contact    Follow Up Progress Note      Assessment: The patient is now attending physical therapy again to work on balance and increase strength and stamina.  Per the spouse of the patient the patient is doing much better.  The goals are being meet and therefore the patient was placed on maintenance and will be called again in 2 months.  Medication reconciliation was completed with the spouse of the patient.  Health maintenance needs were reviewed as well.    Goals addressed this encounter:   Goals Addressed                 This Visit's Progress      #1  Medication 1 (pt-stated)   On track     Goal Statement: I will take all medications as prescribed by the provider.  I will report all the side effects especially those of dizziness to the provider so changes can be made if needed.  Measure of Success: All medications are taken as prescribed by the providers.  Supportive Steps to Achieve: Reviewed the rationale and reason for reporting any side effects especially those with dizziness due to the patient's balance issue.  Barriers: Balance issues which can be involved with the side effects.  Strengths: Motivated and engaged in care coordination.  Date to Achieve By: 7/20/2020  Patient expressed understanding of goal: Yes            #2  Reducing Risks (pt-stated)   On track     Goal Statement: I will use my walker wherever I go to reduce the risk of falling, even in our own home.  The patient will practice his balance exercises to prevent falling as well.  Patient measure of Success: The patient does not fall.  Supportive Steps to Achieve: Rationale for using the walker at all times was explained to the patient  Barriers: Balance issues.  Strengths: Motivated and engaged in care coordination.  Date to Achieve By: 7/20/2020  Patient expressed understanding of goal: Yes                 Intervention/Education provided during outreach: importance of completing the daily exercises to maintain the  strength and stamina.     Outreach Frequency: 2 months    Plan:   1.  The patient will take all medications as prescribed by the providers.  2.  The patient will attend all PT appointments to maintain the strength and stamina.  3.  Care Coordination to remain available for the patient to contact in the event of future needs. No follow up planned at this time.     RN  Care Coordinator will follow up in 2 months.            Alberto Tobias MSN, RN, PHN, [facility]   Primary Care Clinical RN Care Coordinator  [facility]  12/9/2019   3:19 PM  [email or web address]  Office: [phone]

## 2019-12-10 ENCOUNTER — HOSPITAL ENCOUNTER (OUTPATIENT)
Dept: PHYSICAL THERAPY | Facility: CLINIC | Age: 84
Setting detail: THERAPIES SERIES
End: 2019-12-10
Attending: FAMILY MEDICINE
Payer: MEDICARE

## 2019-12-10 PROCEDURE — 97110 THERAPEUTIC EXERCISES: CPT | Mod: GP | Performed by: PHYSICAL THERAPIST

## 2019-12-11 ENCOUNTER — TELEPHONE (OUTPATIENT)
Dept: FAMILY MEDICINE | Facility: CLINIC | Age: 84
End: 2019-12-11

## 2019-12-11 NOTE — TELEPHONE ENCOUNTER
Blood sugars at the level the are noticing are ok. He would not have any symptoms or concerns unless they were regularly above 200.  His last A1c was 5.5 and with that he was at a high risk for low blood sugars which would be significantly more dangerous than if his blood sugars are in the range reported. Blood sugars in these ranges will probably be reflected with an A1c between 6.5 and 7.5 which is where we would want him. If we were to restart his glipizide it would be at 2.5 mg previous was at 5 mg. Have them check some afternoon blood sugars and if they are going above 200 then I would restart the Glipizide at 2.5 mg per day.  Quentin Stewart MD

## 2019-12-11 NOTE — TELEPHONE ENCOUNTER
Routing to PCP to advise re: increased BG.  Please see note below.  Your schedule is booked out a few weeks. Would you like to work patient in, have him continue monitoring BG or restart glipizide?    Patient saw Dr. Stewart on 11/12/19 with well-managed diabetes, so glipizide discontinued at that time.  Patient reports BG had ranged from  in the morning prior to this. Now since discontinuing glipizide, he has noticed BG slowly increasing.  Most days it has been in the 130-140 range in the morning, was 157 today. Denies symptoms except for has been urinating a bit more and a bit more thirsty.  Patient notified that PCP has no appt available in the next 3 weeks, so will route to advise and get back to him with a recommendation.     Georgina Hutson RN

## 2019-12-11 NOTE — TELEPHONE ENCOUNTER
Provider recommendation relayed to patient with understanding voiced.  He will check BG in the afternoon and get back to us early next week with results, was provided with RN number [phone].  Will postpone this encounter to reach out to patient early next week in case we haven't heard from him.  Then can either advise patient okay if below 200, or route to PCP for glipizide order if consistently above 200.    Georgina Hutson RN

## 2019-12-11 NOTE — TELEPHONE ENCOUNTER
Reason for call:  Patient reporting a symptom    Symptom or request:  Blood sugars have gradually been moving up since patient went off his glipizide ( Dr Stewart took him off of this med).  Today his blood sugar was 157. Patient is concerned.    Duration (how long have symptoms been present):  Gradually going up    Have you been treated for this before? No    Additional comments:     Phone Number patient can be reached at:  Home number on file [phone] (home)    Best Time:  any    Can we leave a detailed message on this number:  YES    Call taken on 12/11/2019 at 10:26 AM by Cecelia Morel

## 2019-12-12 ENCOUNTER — HOSPITAL ENCOUNTER (OUTPATIENT)
Dept: PHYSICAL THERAPY | Facility: CLINIC | Age: 84
Setting detail: THERAPIES SERIES
End: 2019-12-12
Attending: FAMILY MEDICINE
Payer: MEDICARE

## 2019-12-12 PROCEDURE — 97110 THERAPEUTIC EXERCISES: CPT | Mod: GP | Performed by: PHYSICAL THERAPIST

## 2019-12-12 NOTE — TELEPHONE ENCOUNTER
Patient wife called stating that Dionisio's blood sugar were high.  She reports high blood sugar of 156 this morning.  His blood sugars have been high around 140s recently.    See below message from Dr Stewart.  No concerns at this point unless consistently above 200.    Hanh Funez RN

## 2019-12-18 ENCOUNTER — HOSPITAL ENCOUNTER (OUTPATIENT)
Dept: PHYSICAL THERAPY | Facility: CLINIC | Age: 84
Setting detail: THERAPIES SERIES
End: 2019-12-18
Attending: FAMILY MEDICINE
Payer: MEDICARE

## 2019-12-18 PROCEDURE — 97110 THERAPEUTIC EXERCISES: CPT | Mod: GP | Performed by: PHYSICAL THERAPIST

## 2019-12-18 NOTE — TELEPHONE ENCOUNTER
Called patient and wife. She stated patient's blood sugars have not been above 200. Nurse relayed the message from PCP below and they verbalized understanding. They stated they will continue to monitor and call back if they are seeing he is consistently above 200.     Diandra Leonard RN

## 2019-12-18 NOTE — DISCHARGE INSTRUCTIONS
12/18/19    Try a SMALL PILLOW between knees for sleep - when lying on your side.      Keep doing the exercises.      Good job.

## 2019-12-20 ENCOUNTER — HOSPITAL ENCOUNTER (OUTPATIENT)
Dept: PHYSICAL THERAPY | Facility: CLINIC | Age: 84
Setting detail: THERAPIES SERIES
End: 2019-12-20
Attending: FAMILY MEDICINE
Payer: MEDICARE

## 2019-12-20 PROCEDURE — 97110 THERAPEUTIC EXERCISES: CPT | Mod: GP | Performed by: PHYSICAL THERAPIST

## 2019-12-23 ENCOUNTER — HOSPITAL ENCOUNTER (OUTPATIENT)
Dept: PHYSICAL THERAPY | Facility: CLINIC | Age: 84
Setting detail: THERAPIES SERIES
End: 2019-12-23
Attending: FAMILY MEDICINE
Payer: MEDICARE

## 2019-12-23 PROCEDURE — 97110 THERAPEUTIC EXERCISES: CPT | Mod: GP | Performed by: PHYSICAL THERAPIST

## 2019-12-27 ENCOUNTER — HOSPITAL ENCOUNTER (OUTPATIENT)
Dept: PHYSICAL THERAPY | Facility: CLINIC | Age: 84
Setting detail: THERAPIES SERIES
End: 2019-12-27
Attending: FAMILY MEDICINE
Payer: MEDICARE

## 2019-12-27 PROCEDURE — 97110 THERAPEUTIC EXERCISES: CPT | Mod: GP | Performed by: PHYSICAL THERAPIST

## 2019-12-29 DIAGNOSIS — F33.9 EPISODE OF RECURRENT MAJOR DEPRESSIVE DISORDER, UNSPECIFIED DEPRESSION EPISODE SEVERITY (H): ICD-10-CM

## 2020-01-07 NOTE — TELEPHONE ENCOUNTER
"Requested Prescriptions   Pending Prescriptions Disp Refills     sertraline (ZOLOFT) 25 MG tablet [Pharmacy Med Name: SERTRALINE 25MG TABLETS]  Last Written Prescription Date:  9/27/2019  Last Fill Quantity: 90 tablet,  # refills: 0   Last office visit: 11/12/2019 with prescribing provider:  Terry GOODEN  Prescribing Provider's NPI: [id number] Amy Sena    Future Office Visit:   Next 5 appointments (look out 90 days)    Feb 12, 2020 10:00 AM CST  SHORT with Chuy Stewart MD  [facility] ([facility] [address] [place] [id number]  [phone]        90 tablet 0     Sig: TAKE 1 TABLET BY MOUTH EVERY DAY       SSRIs Protocol Failed - 12/29/2019  2:20 PM        Failed - PHQ-9 score less than 5 in past 6 months     Please review last PHQ-9 score.   PHQ-9 SCORE 5/1/2013 1/15/2014 11/22/2016   PHQ-9 Total Score 6 0 -   PHQ-9 Total Score - - 3     No flowsheet data found.              Passed - Medication is active on med list        Passed - Patient is age 18 or older        Passed - Recent (6 mo) or future (30 days) visit within the authorizing provider's specialty     Patient had office visit in the last 6 months or has a visit in the next 30 days with authorizing provider or within the authorizing provider's specialty.  See \"Patient Info\" tab in inbasket, or \"Choose Columns\" in Meds & Orders section of the refill encounter.            "

## 2020-01-08 RX ORDER — SERTRALINE HYDROCHLORIDE 25 MG/1
TABLET, FILM COATED ORAL
Qty: 90 TABLET | Refills: 3 | Status: SHIPPED | OUTPATIENT
Start: 2020-01-08 | End: 2021-01-14

## 2020-01-08 NOTE — TELEPHONE ENCOUNTER
Routing refill request to provider for review/approval because:  PHQ-9 score:    PHQ-9 SCORE 11/22/2016   PHQ-9 Total Score -   PHQ-9 Total Score 3             Trish Rai, RN, BSN, PHN  [facility] [place]

## 2020-01-14 ENCOUNTER — HOSPITAL ENCOUNTER (OUTPATIENT)
Dept: PHYSICAL THERAPY | Facility: CLINIC | Age: 85
Setting detail: THERAPIES SERIES
End: 2020-01-14
Attending: FAMILY MEDICINE
Payer: MEDICARE

## 2020-01-14 PROCEDURE — 97110 THERAPEUTIC EXERCISES: CPT | Mod: GP | Performed by: PHYSICAL THERAPIST

## 2020-01-14 NOTE — IP AVS SNAPSHOT
MRN:[id number]                      After Visit Summary   1/14/2020    Bart Blair    MRN: [id number]           Visit Information        Provider Department      1/14/2020 10:45 AM Masha Sherwood, PT [facility], [place], Physical Therapy - Outpatient        Your next 10 appointments already scheduled    Jan 21, 2020 11:45 AM CST  Neuro Treatment with Masha Sherwood, PT  [facility], [place], Physical Therapy - Outpatient ([facility]) [address], [address]  [place] [address]  [phone]      Jan 28, 2020 11:00 AM CST  Neuro Treatment with Masha Sherwood, PT  [facility], [place], Physical Therapy - Outpatient ([facility]) [address], [address]  [place] [address]  [phone]      Feb 12, 2020 10:00 AM CST  SHORT with Chuy Stewart MD  [facility] ([facility]) [address] [place] [id number] [phone]           Further instructions from your care team       Walking on the treadmill - 0.8mph, 3-4 mins. Sit on a chair on the treadmill (when it's off!) to rest. 3 bouts.      Come up with a sustainable plan to work on walking 3x per week.          MyChart Information    [employer] gives you secure access to your electronic health record. If you see a primary care provider, you can also send messages to your care team and make appointments. If you have questions, please call your primary care clinic.  If you do not have a primary care provider, please call [phone] and they will assist you.       Care EveryWhere ID    This is your Care EveryWhere ID. This could be used by other organizations to access your [place] medical records  [id number]       Equal Access to Services    TIFFANI ABDUL AH: kylah Banks, jd de souza. So [facility]  [phone].    ATENCIÓN: Si habla español, tiene a ellis disposición servicios gratuitos de asistencia lingüística. Llame al [phone].    We comply with applicable federal and state civil rights laws, including the [place] Human Rights Act. We do not discriminate on the basis of race, color, creed, Shinto, national origin, marital status, age, disability, sex, sexual orientation, or gender identity.

## 2020-01-14 NOTE — DISCHARGE INSTRUCTIONS
Walking on the treadmill - 0.8mph, 3-4 mins. Sit on a chair on the treadmill (when it's off!) to rest. 3 bouts.      Come up with a sustainable plan to work on walking 3x per week.

## 2020-01-21 ENCOUNTER — HOSPITAL ENCOUNTER (OUTPATIENT)
Dept: PHYSICAL THERAPY | Facility: CLINIC | Age: 85
Setting detail: THERAPIES SERIES
End: 2020-01-21
Attending: FAMILY MEDICINE
Payer: MEDICARE

## 2020-01-21 PROCEDURE — 97110 THERAPEUTIC EXERCISES: CPT | Mod: GP | Performed by: PHYSICAL THERAPIST

## 2020-01-21 NOTE — IP AVS SNAPSHOT
MRN:[id number]                      After Visit Summary   1/21/2020    Bart Blair    MRN: [id number]           Visit Information        Provider Department      1/21/2020 11:45 AM Masha Sherwood, PT [facility], [place], Physical Therapy - Outpatient        Your next 10 appointments already scheduled    Jan 28, 2020 11:00 AM CST  Neuro Treatment with Masha Sherwood, PT  [facility], [place], Physical Therapy - Outpatient ([facility]) [address], [address]  [place] [address]  [phone]      Feb 12, 2020 10:00 AM CST  SHORT with Chuy Stewart MD  [facility] ([facility]) [address] [id number] [phone]           Further instructions from your care team       Options for looking locally at treadmills    [employer]    [employer] Equipment    [employer](?)        Portable or foldable is ok for what Bart needs. The most important part is that it increases in 0.1mph increments. He is walking at 0.8-0.9mph right now. I don't think he will need to work on inclines.    [employer] Information    [employer] gives you secure access to your electronic health record. If you see a primary care provider, you can also send messages to your care team and make appointments. If you have questions, please call your primary care clinic.  If you do not have a primary care provider, please call [phone] and they will assist you.       Care EveryWhere ID    This is your Care EveryWhere ID. This could be used by other organizations to access your [place] medical records  [id number]       Equal Access to Services    TIFFANI ABDUL : Hadii emmie Gruber, waaxda luqadaha, qaybta kaaljd edgar. So [facility] [phone].    ATENCIÓN: Si habla español, tiene a ellis disposición servicios gratuitos de asistencia lingüística. Llame al  [phone].    We comply with applicable federal and state civil rights laws, including the [place] Human Rights Act. We do not discriminate on the basis of race, color, creed, Anglican, national origin, marital status, age, disability, sex, sexual orientation, or gender identity.

## 2020-01-21 NOTE — DISCHARGE INSTRUCTIONS
Options for looking locally at treadmills    [employer]    [employer]    [employer](?)        Portable or foldable is ok for what Bart needs. The most important part is that it increases in 0.1mph increments. He is walking at 0.8-0.9mph right now. I don't think he will need to work on inclines.

## 2020-01-28 ENCOUNTER — HOSPITAL ENCOUNTER (OUTPATIENT)
Dept: PHYSICAL THERAPY | Facility: CLINIC | Age: 85
Setting detail: THERAPIES SERIES
End: 2020-01-28
Attending: FAMILY MEDICINE
Payer: MEDICARE

## 2020-01-28 PROCEDURE — 97110 THERAPEUTIC EXERCISES: CPT | Mod: GP | Performed by: PHYSICAL THERAPIST

## 2020-01-28 NOTE — DISCHARGE INSTRUCTIONS
You have made really good progress in PT again. Keep on walking! Even if you can't get access to a treadmill, make sure you are going on longer walks at least 2x per week.      3 min walk test  11/19/19: 228'  1/28/20: 410'    Timed up and go (stand from a chair, walk 10ft, sit in the chair)  11/19/19: 23 secs  1/28/20: 19.9 secs    25' walk  11/19/19: 13 secs  1/28/20: 10.4 secs

## 2020-01-28 NOTE — IP AVS SNAPSHOT
MRN:[id number]                      After Visit Summary   1/28/2020    Bart Blair    MRN: [id number]           Visit Information        Provider Department      1/28/2020 11:00 AM Masha Sherwood, PT [facility], [place], Physical Therapy - Outpatient        Your next 10 appointments already scheduled    Feb 12, 2020 10:00 AM CST  SHORT with Chuy Stewart MD  [facility] ([facility]) [address] [place] [id number] [phone]           Further instructions from your care team       You have made really good progress in PT again. Keep on walking! Even if you can't get access to a treadmill, make sure you are going on longer walks at least 2x per week.      3 min walk test  11/19/19: 228'  1/28/20: 410'    Timed up and go (stand from a chair, walk 10ft, sit in the chair)  11/19/19: 23 secs  1/28/20: 19.9 secs    25' walk  11/19/19: 13 secs  1/28/20: 10.4 secs    MyChart Information    [employer] gives you secure access to your electronic health record. If you see a primary care provider, you can also send messages to your care team and make appointments. If you have questions, please call your primary care clinic.  If you do not have a primary care provider, please call [phone] and they will assist you.       Care EveryWhere ID    This is your Care EveryWhere ID. This could be used by other organizations to access your [place] medical records  [id number]       Equal Access to Services    TIFFANI ABDUL AH: Hadii aad ku hadasho Soomaali, waaxda luqadaha, qaybta kaalmada adeegyada, jd barroso. So [facility] [phone].    ATENCIÓN: Si habla español, tiene a ellis disposición servicios gratuitos de asistencia lingüística. Llame al [phone].    We comply with applicable federal and state civil rights laws, including the [place] Human Rights Act. We do not discriminate on the basis of race, color, creed, Cheondoism,  national origin, marital status, age, disability, sex, sexual orientation, or gender identity.

## 2020-01-29 DIAGNOSIS — I70.213 ATHEROSCLEROSIS OF NATIVE ARTERY OF BOTH LOWER EXTREMITIES WITH INTERMITTENT CLAUDICATION (H): ICD-10-CM

## 2020-01-29 NOTE — PROGRESS NOTES
Outpatient Physical Therapy Discharge Note     Patient: Bart Blair  : 1931    Beginning/End Dates of Reporting Period:  2019 to 2020 (11 visits)    Referring Provider: Dr Chuy Stewart    Therapy Diagnosis: deconditioning, impaired balance, leg pain     Client Self Report: My pain is really bad today - especially R knee.    Objective Measurements:  Objective Measure: Treadmill  Details: Trial 1: 3:50 mins, 0.8mph, 306ft. Trial 2: 4 mins, 0.8mph, 322ft. TM 3: 4 mins, 0.8 mph, 326ft.     Objective Measure: 25' walk  Details: 10.38 secs     Objective Measure: TUG  Details: 19.9 secs 4WW    Objective Measure: 3MWT  Details: 410' with 4WW     Objective Measure: STS in 30 sec  Details: 8 reps using arm rests      Goals:  Goal Identifier 3MWT   Goal Description Pt will increase 3MWT to 274' (20% increase from 228') to demonstrate improved tolerance to community ambulation.   Target Date 20   Date Met  19   Progress: See last testing above. Improving community ambulation.     Goal Identifier TUG   Goal Description Pt will perform TUG with 4WW in less than 19 sec in order to demonstrate improved transfers and decreased risk for falls.   Target Date 20   Date Met   NEARLY MET   Progress: See above. Pt is improving in efficiency of sit/stand and household ambulation.     Goal Identifier LE strength   Goal Description Pt will perform 10 STS in 30 seconds in order to demonstrate improved functional LE strength.   Target Date 20   Date Met   NOT MET   Progress: Limited today due to R knee pain. Pt notes he is working on doing 10 sit/stands at home in a row.     Goal Identifier Pain   Goal Description Pt to report <3/10 pain in distal LEs during 3MWT (decreased from 5/10 initially) for improved community ambulation.   Target Date 20   Date Met   NOT MET   Progress: Pain in R knee consistently is 4-5/10. Pt does note less stiffness and pain with walking intervention. However, this  pain relief does not last.     Goal Identifier HEP   Goal Description Pt will demonstrate independent compliance to HEP including strengthening, stretching, and walking for continued wellbeing and improved QOL after discharge from skilled PT services.   Target Date 02/16/20   Date Met   1/28/20   Progress: Pt is indep with his stretching and strengthening HEP. However, he has not consistently worked on treadmill walking due to not owning one and having more limited access to his son's treadmill due to son traveling more for work. They are looking into a treadmill for home - his daughter comes every morning and could assist pt with this task.       Progress Toward Goals:   Progress this reporting period: improving walking tolerance, decreased pain overall, indep with HEP.    Plan:  Discharge from therapy.    Discharge:    Reason for Discharge: No further expectation of progress.    Equipment Issued: none    Discharge Plan: Patient to continue home program.

## 2020-01-30 NOTE — TELEPHONE ENCOUNTER
cilostazol (PLETAL) 50 MG   Last Written Prescription Date:  1/23/19  Last Fill Quantity: 180,   # refills: 3  Last Office Visit : 5/7/19  Future Office visit:  NONE    Routing refill request to provider for review/approval because:  90 DAY RF PENDING   OVER DUE HGB  CR

## 2020-02-03 ENCOUNTER — PATIENT OUTREACH (OUTPATIENT)
Dept: CARE COORDINATION | Facility: CLINIC | Age: 85
End: 2020-02-03

## 2020-02-03 NOTE — PROGRESS NOTES
Clinic Care Coordination Contact  [facility]/Voicemail       Clinical Data: Care Coordinator Outreach  Outreach attempted x 1.  Left message on patient's voicemail with call back information and requested return call.  Plan: Care Coordinator sent care coordination introduction letter on 10/25/2019 via mail. Care Coordinator will try to reach patient again in 10 business days.        Alberto Tobias MSN, RN, PHN, CCM   Primary Care Clinical RN Care Coordinator  [facility]  2/3/2020   3:37 PM  [email or web address]  Office: [phone]

## 2020-02-04 RX ORDER — CILOSTAZOL 50 MG/1
TABLET ORAL
Qty: 180 TABLET | Refills: 0 | Status: SHIPPED | OUTPATIENT
Start: 2020-02-04 | End: 2020-04-30

## 2020-02-12 ENCOUNTER — OFFICE VISIT (OUTPATIENT)
Dept: FAMILY MEDICINE | Facility: CLINIC | Age: 85
End: 2020-02-12
Payer: MEDICARE

## 2020-02-12 VITALS
WEIGHT: 214 LBS | DIASTOLIC BLOOD PRESSURE: 58 MMHG | BODY MASS INDEX: 34.39 KG/M2 | HEIGHT: 66 IN | HEART RATE: 96 BPM | OXYGEN SATURATION: 96 % | RESPIRATION RATE: 22 BRPM | SYSTOLIC BLOOD PRESSURE: 118 MMHG

## 2020-02-12 DIAGNOSIS — G20.A1 PARKINSON DISEASE (H): ICD-10-CM

## 2020-02-12 DIAGNOSIS — I73.9 PERIPHERAL ARTERY DISEASE (H): ICD-10-CM

## 2020-02-12 DIAGNOSIS — M31.6 TEMPORAL ARTERITIS (H): ICD-10-CM

## 2020-02-12 DIAGNOSIS — E11.65 TYPE 2 DIABETES MELLITUS WITH HYPERGLYCEMIA, WITHOUT LONG-TERM CURRENT USE OF INSULIN (H): Primary | ICD-10-CM

## 2020-02-12 LAB — HBA1C MFR BLD: 6.2 % (ref 0–5.6)

## 2020-02-12 PROCEDURE — 99214 OFFICE O/P EST MOD 30 MIN: CPT | Performed by: FAMILY MEDICINE

## 2020-02-12 PROCEDURE — 83036 HEMOGLOBIN GLYCOSYLATED A1C: CPT | Performed by: FAMILY MEDICINE

## 2020-02-12 PROCEDURE — 36415 COLL VENOUS BLD VENIPUNCTURE: CPT | Performed by: FAMILY MEDICINE

## 2020-02-12 ASSESSMENT — MIFFLIN-ST. JEOR: SCORE: 1583.45

## 2020-02-12 NOTE — PROGRESS NOTES
Subjective     Bart Blair is a 88 year old male who presents to clinic today for the following health issues:    HPI   Diabetes Follow-up    How often are you checking your blood sugar? One time daily  What time of day are you checking your blood sugars (select all that apply)?  Before meals  Have you had any blood sugars above 200?  No  Have you had any blood sugars below 70?  No    What symptoms do you notice when your blood sugar is low?  None    What concerns do you have today about your diabetes? None     Do you have any of these symptoms? (Select all that apply)  No numbness or tingling in feet.  No redness, sores or blisters on feet.  No complaints of excessive thirst.  No reports of blurry vision.  No significant changes to weight.    Have you had a diabetic eye exam in the last 12 months? Yes- Date of last eye exam: 2019    Edema in Calves  His daughter has concerns about swelling in his calves. He was walking on the treadmill in therapy and it did not cause pain in his calves. He did therapy for circulation and they are looking into getting a treadmill for the house so he can continue walking.     Temporal arteritis improved and he is tapering off prednisone    parkinson's stable. No changes     BP Readings from Last 2 Encounters:   02/12/20 118/58   11/12/19 104/58     Hemoglobin A1C (%)   Date Value   02/12/2020 6.2 (H)   11/12/2019 5.5     LDL Cholesterol Calculated (mg/dL)   Date Value   11/12/2019 63   10/09/2018 21             Hypertension Follow-up      Do you check your blood pressure regularly outside of the clinic? No     Are you following a low salt diet? No    Are your blood pressures ever more than 140 on the top number (systolic) OR more   than 90 on the bottom number (diastolic), for example 140/90? Not sure      How many servings of fruits and vegetables do you eat daily?  2-3    On average, how many sweetened beverages do you drink each day (Examples: soda, juice, sweet tea, etc.  Do NOT  count diet or artificially sweetened beverages)?   1    How many days per week do you exercise enough to make your heart beat faster? none    How many minutes a day do you exercise enough to make your heart beat faster? none    How many days per week do you miss taking your medication? 0    Recent Labs   Lab Test 02/12/20  0944 11/12/19  1033 11/12/19  0956 07/02/19  1127 04/08/19  0644  04/04/19  1707 12/08/18  1346 11/14/18  1400 10/09/18  1031  12/22/17  1830 10/24/17  1050  11/22/16  0948   A1C 6.2*  --  5.5 6.3*  --   --  7.6*  --  8.8* 8.3*   < >  --  6.6*   < > 6.3*   LDL  --  63  --   --   --   --   --   --   --  21  --   --  32  --   --    HDL  --  44  --   --   --   --   --   --   --  49  --   --  75  --   --    TRIG  --  157*  --   --   --   --   --   --   --  287*  --   --  226*  --   --    ALT  --   --   --   --   --   --  16 38  --   --   --  16  --   --   --    CR  --   --   --  1.58* 1.27*   < > 1.56* 1.41* 1.53* 1.55*   < > 1.13 1.29*  --   --    GFRESTIMATED  --   --   --  38* 50*   < > 39* 47* 43* 43*   < > 61 53*  --   --    GFRESTBLACK  --   --   --  44* 58*   < > 45* 57* 52* 52*   < > 74 64  --   --    POTASSIUM  --   --   --  4.3 4.2   < > 4.4 4.6 4.8 5.1   < > 5.1 4.9  --   --    TSH  --   --   --   --   --   --   --   --  1.38  --   --   --   --   --  1.73    < > = values in this interval not displayed.      BP Readings from Last 3 Encounters:   02/12/20 118/58   11/12/19 104/58   07/02/19 126/68    Wt Readings from Last 3 Encounters:   02/12/20 97.1 kg (214 lb)   11/12/19 96.2 kg (212 lb)   07/02/19 97.9 kg (215 lb 12.8 oz)         Reviewed and updated as needed this visit by Provider         Review of Systems   ROS COMP: Constitutional, HEENT, cardiovascular, pulmonary, gi and gu systems are negative, except as otherwise noted.    This document serves as a record of the services and decisions personally performed and made by Rafa Stewart MD. It was created on his behalf by Amy Jeffrey,  "a trained medical scribe. The creation of this document is based on the provider's statements to the medical scribe.  Amy Jeffrey 10:18 AM February 12, 2020        Objective    /58 (BP Location: Right arm, Patient Position: Chair, Cuff Size: Adult Large)   Pulse 96   Resp 22   Ht 1.676 m (5' 6\")   Wt 97.1 kg (214 lb)   SpO2 96%   BMI 34.54 kg/m    Body mass index is 34.54 kg/m .     Physical Exam   GENERAL: healthy, alert and no distress  RESP: lungs clear to auscultation - no rales, rhonchi or wheezes  CV: regular rate and rhythm, normal S1 S2, no S3 or S4, no murmur, click or rub, no peripheral edema   SKIN: no suspicious lesions or rashes to visible skin   MS: no tenderness or edema in lower extremities hx of PVD overall stable findings  NEURO: Normal strength and tone, mentation intact and speech normal  PSYCH: mentation appears normal, affect normal/bright    Diagnostic Test Results:  Labs reviewed in Epic  Results for orders placed or performed in visit on 02/12/20 (from the past 24 hour(s))   Hemoglobin A1c   Result Value Ref Range    Hemoglobin A1C 6.2 (H) 0 - 5.6 %           Assessment & Plan       ICD-10-CM    1. Type 2 diabetes mellitus with hyperglycemia, without long-term current use of insulin (H) E11.65 Hemoglobin A1c   2. Parkinson disease (H) G20    3. Temporal arteritis (H) M31.6    4. Peripheral artery disease (H) I73.9    Diabetes is well controlled, continue current medications for now. If his blood sugars start getting low after stopping Prednisone soon he will also stop the Tradjenta.   Temp Arteritis improved and tapering off prednisone  Parkinson's stable  Continue walking as tolerated for PVD. Has improved with use of treadmill. PVD stable      Patient Instructions     MY DIABETES TODAY:    1)  Goal A1C is under <7.0  Mine is:      Lab Results   Component Value Date    A1C 6.2 02/12/2020     2)  Goal LDL (bad cholesterol) under 100  (measured at least yearly)- I am currently " at:   Lab Results   Component Value Date    LDL 63 11/12/2019     3)  Goal blood pressure under 140/90- mine was 118/58 today    4)  Take aspirin daily YES    5)  No tobacco use    ACTION PLAN CHANGES FROM TODAY:    Care Plan changes:    No change in current medications. If your blood sugars start getting low after stopping the Prednisone, stop the Tradjenta. Don't worry if your blood sugars are up a little bit more than in the past, we want to avoid low blood sugars that may cause falls.    Labs:     I will not need to fast for this lab appointment.    Follow up:    I will follow up with my physician:  In three months.  [place] [facility]     Discharged by : Erma Jeffers CMA    If you have any questions regarding your visit please contact your care team:     Team Kendra              Clinic Hours Telephone Number     Dr. Chuy Ward, [facility]   7am-7pm  Monday - Thursday   7am-5pm  Fridays  [phone]   (Appointment scheduling available 24/7)     RN Line  [phone] option 2     Urgent Care - [place] and [place] - 11am-9pm Monday-Friday Saturday-Sunday- 9am-5pm     [place] -   5pm-9pm Monday-Friday Saturday-Sunday- 9am-5pm    [phone] - [place]    [phone] - [place]     For a Price Quote for your services, please call our Consumer Price Line at [phone].     What options do I have for visits at the clinic other than the traditional office visit?     To expand how we care for you, many of our providers are utilizing electronic visits (e-visits) and telephone visits, when medically appropriate, for interactions with their patients rather than a visit in the clinic. We also offer nurse visits for many medical concerns. Just like any other service, we will bill your insurance company for this type of visit based on time spent on the phone with your provider. Not all insurance companies cover these visits. Please check  with your medical insurance if this type of visit is covered. You will be responsible for any charges that are not paid by your insurance.     E-visits via Narrativehart: generally incur a $45.00 fee.     Telephone visits:  Time spent on the phone: *charged based on time that is spent on the phone in increments of 10 minutes. Estimated cost:   5-10 mins $30.00   11-20 mins. $59.00   21-30 mins. $85.00       Use [employer] (secure email communication and access to your chart) to send your primary care provider a message or make an appointment. Ask someone on your Team how to sign up for [employer].     As always, Thank you for trusting us with your health care needs!      [facility]:    Scheduling Appointments  [place] [place]  Call: [phone]    [facility]  Call: [phone]    [facility]  Call: [phone]    For Gastroenterology referrals   [facility] Gastroenterology   Clinics and Surgery Center, 4th Floor   [address] [place] [address]   Appointments: [phone]    WHERE TO GO FOR CARE?    Clinic    Make an appointment if you:       Are sick (cold, cough, flu, sore throat, earache or in pain).       Have a small injury (sprain, small cut, burn or broken bone).       Need a physical exam, Pap smear, vaccine or prescription refill.       Have questions about your health or medicines.    To reach us:      Call [phone] ([phone]). Open 24 hours every day. (For counseling services, call [phone].)    Log into [employer] at [email or web address]. (Visit [email or web address] to create an account.) Hospital emergency room    An emergency is a serious or life- threatening problem that must be treated right away.    Call [phone] or get to the hospital if you have:      Very bad or sudden:            - Chest pain or pressure         - Bleeding         - Head or belly pain         - Dizziness or trouble seeing, walking  or                          Speaking      Problems breathing      Blood in your vomit or you are coughing up blood      A major injury (knocked out, loss of a finger or limb, rape, broken bone protruding from skin)    A mental health crisis. (Or call the Mental Health Crisis line at 1-393.281.2041 or Suicide Prevention Hotline at 1-929.735.6281.)    Open 24 hours every day. You don't need an appointment.     Urgent care    Visit urgent care for sickness or small injuries when the clinic is closed. You don't need an appointment. To check hours or find an urgent care near you, visit [email or web address]. Online care    Get online care from [facility] for more than 70 common problems, like colds, allergies and infections. Open 24 hours every day at:   [email or web address]   Need help deciding?    For advice about where to be seen, you may call your clinic and ask to speak with a nurse. We're here for you 24 hours every day.         If you are deaf or hard of hearing, please let us know. We provide many free services including sign language interpreters, oral interpreters, TTYs, telephone amplifiers, note takers and written materials.                        Return in about 3 months (around 5/12/2020) for Lab Work, Routine Visit.    The information in this document, created by the medical scribe for me, accurately reflects the services I personally performed and the decisions made by me. I have reviewed and approved this document for accuracy prior to leaving the patient care area.  February 12, 2020 10:32 AM    Chuy Stewart MD, MD  [facility]

## 2020-02-12 NOTE — PATIENT INSTRUCTIONS
MY DIABETES TODAY:    1)  Goal A1C is under <7.0  Mine is:      Lab Results   Component Value Date    A1C 6.2 02/12/2020     2)  Goal LDL (bad cholesterol) under 100  (measured at least yearly)- I am currently at:   Lab Results   Component Value Date    LDL 63 11/12/2019     3)  Goal blood pressure under 140/90- mine was 118/58 today    4)  Take aspirin daily YES    5)  No tobacco use    ACTION PLAN CHANGES FROM TODAY:    Care Plan changes:    No change in current medications. If your blood sugars start getting low after stopping the Prednisone, stop the Tradjenta. Don't worry if your blood sugars are up a little bit more than in the past, we want to avoid low blood sugars that may cause falls.    Labs:     I will not need to fast for this lab appointment.    Follow up:    I will follow up with my physician:  In three months.  [facility]     Discharged by : Erma Jeffers CMA    If you have any questions regarding your visit please contact your care team:     Team Kendra              Clinic Hours Telephone Number     Dr. Chuy Ward, [facility]   7am-7pm  Monday - Thursday   7am-5pm  Fridays  [phone]   (Appointment scheduling available 24/7)     RN Line  [phone] option 2     Urgent Care - [place] and [place] - 11am-9pm Monday-Friday Saturday-Sunday- 9am-5pm     [place] -   5pm-9pm Monday-Friday Saturday-Sunday- 9am-5pm    [phone] - [place]a Moses    [phone] - [place]     For a Price Quote for your services, please call our Consumer Price Line at [phone].     What options do I have for visits at the clinic other than the traditional office visit?     To expand how we care for you, many of our providers are utilizing electronic visits (e-visits) and telephone visits, when medically appropriate, for interactions with their patients rather than a visit in the clinic. We also offer nurse visits for many medical  concerns. Just like any other service, we will bill your insurance company for this type of visit based on time spent on the phone with your provider. Not all insurance companies cover these visits. Please check with your medical insurance if this type of visit is covered. You will be responsible for any charges that are not paid by your insurance.     E-visits via Tamrhart: generally incur a $45.00 fee.     Telephone visits:  Time spent on the phone: *charged based on time that is spent on the phone in increments of 10 minutes. Estimated cost:   5-10 mins $30.00   11-20 mins. $59.00   21-30 mins. $85.00       Use [employer] (secure email communication and access to your chart) to send your primary care provider a message or make an appointment. Ask someone on your Team how to sign up for [employer].     As always, Thank you for trusting us with your health care needs!      [facility]:    Scheduling Appointments  [facility]  Call: [phone]    [facility]  Call: [phone]    [facility]  Call: [phone]    For Gastroenterology referrals   [facility] Gastroenterology   Clinics and Surgery Center, 4th Floor   [address] [place] [address]   Appointments: [phone]    WHERE TO GO FOR CARE?    Clinic    Make an appointment if you:       Are sick (cold, cough, flu, sore throat, earache or in pain).       Have a small injury (sprain, small cut, burn or broken bone).       Need a physical exam, Pap smear, vaccine or prescription refill.       Have questions about your health or medicines.    To reach us:      Call [phone] ([phone]). Open 24 hours every day. (For counseling services, call [phone].)    Log into [employer] at [email or web address]. (Visit [email or web address] to create an account.) Hospital emergency room    An emergency is a serious or life- threatening problem that must be treated  right away.    Call 911 or get to the hospital if you have:      Very bad or sudden:            - Chest pain or pressure         - Bleeding         - Head or belly pain         - Dizziness or trouble seeing, walking or                          Speaking      Problems breathing      Blood in your vomit or you are coughing up blood      A major injury (knocked out, loss of a finger or limb, rape, broken bone protruding from skin)    A mental health crisis. (Or call the Mental Health Crisis line at 1-529.736.6939 or Suicide Prevention Hotline at 1-783.159.9613.)    Open 24 hours every day. You don't need an appointment.     Urgent care    Visit urgent care for sickness or small injuries when the clinic is closed. You don't need an appointment. To check hours or find an urgent care near you, visit [email or web address]. Online care    Get online care from [facility] for more than 70 common problems, like colds, allergies and infections. Open 24 hours every day at:   [email or web address]   Need help deciding?    For advice about where to be seen, you may call your clinic and ask to speak with a nurse. We're here for you 24 hours every day.         If you are deaf or hard of hearing, please let us know. We provide many free services including sign language interpreters, oral interpreters, TTYs, telephone amplifiers, note takers and written materials.

## 2020-02-14 ENCOUNTER — TELEPHONE (OUTPATIENT)
Dept: FAMILY MEDICINE | Facility: CLINIC | Age: 85
End: 2020-02-14

## 2020-02-14 DIAGNOSIS — I10 HYPERTENSION GOAL BP (BLOOD PRESSURE) < 150/90: ICD-10-CM

## 2020-02-14 RX ORDER — IRBESARTAN 75 MG/1
37.5 TABLET ORAL AT BEDTIME
Qty: 45 TABLET | Refills: 1 | Status: SHIPPED | OUTPATIENT
Start: 2020-02-14 | End: 2020-09-14

## 2020-02-14 NOTE — TELEPHONE ENCOUNTER
Route to covering providers for review.    Spoke with patient's wife who is needing guidance about diabetes medication, Tradjenta.    PCP OV note copied below.  Plan was to stop Tradjenta once patient off prednisone if blood sugars are low.  Patient will stop prednisone on Monday.  They only have 1 more week's worth of Tradjenta.  Tried to refill but at 90 day supply it is $466 out of pocket.    Could they get just a 30 day supply?  Or not refill and just monitor blood sugars (maybe he won't need it once prednisone is stopped)?    Collin Desouza RN      Per Dr. Stewart's OV note 2/12/20:  No change in current medications. If your blood sugars start getting low after stopping the Prednisone, stop the Tradjenta. Don't worry if your blood sugars are up a little bit more than in the past, we want to avoid low blood sugars that may cause falls.

## 2020-02-14 NOTE — TELEPHONE ENCOUNTER
[facility] faxed 90-Day Conversion Request for irbesartan (AVAPRO) 150 MG tablet         90-Day Conversion Request for   irbesartan (AVAPRO) 150 MG tablet     (HISTORICAL)      Last Written Prescription Date:  11/12/2019  Last Fill Quantity: na,   # refills: na  Last Office Visit: 2/12/2020  w/ BERKLEY Stewart  Future Office visit:    Next 5 appointments (look out 90 days)    May 13, 2020 11:00 AM CDT  [facility]uy [facility] [address] [place] [id number] [phone]           Routing refill request to provider for review/approval because:  Drug not active on patient's medication list  Medication is reported/historical

## 2020-02-14 NOTE — TELEPHONE ENCOUNTER
Route to covering providers for consideration of refill request.   Called patient to clarify current dose of irbesartan as it is listed as historical.    Called and spoke with patient's wife who states he is taking 37.5 mg daily per Dr. Stewart.    Pended 90 day refill as requested.    Collin Desouza RN

## 2020-02-14 NOTE — TELEPHONE ENCOUNTER
Postpone encounter to next week - we are expecting a call back from patient's wife on Wednesday 2/19 with update regarding blood sugars once off prednisone.  May need refill of Tradjenta or start glipizide instead if blood sugars are not coming down (see below messages).    Phone call to patient's wife and reviewed all information per Alaina Ward, JANENE, APRN, CNP below.  She verbalized understanding.  States she still has a supply of glipizide if they decide to go that route.    Collin Desouza RN

## 2020-02-14 NOTE — TELEPHONE ENCOUNTER
Reviewing while PCP Dr. Stewart is out of the office.    I agree that continuing to monitor blood sugars while stopping the Prednisone on Monday, and perhaps if blood sugars are improving after stopping Prednisone maybe he won't need the Trajenta once Prednisone is stopped.    Family could reach out to insurance to ask if there is a covered alternative.  It also appears he may have been on Glipizide in the past, which could be an option to change to if needed.    If he can update us with his blood sugars end of next week once he is off the Prednisone, then we can have a better idea how to proceed.    Alaina Ward, DNP, APRN, CNP

## 2020-02-14 NOTE — PROGRESS NOTES
Clinic Care Coordination Contact    Assessment: Care Coordinator contacted patient for 2 month follow up.  Patient has continued to follow the plan of care and assessment is negative for any new needs or concerns.    Enrollment status: Graduated.      Plan: No further outreaches at this time.  Patient will continue to follow the plan of care.  If new needs arise a new Care Coordination referral may be placed.  FYI to PCP    Alberto Tobias MSN, RN, PHN, CCM   Primary Care Clinical RN Care Coordinator  [facility]  2/14/2020   10:31 AM  [email or web address]  Office: [phone]

## 2020-02-20 NOTE — TELEPHONE ENCOUNTER
"Spoke with patient's wife ford.    Blood sugars have been 124-126 this week, which \" is still good\" per wife. Patient will be out of Tradjenta soon. Patient's wife reports they do have an rx for glipizide still if needed but patient has not been taking that for the past 6 months.     PCP to advise.     Kelley Garcia RN on 2/20/2020 at 1:36 PM    "

## 2020-02-21 NOTE — TELEPHONE ENCOUNTER
Patient/family was instructed to return call to [facility], directly on the RN Call back line at [phone].    Hanh Funez RN

## 2020-02-21 NOTE — TELEPHONE ENCOUNTER
Reviewing while PCP is out of the office.  I would suggest discontinuing Trajenta and seeing if blood sugars will stay stable off (since Prednisone is done).    Alaina Ward, DNP, APRN, CNP

## 2020-02-24 NOTE — TELEPHONE ENCOUNTER
Wife reports patient has been off of the Tradjenta since Friday and is not having any problems. His sugars have been are 120mg/dl.  They will report back if there are any changes to that. Will update medication list to reflect this discontinuation of medication.    Kelley Garcia RN on 2/24/2020 at 8:40 AM

## 2020-03-15 ENCOUNTER — HEALTH MAINTENANCE LETTER (OUTPATIENT)
Age: 85
End: 2020-03-15

## 2020-04-22 ENCOUNTER — VIRTUAL VISIT (OUTPATIENT)
Dept: FAMILY MEDICINE | Facility: CLINIC | Age: 85
End: 2020-04-22
Payer: MEDICARE

## 2020-04-22 DIAGNOSIS — E11.65 TYPE 2 DIABETES MELLITUS WITH HYPERGLYCEMIA, WITHOUT LONG-TERM CURRENT USE OF INSULIN (H): Primary | ICD-10-CM

## 2020-04-22 DIAGNOSIS — I10 HYPERTENSION GOAL BP (BLOOD PRESSURE) < 150/90: ICD-10-CM

## 2020-04-22 DIAGNOSIS — I73.9 PAD (PERIPHERAL ARTERY DISEASE) (H): ICD-10-CM

## 2020-04-22 DIAGNOSIS — M31.6 TEMPORAL ARTERITIS (H): ICD-10-CM

## 2020-04-22 PROCEDURE — 99442 ZZC PHYSICIAN TELEPHONE EVALUATION 11-20 MIN: CPT | Performed by: FAMILY MEDICINE

## 2020-04-22 NOTE — PROGRESS NOTES
"Bart Blair is a 89 year old male who is being evaluated via a billable telephone visit.      The patient has been notified of following:     \"This telephone visit will be conducted via a call between you and your physician/provider. We have found that certain health care needs can be provided without the need for a physical exam.  This service lets us provide the care you need with a short phone conversation.  If a prescription is necessary we can send it directly to your pharmacy.  If lab work is needed we can place an order for that and you can then stop by our lab to have the test done at a later time.    Telephone visits are billed at different rates depending on your insurance coverage. During this emergency period, for some insurers they may be billed the same as an in-person visit.  Please reach out to your insurance provider with any questions.    If during the course of the call the physician/provider feels a telephone visit is not appropriate, you will not be charged for this service.\"    Patient has given verbal consent for Telephone visit?  Yes    How would you like to obtain your AVS? Mail a copy    Subjective     Bart Blair is a 89 year old male who presents to clinic today for the following health issues:    HPI  Diabetes Follow-up    How often are you checking your blood sugar? One time daily  What time of day are you checking your blood sugars (select all that apply)?  Before meals  Have you had any blood sugars above 200?  No  Have you had any blood sugars below 70?  No    What symptoms do you notice when your blood sugar is low?  None    What concerns do you have today about your diabetes? Other: what are the preameters for blood sugars     Do you have any of these symptoms? (Select all that apply)  No numbness or tingling in feet.  No redness, sores or blisters on feet.  No complaints of excessive thirst.  No reports of blurry vision.  No significant changes to weight. right leg by " marshal    Have you had a diabetic eye exam in the last 12 months? No        BP Readings from Last 2 Encounters:   02/12/20 118/58   11/12/19 104/58     Hemoglobin A1C (%)   Date Value   02/12/2020 6.2 (H)   11/12/2019 5.5     LDL Cholesterol Calculated (mg/dL)   Date Value   11/12/2019 63   10/09/2018 21         Hypertension Follow-up      Do you check your blood pressure regularly outside of the clinic? No     Are you following a low salt diet? Yes    Are your blood pressures ever more than 140 on the top number (systolic) OR more   than 90 on the bottom number (diastolic), for example 140/90? No      How many servings of fruits and vegetables do you eat daily?  0-1    On average, how many sweetened beverages do you drink each day (Examples: soda, juice, sweet tea, etc.  Do NOT count diet or artificially sweetened beverages)?   1    How many days per week do you exercise enough to make your heart beat faster? None     How many minutes a day do you exercise enough to make your heart beat faster? none    How many days per week do you miss taking your medication? 0         Musculoskeletal problem/pain      Duration: years     Description  Location: both legs     Intensity:  mild    Accompanying signs and symptoms: tingling, in big toe , swelling     History  Previous similar problem: YES  Previous evaluation:  none    Precipitating or alleviating factors:  Trauma or overuse: no   Aggravating factors include: standing, walking and cold or damp weather    Therapies tried and outcome: support wrap      Patient overall doing well    Reviewed chart. He is off prednisone and doing well with Temporal Arteritis. No follow up or labs needed at this time    Blood sugars have been doing well off prednisone and Trajenta. Averages at home 120-140    Reviewed this is good for him and concerns if we restarted medications he could have hypoglycemia    Lower extremity edema controlled with wrapping and support stockings. No  ulcerations.  Reviewed last vascular surgery notes and appears to be stable from previous visit. Edema has improved    Reviewed COVID precautions and they are able to do precautions and avoidance with out difficulty    Medications up to date    Reviewed and updated as needed this visit by Provider         Review of Systems   ROS COMP: Constitutional, HEENT, cardiovascular, pulmonary, gi and gu systems are negative, except as otherwise noted.       Objective   Reported vitals:  There were no vitals taken for this visit.   alert and no distress  PSYCH: Alert and oriented times 3; coherent speech, normal   rate and volume, able to articulate logical thoughts, able   to abstract reason, no tangential thoughts, no hallucinations   or delusions  His affect is normal  RESP: No cough, no audible wheezing, able to talk in full sentences  Remainder of exam unable to be completed due to telephone visits    Diagnostic Test Results:  Labs reviewed in Epic  Records from Rheumatology and vascular surgery reviewed.        Assessment/Plan:  1. Type 2 diabetes mellitus with hyperglycemia, without long-term current use of insulin (H)  Stable. Now off prednisone. conitnue home monitoring. No medications at this time    2. Hypertension goal BP (blood pressure) < 150/90  Stable   Continue present medications      3. PAD (peripheral artery disease) (H)  Stable, daily pain but no changes, no ulcerations, lymphedema controlled    4. Temporal arteritis (H)  Improved. Off prednisone. Tolerating medications      Return in about 3 months (around 7/22/2020) for Diabetes follow up, Routine Visit, Lab Work.      Phone call duration:  14 minutes, review of chart, medications, patient concerns and review of treatment plan    Chuy Stewart MD, MD

## 2020-04-29 DIAGNOSIS — I70.213 ATHEROSCLEROSIS OF NATIVE ARTERY OF BOTH LOWER EXTREMITIES WITH INTERMITTENT CLAUDICATION (H): ICD-10-CM

## 2020-05-21 DIAGNOSIS — I70.213 ATHEROSCLEROSIS OF NATIVE ARTERY OF BOTH LOWER EXTREMITIES WITH INTERMITTENT CLAUDICATION (H): ICD-10-CM

## 2020-05-22 RX ORDER — CILOSTAZOL 50 MG/1
TABLET ORAL
Qty: 180 TABLET | Refills: 0 | OUTPATIENT
Start: 2020-05-22

## 2020-06-02 DIAGNOSIS — I70.213 ATHEROSCLEROSIS OF NATIVE ARTERY OF BOTH LOWER EXTREMITIES WITH INTERMITTENT CLAUDICATION (H): ICD-10-CM

## 2020-06-02 RX ORDER — CILOSTAZOL 50 MG/1
50 TABLET ORAL 2 TIMES DAILY
Qty: 180 TABLET | Refills: 0 | Status: SHIPPED | OUTPATIENT
Start: 2020-06-02 | End: 2020-09-08

## 2020-06-02 NOTE — TELEPHONE ENCOUNTER
Reason for Call:  Other prescription    Detailed comments: Patients wife states that patient will only have enough for today, tried to call pharmacy since Monday.    Phone Number Patient can be reached at: Home number on file [phone] (home)    Best Time: any     Can we leave a detailed message on this number? YES    Call taken on 6/2/2020 at 3:08 PM by Kely Khan

## 2020-06-04 RX ORDER — CILOSTAZOL 50 MG/1
TABLET ORAL
Qty: 180 TABLET | Refills: 0 | OUTPATIENT
Start: 2020-06-04

## 2020-06-17 DIAGNOSIS — Z96.652 STATUS POST TOTAL LEFT KNEE REPLACEMENT: ICD-10-CM

## 2020-06-17 DIAGNOSIS — L03.119 CELLULITIS OF LOWER EXTREMITY, UNSPECIFIED LATERALITY: ICD-10-CM

## 2020-06-18 RX ORDER — AMOXICILLIN 500 MG/1
CAPSULE ORAL
Qty: 16 CAPSULE | Refills: 4 | Status: SHIPPED | OUTPATIENT
Start: 2020-06-18 | End: 2022-10-28

## 2020-06-18 RX ORDER — FUROSEMIDE 20 MG
TABLET ORAL
Qty: 90 TABLET | Refills: 3 | Status: SHIPPED | OUTPATIENT
Start: 2020-06-18 | End: 2020-09-08

## 2020-06-18 NOTE — TELEPHONE ENCOUNTER
"Requested Prescriptions   Pending Prescriptions Disp Refills     amoxicillin (AMOXIL) 500 MG capsule [Pharmacy Med Name: AMOXICILLIN 500MG CAPSULES] 16 capsule 4     Sig: TAKE FOUR CAPSULES BY MOUTH 1 HOUR BEFORE DENTAL APPOINTMENT       Oral Acne/Rosacea Medications Protocol Failed - 6/17/2020  3:48 PM        Failed - Confirmation of diagnosis is required     Please confirm diagnosis is acne or rosacea.     If NOT acne or rosacea; refer request to provider for further evaluation.    If diagnosis IS acne or rosacea, OK to refill BASED ON PREVIOUS REFILL CLINICAL NOTE RECOMMENDATION.          Passed - Patient is 12 years of age or older        Passed - Recent (12 mo) or future (30 days) visit within the authorizing provider's specialty     Patient has had an office visit with the authorizing provider or a provider within the authorizing providers department within the previous 12 mos or has a future within next 30 days. See \"Patient Info\" tab in [employer], or \"Choose Columns\" in Meds & Orders section of the refill encounter.              Passed - Medication is active on med list           furosemide (LASIX) 20 MG tablet [Pharmacy Med Name: FUROSEMIDE 20MG TABLETS] 90 tablet 3     Sig: TAKE 1 TABLET(20 MG) BY MOUTH DAILY       Diuretics (Including Combos) Protocol Failed - 6/17/2020  3:48 PM        Failed - Normal serum creatinine on file in past 12 months     Recent Labs   Lab Test 07/02/19  1127   CR 1.58*              Passed - Blood pressure under 140/90 in past 12 months     BP Readings from Last 3 Encounters:   02/12/20 118/58   11/12/19 104/58   07/02/19 126/68                 Passed - Recent (12 mo) or future (30 days) visit within the authorizing provider's specialty     Patient has had an office visit with the authorizing provider or a provider within the authorizing providers department within the previous 12 mos or has a future within next 30 days. See \"Patient Info\" tab in [employer], or \"Choose Columns\" in " Meds & Orders section of the refill encounter.              Passed - Medication is active on med list        Passed - Patient is age 18 or older        Passed - Normal serum potassium on file in past 12 months     Recent Labs   Lab Test 07/02/19  1127   POTASSIUM 4.3                    Passed - Normal serum sodium on file in past 12 months     Recent Labs   Lab Test 07/02/19  1127

## 2020-06-18 NOTE — TELEPHONE ENCOUNTER
Routing refill request to provider for review/approval because:  Drug not on the [facility] refill protocol (amoxicillin)  Labs out of range:  Creatinine    Carlie Tobin RN  [facility]

## 2020-07-21 ENCOUNTER — TELEPHONE (OUTPATIENT)
Dept: FAMILY MEDICINE | Facility: CLINIC | Age: 85
End: 2020-07-21

## 2020-07-21 NOTE — TELEPHONE ENCOUNTER
Reason for Call:  Other appointment    Detailed comments: PT's last visit was told to make an appointment for lab and unsure if face to face or televisit.    Phone Number Patient can be reached at: Home number on file [phone] (home)    Best Time: Anytime    Can we leave a detailed message on this number? YES    Call taken on 7/21/2020 at 1:49 PM by Indu Rossi

## 2020-07-22 NOTE — TELEPHONE ENCOUNTER
Left detailed voicemail letting patient know to scheduled lab only appointment and virtual visit with PCP to review lab results.    Thank you,  Joslyn HUGGINS  [facility]  Team Kendra Coordinator

## 2020-07-27 DIAGNOSIS — M31.6 TEMPORAL ARTERITIS (H): ICD-10-CM

## 2020-07-27 DIAGNOSIS — E11.39 TYPE 2 DIABETES MELLITUS WITH OTHER DIABETIC OPHTHALMIC COMPLICATION (H): ICD-10-CM

## 2020-07-27 LAB
ALBUMIN SERPL-MCNC: 3.7 G/DL (ref 3.4–5)
ALP SERPL-CCNC: 50 U/L (ref 40–150)
ALT SERPL W P-5'-P-CCNC: 29 U/L (ref 0–70)
ANION GAP SERPL CALCULATED.3IONS-SCNC: 8 MMOL/L (ref 3–14)
AST SERPL W P-5'-P-CCNC: 17 U/L (ref 0–45)
BILIRUB SERPL-MCNC: 0.6 MG/DL (ref 0.2–1.3)
BUN SERPL-MCNC: 32 MG/DL (ref 7–30)
CALCIUM SERPL-MCNC: 8.7 MG/DL (ref 8.5–10.1)
CHLORIDE SERPL-SCNC: 106 MMOL/L (ref 94–109)
CHOLEST SERPL-MCNC: 125 MG/DL
CO2 SERPL-SCNC: 26 MMOL/L (ref 20–32)
CREAT SERPL-MCNC: 1.65 MG/DL (ref 0.66–1.25)
ERYTHROCYTE [DISTWIDTH] IN BLOOD BY AUTOMATED COUNT: 13 % (ref 10–15)
ERYTHROCYTE [SEDIMENTATION RATE] IN BLOOD BY WESTERGREN METHOD: 9 MM/H (ref 0–20)
GFR SERPL CREATININE-BSD FRML MDRD: 36 ML/MIN/{1.73_M2}
GLUCOSE SERPL-MCNC: 174 MG/DL (ref 70–99)
HCT VFR BLD AUTO: 38.3 % (ref 40–53)
HDLC SERPL-MCNC: 39 MG/DL
HGB BLD-MCNC: 12.8 G/DL (ref 13.3–17.7)
LDLC SERPL CALC-MCNC: 47 MG/DL
MCH RBC QN AUTO: 35.3 PG (ref 26.5–33)
MCHC RBC AUTO-ENTMCNC: 33.4 G/DL (ref 31.5–36.5)
MCV RBC AUTO: 106 FL (ref 78–100)
NONHDLC SERPL-MCNC: 86 MG/DL
PLATELET # BLD AUTO: 136 10E9/L (ref 150–450)
POTASSIUM SERPL-SCNC: 4.1 MMOL/L (ref 3.4–5.3)
PROT SERPL-MCNC: 6.6 G/DL (ref 6.8–8.8)
RBC # BLD AUTO: 3.63 10E12/L (ref 4.4–5.9)
SODIUM SERPL-SCNC: 140 MMOL/L (ref 133–144)
TRIGL SERPL-MCNC: 197 MG/DL
WBC # BLD AUTO: 5.8 10E9/L (ref 4–11)

## 2020-07-27 PROCEDURE — 36415 COLL VENOUS BLD VENIPUNCTURE: CPT | Performed by: FAMILY MEDICINE

## 2020-07-27 PROCEDURE — 80061 LIPID PANEL: CPT | Performed by: FAMILY MEDICINE

## 2020-07-27 PROCEDURE — 85652 RBC SED RATE AUTOMATED: CPT | Performed by: FAMILY MEDICINE

## 2020-07-27 PROCEDURE — 85027 COMPLETE CBC AUTOMATED: CPT | Performed by: FAMILY MEDICINE

## 2020-07-27 PROCEDURE — 80053 COMPREHEN METABOLIC PANEL: CPT | Performed by: FAMILY MEDICINE

## 2020-08-30 DIAGNOSIS — I70.213 ATHEROSCLEROSIS OF NATIVE ARTERY OF BOTH LOWER EXTREMITIES WITH INTERMITTENT CLAUDICATION (H): ICD-10-CM

## 2020-09-03 RX ORDER — CILOSTAZOL 50 MG/1
TABLET ORAL
Qty: 180 TABLET | Refills: 0 | OUTPATIENT
Start: 2020-09-03

## 2020-09-03 NOTE — TELEPHONE ENCOUNTER
CILOSTAZOL 50MG TABLETS       Last Written Prescription Date:  6-2-20  Last Fill Quantity: 180,   # refills: 0  Last Office Visit : 5-7-19 ( RTC 6 M)  Future Office visit:  none    Routing refill request to provider for review/approval because:  Abnormal Labs: 7-27-20  Hgb, Plts, Cr      Not ordered/reviewed by Card    Scheduling has been notified to contact the pt for appointment.

## 2020-09-14 DIAGNOSIS — I10 HYPERTENSION GOAL BP (BLOOD PRESSURE) < 150/90: ICD-10-CM

## 2020-09-14 RX ORDER — IRBESARTAN 75 MG/1
37.5 TABLET ORAL AT BEDTIME
Qty: 45 TABLET | Refills: 1 | Status: SHIPPED | OUTPATIENT
Start: 2020-09-14 | End: 2021-03-11

## 2020-10-07 ENCOUNTER — TELEPHONE (OUTPATIENT)
Dept: FAMILY MEDICINE | Facility: CLINIC | Age: 85
End: 2020-10-07

## 2020-10-07 NOTE — TELEPHONE ENCOUNTER
Forms received from [employer]: diabetic detailed written order for Chuy Stewart MD.  Forms placed in provider 'sign me' folder.  Please fax forms to [phone] after completion.    Gaurav Giron

## 2020-10-22 ENCOUNTER — OFFICE VISIT (OUTPATIENT)
Dept: FAMILY MEDICINE | Facility: CLINIC | Age: 85
End: 2020-10-22
Payer: MEDICARE

## 2020-10-22 VITALS
TEMPERATURE: 97.5 F | BODY MASS INDEX: 33.57 KG/M2 | WEIGHT: 208 LBS | HEART RATE: 101 BPM | SYSTOLIC BLOOD PRESSURE: 98 MMHG | DIASTOLIC BLOOD PRESSURE: 58 MMHG | OXYGEN SATURATION: 96 %

## 2020-10-22 DIAGNOSIS — E78.5 HYPERLIPIDEMIA LDL GOAL <100: ICD-10-CM

## 2020-10-22 DIAGNOSIS — G20.A1 PARKINSON DISEASE (H): ICD-10-CM

## 2020-10-22 DIAGNOSIS — E11.39 TYPE 2 DIABETES MELLITUS WITH OTHER DIABETIC OPHTHALMIC COMPLICATION (H): Primary | ICD-10-CM

## 2020-10-22 DIAGNOSIS — L08.9 TOE INFECTION: ICD-10-CM

## 2020-10-22 DIAGNOSIS — I10 HYPERTENSION GOAL BP (BLOOD PRESSURE) < 150/90: ICD-10-CM

## 2020-10-22 LAB — HBA1C MFR BLD: 6.3 % (ref 0–5.6)

## 2020-10-22 PROCEDURE — 99214 OFFICE O/P EST MOD 30 MIN: CPT | Performed by: FAMILY MEDICINE

## 2020-10-22 PROCEDURE — 80048 BASIC METABOLIC PNL TOTAL CA: CPT | Performed by: FAMILY MEDICINE

## 2020-10-22 PROCEDURE — 36415 COLL VENOUS BLD VENIPUNCTURE: CPT | Performed by: FAMILY MEDICINE

## 2020-10-22 PROCEDURE — 83036 HEMOGLOBIN GLYCOSYLATED A1C: CPT | Performed by: FAMILY MEDICINE

## 2020-10-22 RX ORDER — CEPHALEXIN 500 MG/1
500 CAPSULE ORAL 2 TIMES DAILY
Qty: 14 CAPSULE | Refills: 0 | Status: SHIPPED | OUTPATIENT
Start: 2020-10-22 | End: 2020-12-15

## 2020-10-22 RX ORDER — ATORVASTATIN CALCIUM 10 MG/1
10 TABLET, FILM COATED ORAL DAILY
Qty: 90 TABLET | Refills: 3 | Status: SHIPPED | OUTPATIENT
Start: 2020-10-22 | End: 2021-03-18

## 2020-10-22 ASSESSMENT — PATIENT HEALTH QUESTIONNAIRE - PHQ9: SUM OF ALL RESPONSES TO PHQ QUESTIONS 1-9: 4

## 2020-10-22 ASSESSMENT — PAIN SCALES - GENERAL: PAINLEVEL: MILD PAIN (2)

## 2020-10-22 NOTE — PROGRESS NOTES
Subjective     Bart Blair is a 89 year old male who presents to clinic today for the following health issues:    HPI         Diabetes Follow-up    How often are you checking your blood sugar? One time daily  What time of day are you checking your blood sugars (select all that apply)?  Before meals  Have you had any blood sugars above 200?  No  Have you had any blood sugars below 70?  No    What symptoms do you notice when your blood sugar is low?  None    What concerns do you have today about your diabetes? None     Do you have any of these symptoms? (Select all that apply)  No numbness or tingling in feet.  No redness, sores or blisters on feet.  No complaints of excessive thirst.  No reports of blurry vision.  No significant changes to weight.    Have you had a diabetic eye exam in the last 12 months? Yes- Date of last eye exam: 8/21/2020                Hyperlipidemia Follow-Up      Are you regularly taking any medication or supplement to lower your cholesterol?   Yes- Lipitor    Are you having muscle aches or other side effects that you think could be caused by your cholesterol lowering medication?  No    Hypertension Follow-up      Do you check your blood pressure regularly outside of the clinic? No     Are you following a low salt diet? Yes    Are your blood pressures ever more than 140 on the top number (systolic) OR more   than 90 on the bottom number (diastolic), for example 140/90? No    BP Readings from Last 2 Encounters:   02/12/20 118/58   11/12/19 104/58     Hemoglobin A1C (%)   Date Value   10/22/2020 6.3 (H)   02/12/2020 6.2 (H)     LDL Cholesterol Calculated (mg/dL)   Date Value   07/27/2020 47   11/12/2019 63         How many servings of fruits and vegetables do you eat daily?  2-3    On average, how many sweetened beverages do you drink each day (Examples: soda, juice, sweet tea, etc.  Do NOT count diet or artificially sweetened beverages)?   0    How many days per week do you exercise enough to  make your heart beat faster? 3 or less    How many minutes a day do you exercise enough to make your heart beat faster? 9 or less    How many days per week do you miss taking your medication? 0    Patient has been stable.  FMLA forms completed for family. He needs assistance due to his parkinson's, diabetes and other chronic concerns    Review of Systems   Constitutional, HEENT, cardiovascular, pulmonary, gi and gu systems are negative, except as otherwise noted.      Objective    There were no vitals taken for this visit.  There is no height or weight on file to calculate BMI.  Physical Exam   GENERAL: alert, no distress, frail and elderly  NECK: no adenopathy, no asymmetry, masses, or scars and thyroid normal to palpation  RESP: lungs clear to auscultation - no rales, rhonchi or wheezes  CV: regular rate and rhythm, normal S1 S2, no S3 or S4, no murmur, click or rub, no peripheral edema and peripheral pulses strong  ABDOMEN: soft, nontender, no hepatosplenomegaly, no masses and bowel sounds normal  MS: no gross musculoskeletal defects noted, no edema    Redness noted on right great and second toe. No skin breakdown. Non tender    Neuro- shuffling gait. cogwheelrigity  Results for orders placed or performed in visit on 10/22/20 (from the past 24 hour(s))   Hemoglobin A1c   Result Value Ref Range    Hemoglobin A1C 6.3 (H) 0 - 5.6 %           Assessment & Plan     Bart was seen today for diabetes and peripheral arterial disease.    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   Continue present medications    Toe could be early cellulitis VS dependent edema/venous stasis   Take Keflex and elevate   Follow up if worse.     BMI:   Estimated body mass index is 33.57 kg/m  as calculated from the following:    Height as of 2/12/20: 1.676 m (5' 6\").    Weight as of this encounter: 94.3 kg (208 lb).            Patient Instructions   We are waiting to see where the [facility] providers are going to end up,     Specific recommendations if needed    Dr Rosey Cerna,Dr Farrah Jones or Madie Sena at our site    Dr Von Cordon, Dr Kathy Blanton, Dr Julio Rush or Dr Samuel Herron. They may be at our site or the [facility]. We will know on the next few weeks.        Consider shingles vaccine. It may be best to get at pharmacy to know the cost. Your need 2 shots with the second sometime 2 months after the first.           No follow-ups on file.    Chuy Stewart MD, MD  [facility]    "

## 2020-10-22 NOTE — PATIENT INSTRUCTIONS
We are waiting to see where the [facility] providers are going to end up,     Specific recommendations if needed    Dr Rosey Cerna,Dr Farrah Jones or Madie Sena at our site    Dr Von Cordon, Dr Kathy Blanton, Dr Julio Rush or Dr Samuel Herron. They may be at our site or the [facility]. We will know on the next few weeks.        Consider shingles vaccine. It may be best to get at pharmacy to know the cost. Your need 2 shots with the second sometime 2 months after the first.

## 2020-10-23 LAB
ANION GAP SERPL CALCULATED.3IONS-SCNC: 5 MMOL/L (ref 3–14)
BUN SERPL-MCNC: 31 MG/DL (ref 7–30)
CALCIUM SERPL-MCNC: 9.2 MG/DL (ref 8.5–10.1)
CHLORIDE SERPL-SCNC: 104 MMOL/L (ref 94–109)
CO2 SERPL-SCNC: 30 MMOL/L (ref 20–32)
CREAT SERPL-MCNC: 1.51 MG/DL (ref 0.66–1.25)
GFR SERPL CREATININE-BSD FRML MDRD: 40 ML/MIN/{1.73_M2}
GLUCOSE SERPL-MCNC: 222 MG/DL (ref 70–99)
POTASSIUM SERPL-SCNC: 4.6 MMOL/L (ref 3.4–5.3)
SODIUM SERPL-SCNC: 139 MMOL/L (ref 133–144)

## 2020-11-04 ENCOUNTER — OFFICE VISIT (OUTPATIENT)
Dept: FAMILY MEDICINE | Facility: CLINIC | Age: 85
End: 2020-11-04
Payer: MEDICARE

## 2020-11-04 VITALS
TEMPERATURE: 98.3 F | HEART RATE: 112 BPM | BODY MASS INDEX: 33.57 KG/M2 | WEIGHT: 208 LBS | DIASTOLIC BLOOD PRESSURE: 66 MMHG | SYSTOLIC BLOOD PRESSURE: 128 MMHG

## 2020-11-04 DIAGNOSIS — H61.23 BILATERAL IMPACTED CERUMEN: Primary | ICD-10-CM

## 2020-11-04 PROCEDURE — 69210 REMOVE IMPACTED EAR WAX UNI: CPT | Performed by: FAMILY MEDICINE

## 2020-11-05 ENCOUNTER — DOCUMENTATION ONLY (OUTPATIENT)
Dept: CARE COORDINATION | Facility: CLINIC | Age: 85
End: 2020-11-05

## 2020-12-12 ASSESSMENT — ENCOUNTER SYMPTOMS
MUSCLE WEAKNESS: 1
SMELL DISTURBANCE: 0
SKIN CHANGES: 0
STIFFNESS: 1
DOUBLE VISION: 0
NAIL CHANGES: 1
EYE PAIN: 0
SPEECH CHANGE: 0
MUSCLE CRAMPS: 0
SINUS PAIN: 0
MEMORY LOSS: 1
HEADACHES: 0
BACK PAIN: 0
DISTURBANCES IN COORDINATION: 0
SORE THROAT: 0
NUMBNESS: 0
TINGLING: 1
MYALGIAS: 1
ARTHRALGIAS: 1
PARALYSIS: 0
SEIZURES: 0
EYE WATERING: 0
POOR WOUND HEALING: 0
NECK MASS: 0
TREMORS: 0
TROUBLE SWALLOWING: 0
LOSS OF CONSCIOUSNESS: 0
NECK PAIN: 1
WEAKNESS: 0
TASTE DISTURBANCE: 0
DIZZINESS: 1
JOINT SWELLING: 0
HOARSE VOICE: 1
SINUS CONGESTION: 0
EYE IRRITATION: 0
EYE REDNESS: 0

## 2020-12-15 ENCOUNTER — OFFICE VISIT (OUTPATIENT)
Dept: CARDIOLOGY | Facility: CLINIC | Age: 85
End: 2020-12-15
Attending: INTERNAL MEDICINE
Payer: MEDICARE

## 2020-12-15 VITALS
OXYGEN SATURATION: 96 % | HEIGHT: 70 IN | WEIGHT: 213 LBS | DIASTOLIC BLOOD PRESSURE: 65 MMHG | BODY MASS INDEX: 30.49 KG/M2 | SYSTOLIC BLOOD PRESSURE: 118 MMHG | HEART RATE: 88 BPM

## 2020-12-15 DIAGNOSIS — I73.9 PAD (PERIPHERAL ARTERY DISEASE) (H): Primary | ICD-10-CM

## 2020-12-15 PROCEDURE — 99214 OFFICE O/P EST MOD 30 MIN: CPT | Performed by: INTERNAL MEDICINE

## 2020-12-15 PROCEDURE — G0463 HOSPITAL OUTPT CLINIC VISIT: HCPCS

## 2020-12-15 RX ORDER — CLOPIDOGREL BISULFATE 75 MG/1
75 TABLET ORAL DAILY
Qty: 90 TABLET | Refills: 3 | Status: SHIPPED | OUTPATIENT
Start: 2020-12-15 | End: 2021-11-23

## 2020-12-15 ASSESSMENT — MIFFLIN-ST. JEOR: SCORE: 1637.41

## 2020-12-15 ASSESSMENT — PAIN SCALES - GENERAL: PAINLEVEL: NO PAIN (0)

## 2020-12-15 NOTE — PATIENT INSTRUCTIONS
Patient Instructions:  It was a pleasure to see you in the cardiology clinic today.      If you have any questions, call  Viktoriya Washington RN, at [phone].  Press Option #1 for the [facility], and then press Option #4  We are encouraging the use of [email or web address] to communicate with your HealthCare Provider    Note the new medications: Plavix (Clopidigrel) 75 mg by mouth everyday  Stop the following medications: Aspirin    The results from today include: none   Please follow up with Dr. Boom Bass in one year  Referral to Podiatry - if you have not heard from them in 3-5 days, please call   Phone: [phone].    If you have an urgent need after hours (8:00 am to 4:30 pm) please call [phone] and ask for the cardiology fellow on call.

## 2020-12-15 NOTE — PROGRESS NOTES
[facility] CARDIOLOGY FOLLOW UP VISIT:    Referring Provider:  Referred Self  Primary Care Provider:   Chuy Stewart  Indication for Consultation:  PAD, LE Edema    HPI: Bart Blair is a 89 year old male being seen today for evaluation of PAD.   The patient's risk factor profile is: (+) HTN, (-) DM, (+) hypercholesterolemia, remote tobacco use [quit age 25], (+) fam Hx premature CAD.  The patient has no history of CAD, CHF, or valvular heart disease.  The patient has a history of syncope and ultimately underwent ILR over 5 years ago but had no recurrent syncope and evidently had no malignant arrhythmia.    The patient has not undergone prior cardiovascular evaluation and has never had an ECHO, stress study, cardiac catheterization, or EP study.   The patient has a history of TIA, no CVA.  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  He was treated with Actemra and Prednison. He was weaned of Prednisone and is now being weaned off Actemra.  He is followed by Rheumatology at [facility].    The patient denies a history of chest discomfort, dyspnea, PND, orthopnea, pedal edema, palpitations, lightheadedness, and syncope.     The patient was diagnosed with PAD years ago.  He describes cramping in both calves that occurs at rest and with activity.  The calf discomfort is chronic, persistent throughout the day and night.  He use to ambulated with a cane but had several falls and started using a walker in 2018.  He had cellulitis in 2018 requiring IV antibiotic.  He was also diagnosed with lymphedema and underwent wraps with nursing support at home.  He continues to use wraps and compressions stockings.  He also takes Lasix 20 mg every day.      The patient was suspected to have PAD based on his clinical history and had been treated with Cilostazol.  He had MRA (11/27/18) showing normal abdominal  aorta, occlusion of the RT PT and peroneal arteries at the proximal/mid calf.  The RT AT reconstitutes the RT DP at the ankle via collaterals.  On the LF side, the PT and peroneal arteries are occluded at the proximal to mid calf.  LF AT reconstitutes the LF PT at the ankle through the collaterals. He had arterial duplex (11/2/2018) which confirmed occlusion of the RT PT but did not identify the other arterial disease observed on the MRA.    PAST MEDICAL HISTORY:  Past Medical History:   Diagnosis Date     Anemia      Anemia due to blood loss, acute      CKD (chronic kidney disease) stage 3, GFR 30-59 ml/min 5/31/2010     Essential hypertension, benign      Other and unspecified hyperlipidemia      Other and unspecified hyperlipidemia      Other specified disorder of skin      Pain in joint, shoulder region      Parkinson disease (H) 9/2/2010     Polyp of vocal cord or larynx      Retinal ischemia     right sided thrombotic plaque     Rosacea      Type II or unspecified type diabetes mellitus without mention of complication, not stated as uncontrolled        CURRENT MEDICATIONS:  Current Outpatient Medications   Medication Sig     amoxicillin (AMOXIL) 500 MG capsule TAKE FOUR CAPSULES BY MOUTH 1 HOUR BEFORE DENTAL APPOINTMENT     aspirin 81 MG tablet Take 1 tablet by mouth daily.     atorvastatin (LIPITOR) 10 MG tablet Take 1 tablet (10 mg) by mouth daily     blood glucose (NO BRAND SPECIFIED) lancets standard Use to test blood sugar one time daily or as directed.     blood glucose (NO BRAND SPECIFIED) test strip Use to test blood sugar one times daily or as directed.     cephALEXin (KEFLEX) 500 MG capsule Take 1 capsule (500 mg) by mouth 2 times daily     cilostazol (PLETAL) 50 MG tablet Take 1 tablet (50 mg) by mouth 2 times daily Take on an empty stomach.     COMPRESSION STOCKINGS 1 each daily Lower Extremity:   Knee High;  bilateral;  20-30 mm Hg.  Measure and fit.  Style and color per patient preference.  Nat  n Nando per patient need.     Cyanocobalamin (VITAMIN B-12 CR) 1000 MCG TBCR TAKE ONE TABLET BY MOUTH EVERY DAY (Patient taking differently: TAKE ONE TABLET BY MOUTH EVERY DAY. Pt's taking OCT vitamin B12 and the dose is not clear.)     diclofenac (VOLTAREN) 1 % topical gel APPLY 4 GRAMS TO KNEES OR 2 GRAMS TO HANDS FOUR TIMES A DAY USING ENCLOSED DOSING CARD     fluticasone (FLONASE) 50 MCG/ACT nasal spray Spray 1-2 sprays into both nostrils daily as needed     folic acid (FOLVITE) 1 MG tablet Take 1 mg by mouth daily     furosemide (LASIX) 20 MG tablet Take 1 tablet (20 mg) by mouth daily     irbesartan (AVAPRO) 75 MG tablet Take 0.5 tablets (37.5 mg) by mouth At Bedtime     Multiple Vitamin (MULTI VITAMIN  MENS) TABS Take  by mouth. Takes one daily       order for DME Equipment being ordered: pneumatic compression pumps.  Thigh length.  Measure and fit.  Pressure per protocol.     order for DME Glucometer, brand as covered by insurance.     order for DME Test strips for pt's glucometer, brand as covered by insurance. Test daily and prn.     order for DME Lancets.  Daily and prn.     sertraline (ZOLOFT) 25 MG tablet TAKE 1 TABLET BY MOUTH EVERY DAY     tocilizumab (ACTEMRA) 162 MG/0.9ML subcutaneous injection Inject 162 mg Subcutaneous     vitamin D3 (CHOLECALCIFEROL) 1000 units (25 mcg) tablet Take 1 tablet (1,000 Units) by mouth at bedtime     No current facility-administered medications for this visit.        PAST SURGICAL HISTORY:  Past Surgical History:   Procedure Laterality Date     ARTHROPLASTY KNEE  1/31/2012    Procedure:ARTHROPLASTY KNEE; LEFT TOTAL KNEE ARTHROPLASTY (IRASEMA)^; Surgeon:SKIP FAIR; Location:[facility] OR     ARTHROSCOPY SHOULDER, OPEN ROTATOR CUFF REPAIR, COMBINED  4/24/2012    Procedure:COMBINED ARTHROSCOPY SHOULDER, OPEN ROTATOR CUFF REPAIR; RIGHT SHOULDER ARTHROSCOPY OPEN ROTATOR CUFF DEBRIDEMENT, SUBCROMIAL DECOMPRESSION, AND BICEPS TENODESIS REPAIR; Surgeon:SKIP FAIR;  Location:[facility] AFF SURGICAL PATHOLOGY  6-    Dr. Bowers; left hand     ENT SURGERY       INCISE FINGER TENDON SHEATH  2008    Dr. Bowers; right AND LEFT hand     THROAT SURGERY      vocal cord polyps       ALLERGIES  No known drug allergies    FAMILY HX:  Family History   Problem Relation Age of Onset     Family History Negative Other         unknown family history per patient       SOCIAL HX:  Social History     Socioeconomic History     Marital status:      Spouse name: ford     Number of children: 6     Years of education: 12     Highest education level: Not on file   Occupational History     Occupation: printing     Employer: RETIRED   Social Needs     Financial resource strain: Not on file     Food insecurity     Worry: Not on file     Inability: Not on file     Transportation needs     Medical: Not on file     Non-medical: Not on file   Tobacco Use     Smoking status: Former Smoker     Smokeless tobacco: Never Used   Substance and Sexual Activity     Alcohol use: Yes     Comment: rarely     Drug use: No     Sexual activity: Not Currently     Partners: Female   Lifestyle     Physical activity     Days per week: Not on file     Minutes per session: Not on file     Stress: Not on file   Relationships     Social connections     Talks on phone: Not on file     Gets together: Not on file     Attends [employer] service: Not on file     Active member of club or organization: Not on file     Attends meetings of clubs or organizations: Not on file     Relationship status: Not on file     Intimate partner violence     Fear of current or ex partner: Not on file     Emotionally abused: Not on file     Physically abused: Not on file     Forced sexual activity: Not on file   Other Topics Concern     Parent/sibling w/ CABG, MI or angioplasty before 65F 55M? No   Social History Narrative     Not on file       ROS:  Answers for HPI/ROS submitted by the patient on 12/12/2020   General Symptoms: No  Skin  "Symptoms: Yes  HENT Symptoms: Yes  EYE SYMPTOMS: Yes  HEART SYMPTOMS: No  LUNG SYMPTOMS: No  INTESTINAL SYMPTOMS: No  URINARY SYMPTOMS: No  REPRODUCTIVE SYMPTOMS: No  SKELETAL SYMPTOMS: Yes  BLOOD SYMPTOMS: No  NERVOUS SYSTEM SYMPTOMS: Yes  MENTAL HEALTH SYMPTOMS: No  Changes in hair: No  Changes in moles/birth marks: No  Itching: No  Rashes: Yes  Changes in nails: Yes  Acne: Yes  Change in facial hair: No  Warts: No  Non-healing sores: No  Scarring: No  Flaking of skin: No  Color changes of hands/feet in cold : No  Sun sensitivity: No  Skin thickening: No  Ear pain: Yes  Ear discharge: No  Hearing loss: Yes  Tinnitus: No  Nosebleeds: No  Congestion: No  Sinus pain: No  Trouble swallowing: No   Voice hoarseness: Yes  Mouth sores: No  Sore throat: No  Tooth pain: No  Gum tenderness: No  Bleeding gums: No  Change in taste: No  Change in sense of smell: No  Dry mouth: Yes  Hearing aid used: No  Neck lump: No  Eye pain: No  Vision loss: Yes  Dry eyes: Yes  Watery eyes: No  Eye bulging: No  Double vision: No  Flashing of lights: No  Spots: No  Floaters: No  Redness: No  Crossed eyes: No  Tunnel Vision: No  Yellowing of eyes: No  Eye irritation: No  Back pain: No  Muscle aches: Yes  Neck pain: Yes  Swollen joints: No  Joint pain: Yes  Bone pain: No  Muscle cramps: No  Muscle weakness: Yes  Joint stiffness: Yes  Bone fracture: No  Trouble with coordination: No  Dizziness or trouble with balance: Yes  Fainting or black-out spells: No  Memory loss: Yes  Headache: No  Seizures: No  Speech problems: No  Tingling: Yes  Tremor: No  Weakness: No  Difficulty walking: Yes  Paralysis: No  Numbness: No      VITAL SIGNS:  /65 (BP Location: Right arm, Patient Position: Chair, Cuff Size: Adult Large)   Pulse 88   Ht 1.778 m (5' 10\")   Wt 96.6 kg (213 lb)   SpO2 96%   BMI 30.56 kg/m    Body mass index is 30.56 kg/m .  Wt Readings from Last 2 Encounters:   11/04/20 94.3 kg (208 lb)   10/22/20 94.3 kg (208 lb)       PHYSICAL " EXAM  Bart Blair is a 89 year old male.in no acute distress.  HEENT: Eyes Nonicteric.  Neck: JVP 2cm H20.  Carotids +2 bilaterally without bruits.  Lungs: CTA.  Cor: RRR. Normal S1 and S2.  No murmur, rub, or gallop.  PMI in Lf 5th ICS.  Abd: Soft, nontender, nondistended.  NABS.  No pulsatile mass.  Extremities: No C/C/E.  Pulses +3/3 symmetric in upper extremities. Lower extremities: Right DP mod doppler, Right PT weak doppler signal. Left DP moderate doppler signal and PT absent.Neuro: Grossly intact.  Psych: A&O x 3.  Skin: No rash.    LABS  Recent Labs   Lab Test 20  1030 19  1205   WBC 5.8 7.2   HGB 12.8* 12.0*   HCT 38.3* 36.7*   * 206     Recent Labs   Lab Test 10/22/20  1321 20  1030    140   POTASSIUM 4.6 4.1   CHLORIDE 104 106   CO2 30 26   * 174*   BUN 31* 32*   CR 1.51* 1.65*   BLAISE 9.2 8.7     Recent Labs   Lab Test 20  1030 19  1033 14  1238 14  1238 13  0829   CHOL 125 138   < > 116 112   HDL 39* 44   < > 45 41   LDL 47 63   < > 37 52   TRIG 197* 157*   < > 171* 92   CHOLHDLRATIO  --   --   --  2.6 2.7   NHDL 86 94   < >  --   --     < > = values in this interval not displayed.        [id number]  Sinus rhythm  Normal ECG    MRA BLE: 18:  1. Abdominal aorta: No MRA abnormality identified.   2. Right leg: Posterior tibial and peroneal arteries are occluded at the proximal/mid calf. Single vessel runoff by anterior tibial artery posterior tibial artery is reconstituted at the ankle via collaterals.  3. Left leg: Posterior tibial and peroneal arteries are occluded at the proximal to mid calf. Single vessel runoff by anterior tibialartery. Reconstitution of the posterior tibial artery at the ankle through the collaterals.   4. Nonenhancing foci in both kidneys may represent renal cysts, but are inadequately evaluated on this study. Renal ultrasound could further evaluate.  5. Decreased T1 in the articular surfaces in the  bilateral femurs and tibia as described, which is most likely related to degenerative disease. Knee radiographs would better evaluate.      ARTERIAL DUPLEX, BLE:  11/2/18  RIGHT:       EXTERNAL ILIAC ARTERY: 110/0 cm/s, triphasic       COMMON FEMORAL ARTERY: 63/0 cm/s, triphasic       PROFUNDUS FEMORAL ARTERY: 73/0 cm/s, biphasic       SUPERFICIAL FEMORAL ARTERY, proximal: 67/0 cm/s, triphasic       SUPERFICIAL FEMORAL ARTERY, mid: 92/0 cm/s, triphasic       SUPERFICIAL FEMORAL ARTERY, distal: 66/0 cm/s, triphasic       POPLITEAL: 73/0 cm/s, biphasic       POSTERIOR TIBIAL ARTERY, proximal: 62/0 cm/s, biphasic       POSTERIOR TIBIAL ARTERY: occluded from mid to distal       ANTERIOR TIBIAL ARTERY, ankle: 98/26 cm/s, triphasic     LEFT:       EXTERNAL ILIAC ARTERY: 79/0 cm/s, triphasic       COMMON FEMORAL ARTERY: 68/0 cm/s, triphasic       PROFUNDUS FEMORAL ARTERY: 77/0 cm/s, triphasic       SUPERFICIAL FEMORAL ARTERY, proximal: 79/0 cm/s, triphasic       SUPERFICIAL FEMORAL ARTERY, mid: 78/0 cm/s, triphasic       SUPERFICIAL FEMORAL ARTERY, distal: 76/0 cm/s, triphasic       POPLITEAL: 73/0 cm/s, biphasic       POSTERIOR TIBIAL ARTERY, ankle: 43/0 cm/s, biphasic       ANTERIOR TIBIAL ARTERY, ankle: 103/19 cm/s, triphasic                                                                      IMPRESSION:  1. Right posterior tibial artery occluded from the mid calf.   2. Biphasic waveforms reflect noncompliance due to heavily calcified arteries.  3. No significant inflow disease demonstrated.     ECHO: 10/17/18  Interpretation Summary  Global and regional left ventricular function is normal with an EF of 60-65%.  Right ventricular function, chamber size, wall motion, and thickness are normal.  The inferior vena cava is normal.     CARDIAC MRI: None    CORONARY CTA: None    STRESS TEST:  None    CARDIAC CATH: None    ASSESSMENT AND PLAN:   1. PAD, bilateral infrapopliteal  -- Patient has single vessel runoff with patent  AT bilaterally with collateral flow to PT bilaterally  -- At age 89 and with moderate severity of symptoms, I would not be inclined to recommend revascularization of the PT vessels at this time as restenosis rate would be particularly high and procedure would involve a moderate amount of complexity.  -- Continue Cilosatazol  -- Start Plavix 75 mg every day  -- Discontinue ASA   -- Continue exercise rehabilitation    2. Chronic venous incompetence  -- Continue Lasix  -- Patient referred for mechanical pneumatic compression devices but could not afford them  -- Recommend continue use of compression stockings.    3. HTN  -- Continue Irbesartan  -- Low Na diet    FOLLOW UP:  1 year      oBom Bass MD    Divisions of Cardiology  [place], [place]    CC  Patient Care Team:  Chuy Stewart MD as PCP - General  Chuy Stewart MD as Assigned PCP  Rosalee Aggarwal RN as Nurse Coordinator  SELF, REFERRED

## 2020-12-15 NOTE — LETTER
12/15/2020      RE: Bart Blair  [address] [facility] [id number]       Dear Colleague,    Thank you for the opportunity to participate in the care of your patient, Bart Blair, at the [facility] HEART [facility] at [facility]. Please see a copy of my visit note below.    [facility] CARDIOLOGY FOLLOW UP VISIT:    Referring Provider:  Referred Self  Primary Care Provider:   Chuy Stewart  Indication for Consultation:  PAD, LE Edema    HPI: Bart Blair is a 89 year old male being seen today for evaluation of PAD.   The patient's risk factor profile is: (+) HTN, (-) DM, (+) hypercholesterolemia, remote tobacco use [quit age 25], (+) fam Hx premature CAD.  The patient has no history of CAD, CHF, or valvular heart disease.  The patient has a history of syncope and ultimately underwent ILR over 5 years ago but had no recurrent syncope and evidently had no malignant arrhythmia.    The patient has not undergone prior cardiovascular evaluation and has never had an ECHO, stress study, cardiac catheterization, or EP study.   The patient has a history of TIA, no CVA.  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  He was treated with Actemra and Prednison. He was weaned of Prednisone and is now being weaned off Actemra.  He is followed by Rheumatology at [facility].    The patient denies a history of chest discomfort, dyspnea, PND, orthopnea, pedal edema, palpitations, lightheadedness, and syncope.     The patient was diagnosed with PAD years ago.  He describes cramping in both calves that occurs at rest and with activity.  The calf discomfort is chronic, persistent throughout the day and night.  He use to ambulated with a cane but had several falls and started using a walker in 2018.  He had cellulitis in 2018 requiring IV antibiotic.  He was  also diagnosed with lymphedema and underwent wraps with nursing support at home.  He continues to use wraps and compressions stockings.  He also takes Lasix 20 mg every day.      The patient was suspected to have PAD based on his clinical history and had been treated with Cilostazol.  He had MRA (11/27/18) showing normal abdominal aorta, occlusion of the RT PT and peroneal arteries at the proximal/mid calf.  The RT AT reconstitutes the RT DP at the ankle via collaterals.  On the LF side, the PT and peroneal arteries are occluded at the proximal to mid calf.  LF AT reconstitutes the LF PT at the ankle through the collaterals. He had arterial duplex (11/2/2018) which confirmed occlusion of the RT PT but did not identify the other arterial disease observed on the MRA.    PAST MEDICAL HISTORY:  Past Medical History:   Diagnosis Date     Anemia      Anemia due to blood loss, acute      CKD (chronic kidney disease) stage 3, GFR 30-59 ml/min 5/31/2010     Essential hypertension, benign      Other and unspecified hyperlipidemia      Other and unspecified hyperlipidemia      Other specified disorder of skin      Pain in joint, shoulder region      Parkinson disease (H) 9/2/2010     Polyp of vocal cord or larynx      Retinal ischemia     right sided thrombotic plaque     Rosacea      Type II or unspecified type diabetes mellitus without mention of complication, not stated as uncontrolled        CURRENT MEDICATIONS:  Current Outpatient Medications   Medication Sig     amoxicillin (AMOXIL) 500 MG capsule TAKE FOUR CAPSULES BY MOUTH 1 HOUR BEFORE DENTAL APPOINTMENT     aspirin 81 MG tablet Take 1 tablet by mouth daily.     atorvastatin (LIPITOR) 10 MG tablet Take 1 tablet (10 mg) by mouth daily     blood glucose (NO BRAND SPECIFIED) lancets standard Use to test blood sugar one time daily or as directed.     blood glucose (NO BRAND SPECIFIED) test strip Use to test blood sugar one times daily or as directed.     cephALEXin  (KEFLEX) 500 MG capsule Take 1 capsule (500 mg) by mouth 2 times daily     cilostazol (PLETAL) 50 MG tablet Take 1 tablet (50 mg) by mouth 2 times daily Take on an empty stomach.     COMPRESSION STOCKINGS 1 each daily Lower Extremity:   Knee High;  bilateral;  20-30 mm Hg.  Measure and fit.  Style and color per patient preference.  D[place] n Nando per patient need.     Cyanocobalamin (VITAMIN B-12 CR) 1000 MCG TBCR TAKE ONE TABLET BY MOUTH EVERY DAY (Patient taking differently: TAKE ONE TABLET BY MOUTH EVERY DAY. Pt's taking OCT vitamin B12 and the dose is not clear.)     diclofenac (VOLTAREN) 1 % topical gel APPLY 4 GRAMS TO KNEES OR 2 GRAMS TO HANDS FOUR TIMES A DAY USING ENCLOSED DOSING CARD     fluticasone (FLONASE) 50 MCG/ACT nasal spray Spray 1-2 sprays into both nostrils daily as needed     folic acid (FOLVITE) 1 MG tablet Take 1 mg by mouth daily     furosemide (LASIX) 20 MG tablet Take 1 tablet (20 mg) by mouth daily     irbesartan (AVAPRO) 75 MG tablet Take 0.5 tablets (37.5 mg) by mouth At Bedtime     Multiple Vitamin (MULTI VITAMIN  MENS) TABS Take  by mouth. Takes one daily       order for DME Equipment being ordered: pneumatic compression pumps.  Thigh length.  Measure and fit.  Pressure per protocol.     order for DME Glucometer, brand as covered by insurance.     order for DME Test strips for pt's glucometer, brand as covered by insurance. Test daily and prn.     order for DME Lancets.  Daily and prn.     sertraline (ZOLOFT) 25 MG tablet TAKE 1 TABLET BY MOUTH EVERY DAY     tocilizumab (ACTEMRA) 162 MG/0.9ML subcutaneous injection Inject 162 mg Subcutaneous     vitamin D3 (CHOLECALCIFEROL) 1000 units (25 mcg) tablet Take 1 tablet (1,000 Units) by mouth at bedtime     No current facility-administered medications for this visit.        PAST SURGICAL HISTORY:  Past Surgical History:   Procedure Laterality Date     ARTHROPLASTY KNEE  1/31/2012    Procedure:ARTHROPLASTY KNEE; LEFT TOTAL KNEE ARTHROPLASTY  (IRASEMA)^; Surgeon:SKIP FAIR; Location: OR     ARTHROSCOPY SHOULDER, OPEN ROTATOR CUFF REPAIR, COMBINED  4/24/2012    Procedure:COMBINED ARTHROSCOPY SHOULDER, OPEN ROTATOR CUFF REPAIR; RIGHT SHOULDER ARTHROSCOPY OPEN ROTATOR CUFF DEBRIDEMENT, SUBCROMIAL DECOMPRESSION, AND BICEPS TENODESIS REPAIR; Surgeon:SKIP FAIR; Location: [facility]     CL AFF SURGICAL PATHOLOGY  6-    Dr. Bowers; left hand     ENT SURGERY       INCISE FINGER TENDON SHEATH  2008    Dr. Bowers; right AND LEFT hand     THROAT SURGERY      vocal cord polyps       ALLERGIES  No known drug allergies    FAMILY HX:  Family History   Problem Relation Age of Onset     Family History Negative Other         unknown family history per patient       SOCIAL HX:  Social History     Socioeconomic History     Marital status:      Spouse name: ford     Number of children: 6     Years of education: 12     Highest education level: Not on file   Occupational History     Occupation: [employer]     Employer: RETIRED   Social Needs     Financial resource strain: Not on file     Food insecurity     Worry: Not on file     Inability: Not on file     Transportation needs     Medical: Not on file     Non-medical: Not on file   Tobacco Use     Smoking status: Former Smoker     Smokeless tobacco: Never Used   Substance and Sexual Activity     Alcohol use: Yes     Comment: rarely     Drug use: No     Sexual activity: Not Currently     Partners: Female   Lifestyle     Physical activity     Days per week: Not on file     Minutes per session: Not on file     Stress: Not on file   Relationships     Social connections     Talks on phone: Not on file     Gets together: Not on file     Attends [employer] service: Not on file     Active member of club or organization: Not on file     Attends meetings of clubs or organizations: Not on file     Relationship status: Not on file     Intimate partner violence     Fear of current or ex partner: Not on file      Emotionally abused: Not on file     Physically abused: Not on file     Forced sexual activity: Not on file   Other Topics Concern     Parent/sibling w/ CABG, MI or angioplasty before 65F 55M? No   Social History Narrative     Not on file       ROS:  Answers for HPI/ROS submitted by the patient on 12/12/2020   General Symptoms: No  Skin Symptoms: Yes  HENT Symptoms: Yes  EYE SYMPTOMS: Yes  HEART SYMPTOMS: No  LUNG SYMPTOMS: No  INTESTINAL SYMPTOMS: No  URINARY SYMPTOMS: No  REPRODUCTIVE SYMPTOMS: No  SKELETAL SYMPTOMS: Yes  BLOOD SYMPTOMS: No  NERVOUS SYSTEM SYMPTOMS: Yes  MENTAL HEALTH SYMPTOMS: No  Changes in hair: No  Changes in moles/birth marks: No  Itching: No  Rashes: Yes  Changes in nails: Yes  Acne: Yes  Change in facial hair: No  Warts: No  Non-healing sores: No  Scarring: No  Flaking of skin: No  Color changes of hands/feet in cold : No  Sun sensitivity: No  Skin thickening: No  Ear pain: Yes  Ear discharge: No  Hearing loss: Yes  Tinnitus: No  Nosebleeds: No  Congestion: No  Sinus pain: No  Trouble swallowing: No   Voice hoarseness: Yes  Mouth sores: No  Sore throat: No  Tooth pain: No  Gum tenderness: No  Bleeding gums: No  Change in taste: No  Change in sense of smell: No  Dry mouth: Yes  Hearing aid used: No  Neck lump: No  Eye pain: No  Vision loss: Yes  Dry eyes: Yes  Watery eyes: No  Eye bulging: No  Double vision: No  Flashing of lights: No  Spots: No  Floaters: No  Redness: No  Crossed eyes: No  Tunnel Vision: No  Yellowing of eyes: No  Eye irritation: No  Back pain: No  Muscle aches: Yes  Neck pain: Yes  Swollen joints: No  Joint pain: Yes  Bone pain: No  Muscle cramps: No  Muscle weakness: Yes  Joint stiffness: Yes  Bone fracture: No  Trouble with coordination: No  Dizziness or trouble with balance: Yes  Fainting or black-out spells: No  Memory loss: Yes  Headache: No  Seizures: No  Speech problems: No  Tingling: Yes  Tremor: No  Weakness: No  Difficulty walking: Yes  Paralysis: No  Numbness:  "No      VITAL SIGNS:  /65 (BP Location: Right arm, Patient Position: Chair, Cuff Size: Adult Large)   Pulse 88   Ht 1.778 m (5' 10\")   Wt 96.6 kg (213 lb)   SpO2 96%   BMI 30.56 kg/m    Body mass index is 30.56 kg/m .  Wt Readings from Last 2 Encounters:   20 94.3 kg (208 lb)   10/22/20 94.3 kg (208 lb)       PHYSICAL EXAM  Bart Blair is a 89 year old male.in no acute distress.  HEENT: Eyes Nonicteric.  Neck: JVP 2cm H20.  Carotids +2 bilaterally without bruits.  Lungs: CTA.  Cor: RRR. Normal S1 and S2.  No murmur, rub, or gallop.  PMI in Lf 5th ICS.  Abd: Soft, nontender, nondistended.  NABS.  No pulsatile mass.  Extremities: No C/C/E.  Pulses +3/3 symmetric in upper extremities. Lower extremities: Right DP mod doppler, Right PT weak doppler signal. Left DP moderate doppler signal and PT absent.Neuro: Grossly intact.  Psych: A&O x 3.  Skin: No rash.    LABS  Recent Labs   Lab Test 20  1030 19  1205   WBC 5.8 7.2   HGB 12.8* 12.0*   HCT 38.3* 36.7*   * 206     Recent Labs   Lab Test 10/22/20  1321 20  1030    140   POTASSIUM 4.6 4.1   CHLORIDE 104 106   CO2 30 26   * 174*   BUN 31* 32*   CR 1.51* 1.65*   BLAISE 9.2 8.7     Recent Labs   Lab Test 20  1030 19  1033 14  1238 14  1238 13  0829   CHOL 125 138   < > 116 112   HDL 39* 44   < > 45 41   LDL 47 63   < > 37 52   TRIG 197* 157*   < > 171* 92   CHOLHDLRATIO  --   --   --  2.6 2.7   NHDL 86 94   < >  --   --     < > = values in this interval not displayed.        [id number]  Sinus rhythm  Normal ECG    MRA BLE: 18:  1. Abdominal aorta: No MRA abnormality identified.   2. Right leg: Posterior tibial and peroneal arteries are occluded at the proximal/mid calf. Single vessel runoff by anterior tibial artery posterior tibial artery is reconstituted at the ankle via collaterals.  3. Left leg: Posterior tibial and peroneal arteries are occluded at the proximal to mid calf. " Single vessel runoff by anterior tibialartery. Reconstitution of the posterior tibial artery at the ankle through the collaterals.   4. Nonenhancing foci in both kidneys may represent renal cysts, but are inadequately evaluated on this study. Renal ultrasound could further evaluate.  5. Decreased T1 in the articular surfaces in the bilateral femurs and tibia as described, which is most likely related to degenerative disease. Knee radiographs would better evaluate.      ARTERIAL DUPLEX, BLE:  11/2/18  RIGHT:       EXTERNAL ILIAC ARTERY: 110/0 cm/s, triphasic       COMMON FEMORAL ARTERY: 63/0 cm/s, triphasic       PROFUNDUS FEMORAL ARTERY: 73/0 cm/s, biphasic       SUPERFICIAL FEMORAL ARTERY, proximal: 67/0 cm/s, triphasic       SUPERFICIAL FEMORAL ARTERY, mid: 92/0 cm/s, triphasic       SUPERFICIAL FEMORAL ARTERY, distal: 66/0 cm/s, triphasic       POPLITEAL: 73/0 cm/s, biphasic       POSTERIOR TIBIAL ARTERY, proximal: 62/0 cm/s, biphasic       POSTERIOR TIBIAL ARTERY: occluded from mid to distal       ANTERIOR TIBIAL ARTERY, ankle: 98/26 cm/s, triphasic     LEFT:       EXTERNAL ILIAC ARTERY: 79/0 cm/s, triphasic       COMMON FEMORAL ARTERY: 68/0 cm/s, triphasic       PROFUNDUS FEMORAL ARTERY: 77/0 cm/s, triphasic       SUPERFICIAL FEMORAL ARTERY, proximal: 79/0 cm/s, triphasic       SUPERFICIAL FEMORAL ARTERY, mid: 78/0 cm/s, triphasic       SUPERFICIAL FEMORAL ARTERY, distal: 76/0 cm/s, triphasic       POPLITEAL: 73/0 cm/s, biphasic       POSTERIOR TIBIAL ARTERY, ankle: 43/0 cm/s, biphasic       ANTERIOR TIBIAL ARTERY, ankle: 103/19 cm/s, triphasic                                                                      IMPRESSION:  1. Right posterior tibial artery occluded from the mid calf.   2. Biphasic waveforms reflect noncompliance due to heavily calcified arteries.  3. No significant inflow disease demonstrated.     ECHO: 10/17/18  Interpretation Summary  Global and regional left ventricular function is normal  with an EF of 60-65%.  Right ventricular function, chamber size, wall motion, and thickness are normal.  The inferior vena cava is normal.     CARDIAC MRI: None    CORONARY CTA: None    STRESS TEST:  None    CARDIAC CATH: None    ASSESSMENT AND PLAN:   1. PAD, bilateral infrapopliteal  -- Patient has single vessel runoff with patent AT bilaterally with collateral flow to PT bilaterally  -- At age 89 and with moderate severity of symptoms, I would not be inclined to recommend revascularization of the PT vessels at this time as restenosis rate would be particularly high and procedure would involve a moderate amount of complexity.  -- Continue Cilosatazol  -- Start Plavix 75 mg every day  -- Discontinue ASA   -- Continue exercise rehabilitation    2. Chronic venous incompetence  -- Continue Lasix  -- Patient referred for mechanical pneumatic compression devices but could not afford them  -- Recommend continue use of compression stockings.    3. HTN  -- Continue Irbesartan  -- Low Na diet    FOLLOW UP:  1 year      Boom Bass MD    Divisions of Cardiology  [place], [place]    CC  Patient Care Team:  Chuy Stewart MD as PCP - General  Chuy Stewart MD as Assigned PCP  Rosalee Aggarwal RN as Nurse Coordinator  SELF, REFERRED      Please do not hesitate to contact me if you have any questions/concerns.     Sincerely,     Boom Bass MD

## 2021-03-08 ENCOUNTER — TELEPHONE (OUTPATIENT)
Dept: FAMILY MEDICINE | Facility: CLINIC | Age: 86
End: 2021-03-08

## 2021-03-08 DIAGNOSIS — I10 HYPERTENSION GOAL BP (BLOOD PRESSURE) < 150/90: ICD-10-CM

## 2021-03-11 RX ORDER — IRBESARTAN 75 MG/1
TABLET ORAL
Qty: 45 TABLET | Refills: 0 | Status: SHIPPED | OUTPATIENT
Start: 2021-03-11 | End: 2021-03-18

## 2021-03-11 NOTE — TELEPHONE ENCOUNTER
[employer] message sent.    Thank you,  Joslyn HUGGINS  [facility]  Team Kendra Coordinator

## 2021-03-11 NOTE — TELEPHONE ENCOUNTER
Routing refill request to provider for review/approval because:  Routing to NE providers to consider one time josé manuel refill, patient needs to establish care with new PCP.     Creatinine   Date Value Ref Range Status   10/22/2020 1.51 (H) 0.66 - 1.25 mg/dL Final       Trish Rai, RN, BSN, PHN  [facility]: [place]

## 2021-03-11 NOTE — TELEPHONE ENCOUNTER
Sent. Please call to schedule an establish care visit with someone. I am not taking new adult patients.    Thank you.    Mandeep Lane MPAS, PA-C

## 2021-03-12 NOTE — TELEPHONE ENCOUNTER
MyChart msg read and appointment set up.    Thank you,  Joslyn HUGGINS  [facility]  Team Kendra Coordinator

## 2021-03-14 ENCOUNTER — HEALTH MAINTENANCE LETTER (OUTPATIENT)
Age: 86
End: 2021-03-14

## 2021-03-18 ENCOUNTER — OFFICE VISIT (OUTPATIENT)
Dept: FAMILY MEDICINE | Facility: CLINIC | Age: 86
End: 2021-03-18
Payer: COMMERCIAL

## 2021-03-18 VITALS
OXYGEN SATURATION: 98 % | WEIGHT: 214 LBS | TEMPERATURE: 97.7 F | HEIGHT: 70 IN | SYSTOLIC BLOOD PRESSURE: 110 MMHG | BODY MASS INDEX: 30.64 KG/M2 | HEART RATE: 87 BPM | DIASTOLIC BLOOD PRESSURE: 60 MMHG | RESPIRATION RATE: 22 BRPM

## 2021-03-18 DIAGNOSIS — E78.5 HYPERLIPIDEMIA LDL GOAL <100: ICD-10-CM

## 2021-03-18 DIAGNOSIS — I10 HYPERTENSION GOAL BP (BLOOD PRESSURE) < 150/90: ICD-10-CM

## 2021-03-18 DIAGNOSIS — E11.39 TYPE 2 DIABETES MELLITUS WITH OTHER DIABETIC OPHTHALMIC COMPLICATION (H): ICD-10-CM

## 2021-03-18 DIAGNOSIS — Z00.00 ENCOUNTER FOR MEDICARE ANNUAL WELLNESS EXAM: Primary | ICD-10-CM

## 2021-03-18 DIAGNOSIS — I70.213 ATHEROSCLEROSIS OF NATIVE ARTERY OF BOTH LOWER EXTREMITIES WITH INTERMITTENT CLAUDICATION (H): ICD-10-CM

## 2021-03-18 LAB — HBA1C MFR BLD: 6.4 % (ref 0–5.6)

## 2021-03-18 PROCEDURE — 80048 BASIC METABOLIC PNL TOTAL CA: CPT | Performed by: FAMILY MEDICINE

## 2021-03-18 PROCEDURE — 80061 LIPID PANEL: CPT | Performed by: FAMILY MEDICINE

## 2021-03-18 PROCEDURE — 83036 HEMOGLOBIN GLYCOSYLATED A1C: CPT | Performed by: FAMILY MEDICINE

## 2021-03-18 PROCEDURE — 36415 COLL VENOUS BLD VENIPUNCTURE: CPT | Performed by: FAMILY MEDICINE

## 2021-03-18 PROCEDURE — 99397 PER PM REEVAL EST PAT 65+ YR: CPT | Performed by: FAMILY MEDICINE

## 2021-03-18 RX ORDER — IRBESARTAN 75 MG/1
TABLET ORAL
Qty: 45 TABLET | Refills: 3 | Status: SHIPPED | OUTPATIENT
Start: 2021-03-18

## 2021-03-18 RX ORDER — FUROSEMIDE 20 MG
20 TABLET ORAL DAILY
Qty: 90 TABLET | Refills: 3 | Status: SHIPPED | OUTPATIENT
Start: 2021-03-18

## 2021-03-18 RX ORDER — CILOSTAZOL 50 MG/1
50 TABLET ORAL 2 TIMES DAILY
Qty: 180 TABLET | Refills: 3 | Status: SHIPPED | OUTPATIENT
Start: 2021-03-18

## 2021-03-18 RX ORDER — ATORVASTATIN CALCIUM 10 MG/1
10 TABLET, FILM COATED ORAL DAILY
Qty: 90 TABLET | Refills: 3 | Status: SHIPPED | OUTPATIENT
Start: 2021-03-18 | End: 2022-03-31

## 2021-03-18 ASSESSMENT — ENCOUNTER SYMPTOMS
NEUROLOGICAL NEGATIVE: 1
EYE ITCHING: 1
ENDOCRINE NEGATIVE: 1
SLEEP DISTURBANCE: 1
ALLERGIC/IMMUNOLOGIC NEGATIVE: 1
BACK PAIN: 1
JOINT SWELLING: 1
CONSTITUTIONAL NEGATIVE: 1
GASTROINTESTINAL NEGATIVE: 1
RESPIRATORY NEGATIVE: 1
PHOTOPHOBIA: 1
HEMATOLOGIC/LYMPHATIC NEGATIVE: 1

## 2021-03-18 ASSESSMENT — ACTIVITIES OF DAILY LIVING (ADL)
CURRENT_FUNCTION: HOUSEWORK REQUIRES ASSISTANCE
CURRENT_FUNCTION: PREPARING MEALS REQUIRES ASSISTANCE
CURRENT_FUNCTION: TRANSPORTATION REQUIRES ASSISTANCE
CURRENT_FUNCTION: LAUNDRY REQUIRES ASSISTANCE
CURRENT_FUNCTION: TELEPHONE REQUIRES ASSISTANCE
CURRENT_FUNCTION: MEDICATION ADMINISTRATION REQUIRES ASSISTANCE
CURRENT_FUNCTION: BATHING REQUIRES ASSISTANCE
CURRENT_FUNCTION: SHOPPING REQUIRES ASSISTANCE
CURRENT_FUNCTION: MONEY MANAGEMENT REQUIRES ASSISTANCE

## 2021-03-18 ASSESSMENT — PAIN SCALES - GENERAL: PAINLEVEL: NO PAIN (0)

## 2021-03-18 ASSESSMENT — MIFFLIN-ST. JEOR: SCORE: 1636.95

## 2021-03-18 NOTE — PATIENT INSTRUCTIONS
Patient Education   Personalized Prevention Plan  You are due for the preventive services outlined below.  Your care team is available to assist you in scheduling these services.  If you have already completed any of these items, please share that information with your care team to update in your medical record.  Health Maintenance Due   Topic Date Due     Depression Action Plan  Never done     Annual Wellness Visit  Never done     Diabetic Foot Exam  03/29/2019     Eye Exam  04/19/2020     FALL RISK ASSESSMENT  06/12/2020     Kidney Microalbumin Urine Test  07/02/2020     A1C Lab  01/22/2021

## 2021-03-18 NOTE — PROGRESS NOTES
"SUBJECTIVE:   Bart Blair is a 89 year old male who presents for Preventive Visit.  Comes in with wife, he is doing well.  No recent history of falls, or sickness.  History of decreased hearing.  Decreased short-term memory, chronic.    Patient has been advised of split billing requirements and indicates understanding: Yes   Are you in the first 12 months of your Medicare coverage?  No    Healthy Habits:    In general, how would you rate your overall health?  Fair    Frequency of exercise:  None    Do you usually eat at least 4 servings of fruit and vegetables a day, include whole grains    & fiber and avoid regularly eating high fat or \"junk\" foods?  Yes    Taking medications regularly:  Yes    Barriers to taking medications:  Not applicable    Medication side effects:  None    Ability to successfully perform activities of daily living:  Telephone requires assistance, transportation requires assistance, shopping requires assistance, preparing meals requires assistance, housework requires assistance, bathing requires assistance, laundry requires assistance, medication administration requires assistance and money management requires assistance    Home Safety:  No safety concerns identified    Hearing Impairment:  Feel that people are mumbling or not speaking clearly    In the past 6 months, have you been bothered by leaking of urine? Yes    In general, how would you rate your overall mental or emotional health?  Good      PHQ-2 Total Score:    Additional concerns today:  No    Do you feel safe in your environment? Yes    Have you ever done Advance Care Planning? (For example, a Health Directive, POLST, or a discussion with a medical provider or your loved ones about your wishes): Yes, advance care planning is on file.       Fall risk  Fallen 2 or more times in the past year?: No  Any fall with injury in the past year?: No    Cognitive Screening   1) Repeat 3 items (Leader, Season, Table)    2) Clock draw: NORMAL  3) 3 " item recall: Recalls NO objects   Results: 0 items recalled: PROBABLE COGNITIVE IMPAIRMENT, **INFORM PROVIDER**    Mini-CogTM Copyright RANDEE Mckeon. Licensed by the author for use in [facility]; reprinted with permission (manav@[facility]). All rights reserved.      Do you have sleep apnea, excessive snoring or daytime drowsiness?: yes    Reviewed and updated as needed this visit by clinical staff  Tobacco  Allergies  Meds   Med Hx  Surg Hx  Fam Hx  Soc Hx        Reviewed and updated as needed this visit by Provider                Social History     Tobacco Use     Smoking status: Former Smoker     Smokeless tobacco: Never Used   Substance Use Topics     Alcohol use: Yes     Comment: rarely     If you drink alcohol do you typically have >3 drinks per day or >7 drinks per week? No    No flowsheet data found.No flowsheet data found.        Current providers sharing in care for this patient include:   Patient Care Team:  Chavez Blanton MD as PCP - General (Family Medicine)  Chuy Stewart MD as Assigned PCP  Rosalee Aggarwal RN as Nurse Coordinator  Boom Bass MD as Assigned Heart and Vascular Provider    The following health maintenance items are reviewed in Epic and correct as of today:  Health Maintenance   Topic Date Due     DEPRESSION ACTION PLAN  Never done     MEDICARE ANNUAL WELLNESS VISIT  Never done     DIABETIC FOOT EXAM  03/29/2019     EYE EXAM  04/19/2020     FALL RISK ASSESSMENT  06/12/2020     MICROALBUMIN  07/02/2020     A1C  01/22/2021     PHQ-9  04/22/2021     LIPID  07/27/2021     BMP  10/22/2021     DTAP/TDAP/TD IMMUNIZATION (3 - Td) 01/24/2022     ADVANCE CARE PLANNING  09/13/2022     INFLUENZA VACCINE  Completed     Pneumococcal Vaccine: Pediatrics (0 to 5 Years) and At-Risk Patients (6 to 64 Years)  Completed     Pneumococcal Vaccine: 65+ Years  Completed     ZOSTER IMMUNIZATION  Completed     COVID-19 Vaccine  Completed     IPV IMMUNIZATION  Aged Out     MENINGITIS  "IMMUNIZATION  Aged Out     Labs reviewed in EPIC  no    Review of Systems   Constitutional: Negative.    HENT: Positive for hearing loss and tinnitus.    Eyes: Positive for photophobia and itching.   Respiratory: Negative.    Gastrointestinal: Negative.    Endocrine: Negative.    Genitourinary: Negative.    Musculoskeletal: Positive for back pain and joint swelling.        Toes   Skin: Negative.    Allergic/Immunologic: Negative.    Neurological: Negative.    Hematological: Negative.    Psychiatric/Behavioral: Positive for sleep disturbance.     Constitutional, HEENT, cardiovascular, pulmonary, GI, , musculoskeletal, neuro, skin, endocrine and psych systems are negative, except as otherwise noted.    OBJECTIVE:   Pulse 87   Temp 97.7  F (36.5  C) (Oral)   Resp 22   Ht 1.778 m (5' 10\")   Wt 97.1 kg (214 lb)   SpO2 98%   BMI 30.71 kg/m   Estimated body mass index is 30.71 kg/m  as calculated from the following:    Height as of this encounter: 1.778 m (5' 10\").    Weight as of this encounter: 97.1 kg (214 lb).  Physical Exam  GENERAL: healthy, alert and no distress  HENT: ear canals and TM's normal, nose and mouth without ulcers or lesions  NECK: no adenopathy, no asymmetry, masses, or scars and thyroid normal to palpation  RESP: lungs clear to auscultation - no rales, rhonchi or wheezes  CV: regular rate and rhythm, normal S1 S2, no S3 or S4, no murmur, click or rub, no peripheral edema and peripheral pulses strong  ABDOMEN: soft, nontender, no hepatosplenomegaly, no masses and bowel sounds normal  MS: no gross musculoskeletal defects noted, no edema  SKIN: no suspicious lesions or rashes  NEURO: Normal strength and tone, mentation intact and speech normal  PSYCH: mentation appears normal, affect normal/bright    Diagnostic Test Results:  Labs reviewed in Epic  Orders Placed This Encounter   Procedures     HEMOGLOBIN A1C     Basic metabolic panel  (Ca, Cl, CO2, Creat, Gluc, K, Na, BUN)     Lipid panel reflex " "to direct LDL Non-fasting     OPTOMETRY REFERRAL     ASSESSMENT / PLAN:   1. Encounter for Medicare annual wellness exam  Preventative care discussed.  1 year annual follow-up recommended.  - HEMOGLOBIN A1C  - OPTOMETRY REFERRAL; Future    2. Atherosclerosis of native artery of both lower extremities with intermittent claudication (H)    - cilostazol (PLETAL) 50 MG tablet; Take 1 tablet (50 mg) by mouth 2 times daily Take on an empty stomach.  Dispense: 180 tablet; Refill: 3  - Lipid panel reflex to direct LDL Non-fasting    3. Hypertension goal BP (blood pressure) < 150/90    - irbesartan (AVAPRO) 75 MG tablet; TAKE 1/2 TABLET(37.5 MG) BY MOUTH AT BEDTIME  Dispense: 45 tablet; Refill: 3  - Basic metabolic panel  (Ca, Cl, CO2, Creat, Gluc, K, Na, BUN)    4. Type 2 diabetes mellitus with other diabetic ophthalmic complication (H)  Diet controlled.  It was felt related to his use of prednisone,  Now he has been off prednisone.  - blood glucose (NO BRAND SPECIFIED) lancets standard; Use to test blood sugar one time daily or as directed.  Dispense: 100 each; Refill: 3  - blood glucose (NO BRAND SPECIFIED) test strip; Use to test blood sugar one times daily or as directed.  Dispense: 100 strip; Refill: 3    5. Hyperlipidemia LDL goal <100    - atorvastatin (LIPITOR) 10 MG tablet; Take 1 tablet (10 mg) by mouth daily  Dispense: 90 tablet; Refill: 3    Patient has been advised of split billing requirements and indicates understanding: Yes  COUNSELING:  Reviewed preventive health counseling, as reflected in patient instructions       Regular exercise       Healthy diet/nutrition       Vision screening       Hearing screening       Dental care       Bladder control       Fall risk prevention    Estimated body mass index is 30.71 kg/m  as calculated from the following:    Height as of this encounter: 1.778 m (5' 10\").    Weight as of this encounter: 97.1 kg (214 lb).    Weight management plan: Discussed healthy diet and " exercise guidelines    He reports that he has quit smoking. He has never used smokeless tobacco.      Appropriate preventive services were discussed with this patient, including applicable screening as appropriate for cardiovascular disease, diabetes, osteopenia/osteoporosis, and glaucoma.  As appropriate for age/gender, discussed screening for colorectal cancer, prostate cancer, breast cancer, and cervical cancer. Checklist reviewing preventive services available has been given to the patient.    Reviewed patients plan of care and provided an AVS. The Basic Care Plan (routine screening as documented in Health Maintenance) for Bart meets the Care Plan requirement. This Care Plan has been established and reviewed with the Patient and spouse.    Counseling Resources:  ATP IV Guidelines  Pooled Cohorts Equation Calculator  Breast Cancer Risk Calculator  Breast Cancer: Medication to Reduce Risk  FRAX Risk Assessment  ICSI Preventive Guidelines  Dietary Guidelines for Americans, 2010  USDA's MyPlate  ASA Prophylaxis  Lung CA Screening    Chavez Blanton MD  [facility]    Identified Health Risks:

## 2021-03-19 LAB
ANION GAP SERPL CALCULATED.3IONS-SCNC: 4 MMOL/L (ref 3–14)
BUN SERPL-MCNC: 31 MG/DL (ref 7–30)
CALCIUM SERPL-MCNC: 8.4 MG/DL (ref 8.5–10.1)
CHLORIDE SERPL-SCNC: 106 MMOL/L (ref 94–109)
CHOLEST SERPL-MCNC: 136 MG/DL
CO2 SERPL-SCNC: 26 MMOL/L (ref 20–32)
CREAT SERPL-MCNC: 1.4 MG/DL (ref 0.66–1.25)
GFR SERPL CREATININE-BSD FRML MDRD: 44 ML/MIN/{1.73_M2}
GLUCOSE SERPL-MCNC: 91 MG/DL (ref 70–99)
HDLC SERPL-MCNC: 56 MG/DL
LDLC SERPL CALC-MCNC: 56 MG/DL
NONHDLC SERPL-MCNC: 80 MG/DL
POTASSIUM SERPL-SCNC: 4.4 MMOL/L (ref 3.4–5.3)
SODIUM SERPL-SCNC: 136 MMOL/L (ref 133–144)
TRIGL SERPL-MCNC: 121 MG/DL

## 2021-04-09 NOTE — TELEPHONE ENCOUNTER
cilostazol (PLETAL) 50 MG tablet  Last Written Prescription Date:  2/4/20  Last Fill Quantity: 180,   # refills: 0  Last Office Visit : 5/7/19  Future Office visit: none    Routing refill request to provider for review/approval because: Labs 7/19/2019: hgb L,  past due. platelet Past due, creat H 7/2/2019  
 Centralized Medication Refill Team note:  Last request pended, not signed.4/30/20  Fails refill protocol:  Routing refill request to provider for review/approval because: Labs 7/19/2019: hgb L,  past due. platelet Past due, creat H 7/2/2019  
Yes

## 2021-07-04 ENCOUNTER — HEALTH MAINTENANCE LETTER (OUTPATIENT)
Age: 86
End: 2021-07-04

## 2021-10-24 ENCOUNTER — HEALTH MAINTENANCE LETTER (OUTPATIENT)
Age: 86
End: 2021-10-24

## 2021-10-25 ENCOUNTER — TRANSFERRED RECORDS (OUTPATIENT)
Dept: HEALTH INFORMATION MANAGEMENT | Facility: CLINIC | Age: 86
End: 2021-10-25
Payer: COMMERCIAL

## 2021-10-26 ENCOUNTER — TELEPHONE (OUTPATIENT)
Dept: CARDIOLOGY | Facility: CLINIC | Age: 86
End: 2021-10-26

## 2021-10-26 NOTE — TELEPHONE ENCOUNTER
[facility] Call Center    Phone Message    May a detailed message be left on voicemail: yes     Reason for Call: Other: . [facility] pt will be having a procedure to remove his vocal cords coming up, he had his pre op and EKG done with [facility], they found a few abnormalities with his EKG and would like  ot care team to call On license of [facility] at [phone], and ask for any triage nurse, thank you    Action Taken: Message routed to:  [facility] heart    Travel Screening: Not Applicable

## 2021-10-28 NOTE — TELEPHONE ENCOUNTER
Dr. Deng reviewed the EKG and stated that if the patient does not have any symptoms he could proceed with the surgery.  Called Bart and he denied any chest pain, shortness of breath, palpitations or syncope.  Due to absence of symptoms he would not need a cardiac evaluation prior to his surgery.   Relayed information to [facility] triage

## 2021-11-23 DIAGNOSIS — I73.9 PAD (PERIPHERAL ARTERY DISEASE) (H): ICD-10-CM

## 2021-11-23 RX ORDER — CLOPIDOGREL BISULFATE 75 MG/1
75 TABLET ORAL DAILY
Qty: 90 TABLET | Refills: 0 | Status: SHIPPED | OUTPATIENT
Start: 2021-11-23 | End: 2022-03-03

## 2021-11-23 NOTE — TELEPHONE ENCOUNTER
clopidogrel (PLAVIX) 75 MG tablet      Last Written Prescription Date:  12/15/20  Last Fill Quantity: 90,   # refills: 3  Last Office Visit : Boom Bass MD  Interventional Cardiology  12/15/2020  [facility]  Recommended 1 year follow up  Future Office visit:  None scheduled    Routing refill request to provider for review/approval because:  Overdue for lab    Lab Test 07/27/20  1030   HGB 12.8*     Lab Test 07/27/20  1030   *     Nothing more recent in care everywhere nor media  No future orders in queue  90 day refill provided, routed to cardiology

## 2022-01-01 ENCOUNTER — VIRTUAL VISIT (OUTPATIENT)
Dept: CARDIOLOGY | Facility: CLINIC | Age: 87
End: 2022-01-01
Attending: INTERNAL MEDICINE
Payer: COMMERCIAL

## 2022-01-01 ENCOUNTER — TELEPHONE (OUTPATIENT)
Dept: CARDIOLOGY | Facility: CLINIC | Age: 87
End: 2022-01-01

## 2022-01-01 VITALS — WEIGHT: 212 LBS | BODY MASS INDEX: 30.42 KG/M2

## 2022-01-01 DIAGNOSIS — M31.6: ICD-10-CM

## 2022-01-01 DIAGNOSIS — I73.9 PAD (PERIPHERAL ARTERY DISEASE) (H): ICD-10-CM

## 2022-01-01 PROCEDURE — 99214 OFFICE O/P EST MOD 30 MIN: CPT | Mod: GT | Performed by: INTERNAL MEDICINE

## 2022-01-01 NOTE — TELEPHONE ENCOUNTER
Patient Education    [employer] HANDOUT- PARENT  FIRST WEEK VISIT (3 TO 5 DAYS)  Here are some suggestions from [employer] experts that may be of value to your family.     HOW YOUR FAMILY IS DOING  If you are worried about your living or food situation, talk with us. Community agencies and programs such as WIC and SNAP can also provide information and assistance.  Tobacco-free spaces keep children healthy. Don t smoke or use e-cigarettes. Keep your home and car smoke-free.  Take help from family and friends.    FEEDING YOUR BABY    Feed your baby only breast milk or iron-fortified formula until he is about 6 months old.    Feed your baby when he is hungry. Look for him to    Put his hand to his mouth.    Suck or root.    Fuss.    Stop feeding when you see your baby is full. You can tell when he    Turns away    Closes his mouth    Relaxes his arms and hands    Know that your baby is getting enough to eat if he has more than 5 wet diapers and at least 3 soft stools per day and is gaining weight appropriately.    Hold your baby so you can look at each other while you feed him.    Always hold the bottle. Never prop it.  If Breastfeeding    Feed your baby on demand. Expect at least 8 to 12 feedings per day.    A lactation consultant can give you information and support on how to breastfeed your baby and make you more comfortable.    Begin giving your baby vitamin D drops (400 IU a day).    Continue your prenatal vitamin with iron.    Eat a healthy diet; avoid fish high in mercury.  If Formula Feeding    Offer your baby 2 oz of formula every 2 to 3 hours. If he is still hungry, offer him more.    HOW YOU ARE FEELING    Try to sleep or rest when your baby sleeps.    Spend time with your other children.    Keep up routines to help your family adjust to the new baby.    BABY CARE    Sing, talk, and read to your baby; avoid TV and digital media.    Help your baby wake for feeding by patting her, changing her  Form signed and faxed to [phone]. Copy made for chart.    Thank you,  Joslyn HUGGINS    NE Team Kendra     diaper, and undressing her.    Calm your baby by stroking her head or gently rocking her.    Never hit or shake your baby.    Take your baby s temperature with a rectal thermometer, not by ear or skin; a fever is a rectal temperature of 100.4 F/38.0 C or higher. Call us anytime if you have questions or concerns.    Plan for emergencies: have a first aid kit, take first aid and infant CPR classes, and make a list of phone numbers.    Wash your hands often.    Avoid crowds and keep others from touching your baby without clean hands.    Avoid sun exposure.    SAFETY    Use a rear-facing-only car safety seat in the back seat of all vehicles.    Make sure your baby always stays in his car safety seat during travel. If he becomes fussy or needs to feed, stop the vehicle and take him out of his seat.    Your baby s safety depends on you. Always wear your lap and shoulder seat belt. Never drive after drinking alcohol or using drugs. Never text or use a cell phone while driving.    Never leave your baby in the car alone. Start habits that prevent you from ever forgetting your baby in the car, such as putting your cell phone in the back seat.    Always put your baby to sleep on his back in his own crib, not your bed.    Your baby should sleep in your room until he is at least 6 months old.    Make sure your baby s crib or sleep surface meets the most recent safety guidelines.    If you choose to use a mesh playpen, get one made after February 28, 2013.    Swaddling is not safe for sleeping. It may be used to calm your baby when he is awake.    Prevent scalds or burns. Don t drink hot liquids while holding your baby.    Prevent tap water burns. Set the water heater so the temperature at the faucet is at or below 120 F /49 C.    WHAT TO EXPECT AT YOUR BABY S 1 MONTH VISIT  We will talk about  Taking care of your baby, your family, and yourself  Promoting your health and recovery  Feeding your baby and watching her grow  Caring  for and protecting your baby  Keeping your baby safe at home and in the car      Helpful Resources: Smoking Quit Line: 731.831.7984  Poison Help Line:  499.130.1913  Information About Car Safety Seats: www.safercar.gov/parents  Toll-free Auto Safety Hotline: 798.976.1772  Consistent with Bright Futures: Guidelines for Health Supervision of Infants, Children, and Adolescents, 4th Edition  For more information, go to https://brightfutures.aap.org.

## 2022-02-09 DIAGNOSIS — I73.9 PAD (PERIPHERAL ARTERY DISEASE) (H): ICD-10-CM

## 2022-02-13 ENCOUNTER — HEALTH MAINTENANCE LETTER (OUTPATIENT)
Age: 87
End: 2022-02-13

## 2022-02-16 ENCOUNTER — LAB (OUTPATIENT)
Dept: LAB | Facility: CLINIC | Age: 87
End: 2022-02-16
Payer: COMMERCIAL

## 2022-02-16 ENCOUNTER — OFFICE VISIT (OUTPATIENT)
Dept: CARDIOLOGY | Facility: CLINIC | Age: 87
End: 2022-02-16
Payer: COMMERCIAL

## 2022-02-16 VITALS
DIASTOLIC BLOOD PRESSURE: 68 MMHG | BODY MASS INDEX: 31.48 KG/M2 | SYSTOLIC BLOOD PRESSURE: 130 MMHG | WEIGHT: 219.9 LBS | HEART RATE: 96 BPM | HEIGHT: 70 IN | OXYGEN SATURATION: 96 %

## 2022-02-16 DIAGNOSIS — I73.9 PAD (PERIPHERAL ARTERY DISEASE) (H): Primary | ICD-10-CM

## 2022-02-16 DIAGNOSIS — M31.6: ICD-10-CM

## 2022-02-16 DIAGNOSIS — M79.89 LEG SWELLING: ICD-10-CM

## 2022-02-16 DIAGNOSIS — I73.9 PAD (PERIPHERAL ARTERY DISEASE) (H): ICD-10-CM

## 2022-02-16 LAB
ANION GAP SERPL CALCULATED.3IONS-SCNC: 4 MMOL/L (ref 3–14)
BUN SERPL-MCNC: 33 MG/DL (ref 7–30)
CALCIUM SERPL-MCNC: 8.9 MG/DL (ref 8.5–10.1)
CHLORIDE BLD-SCNC: 104 MMOL/L (ref 94–109)
CO2 SERPL-SCNC: 27 MMOL/L (ref 20–32)
CREAT SERPL-MCNC: 1.48 MG/DL (ref 0.66–1.25)
GFR SERPL CREATININE-BSD FRML MDRD: 44 ML/MIN/1.73M2
GLUCOSE BLD-MCNC: 206 MG/DL (ref 70–99)
POTASSIUM BLD-SCNC: 4.8 MMOL/L (ref 3.4–5.3)
SODIUM SERPL-SCNC: 135 MMOL/L (ref 133–144)

## 2022-02-16 PROCEDURE — 99215 OFFICE O/P EST HI 40 MIN: CPT | Performed by: INTERNAL MEDICINE

## 2022-02-16 PROCEDURE — 80048 BASIC METABOLIC PNL TOTAL CA: CPT | Performed by: INTERNAL MEDICINE

## 2022-02-16 PROCEDURE — 36415 COLL VENOUS BLD VENIPUNCTURE: CPT | Performed by: INTERNAL MEDICINE

## 2022-02-16 NOTE — LETTER
2/16/2022    Chavez Blanton MD  [address] [facility] [address]    RE: Bart Blair       Dear Colleague,     I had the pleasure of seeing Bart Blair in the [facility].  HPI:     Bart Blair is a 87 year old male with a history of Parkinson's disease, HTN, CKD III, DM II, peripheral artery disease, chronic LE edema, and anemia who presents for follow up of PAD and LE edema.       Since last visit Dionisio was hospitalized for cellulitis, treated with IV antibiotics.  Outpatient lymphedema rehab and therapy was set up for him, which he and his wife state has really helped his legs.  Nurse comes a few times a week to wrap his legs, which they have also taught his wife how to do.  The nurse also took measurements to get Circaids.  Thus, since his last visit, they have been doing better from a edema stand point.     His claudication is quite stable, and he is able to walk through the discomfort.  He has been working with physical therapy and doing the treadmill with his son as motivation. He denies fevers chills, lightheadedness dizziness, chest pain, shortness of breath, abdominal fullness, or changes in urination.  He is still taking oral diuretics.       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 Brock Sanders. [facility].  Black and blue toes.       ASSESSMENT/PLAN:     Dionisio returns to clinic for evaluation of his peripheral artery disease and lower extremity edema.  His lower extremity ultrasounds and MRA of the lower extremities show occlusion of the calf arteries with reconstitution distally.  He seems to be doing better with the addition of cilostazol and with physical therapy.       As for the lower extremity swelling, this has improved greatly with the lymphedema treatment.  He will continue with therapy. Review of the echo and venous lower extremity studies shows that the lower extremity swelling is due to venous  insufficiency and not related to heart failure.  As such, he will be referred for mechanical pneumatic external compression devices, which the representative has met with them today in clinic.  They seemed quite intrigued by it.        -Encouraged daily use of compression stockings.   -referral for pneumatic external compression devices for home use  -Continued exercise with PT was also encouraged  -Continue cilostazol  -Continue daily aspirin  -Continue irbesartan  -Continue Lasix  -Advised to avoid salty foods     Follow up in 6 months.     Pt seen and discussed with Dr. Meade.        PAST MEDICAL HISTORY  Past Medical History:   Diagnosis Date     Anemia      Anemia due to blood loss, acute      CKD (chronic kidney disease) stage 3, GFR 30-59 ml/min 5/31/2010     Essential hypertension, benign      Other and unspecified hyperlipidemia      Other and unspecified hyperlipidemia      Other specified disorder of skin      Pain in joint, shoulder region      Parkinson disease (H) 9/2/2010     Polyp of vocal cord or larynx      Retinal ischemia     right sided thrombotic plaque     Rosacea      Type II or unspecified type diabetes mellitus without mention of complication, not stated as uncontrolled        CURRENT MEDICATIONS  Current Outpatient Medications   Medication Sig Dispense Refill     amoxicillin (AMOXIL) 500 MG capsule TAKE FOUR CAPSULES BY MOUTH 1 HOUR BEFORE DENTAL APPOINTMENT 16 capsule 4     atorvastatin (LIPITOR) 10 MG tablet Take 1 tablet (10 mg) by mouth daily 90 tablet 3     blood glucose (NO BRAND SPECIFIED) lancets standard Use to test blood sugar one time daily or as directed. 100 each 1     blood glucose (NO BRAND SPECIFIED) test strip Use to test blood sugar one times daily or as directed. 100 strip 3     cilostazol (PLETAL) 50 MG tablet Take 1 tablet (50 mg) by mouth 2 times daily Take on an empty stomach. 180 tablet 3     clopidogrel (PLAVIX) 75 MG tablet Take 1 tablet (75 mg) by mouth daily  For additional refills, please schedule a follow-up appointment at [phone], overdue for lab 90 tablet 0     COMPRESSION STOCKINGS 1 each daily Lower Extremity:   Knee High;  bilateral;  20-30 mm Hg.  Measure and fit.  Style and color per patient preference.  Nat Collier per patient need. 6 each 11     Cyanocobalamin (VITAMIN B-12 CR) 1000 MCG TBCR TAKE ONE TABLET BY MOUTH EVERY DAY (Patient taking differently: TAKE ONE TABLET BY MOUTH EVERY DAY. Pt's taking OCT vitamin B12 and the dose is not clear.) 60 tablet 4     diclofenac (VOLTAREN) 1 % topical gel APPLY 4 GRAMS TO KNEES OR 2 GRAMS TO HANDS FOUR TIMES A DAY USING ENCLOSED DOSING CARD 300 g 3     folic acid (FOLVITE) 1 MG tablet Take 1 mg by mouth daily       furosemide (LASIX) 20 MG tablet Take 1 tablet (20 mg) by mouth daily 90 tablet 3     irbesartan (AVAPRO) 75 MG tablet TAKE 1/2 TABLET(37.5 MG) BY MOUTH AT BEDTIME 45 tablet 3     Multiple Vitamin (MULTI VITAMIN  MENS) TABS Take  by mouth. Takes one daily         order for DME Glucometer, brand as covered by insurance. 1 each 0     order for DME Test strips for pt's glucometer, brand as covered by insurance. Test daily and prn. 100 each 4     order for DME Lancets.  Daily and prn. 100 each 4     sertraline (ZOLOFT) 25 MG tablet TAKE 1 TABLET BY MOUTH EVERY DAY 90 tablet 3     vitamin D3 (CHOLECALCIFEROL) 1000 units (25 mcg) tablet Take 1 tablet (1,000 Units) by mouth at bedtime       fluticasone (FLONASE) 50 MCG/ACT nasal spray Spray 1-2 sprays into both nostrils daily as needed (Patient not taking: Reported on 2/16/2022)         PAST SURGICAL HISTORY:  Past Surgical History:   Procedure Laterality Date     ARTHROPLASTY KNEE  1/31/2012    Procedure:ARTHROPLASTY KNEE; LEFT TOTAL KNEE ARTHROPLASTY (IRASEMA)^; Surgeon:SKIP FAIR; Location:[facility] OR     ARTHROSCOPY SHOULDER, OPEN ROTATOR CUFF REPAIR, COMBINED  4/24/2012    Procedure:COMBINED ARTHROSCOPY SHOULDER, OPEN ROTATOR CUFF REPAIR; RIGHT SHOULDER  "ARTHROSCOPY OPEN ROTATOR CUFF DEBRIDEMENT, SUBCROMIAL DECOMPRESSION, AND BICEPS TENODESIS REPAIR; Surgeon:SKIP FAIR; Location:[facility] SURGICAL PATHOLOGY  6-    Dr. Bowers; left hand     ENT SURGERY       INCISE FINGER TENDON SHEATH  2008    Dr. Bowers; right AND LEFT hand     THROAT SURGERY      vocal cord polyps       ALLERGIES     Allergies   Allergen Reactions     No Known Drug Allergies        FAMILY HISTORY  Family History   Problem Relation Age of Onset     Family History Negative Other         unknown family history per patient       VASCULAR FAMILY HISTORY  1st order relative with atherosclerotic PAD: {YES / NO:016386::\"Yes\"}  1st order relative with AAA: {YES / NO:103106::\"Yes\"}    SOCIAL HISTORY  Social History     Socioeconomic History     Marital status:      Spouse name: ford     Number of children: 6     Years of education: 12     Highest education level: Not on file   Occupational History     Occupation: [employer]     Employer: RETIRED   Tobacco Use     Smoking status: Former Smoker     Smokeless tobacco: Never Used   Substance and Sexual Activity     Alcohol use: Yes     Comment: rarely     Drug use: No     Sexual activity: Not Currently     Partners: Female   Other Topics Concern     Parent/sibling w/ CABG, MI or angioplasty before 65F 55M? No   Social History Narrative     Not on file     Social Determinants of Health     Financial Resource Strain: Not on file   Food Insecurity: Not on file   Transportation Needs: Not on file   Physical Activity: Not on file   Stress: Not on file   Social Connections: Not on file   Intimate Partner Violence: Not on file   Housing Stability: Not on file       ROS:   Constitutional: No fever, chills, or sweats. No weight gain/loss   ENT: No visual disturbance, ear ache, epistaxis, sore throat  Allergies/Immunologic: Negative  Respiratory: No cough, hemoptysia  Cardiovascular: As per HPI  GI: No nausea, vomiting, hematemesis, melena, or " "hematochezia  : No urinary frequency, dysuria, or hematuria  Integument: Negative  Psychiatric: Negative  Neuro: Negative  Endocrinology: Negative   Musculoskeletal: Negative  Vascular: No walking impairment, claudication, ischemic rest pain or nonhealing wounds    EXAM:  /68   Pulse 96   Ht 1.778 m (5' 10\")   Wt 99.7 kg (219 lb 14.4 oz)   SpO2 96%   BMI 31.55 kg/m    In general, the patient is a pleasant male in no apparent distress.    HEENT: NC/AT.  PERRLA.  EOMI.  Sclerae white, not injected.  Nares clear.  Pharynx without erythema or exudate.  Dentition intact.    Neck: No adenopathy.  No thyromegaly. Carotids +2/2 bilaterally without bruits.  No jugular venous distension.   Heart: RRR. Normal S1, S2 splits physiologically. No murmur, rub, click, or gallop. The PMI is in the 5th ICS in the midclavicular line. There is no heave.    Lungs: CTA.  No ronchi, wheezes, rales.  No dullness to percussion.   Abdomen: Soft, nontender, nondistended. No organomegaly. No AAA.  No bruits.   Extremities: No clubbing, cyanosis, or edema.  No wounds. No varicose veins signs of chronic venous insufficiency.   Vascular: No bruits are noted.   Brachial Radial Ulnar Femoral Popliteal DP PT   Left ***/2 ***/2 ***/2 ***/2 ***/2 ***/2 ***/2   Right ***/2 ***/2 ***/2 ***/2 ***/2 ***/2 ***/2     Labs:  LIPID RESULTS:  Lab Results   Component Value Date    CHOL 136 03/18/2021    HDL 56 03/18/2021    LDL 56 03/18/2021    TRIG 121 03/18/2021    CHOLHDLRATIO 2.6 11/18/2014    NHDL 80 03/18/2021       LIVER ENZYME RESULTS:  Lab Results   Component Value Date    AST 17 07/27/2020    ALT 29 07/27/2020       CBC RESULTS:  Lab Results   Component Value Date    WBC 5.8 07/27/2020    RBC 3.63 (L) 07/27/2020    HGB 12.8 (L) 07/27/2020    HCT 38.3 (L) 07/27/2020     (H) 07/27/2020    MCH 35.3 (H) 07/27/2020    MCHC 33.4 07/27/2020    RDW 13.0 07/27/2020     (L) 07/27/2020       BMP RESULTS:  Lab Results   Component Value " Date     03/18/2021    POTASSIUM 4.4 03/18/2021    CHLORIDE 106 03/18/2021    CO2 26 03/18/2021    ANIONGAP 4 03/18/2021    GLC 91 03/18/2021    BUN 31 (H) 03/18/2021    CR 1.40 (H) 03/18/2021    GFRESTIMATED 44 (L) 03/18/2021    GFRESTBLACK 51 (L) 03/18/2021    BLAISE 8.4 (L) 03/18/2021        A1C RESULTS:  Lab Results   Component Value Date    A1C 6.4 (H) 03/18/2021       Procedures:      Assessment and Plan:     Thank you for allowing me to participate in the care of your patient.      Sincerely,     Azul Meade MD     [facility]  cc:   No referring provider defined for this encounter.

## 2022-02-16 NOTE — PROGRESS NOTES
Vascular Cardiology Consultation Follow Up      HPI:   This is a pleasant 89 year old male with a history of Parkinson's disease, HTN, CKD III, DM II, peripheral artery disease, chronic LE edema, and anemia who presents for follow up. I have not seen him for several years. He was seen last in vascular by Dr. Bass in 2020 who is on leave.     He is overdue for vascular imaging. During past visits his claudication had been stable and he would walk through discomfort. 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 This was biopsy proven and he was started on Actimera. He is followed by [facility]. Since this diagnosis he now has blue toes and ongoing pain and worsening skin changes. Swelling has not worsened. He denies chest pain, sob, dizziness.       ASSESSMENT/PLAN:     This is a pleasant 89 year old male with PMH peripheral artery disease and lower extremity edema.  His lower extremity ultrasounds and MRA of the lower extremities show occlusion of the calf arteries with reconstitution distally.  In 2019 he seemed to be doing better with the addition of cilostazol and with physical therapy.  However, since his diagnosis of Giant Cell he has new symptoms concerning for critical limb ischemia. He had diminished pulses at the ankles and diffuse blue discoloration. 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      As for the lower extremity swelling, this had improved greatly with the lymphedema treatment however in setting of his PAD now I have recommended to hold off on compression stockings in the immediate time when we are redoing his imaging. Review of the echo and venous lower extremity studies shows that the lower extremity swelling is due to venous insufficiency and not related to heart failure.      1. Lymphedema:    -Encouraged daily use of compression stockings however for now put on hold in setting of severe PAD   -referral was made for pneumatic external compression devices for home use  -venous ultrasound    2. PAD with blue toe syndrome: concerned for diffuse embolism or critical limb ischemia  -continue aspirin, pletal  -echocardiogram  -CT angiogram chest/abdomen/pelvis to evaluation for active vascular inflammation  -labs today   -depending on results will refer to Dr. Alanis or vascular surgery      Follow up first available with me or SAMI    Azul Meade MD MSC  [facility]    PAST MEDICAL HISTORY  Past Medical History:   Diagnosis Date     Anemia      Anemia due to blood loss, acute      CKD (chronic kidney disease) stage 3, GFR 30-59 ml/min 5/31/2010     Essential hypertension, benign      Other and unspecified hyperlipidemia      Other and unspecified hyperlipidemia      Other specified disorder of skin      Pain in joint, shoulder region      Parkinson disease (H) 9/2/2010     Polyp of vocal cord or larynx      Retinal ischemia     right sided thrombotic plaque     Rosacea      Type II or unspecified type diabetes mellitus without mention of complication, not stated as uncontrolled        CURRENT MEDICATIONS  Current Outpatient Medications   Medication Sig Dispense Refill     amoxicillin (AMOXIL) 500 MG capsule TAKE FOUR CAPSULES BY MOUTH 1 HOUR BEFORE DENTAL APPOINTMENT 16 capsule 4     atorvastatin (LIPITOR) 10 MG tablet Take 1 tablet (10 mg) by mouth daily 90 tablet 3     blood glucose (NO BRAND SPECIFIED) lancets standard Use to test blood sugar one time daily or as directed. 100 each 1     blood glucose (NO BRAND SPECIFIED) test strip Use to test blood sugar one times daily or as directed. 100 strip 3     cilostazol (PLETAL) 50 MG tablet Take 1 tablet (50 mg) by mouth 2 times daily Take on an empty stomach. 180 tablet 3     clopidogrel (PLAVIX) 75 MG tablet Take 1 tablet (75 mg) by mouth daily  For additional refills, please schedule a follow-up appointment at [phone], overdue for lab 90 tablet 0     COMPRESSION STOCKINGS 1 each daily Lower Extremity:   Knee High;  bilateral;  20-30 mm Hg.  Measure and fit.  Style and color per patient preference.  Nat Collier per patient need. 6 each 11     Cyanocobalamin (VITAMIN B-12 CR) 1000 MCG TBCR TAKE ONE TABLET BY MOUTH EVERY DAY (Patient taking differently: TAKE ONE TABLET BY MOUTH EVERY DAY. Pt's taking OCT vitamin B12 and the dose is not clear.) 60 tablet 4     diclofenac (VOLTAREN) 1 % topical gel APPLY 4 GRAMS TO KNEES OR 2 GRAMS TO HANDS FOUR TIMES A DAY USING ENCLOSED DOSING CARD 300 g 3     folic acid (FOLVITE) 1 MG tablet Take 1 mg by mouth daily       furosemide (LASIX) 20 MG tablet Take 1 tablet (20 mg) by mouth daily 90 tablet 3     irbesartan (AVAPRO) 75 MG tablet TAKE 1/2 TABLET(37.5 MG) BY MOUTH AT BEDTIME 45 tablet 3     Multiple Vitamin (MULTI VITAMIN  MENS) TABS Take  by mouth. Takes one daily         order for DME Glucometer, brand as covered by insurance. 1 each 0     order for DME Test strips for pt's glucometer, brand as covered by insurance. Test daily and prn. 100 each 4     order for DME Lancets.  Daily and prn. 100 each 4     sertraline (ZOLOFT) 25 MG tablet TAKE 1 TABLET BY MOUTH EVERY DAY 90 tablet 3     vitamin D3 (CHOLECALCIFEROL) 1000 units (25 mcg) tablet Take 1 tablet (1,000 Units) by mouth at bedtime       fluticasone (FLONASE) 50 MCG/ACT nasal spray Spray 1-2 sprays into both nostrils daily as needed (Patient not taking: Reported on 2/16/2022)         PAST SURGICAL HISTORY:  Past Surgical History:   Procedure Laterality Date     ARTHROPLASTY KNEE  1/31/2012    Procedure:ARTHROPLASTY KNEE; LEFT TOTAL KNEE ARTHROPLASTY (IRASEMA)^; Surgeon:SKIP FAIR; Location:[facility] OR     ARTHROSCOPY SHOULDER, OPEN ROTATOR CUFF REPAIR, COMBINED  4/24/2012    Procedure:COMBINED ARTHROSCOPY SHOULDER, OPEN ROTATOR CUFF REPAIR; RIGHT SHOULDER  ARTHROSCOPY OPEN ROTATOR CUFF DEBRIDEMENT, SUBCROMIAL DECOMPRESSION, AND BICEPS TENODESIS REPAIR; Surgeon:SKIP FAIR; Location:[facility] SURGICAL PATHOLOGY  6-    Dr. Bowers; left hand     ENT SURGERY       INCISE FINGER TENDON SHEATH  2008    Dr. Bowers; right AND LEFT hand     THROAT SURGERY      vocal cord polyps       ALLERGIES     Allergies   Allergen Reactions     No Known Drug Allergies        FAMILY HISTORY  Family History   Problem Relation Age of Onset     Family History Negative Other         unknown family history per patient       SOCIAL HISTORY  Social History     Socioeconomic History     Marital status:      Spouse name: ford     Number of children: 6     Years of education: 12     Highest education level: Not on file   Occupational History     Occupation: [employer]     Employer: RETIRED   Tobacco Use     Smoking status: Former Smoker     Smokeless tobacco: Never Used   Substance and Sexual Activity     Alcohol use: Yes     Comment: rarely     Drug use: No     Sexual activity: Not Currently     Partners: Female   Other Topics Concern     Parent/sibling w/ CABG, MI or angioplasty before 65F 55M? No   Social History Narrative     Not on file     Social Determinants of Health     Financial Resource Strain: Not on file   Food Insecurity: Not on file   Transportation Needs: Not on file   Physical Activity: Not on file   Stress: Not on file   Social Connections: Not on file   Intimate Partner Violence: Not on file   Housing Stability: Not on file       ROS:   Constitutional: No fever, chills, or sweats. No weight gain/loss   ENT: No visual disturbance, ear ache, epistaxis, sore throat  Allergies/Immunologic: Negative  Respiratory: No cough, hemoptysia  Cardiovascular: As per HPI  GI: No nausea, vomiting, hematemesis, melena, or hematochezia  : No urinary frequency, dysuria, or hematuria  Integument: Negative  Psychiatric: Negative  Neuro: Negative  Endocrinology: Negative  "  Musculoskeletal: Negative  Vascular: No walking impairment, claudication, ischemic rest pain or nonhealing wounds    EXAM:  /68   Pulse 96   Ht 1.778 m (5' 10\")   Wt 99.7 kg (219 lb 14.4 oz)   SpO2 96%   BMI 31.55 kg/m    In general, the patient is a pleasant male in no apparent distress.    HEENT: NC/AT.  PERRLA.  EOMI.  Sclerae white, not injected.  Neck: No adenopathy.  No thyromegaly. Carotids +2/2 bilaterally without bruits.   Heart: RRR. Normal S1, S2 splits physiologically. No murmur, rub, click, or gallop.  Lungs: CTA.  No ronchi, wheezes, rales.  No dullness to percussion.   Abdomen: Soft, nontender, nondistended.  Extremities: No clubbing, cyanosis, or edema.    Vascular: No bruits are noted.    Labs:  LIPID RESULTS:  Lab Results   Component Value Date    CHOL 136 03/18/2021    HDL 56 03/18/2021    LDL 56 03/18/2021    TRIG 121 03/18/2021    CHOLHDLRATIO 2.6 11/18/2014    NHDL 80 03/18/2021       LIVER ENZYME RESULTS:  Lab Results   Component Value Date    AST 17 07/27/2020    ALT 29 07/27/2020       CBC RESULTS:  Lab Results   Component Value Date    WBC 5.8 07/27/2020    RBC 3.63 (L) 07/27/2020    HGB 12.8 (L) 07/27/2020    HCT 38.3 (L) 07/27/2020     (H) 07/27/2020    MCH 35.3 (H) 07/27/2020    MCHC 33.4 07/27/2020    RDW 13.0 07/27/2020     (L) 07/27/2020       BMP RESULTS:  Lab Results   Component Value Date     03/18/2021    POTASSIUM 4.4 03/18/2021    CHLORIDE 106 03/18/2021    CO2 26 03/18/2021    ANIONGAP 4 03/18/2021    GLC 91 03/18/2021    BUN 31 (H) 03/18/2021    CR 1.40 (H) 03/18/2021    GFRESTIMATED 44 (L) 03/18/2021    GFRESTBLACK 51 (L) 03/18/2021    BLAISE 8.4 (L) 03/18/2021        A1C RESULTS:  Lab Results   Component Value Date    A1C 6.4 (H) 03/18/2021         "

## 2022-02-16 NOTE — PATIENT INSTRUCTIONS
At [place] ([facility]):    1. CT angiogram chest/abdomen/pelvis  2. Leg ultrasounds (venous and arterial) with ABIs  3. Echocardiogram     At [facility]:     1. Labs today   2. Follow up first available with Dr. Meade (or Dr. Alanis if available)

## 2022-02-24 ENCOUNTER — ANCILLARY PROCEDURE (OUTPATIENT)
Dept: ULTRASOUND IMAGING | Facility: CLINIC | Age: 87
End: 2022-02-24
Attending: INTERNAL MEDICINE
Payer: COMMERCIAL

## 2022-02-24 ENCOUNTER — TELEPHONE (OUTPATIENT)
Dept: CARDIOLOGY | Facility: CLINIC | Age: 87
End: 2022-02-24

## 2022-02-24 ENCOUNTER — TELEPHONE (OUTPATIENT)
Dept: OTHER | Facility: CLINIC | Age: 87
End: 2022-02-24

## 2022-02-24 ENCOUNTER — ANCILLARY PROCEDURE (OUTPATIENT)
Dept: CT IMAGING | Facility: CLINIC | Age: 87
End: 2022-02-24
Attending: INTERNAL MEDICINE
Payer: COMMERCIAL

## 2022-02-24 DIAGNOSIS — I73.9 PAD (PERIPHERAL ARTERY DISEASE) (H): ICD-10-CM

## 2022-02-24 DIAGNOSIS — M31.6: ICD-10-CM

## 2022-02-24 DIAGNOSIS — I73.9 PAD (PERIPHERAL ARTERY DISEASE) (H): Primary | ICD-10-CM

## 2022-02-24 DIAGNOSIS — M79.89 LEG SWELLING: ICD-10-CM

## 2022-02-24 LAB
CREAT BLD-MCNC: 1.4 MG/DL (ref 0.7–1.3)
GFR SERPL CREATININE-BSD FRML MDRD: 47 ML/MIN/1.73M2

## 2022-02-24 PROCEDURE — 93922 UPR/L XTREMITY ART 2 LEVELS: CPT | Performed by: RADIOLOGY

## 2022-02-24 PROCEDURE — 71275 CT ANGIOGRAPHY CHEST: CPT | Mod: GC | Performed by: RADIOLOGY

## 2022-02-24 PROCEDURE — 93925 LOWER EXTREMITY STUDY: CPT | Performed by: RADIOLOGY

## 2022-02-24 PROCEDURE — 74174 CTA ABD&PLVS W/CONTRAST: CPT | Mod: GC | Performed by: RADIOLOGY

## 2022-02-24 PROCEDURE — 93970 EXTREMITY STUDY: CPT | Performed by: RADIOLOGY

## 2022-02-24 RX ORDER — IOPAMIDOL 755 MG/ML
125 INJECTION, SOLUTION INTRAVASCULAR ONCE
Status: COMPLETED | OUTPATIENT
Start: 2022-02-24 | End: 2022-02-24

## 2022-02-24 RX ADMIN — IOPAMIDOL 125 ML: 755 INJECTION, SOLUTION INTRAVASCULAR at 10:07

## 2022-02-24 NOTE — TELEPHONE ENCOUNTER
RN called and spoke with patient's wife and reviewed with her Dr. Meade's recommendations. Patient's wife verbalized understanding and is in agreement with plan. RN placed order for vascular surgery referral and provided patient's wife with phone number to call and arrange.         Thanks Darius. He has distal tibial disease and blue toes. Can you have him establish care with vascular surgery at [facility]?     Thank you!     CF

## 2022-02-24 NOTE — TELEPHONE ENCOUNTER
Spoke to spouse, Malathi about scheduling. Bart is scheduled to see Dr. Joseph on 4/20/22.     Malathi declined to schedule any sooner due to winter weather concerns.     Routing back to RN as ROMAIN.       Nayana García    [facility]   [phone]

## 2022-02-24 NOTE — TELEPHONE ENCOUNTER
Pt referred to [facility] by Azul Meaed MD for PAD.    Patient has several images in Epic.     Pt needs to be scheduled for consult with vascular surgery.  Will route to scheduling to coordinate an appointment next week.    Appointment note: Referred to [facility] by Azul Meade MD for PAD. Patient has VIOLET US, LE arterial US, LE venous US and CTA chest abdomen pelvis in Epic.       Uma CENTENO, RN    [facility]  Office: [phone]  Fax: [phone]

## 2022-02-24 NOTE — TELEPHONE ENCOUNTER
Results noted. RN will send to Dr. Meade further review to inquire if any further testing prior to patient's scheduled f/u on 3/30/22.                    2/24/22 VIOLET  RIGHT:       Brachial: 150 mmHg       Ankle (PT): > 254 mmHg - non compressible       Ankle (DP): > 254 mmHg - non compressible       1st Digit: 64 mmHg          VIOLET: Non compressible       TBI: 0.39          1st Digit PPG: Normal     LEFT:       Brachial: 165 mmHg       Ankle (PT): > 254 mmHg - non compressible       Ankle (DP): > 254 mmHg - non compressible       1st Digit: 78 mmHg          VIOLET: Non compressible       TBI: 0.47          1st Digit PPG: Normal                                                                      IMPRESSION:  1. RIGHT:       A. Resting VIOLET is non compressible.       B. Resting TBI is ABNORMAL, 0.39.     2. LEFT:       A. Resting VIOLET is non compressible.       B. Resting TBI is ABNORMAL, 0.47.     Guidelines:        2/24/22 arterial US  IMPRESSION:  1. RIGHT:       A. Previous right posterior tibial occlusion in the mid to distal  calf was not evaluated in today's study. Slow Dopplerable flow in the  posterior tibial artery at the ankle.       B. No above knee arterial stenosis demonstrated.       C. Anterior tibial stenosis.     2. LEFT:       A. No above knee arterial stenosis demonstrated.       B. Anterior tibial and dorsalis pedis stenosis.

## 2022-02-25 RX ORDER — CLOPIDOGREL BISULFATE 75 MG/1
75 TABLET ORAL DAILY
Qty: 90 TABLET | Refills: 0 | OUTPATIENT
Start: 2022-02-25

## 2022-02-28 ENCOUNTER — TELEPHONE (OUTPATIENT)
Dept: CARDIOLOGY | Facility: CLINIC | Age: 87
End: 2022-02-28
Payer: COMMERCIAL

## 2022-02-28 NOTE — TELEPHONE ENCOUNTER
Results noted. RN will send to Dr. Meade for further review.           2/24/22 CTA C/A/P  Impression:  1. Atherosclerotic changes of of the thoracic and abdominal aorta,  with associated calcifications. No mural nodularity or stranding to  reveal a clear inflammatory process. There is a dissection flap within  infrarenal abdominal aortic aneurysm, favored to be atherosclerotic in  etiology. Normal contrast opacification of associated branching  vessels. No distal extension of the flap. No aneurysmal dilatation of  the thoracoabdominal aorta.  2. Subpleural reticular opacities with a basilar predominance  compatible with interstitial lung disease. Pleural calcifications  likely sequela of asbestos exposure   3. 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  4. Additional nonacute incidental findings as detailed.     I have personally reviewed the examination and initial interpretation  and I agree with the findings.     MENDEL DEL ANGEL MD       2/16/22 visit with Dr Meade     ASSESSMENT/PLAN:      Dionisio returns to clinic for evaluation of his peripheral artery disease and lower extremity edema.  His lower extremity ultrasounds and MRA of the lower extremities show occlusion of the calf arteries with reconstitution distally.  He seems to be doing better with the addition of cilostazol and with physical therapy.       As for the lower extremity swelling, this has improved greatly with the lymphedema treatment.  He will continue with therapy. Review of the echo and venous lower extremity studies shows that the lower extremity swelling is due to venous insufficiency and not related to heart failure.  As such, he will be referred for mechanical pneumatic external compression devices, which the representative has met with them today in clinic.  They seemed quite intrigued by it.        -Encouraged  daily use of compression stockings.   -referral for pneumatic external compression devices for home use  -Continued exercise with PT was also encouraged  -Continue cilostazol  -Continue daily aspirin  -Continue irbesartan  -Continue Lasix  -Advised to avoid salty foods     Follow up in 6 months.     Pt seen and discussed with Dr. Meade.

## 2022-03-03 RX ORDER — CLOPIDOGREL BISULFATE 75 MG/1
75 TABLET ORAL DAILY
Qty: 90 TABLET | Refills: 0 | Status: SHIPPED | OUTPATIENT
Start: 2022-03-03 | End: 2022-05-11

## 2022-03-03 NOTE — TELEPHONE ENCOUNTER
RN refilled medication.         Sure - yes feel free to send any RX's over to Darius and my nursing team.     Dr. Meade

## 2022-03-10 NOTE — TELEPHONE ENCOUNTER
RN called and spoke with patient's wife and reviewed with her Dr. Meade's recommendations below patient's wife verbalized understanding and is in agreement with plan.          Dr. Meade's recommendations    Ok, as long as he is not getting toe gangrene (black) or ulcerations. If his symptoms of his toes get worse at all he could just come to ER too in the future.     Thank you!   Dr. Meade

## 2022-03-30 ENCOUNTER — OFFICE VISIT (OUTPATIENT)
Dept: CARDIOLOGY | Facility: CLINIC | Age: 87
End: 2022-03-30
Payer: COMMERCIAL

## 2022-03-30 ENCOUNTER — LAB (OUTPATIENT)
Dept: LAB | Facility: CLINIC | Age: 87
End: 2022-03-30
Payer: COMMERCIAL

## 2022-03-30 ENCOUNTER — TELEPHONE (OUTPATIENT)
Dept: OTHER | Facility: CLINIC | Age: 87
End: 2022-03-30

## 2022-03-30 ENCOUNTER — TELEPHONE (OUTPATIENT)
Dept: CARDIOLOGY | Facility: CLINIC | Age: 87
End: 2022-03-30

## 2022-03-30 VITALS
OXYGEN SATURATION: 95 % | SYSTOLIC BLOOD PRESSURE: 136 MMHG | WEIGHT: 216 LBS | DIASTOLIC BLOOD PRESSURE: 69 MMHG | BODY MASS INDEX: 30.92 KG/M2 | HEART RATE: 99 BPM | HEIGHT: 70 IN

## 2022-03-30 DIAGNOSIS — M31.6: ICD-10-CM

## 2022-03-30 DIAGNOSIS — I73.9 PAD (PERIPHERAL ARTERY DISEASE) (H): ICD-10-CM

## 2022-03-30 LAB
CRP SERPL-MCNC: 7.2 MG/L (ref 0–8)
ERYTHROCYTE [SEDIMENTATION RATE] IN BLOOD BY WESTERGREN METHOD: 41 MM/HR (ref 0–20)

## 2022-03-30 PROCEDURE — 36415 COLL VENOUS BLD VENIPUNCTURE: CPT | Performed by: INTERNAL MEDICINE

## 2022-03-30 PROCEDURE — 86140 C-REACTIVE PROTEIN: CPT | Performed by: INTERNAL MEDICINE

## 2022-03-30 PROCEDURE — 99215 OFFICE O/P EST HI 40 MIN: CPT | Performed by: INTERNAL MEDICINE

## 2022-03-30 PROCEDURE — 85652 RBC SED RATE AUTOMATED: CPT | Performed by: INTERNAL MEDICINE

## 2022-03-30 NOTE — PATIENT INSTRUCTIONS
1. Will see Dr. Xie on Monday in [facility] for legs (they will reach out to you)  2. Echocardiogram at convenience   3. Follow up with Dr. Meade in 6 months

## 2022-03-30 NOTE — LETTER
3/30/2022    Arnel ONEAL [facility] [address]  [facility] [address]    RE: Bart Wilkersone       Dear Colleague,     I had the pleasure of seeing Bart Blair in the [facility].        HPI:     This is a pleasant 89 year old male with a history of Parkinson's disease, HTN, CKD III, DM II, peripheral artery disease, chronic LE edema, and anemia who presents for follow up. I saw him last month for some lower leg symptoms.      He was overdue for vascular imaging. During past visits his claudication had been stable and he would walk through discomfort. 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 He has not had inflammatory markers repeated. He is followed by [facility] in rheumatology. Since this diagnosis he now has blue toes and ongoing pain and worsening skin changes. Swelling has not worsened. He denies chest pain, sob, dizziness.    CT angiogram reviewed in office:      Impression:  1. Atherosclerotic changes of of the thoracic and abdominal aorta,  with associated calcifications. No mural nodularity or stranding to  reveal a clear inflammatory process. There is a dissection flap within  infrarenal abdominal aortic aneurysm, favored to be atherosclerotic in  etiology. Normal contrast opacification of associated branching  vessels. No distal extension of the flap. No aneurysmal dilatation of  the thoracoabdominal aorta.  2. Subpleural reticular opacities with a basilar predominance  compatible with interstitial lung disease. Pleural calcifications  likely sequela of asbestos exposure   3. Mild-moderate stenosis of the origins of the celiac trunk, superior  mesenteric artery, bilateral renal and accessory renal arteries, as  well as inferior mesenteric  artery.  4. Additional nonacute incidental findings as detailed.     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      His lower extremity ultrasounds and MRA of the lower extremities show occlusion of the calf arteries with reconstitution distally.  In 2019 he seemed to be doing better with the addition of cilostazol and with physical therapy.  However, since his diagnosis of Giant Cell he has new symptoms concerning for critical limb ischemia with blue toes and resting leg pain however he has vein insufficiency which may be contributing. He does have Doppler signal to both feet.      As for the lower extremity swelling, this had improved greatly with the lymphedema treatment. Review of the echo and venous lower extremity studies shows that the lower extremity swelling is due to venous insufficiency and not related to heart failure.    Summary:      1. Lymphedema:   -Encouraged daily use of compression stockings however this was put on hold in setting of vascular work up for PAD   -referral was made for pneumatic external compression devices for home use  -venous ultrasound UTD      2. PAD with blue toe syndrome: concerned for diffuse embolism or critical limb ischemia, versus acrocyanosis or venous pooling   -continue aspirin, pletal  -echocardiogram reviewed, stable  -CT angiogram chest/abdomen/pelvis reviewed  -labs today for ESR, CRP  -new infrarenal dissection, will medically manage - however would like to send to vascular surgery for opinion in setting of his vascular studies         Azul Meade MD MSC  [facility]          PAST MEDICAL HISTORY  Past Medical History:   Diagnosis Date      Anemia      Anemia due to blood loss, acute      CKD (chronic kidney disease) stage 3, GFR 30-59 ml/min (H) 5/31/2010     Essential hypertension, benign      Other and unspecified hyperlipidemia      Other and unspecified hyperlipidemia      Other specified disorder of skin      Pain in joint, shoulder region      Parkinson disease (H) 9/2/2010     Polyp of vocal cord or larynx      Retinal ischemia     right sided thrombotic plaque     Rosacea      Type II or unspecified type diabetes mellitus without mention of complication, not stated as uncontrolled        CURRENT MEDICATIONS  Current Outpatient Medications   Medication Sig Dispense Refill     amoxicillin (AMOXIL) 500 MG capsule TAKE FOUR CAPSULES BY MOUTH 1 HOUR BEFORE DENTAL APPOINTMENT 16 capsule 4     atorvastatin (LIPITOR) 10 MG tablet Take 1 tablet (10 mg) by mouth daily 90 tablet 3     blood glucose (NO BRAND SPECIFIED) lancets standard Use to test blood sugar one time daily or as directed. 100 each 1     blood glucose (NO BRAND SPECIFIED) test strip Use to test blood sugar one times daily or as directed. 100 strip 3     cilostazol (PLETAL) 50 MG tablet Take 1 tablet (50 mg) by mouth 2 times daily Take on an empty stomach. 180 tablet 3     clopidogrel (PLAVIX) 75 MG tablet Take 1 tablet (75 mg) by mouth daily For additional refills, please schedule a follow-up appointment at [phone], overdue for lab 90 tablet 0     COMPRESSION STOCKINGS 1 each daily Lower Extremity:   Knee High;  bilateral;  20-30 mm Hg.  Measure and fit.  Style and color per patient preference.  Nat Collier per patient need. 6 each 11     Cyanocobalamin (VITAMIN B-12 CR) 1000 MCG TBCR TAKE ONE TABLET BY MOUTH EVERY DAY (Patient taking differently: TAKE ONE TABLET BY MOUTH EVERY DAY. Pt's taking OCT vitamin B12 and the dose is not clear.) 60 tablet 4     diclofenac (VOLTAREN) 1 % topical gel APPLY 4 GRAMS TO KNEES OR 2 GRAMS TO HANDS FOUR TIMES A DAY USING ENCLOSED DOSING CARD 300  g 3     fluticasone (FLONASE) 50 MCG/ACT nasal spray Spray 1-2 sprays into both nostrils daily as needed        folic acid (FOLVITE) 1 MG tablet Take 1 mg by mouth daily       furosemide (LASIX) 20 MG tablet Take 1 tablet (20 mg) by mouth daily 90 tablet 3     irbesartan (AVAPRO) 75 MG tablet TAKE 1/2 TABLET(37.5 MG) BY MOUTH AT BEDTIME 45 tablet 3     Multiple Vitamin (MULTI VITAMIN  MENS) TABS Take  by mouth. Takes one daily         order for DME Glucometer, brand as covered by insurance. 1 each 0     order for DME Test strips for pt's glucometer, brand as covered by insurance. Test daily and prn. 100 each 4     order for DME Lancets.  Daily and prn. 100 each 4     sertraline (ZOLOFT) 25 MG tablet TAKE 1 TABLET BY MOUTH EVERY DAY 90 tablet 3     vitamin D3 (CHOLECALCIFEROL) 1000 units (25 mcg) tablet Take 1 tablet (1,000 Units) by mouth at bedtime         PAST SURGICAL HISTORY:  Past Surgical History:   Procedure Laterality Date     ARTHROPLASTY KNEE  1/31/2012    Procedure:ARTHROPLASTY KNEE; LEFT TOTAL KNEE ARTHROPLASTY (IRASEMA)^; Surgeon:SKIP FAIR; Location: OR     ARTHROSCOPY SHOULDER, OPEN ROTATOR CUFF REPAIR, COMBINED  4/24/2012    Procedure:COMBINED ARTHROSCOPY SHOULDER, OPEN ROTATOR CUFF REPAIR; RIGHT SHOULDER ARTHROSCOPY OPEN ROTATOR CUFF DEBRIDEMENT, SUBCROMIAL DECOMPRESSION, AND BICEPS TENODESIS REPAIR; Surgeon:SKIP FAIR; Location: [facility]      AFF SURGICAL PATHOLOGY  6-    Dr. Bowers; left hand     ENT SURGERY       INCISE FINGER TENDON SHEATH  2008    Dr. Bowers; right AND LEFT hand     THROAT SURGERY      vocal cord polyps       ALLERGIES     Allergies   Allergen Reactions     No Known Drug Allergies        FAMILY HISTORY  Family History   Problem Relation Age of Onset     Family History Negative Other         unknown family history per patient         SOCIAL HISTORY  Social History     Socioeconomic History     Marital status:      Spouse name: ford     Number of  "children: 6     Years of education: 12     Highest education level: Not on file   Occupational History     Occupation: [employer]     Employer: RETIRED   Tobacco Use     Smoking status: Former Smoker     Smokeless tobacco: Never Used   Substance and Sexual Activity     Alcohol use: Yes     Comment: rarely     Drug use: No     Sexual activity: Not Currently     Partners: Female   Other Topics Concern     Parent/sibling w/ CABG, MI or angioplasty before 65F 55M? No   Social History Narrative     Not on file     Social Determinants of Health     Financial Resource Strain: Not on file   Food Insecurity: Not on file   Transportation Needs: Not on file   Physical Activity: Not on file   Stress: Not on file   Social Connections: Not on file   Intimate Partner Violence: Not on file   Housing Stability: Not on file       ROS:   See HPI     EXAM:  /69   Pulse 99   Ht 1.778 m (5' 10\")   Wt 98 kg (216 lb)   SpO2 95%   BMI 30.99 kg/m    In general, the patient is a pleasant male in no apparent distress.    HEENT: NC/AT.  PERRLA.  EOMI.  Sclerae white, not injected.    Neck: No adenopathy.  No thyromegaly. Carotids +2/2 bilaterally without bruits.  No jugular venous distension.   Heart: RRR. Normal S1, S2 splits physiologically. No murmur, rub, click, or gallop.   Lungs: CTA.  No ronchi, wheezes, rales.   Abdomen: Soft, nontender, nondistended.   Extremities: No clubbing, cyanosis, 1+ edema.   Vascular: No bruits are noted.    Labs:  LIPID RESULTS:  Lab Results   Component Value Date    CHOL 136 03/18/2021    HDL 56 03/18/2021    LDL 56 03/18/2021    TRIG 121 03/18/2021    CHOLHDLRATIO 2.6 11/18/2014    NHDL 80 03/18/2021       LIVER ENZYME RESULTS:  Lab Results   Component Value Date    AST 17 07/27/2020    ALT 29 07/27/2020       CBC RESULTS:  Lab Results   Component Value Date    WBC 5.8 07/27/2020    RBC 3.63 (L) 07/27/2020    HGB 12.8 (L) 07/27/2020    HCT 38.3 (L) 07/27/2020     (H) 07/27/2020    MCH 35.3 " (H) 07/27/2020    MCHC 33.4 07/27/2020    RDW 13.0 07/27/2020     (L) 07/27/2020       BMP RESULTS:  Lab Results   Component Value Date     02/16/2022     03/18/2021    POTASSIUM 4.8 02/16/2022    POTASSIUM 4.4 03/18/2021    CHLORIDE 104 02/16/2022    CHLORIDE 106 03/18/2021    CO2 27 02/16/2022    CO2 26 03/18/2021    ANIONGAP 4 02/16/2022    ANIONGAP 4 03/18/2021     (H) 02/16/2022    GLC 91 03/18/2021    BUN 33 (H) 02/16/2022    BUN 31 (H) 03/18/2021    CR 1.4 (H) 02/24/2022    CR 1.48 (H) 02/16/2022    CR 1.40 (H) 03/18/2021    GFRESTIMATED 47 (L) 02/24/2022    GFRESTIMATED 44 (L) 03/18/2021    GFRESTBLACK 51 (L) 03/18/2021    BLAISE 8.9 02/16/2022    BLAISE 8.4 (L) 03/18/2021        A1C RESULTS:  Lab Results   Component Value Date    A1C 6.4 (H) 03/18/2021         Thank you for allowing me to participate in the care of your patient.    Sincerely,     Azul Meade MD     [facility]

## 2022-03-30 NOTE — PROGRESS NOTES
HPI:     This is a pleasant 89 year old male with a history of Parkinson's disease, HTN, CKD III, DM II, peripheral artery disease, chronic LE edema, and anemia who presents for follow up. I saw him last month for some lower leg symptoms.      He was overdue for vascular imaging. During past visits his claudication had been stable and he would walk through discomfort. 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 He has not had inflammatory markers repeated. He is followed by [facility] in rheumatology. Since this diagnosis he now has blue toes and ongoing pain and worsening skin changes. Swelling has not worsened. He denies chest pain, sob, dizziness.    CT angiogram reviewed in office:      Impression:  1. Atherosclerotic changes of of the thoracic and abdominal aorta,  with associated calcifications. No mural nodularity or stranding to  reveal a clear inflammatory process. There is a dissection flap within  infrarenal abdominal aortic aneurysm, favored to be atherosclerotic in  etiology. Normal contrast opacification of associated branching  vessels. No distal extension of the flap. No aneurysmal dilatation of  the thoracoabdominal aorta.  2. Subpleural reticular opacities with a basilar predominance  compatible with interstitial lung disease. Pleural calcifications  likely sequela of asbestos exposure   3. 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  4. Additional nonacute incidental findings as detailed.     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      His lower extremity ultrasounds and MRA of the lower extremities show occlusion of the calf arteries with reconstitution distally.  In 2019 he seemed to be doing better with the addition of cilostazol and with physical therapy.  However, since his diagnosis of Giant Cell he has new symptoms concerning for critical limb ischemia with blue toes and resting leg pain however he has vein insufficiency which may be contributing. He does have Doppler signal to both feet.      As for the lower extremity swelling, this had improved greatly with the lymphedema treatment. Review of the echo and venous lower extremity studies shows that the lower extremity swelling is due to venous insufficiency and not related to heart failure.    Summary:      1. Lymphedema:   -Encouraged daily use of compression stockings however this was put on hold in setting of vascular work up for PAD   -referral was made for pneumatic external compression devices for home use  -venous ultrasound UTD      2. PAD with blue toe syndrome: concerned for diffuse embolism or critical limb ischemia, versus acrocyanosis or venous pooling   -continue aspirin, pletal  -echocardiogram reviewed, stable  -CT angiogram chest/abdomen/pelvis reviewed  -labs today for ESR, CRP  -new infrarenal dissection, will medically manage - however would like to send to vascular surgery for opinion in setting of his vascular studies         Azul Meade MD MSC  [facility]          PAST MEDICAL HISTORY  Past Medical History:   Diagnosis Date     Anemia      Anemia due to blood loss, acute      CKD (chronic kidney disease) stage 3, GFR 30-59 ml/min (H) 5/31/2010     Essential hypertension, benign      Other and unspecified hyperlipidemia      Other and unspecified  hyperlipidemia      Other specified disorder of skin      Pain in joint, shoulder region      Parkinson disease (H) 9/2/2010     Polyp of vocal cord or larynx      Retinal ischemia     right sided thrombotic plaque     Rosacea      Type II or unspecified type diabetes mellitus without mention of complication, not stated as uncontrolled        CURRENT MEDICATIONS  Current Outpatient Medications   Medication Sig Dispense Refill     amoxicillin (AMOXIL) 500 MG capsule TAKE FOUR CAPSULES BY MOUTH 1 HOUR BEFORE DENTAL APPOINTMENT 16 capsule 4     atorvastatin (LIPITOR) 10 MG tablet Take 1 tablet (10 mg) by mouth daily 90 tablet 3     blood glucose (NO BRAND SPECIFIED) lancets standard Use to test blood sugar one time daily or as directed. 100 each 1     blood glucose (NO BRAND SPECIFIED) test strip Use to test blood sugar one times daily or as directed. 100 strip 3     cilostazol (PLETAL) 50 MG tablet Take 1 tablet (50 mg) by mouth 2 times daily Take on an empty stomach. 180 tablet 3     clopidogrel (PLAVIX) 75 MG tablet Take 1 tablet (75 mg) by mouth daily For additional refills, please schedule a follow-up appointment at [phone], overdue for lab 90 tablet 0     COMPRESSION STOCKINGS 1 each daily Lower Extremity:   Knee High;  bilateral;  20-30 mm Hg.  Measure and fit.  Style and color per patient preference.  Doff n Nando per patient need. 6 each 11     Cyanocobalamin (VITAMIN B-12 CR) 1000 MCG TBCR TAKE ONE TABLET BY MOUTH EVERY DAY (Patient taking differently: TAKE ONE TABLET BY MOUTH EVERY DAY. Pt's taking OCT vitamin B12 and the dose is not clear.) 60 tablet 4     diclofenac (VOLTAREN) 1 % topical gel APPLY 4 GRAMS TO KNEES OR 2 GRAMS TO HANDS FOUR TIMES A DAY USING ENCLOSED DOSING CARD 300 g 3     fluticasone (FLONASE) 50 MCG/ACT nasal spray Spray 1-2 sprays into both nostrils daily as needed        folic acid (FOLVITE) 1 MG tablet Take 1 mg by mouth daily       furosemide (LASIX) 20 MG tablet Take 1 tablet  (20 mg) by mouth daily 90 tablet 3     irbesartan (AVAPRO) 75 MG tablet TAKE 1/2 TABLET(37.5 MG) BY MOUTH AT BEDTIME 45 tablet 3     Multiple Vitamin (MULTI VITAMIN  MENS) TABS Take  by mouth. Takes one daily         order for DME Glucometer, brand as covered by insurance. 1 each 0     order for DME Test strips for pt's glucometer, brand as covered by insurance. Test daily and prn. 100 each 4     order for DME Lancets.  Daily and prn. 100 each 4     sertraline (ZOLOFT) 25 MG tablet TAKE 1 TABLET BY MOUTH EVERY DAY 90 tablet 3     vitamin D3 (CHOLECALCIFEROL) 1000 units (25 mcg) tablet Take 1 tablet (1,000 Units) by mouth at bedtime         PAST SURGICAL HISTORY:  Past Surgical History:   Procedure Laterality Date     ARTHROPLASTY KNEE  1/31/2012    Procedure:ARTHROPLASTY KNEE; LEFT TOTAL KNEE ARTHROPLASTY (IRASEMA)^; Surgeon:SKIP FAIR; Location: OR     ARTHROSCOPY SHOULDER, OPEN ROTATOR CUFF REPAIR, COMBINED  4/24/2012    Procedure:COMBINED ARTHROSCOPY SHOULDER, OPEN ROTATOR CUFF REPAIR; RIGHT SHOULDER ARTHROSCOPY OPEN ROTATOR CUFF DEBRIDEMENT, SUBCROMIAL DECOMPRESSION, AND BICEPS TENODESIS REPAIR; Surgeon:SKIP FAIR; Location: [facility]     [facility] AFF SURGICAL PATHOLOGY  6-    Dr. Bowers; left hand     ENT SURGERY       INCISE FINGER TENDON SHEATH  2008    Dr. Bowers; right AND LEFT hand     THROAT SURGERY      vocal cord polyps       ALLERGIES     Allergies   Allergen Reactions     No Known Drug Allergies        FAMILY HISTORY  Family History   Problem Relation Age of Onset     Family History Negative Other         unknown family history per patient         SOCIAL HISTORY  Social History     Socioeconomic History     Marital status:      Spouse name: ford     Number of children: 6     Years of education: 12     Highest education level: Not on file   Occupational History     Occupation: [employer]     Employer: RETIRED   Tobacco Use     Smoking status: Former Smoker     Smokeless tobacco: Never  "Used   Substance and Sexual Activity     Alcohol use: Yes     Comment: rarely     Drug use: No     Sexual activity: Not Currently     Partners: Female   Other Topics Concern     Parent/sibling w/ CABG, MI or angioplasty before 65F 55M? No   Social History Narrative     Not on file     Social Determinants of Health     Financial Resource Strain: Not on file   Food Insecurity: Not on file   Transportation Needs: Not on file   Physical Activity: Not on file   Stress: Not on file   Social Connections: Not on file   Intimate Partner Violence: Not on file   Housing Stability: Not on file       ROS:   See HPI     EXAM:  /69   Pulse 99   Ht 1.778 m (5' 10\")   Wt 98 kg (216 lb)   SpO2 95%   BMI 30.99 kg/m    In general, the patient is a pleasant male in no apparent distress.    HEENT: NC/AT.  PERRLA.  EOMI.  Sclerae white, not injected.    Neck: No adenopathy.  No thyromegaly. Carotids +2/2 bilaterally without bruits.  No jugular venous distension.   Heart: RRR. Normal S1, S2 splits physiologically. No murmur, rub, click, or gallop.   Lungs: CTA.  No ronchi, wheezes, rales.   Abdomen: Soft, nontender, nondistended.   Extremities: No clubbing, cyanosis, 1+ edema.   Vascular: No bruits are noted.    Labs:  LIPID RESULTS:  Lab Results   Component Value Date    CHOL 136 03/18/2021    HDL 56 03/18/2021    LDL 56 03/18/2021    TRIG 121 03/18/2021    CHOLHDLRATIO 2.6 11/18/2014    NHDL 80 03/18/2021       LIVER ENZYME RESULTS:  Lab Results   Component Value Date    AST 17 07/27/2020    ALT 29 07/27/2020       CBC RESULTS:  Lab Results   Component Value Date    WBC 5.8 07/27/2020    RBC 3.63 (L) 07/27/2020    HGB 12.8 (L) 07/27/2020    HCT 38.3 (L) 07/27/2020     (H) 07/27/2020    MCH 35.3 (H) 07/27/2020    MCHC 33.4 07/27/2020    RDW 13.0 07/27/2020     (L) 07/27/2020       BMP RESULTS:  Lab Results   Component Value Date     02/16/2022     03/18/2021    POTASSIUM 4.8 02/16/2022    POTASSIUM 4.4 " 03/18/2021    CHLORIDE 104 02/16/2022    CHLORIDE 106 03/18/2021    CO2 27 02/16/2022    CO2 26 03/18/2021    ANIONGAP 4 02/16/2022    ANIONGAP 4 03/18/2021     (H) 02/16/2022    GLC 91 03/18/2021    BUN 33 (H) 02/16/2022    BUN 31 (H) 03/18/2021    CR 1.4 (H) 02/24/2022    CR 1.48 (H) 02/16/2022    CR 1.40 (H) 03/18/2021    GFRESTIMATED 47 (L) 02/24/2022    GFRESTIMATED 44 (L) 03/18/2021    GFRESTBLACK 51 (L) 03/18/2021    BLAISE 8.9 02/16/2022    BLAISE 8.4 (L) 03/18/2021        A1C RESULTS:  Lab Results   Component Value Date    A1C 6.4 (H) 03/18/2021

## 2022-03-30 NOTE — TELEPHONE ENCOUNTER
Post OV labs noted 3/30/22:    Component      Latest Ref Rng & Units 3/30/2022   Sed Rate      0 - 20 mm/hr 41 (H)   CRP Inflammation      0.0 - 8.0 mg/L 7.2       Will route to Dr. Meade for review.

## 2022-03-30 NOTE — TELEPHONE ENCOUNTER
Received message from Dr. Xie for patient to get scheduled with him on 4/4/22 as cardiology is requesting patient to be seen earlier.    Routing to scheduling to contact patient and reschedule patient from 4/20/22 to 4/4/22 with Dr. Xie.      Ok to schedule patient at 12 or 12:45pm.    Esme Stevens, FABIANAN, RN-[facility]

## 2022-03-31 NOTE — TELEPHONE ENCOUNTER
Rescheduled to see Dr. Xie on 4/4/22 at 12:00 pm.     Future Appointments   Date Time Provider Department Center   4/4/2022 12:00 PM Uziel Xie MD [facility]         Nayana García    [facility]   [phone]

## 2022-04-04 ENCOUNTER — OFFICE VISIT (OUTPATIENT)
Dept: OTHER | Facility: CLINIC | Age: 87
End: 2022-04-04
Attending: INTERNAL MEDICINE
Payer: COMMERCIAL

## 2022-04-04 VITALS — DIASTOLIC BLOOD PRESSURE: 72 MMHG | HEART RATE: 102 BPM | SYSTOLIC BLOOD PRESSURE: 119 MMHG

## 2022-04-04 DIAGNOSIS — I73.9 PAD (PERIPHERAL ARTERY DISEASE) (H): ICD-10-CM

## 2022-04-04 PROCEDURE — G0463 HOSPITAL OUTPT CLINIC VISIT: HCPCS

## 2022-04-04 PROCEDURE — 99204 OFFICE O/P NEW MOD 45 MIN: CPT | Performed by: SURGERY

## 2022-04-04 NOTE — PROGRESS NOTES
[facility]        Patient is here for a consult to discuss Peripheral artery disease (PAD).     Pt is currently taking Statin and Plavix.    /72 (BP Location: Left arm, Patient Position: Chair, Cuff Size: Adult Regular)   Pulse 102     The provider has been notified that the patient has no concerns.     Questions patient would like addressed today are: N/A.    Refills are needed: N/A    Has homecare services and agency name:  Xi Matthews MA

## 2022-04-04 NOTE — PROGRESS NOTES
I had the pleasure of seeing Mr. Bart Blair in the vascular surgery clinic today.  He is a very pleasant 89-year-old gentleman who I have been requested by Dr. Meade to see for lower extremity pain.    He is accompanied by his wife today.  History was obtained from the chart, talking with the patient and conversations with his wife.    When I asked him to elaborate on what bothers him he is indicating pain in the right hip that radiates down to the right lower extremity.  He also reports pain going from the knee to the right foot.  No open wounds or ulcers.    On my examination both feet are warm and well perfused.  Right dorsalis pedis is a strong biphasic signal, right posterior tibial is a strong monophasic signal, left dorsalis pedis is a strong biphasic signal and left posterior tibial is a strong biphasic signal.  I do not see any wounds on his feet.  He does have an acrocyanotic hue in both his feet but capillary refill time is less than 2 seconds.    He underwent bilateral ankle-brachial indices on 24 February 2022.  I have actually reviewed the imaging and the waveforms myself.  Right dorsalis pedis and posterior tibial are noncompressible.  First toe pressure of 64 mmHg.  First toe PPG looks normal.    Left dorsalis pedis and posterior tibial arteries are noncompressible.  First toe pressure is 78 mmHg.  First toe PPG waveform looks normal.    Duplex sonography unconvincing for any significant stenosis and lower extremity arterial circulation.    In November 2018 he had an MR angiogram which showed occlusion of the posterior tibial artery at the level of the mid calf with reconstitution at the level of the ankle.  There was also severe multifocal narrowing versus occlusion at the mid to distal calf of the peroneal artery.  On the right side somewhat similar findings.    CT angiogram of the chest, abdomen pelvis done on 24 February 2022 was also reviewed by myself.  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    Diagnosis: Nonspecific bilateral lower extremity pain.    Plan: I am not convinced that his symptoms are due to any significant peripheral arterial disease.  His feet are warm and well-perfused with good signal and normal toe pressures.  I do not feel the need for arteriogram in either lower extremities.    As far as the atherosclerotic flap in the infrarenal aorta is concerned no surgical intervention would be advised due to low risk of any complication with its natural history.    I went over all the studies with the patient and his wife and my rationale for adopting a conservative approach.  There is no denying that he does have peripheral arterial disease in his lower extremities but it is not the cause of his symptoms and trying to do angiogram and attempting to interfere with his stable vasculature will likely cause more problems and is unlikely to yield any results so far as his symptoms are specifically concerned.

## 2022-04-06 NOTE — TELEPHONE ENCOUNTER
Azul Meade MD  You 3 minutes ago (11:24 AM)     CF    Good news is CRP is normal, ESR could be elevated due to his underlying atherosclerosis but I would advise him to review these labs with his rheumatologist who treats his giant cell arteritis.     Dr. Meade    Message text      Patient updated with Dr. Meade's comments via [facility].

## 2022-05-10 DIAGNOSIS — I73.9 PAD (PERIPHERAL ARTERY DISEASE) (H): ICD-10-CM

## 2022-05-11 NOTE — TELEPHONE ENCOUNTER
CLOPIDOGREL 75MG TABLETS  Last Written Prescription Date:   3/3/2022  Last Fill Quantity: 90,   # refills: 0  Last Office Visit : 3/30/2022  Future Office visit:  None    Routing refill request to provider for review/approval because:  Second Request  Over due HGB/PLT Labs  Refer to clinic/provider for review     Recent Labs   Lab Test 07/27/20  1030   HGB 12.8*         Recent Labs   Lab Test 07/27/20  1030   *       Ksenia Glover RN  Central Triage Red Flags/Med Refills

## 2022-05-13 RX ORDER — CLOPIDOGREL BISULFATE 75 MG/1
75 TABLET ORAL DAILY
Qty: 90 TABLET | Refills: 3 | Status: SHIPPED | OUTPATIENT
Start: 2022-05-13

## 2022-05-15 NOTE — TELEPHONE ENCOUNTER
Forms received from: [facility] (3)   Phone number listed: n/a   Fax listed: [phone]  Date received: 3-7-18  Form description: Amoxicillin, INV Cerexa Study, Vitamin B12  Once forms are completed, please return to [facility] via fax.  Is patient requesting to be contacted when forms are completed: n/a  Form placed: Provider folder    Rhonda Alvarado        
Requested information faxed to number provided.    
Universal Safety Interventions

## 2022-06-05 ENCOUNTER — HEALTH MAINTENANCE LETTER (OUTPATIENT)
Age: 87
End: 2022-06-05

## 2022-07-07 DIAGNOSIS — E11.39 TYPE 2 DIABETES MELLITUS WITH OTHER DIABETIC OPHTHALMIC COMPLICATION (H): ICD-10-CM

## 2022-07-08 NOTE — TELEPHONE ENCOUNTER
"Requested Prescriptions   Pending Prescriptions Disp Refills     blood glucose (NO BRAND SPECIFIED) test strip 100 strip 0     Sig: Use to test blood sugar one times daily or as directed.       Diabetic Supplies Protocol Failed - 7/7/2022  1:45 PM        Failed - Recent (6 mo) or future (30 days) visit within the authorizing provider's specialty     Patient had office visit in the last 6 months or has a visit in the next 30 days with authorizing provider.  See \"Patient Info\" tab in inbasket, or \"Choose Columns\" in Meds & Orders section of the refill encounter.            Passed - Medication is active on med list        Passed - Patient is 18 years of age or older           Routing refill request to provider for review/approval because:  6 mo f/u    ThanksMaria Isabel RN  [facility]           "

## 2022-07-28 ENCOUNTER — MYC MEDICAL ADVICE (OUTPATIENT)
Dept: CARDIOLOGY | Facility: CLINIC | Age: 87
End: 2022-07-28

## 2022-07-28 ENCOUNTER — ANCILLARY PROCEDURE (OUTPATIENT)
Dept: CARDIOLOGY | Facility: CLINIC | Age: 87
End: 2022-07-28
Attending: INTERNAL MEDICINE
Payer: COMMERCIAL

## 2022-07-28 DIAGNOSIS — I51.9 HEART DISEASE, UNSPECIFIED: ICD-10-CM

## 2022-07-28 DIAGNOSIS — I70.213 ATHEROSCLEROSIS OF NATIVE ARTERY OF BOTH LOWER EXTREMITIES WITH INTERMITTENT CLAUDICATION (H): ICD-10-CM

## 2022-07-28 LAB — LVEF ECHO: NORMAL

## 2022-07-28 PROCEDURE — 93306 TTE W/DOPPLER COMPLETE: CPT | Performed by: INTERNAL MEDICINE

## 2022-07-28 NOTE — TELEPHONE ENCOUNTER
Face sheet along with Dr. Meade's last OV note faxed to [employer] at   [phone]

## 2022-09-20 ENCOUNTER — MYC MEDICAL ADVICE (OUTPATIENT)
Dept: CARDIOLOGY | Facility: CLINIC | Age: 87
End: 2022-09-20

## 2022-09-30 DIAGNOSIS — E11.39 TYPE 2 DIABETES MELLITUS WITH OTHER DIABETIC OPHTHALMIC COMPLICATION (H): ICD-10-CM

## 2022-10-15 ENCOUNTER — HEALTH MAINTENANCE LETTER (OUTPATIENT)
Age: 87
End: 2022-10-15

## 2022-10-28 ENCOUNTER — OFFICE VISIT (OUTPATIENT)
Dept: CARDIOLOGY | Facility: CLINIC | Age: 87
End: 2022-10-28
Payer: COMMERCIAL

## 2022-10-28 VITALS
WEIGHT: 219 LBS | HEART RATE: 86 BPM | SYSTOLIC BLOOD PRESSURE: 122 MMHG | DIASTOLIC BLOOD PRESSURE: 63 MMHG | OXYGEN SATURATION: 95 % | BODY MASS INDEX: 31.42 KG/M2

## 2022-10-28 DIAGNOSIS — I73.9 PAD (PERIPHERAL ARTERY DISEASE) (H): ICD-10-CM

## 2022-10-28 DIAGNOSIS — M79.89 LEG SWELLING: Primary | ICD-10-CM

## 2022-10-28 DIAGNOSIS — I89.0 LYMPHEDEMA: ICD-10-CM

## 2022-10-28 DIAGNOSIS — M31.6: ICD-10-CM

## 2022-10-28 DIAGNOSIS — I75.023 BLUE TOE SYNDROME OF BOTH LOWER EXTREMITIES (H): ICD-10-CM

## 2022-10-28 PROCEDURE — 99214 OFFICE O/P EST MOD 30 MIN: CPT | Performed by: INTERNAL MEDICINE

## 2022-10-28 NOTE — PROGRESS NOTES
Vascular Cardiology       HPI:       This is a pleasant 89 year old male with a history of Parkinson's disease, HTN, CKD III, DM II, peripheral artery disease, chronic LE edema, and anemia who presents for follow up.     During past visits his claudication had been stable and he would walk through discomfort. 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 He had a small contained infrarenal dissection that vascular surgery deemed stable for surveillance.      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 Since this diagnosis had blue toes and ongoing pain and worsening skin changes. Swelling has not worsened. He denies chest pain, sob, dizziness. His legs remain however warm and perfused. He also has vein insufficiency and thus I referred him for biotab pumps. He has not yet received these although a rep came out to his house.      CT angiogram reviewed in office:       Impression:  1. Atherosclerotic changes of of the thoracic and abdominal aorta,  with associated calcifications. No mural nodularity or stranding to  reveal a clear inflammatory process. There is a dissection flap within  infrarenal abdominal aortic aneurysm, favored to be atherosclerotic in  etiology. Normal contrast opacification of associated branching  vessels. No distal extension of the flap. No aneurysmal dilatation of  the thoracoabdominal aorta.  2. Subpleural reticular opacities with a basilar predominance  compatible with interstitial lung disease. Pleural calcifications  likely sequela of asbestos exposure   3. 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  4. Additional nonacute incidental findings as detailed.     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  He has concomitant vein insufficiency that I believe is contributing to his leg symptoms as he does not have severe medium or large vessel occlusions on his imaging. His pain is proximal to the level of infrapopliteal disease he has.     His lower extremity ultrasounds and MRA of the lower extremities show occlusion of the calf arteries with reconstitution distally.  In 2019 he seemed to be doing better with the addition of cilostazol and with physical therapy.  However, since his diagnosis of Giant Cell he has new symptoms concerning for critical limb ischemia with blue toes and resting leg pain however he has vein insufficiency which may be contributing. He does have Doppler signal to both feet.      As for the lower extremity swelling, this had improved greatly with the lymphedema treatment. Review of the echo and venous lower extremity studies shows that the lower extremity swelling is due to venous insufficiency and not related to heart failure.      Summary:      1. Lymphedema:   -referral was made for pneumatic external compression devices for home use  -venous ultrasound to be repeated      2. PAD with blue toe syndrome: suspect mostly venous, his feet are warmly perfused  -continue aspirin, pletal  -echocardiogram reviewed, stable  -CT angiogram chest/abdomen/pelvis reviewed  -labs for ESR, CRP every year  -new infrarenal dissection - stable, surveillance by vas surgery  -repeat abis with arterial duplex    Will follow up with me in 2 months virtual phone visit     Azul Meade MD MSC  [facility]  PAST MEDICAL HISTORY  Past Medical History:   Diagnosis Date     Anemia      Anemia due to blood loss, acute      CKD  (chronic kidney disease) stage 3, GFR 30-59 ml/min (H) 5/31/2010     Essential hypertension, benign      Other and unspecified hyperlipidemia      Other and unspecified hyperlipidemia      Other specified disorder of skin      Pain in joint, shoulder region      Parkinson disease (H) 9/2/2010     Polyp of vocal cord or larynx      Retinal ischemia     right sided thrombotic plaque     Rosacea      Type II or unspecified type diabetes mellitus without mention of complication, not stated as uncontrolled        CURRENT MEDICATIONS  Current Outpatient Medications   Medication Sig Dispense Refill     atorvastatin (LIPITOR) 10 MG tablet Take 1 tablet (10 mg) by mouth daily 90 tablet 0     blood glucose (NO BRAND SPECIFIED) lancets standard Use to test blood sugar one time daily or as directed. 100 each 1     blood glucose (NO BRAND SPECIFIED) test strip Use to test blood sugar one times daily or as directed. 100 strip 0     cilostazol (PLETAL) 50 MG tablet Take 1 tablet (50 mg) by mouth 2 times daily Take on an empty stomach. 180 tablet 3     clopidogrel (PLAVIX) 75 MG tablet Take 1 tablet (75 mg) by mouth daily 90 tablet 3     Cyanocobalamin (VITAMIN B-12 CR) 1000 MCG TBCR TAKE ONE TABLET BY MOUTH EVERY DAY 60 tablet 4     diclofenac (VOLTAREN) 1 % topical gel APPLY 4 GRAMS TO KNEES OR 2 GRAMS TO HANDS FOUR TIMES A DAY USING ENCLOSED DOSING CARD 300 g 3     fluticasone (FLONASE) 50 MCG/ACT nasal spray Spray 1-2 sprays into both nostrils daily as needed        folic acid (FOLVITE) 1 MG tablet Take 1 mg by mouth daily       furosemide (LASIX) 20 MG tablet Take 1 tablet (20 mg) by mouth daily 90 tablet 3     irbesartan (AVAPRO) 75 MG tablet TAKE 1/2 TABLET(37.5 MG) BY MOUTH AT BEDTIME 45 tablet 3     Multiple Vitamin (MULTI VITAMIN  MENS) TABS Take  by mouth. Takes one daily         order for DME Glucometer, brand as covered by insurance. 1 each 0     order for DME Test strips for pt's glucometer, brand as covered by  insurance. Test daily and prn. 100 each 4     order for DME Lancets.  Daily and prn. 100 each 4     vitamin D3 (CHOLECALCIFEROL) 1000 units (25 mcg) tablet Take 1 tablet (1,000 Units) by mouth at bedtime       amoxicillin (AMOXIL) 500 MG capsule TAKE FOUR CAPSULES BY MOUTH 1 HOUR BEFORE DENTAL APPOINTMENT (Patient not taking: Reported on 10/28/2022) 16 capsule 4     COMPRESSION STOCKINGS 1 each daily Lower Extremity:   Knee High;  bilateral;  20-30 mm Hg.  Measure and fit.  Style and color per patient preference.  Doff n Nando per patient need. 6 each 11     sertraline (ZOLOFT) 25 MG tablet TAKE 1 TABLET BY MOUTH EVERY DAY (Patient not taking: Reported on 10/28/2022) 90 tablet 3       PAST SURGICAL HISTORY:  Past Surgical History:   Procedure Laterality Date     ARTHROPLASTY KNEE  1/31/2012    Procedure:ARTHROPLASTY KNEE; LEFT TOTAL KNEE ARTHROPLASTY (Amitive)^; Surgeon:SKIP FAIR; Location: OR     ARTHROSCOPY SHOULDER, OPEN ROTATOR CUFF REPAIR, COMBINED  4/24/2012    Procedure:COMBINED ARTHROSCOPY SHOULDER, OPEN ROTATOR CUFF REPAIR; RIGHT SHOULDER ARTHROSCOPY OPEN ROTATOR CUFF DEBRIDEMENT, SUBCROMIAL DECOMPRESSION, AND BICEPS TENODESIS REPAIR; Surgeon:SKIP FAIR; Location: [facility]     [facility] AFF SURGICAL PATHOLOGY  6-    Dr. Bowers; left hand     ENT SURGERY       INCISE FINGER TENDON SHEATH  2008    Dr. Bowers; right AND LEFT hand     THROAT SURGERY      vocal cord polyps       ALLERGIES     Allergies   Allergen Reactions     No Known Drug Allergies        FAMILY HISTORY  Family History   Problem Relation Age of Onset     Family History Negative Other         unknown family history per patient         SOCIAL HISTORY  Social History     Socioeconomic History     Marital status:      Spouse name: ford     Number of children: 6     Years of education: 12     Highest education level: Not on file   Occupational History     Occupation: printing     Employer: RETIRED   Tobacco Use     Smoking  status: Former     Smokeless tobacco: Never   Substance and Sexual Activity     Alcohol use: Yes     Comment: rarely     Drug use: No     Sexual activity: Not Currently     Partners: Female   Other Topics Concern     Parent/sibling w/ CABG, MI or angioplasty before 65F 55M? No   Social History Narrative     Not on file     Social Determinants of Health     Financial Resource Strain: Not on file   Food Insecurity: Not on file   Transportation Needs: Not on file   Physical Activity: Not on file   Stress: Not on file   Social Connections: Not on file   Intimate Partner Violence: Not on file   Housing Stability: Not on file       ROS:   Constitutional: No fever, chills, or sweats. No weight gain/loss   ENT: No visual disturbance, ear ache, epistaxis, sore throat  Allergies/Immunologic: Negative  Respiratory: No cough, hemoptysia  Cardiovascular: As per HPI  GI: No nausea, vomiting, hematemesis, melena, or hematochezia  : No urinary frequency, dysuria, or hematuria  Integument: Negative  Psychiatric: Negative  Neuro: Negative  Endocrinology: Negative   Musculoskeletal: Negative  Vascular: No walking impairment, claudication, ischemic rest pain or nonhealing wounds    EXAM:  /68   Pulse 67   Wt 99.3 kg (219 lb)   SpO2 95%   BMI 31.42 kg/m    In general, the patient is a pleasant male in no apparent distress.    HEENT: NC/AT.  PERRLA.  EOMI.  Sclerae white, not injected.  Nares clear.  Pharynx without erythema or exudate.  Dentition intact.    Neck: No adenopathy.  No thyromegaly. Carotids +2/2 bilaterally without bruits.  No jugular venous distension.   Heart: RRR. Normal S1, S2 splits physiologically. No murmur, rub, click, or gallop.   Lungs: CTA.  No ronchi, wheezes, rales.  No dullness to percussion.   Abdomen: Soft, nontender, nondistended. No organomegaly. No AAA.  No bruits.   Extremities: No clubbing, cyanosis, 1+ edema.  No wounds. +varicose veins signs of chronic venous insufficiency.   Vascular: No  bruits are noted.    Labs:  LIPID RESULTS:  Lab Results   Component Value Date    CHOL 136 03/18/2021    HDL 56 03/18/2021    LDL 56 03/18/2021    TRIG 121 03/18/2021    CHOLHDLRATIO 2.6 11/18/2014    NHDL 80 03/18/2021       LIVER ENZYME RESULTS:  Lab Results   Component Value Date    AST 17 07/27/2020    ALT 29 07/27/2020       CBC RESULTS:  Lab Results   Component Value Date    WBC 5.8 07/27/2020    RBC 3.63 (L) 07/27/2020    HGB 12.8 (L) 07/27/2020    HCT 38.3 (L) 07/27/2020     (H) 07/27/2020    MCH 35.3 (H) 07/27/2020    MCHC 33.4 07/27/2020    RDW 13.0 07/27/2020     (L) 07/27/2020       BMP RESULTS:  Lab Results   Component Value Date     02/16/2022     03/18/2021    POTASSIUM 4.8 02/16/2022    POTASSIUM 4.4 03/18/2021    CHLORIDE 104 02/16/2022    CHLORIDE 106 03/18/2021    CO2 27 02/16/2022    CO2 26 03/18/2021    ANIONGAP 4 02/16/2022    ANIONGAP 4 03/18/2021     (H) 02/16/2022    GLC 91 03/18/2021    BUN 33 (H) 02/16/2022    BUN 31 (H) 03/18/2021    CR 1.4 (H) 02/24/2022    CR 1.48 (H) 02/16/2022    CR 1.40 (H) 03/18/2021    GFRESTIMATED 47 (L) 02/24/2022    GFRESTIMATED 44 (L) 03/18/2021    GFRESTBLACK 51 (L) 03/18/2021    BLAISE 8.9 02/16/2022    BLAISE 8.4 (L) 03/18/2021        A1C RESULTS:  Lab Results   Component Value Date    A1C 6.4 (H) 03/18/2021

## 2022-10-28 NOTE — PATIENT INSTRUCTIONS
Vascular studies at the [facility] ([facility])  Follow up with Dr. Meade for TELEPHONE VISIT December 19th VIRTUAL

## 2022-10-28 NOTE — LETTER
10/28/2022    Arnel ONEAL [facility] [address]  [facility] [address]    RE: Bart Rossy       Dear Colleague,     I had the pleasure of seeing Bart Blair in the [facility].           Vascular Cardiology       HPI:       This is a pleasant 89 year old male with a history of Parkinson's disease, HTN, CKD III, DM II, peripheral artery disease, chronic LE edema, and anemia who presents for follow up.     During past visits his claudication had been stable and he would walk through discomfort. 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 He had a small contained infrarenal dissection that vascular surgery deemed stable for surveillance.      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 Since this diagnosis had blue toes and ongoing pain and worsening skin changes. Swelling has not worsened. He denies chest pain, sob, dizziness. His legs remain however warm and perfused. He also has vein insufficiency and thus I referred him for biotab pumps. He has not yet received these although a rep came out to his house.      CT angiogram reviewed in office:       Impression:  1. Atherosclerotic changes of of the thoracic and abdominal aorta,  with associated calcifications. No mural nodularity or stranding to  reveal a clear inflammatory process. There is a dissection flap within  infrarenal abdominal aortic aneurysm, favored to be atherosclerotic in  etiology. Normal contrast opacification of associated branching  vessels. No distal extension of the flap. No aneurysmal dilatation of  the thoracoabdominal aorta.  2. Subpleural reticular opacities with a basilar predominance  compatible with interstitial lung disease. Pleural calcifications  likely sequela of asbestos exposure   3. Mild-moderate stenosis of the origins of the celiac trunk, superior  mesenteric artery, bilateral  renal and accessory renal arteries, as  well as inferior mesenteric artery.  4. Additional nonacute incidental findings as detailed.     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  He has concomitant vein insufficiency that I believe is contributing to his leg symptoms as he does not have severe medium or large vessel occlusions on his imaging. His pain is proximal to the level of infrapopliteal disease he has.     His lower extremity ultrasounds and MRA of the lower extremities show occlusion of the calf arteries with reconstitution distally.  In 2019 he seemed to be doing better with the addition of cilostazol and with physical therapy.  However, since his diagnosis of Giant Cell he has new symptoms concerning for critical limb ischemia with blue toes and resting leg pain however he has vein insufficiency which may be contributing. He does have Doppler signal to both feet.      As for the lower extremity swelling, this had improved greatly with the lymphedema treatment. Review of the echo and venous lower extremity studies shows that the lower extremity swelling is due to venous insufficiency and not related to heart failure.      Summary:      1. Lymphedema:   -referral was made for pneumatic external compression devices for home use  -venous ultrasound to be repeated      2. PAD with blue toe syndrome: suspect mostly venous, his feet are warmly perfused  -continue aspirin, pletal  -echocardiogram reviewed, stable  -CT angiogram chest/abdomen/pelvis reviewed  -labs for ESR, CRP every year  -new infrarenal dissection - stable, surveillance by [facility] surgery  -repeat abis with arterial duplex    Will follow up  with me in 2 months virtual phone visit     Azul Meade MD MSC  [facility]  PAST MEDICAL HISTORY  Past Medical History:   Diagnosis Date     Anemia      Anemia due to blood loss, acute      CKD (chronic kidney disease) stage 3, GFR 30-59 ml/min (H) 5/31/2010     Essential hypertension, benign      Other and unspecified hyperlipidemia      Other and unspecified hyperlipidemia      Other specified disorder of skin      Pain in joint, shoulder region      Parkinson disease (H) 9/2/2010     Polyp of vocal cord or larynx      Retinal ischemia     right sided thrombotic plaque     Rosacea      Type II or unspecified type diabetes mellitus without mention of complication, not stated as uncontrolled        CURRENT MEDICATIONS  Current Outpatient Medications   Medication Sig Dispense Refill     atorvastatin (LIPITOR) 10 MG tablet Take 1 tablet (10 mg) by mouth daily 90 tablet 0     blood glucose (NO BRAND SPECIFIED) lancets standard Use to test blood sugar one time daily or as directed. 100 each 1     blood glucose (NO BRAND SPECIFIED) test strip Use to test blood sugar one times daily or as directed. 100 strip 0     cilostazol (PLETAL) 50 MG tablet Take 1 tablet (50 mg) by mouth 2 times daily Take on an empty stomach. 180 tablet 3     clopidogrel (PLAVIX) 75 MG tablet Take 1 tablet (75 mg) by mouth daily 90 tablet 3     Cyanocobalamin (VITAMIN B-12 CR) 1000 MCG TBCR TAKE ONE TABLET BY MOUTH EVERY DAY 60 tablet 4     diclofenac (VOLTAREN) 1 % topical gel APPLY 4 GRAMS TO KNEES OR 2 GRAMS TO HANDS FOUR TIMES A DAY USING ENCLOSED DOSING CARD 300 g 3     fluticasone (FLONASE) 50 MCG/ACT nasal spray Spray 1-2 sprays into both nostrils daily as needed        folic acid (FOLVITE) 1 MG tablet Take 1 mg by mouth daily       furosemide (LASIX) 20 MG tablet Take 1 tablet (20 mg) by mouth daily 90 tablet 3     irbesartan (AVAPRO) 75 MG tablet TAKE 1/2 TABLET(37.5 MG) BY MOUTH AT BEDTIME 45 tablet 3     Multiple Vitamin  (MULTI VITAMIN  MENS) TABS Take  by mouth. Takes one daily         order for DME Glucometer, brand as covered by insurance. 1 each 0     order for DME Test strips for pt's glucometer, brand as covered by insurance. Test daily and prn. 100 each 4     order for DME Lancets.  Daily and prn. 100 each 4     vitamin D3 (CHOLECALCIFEROL) 1000 units (25 mcg) tablet Take 1 tablet (1,000 Units) by mouth at bedtime       amoxicillin (AMOXIL) 500 MG capsule TAKE FOUR CAPSULES BY MOUTH 1 HOUR BEFORE DENTAL APPOINTMENT (Patient not taking: Reported on 10/28/2022) 16 capsule 4     COMPRESSION STOCKINGS 1 each daily Lower Extremity:   Knee High;  bilateral;  20-30 mm Hg.  Measure and fit.  Style and color per patient preference.  Nat n Nando per patient need. 6 each 11     sertraline (ZOLOFT) 25 MG tablet TAKE 1 TABLET BY MOUTH EVERY DAY (Patient not taking: Reported on 10/28/2022) 90 tablet 3       PAST SURGICAL HISTORY:  Past Surgical History:   Procedure Laterality Date     ARTHROPLASTY KNEE  1/31/2012    Procedure:ARTHROPLASTY KNEE; LEFT TOTAL KNEE ARTHROPLASTY (IRASEMA)^; Surgeon:SKIP FAIR; Location: OR     ARTHROSCOPY SHOULDER, OPEN ROTATOR CUFF REPAIR, COMBINED  4/24/2012    Procedure:COMBINED ARTHROSCOPY SHOULDER, OPEN ROTATOR CUFF REPAIR; RIGHT SHOULDER ARTHROSCOPY OPEN ROTATOR CUFF DEBRIDEMENT, SUBCROMIAL DECOMPRESSION, AND BICEPS TENODESIS REPAIR; Surgeon:SKIP FAIR; Location:[facility] AFF SURGICAL PATHOLOGY  6-    Dr. Bowers; left hand     ENT SURGERY       INCISE FINGER TENDON SHEATH  2008    Dr. Bowers; right AND LEFT hand     THROAT SURGERY      vocal cord polyps       ALLERGIES     Allergies   Allergen Reactions     No Known Drug Allergies        FAMILY HISTORY  Family History   Problem Relation Age of Onset     Family History Negative Other         unknown family history per patient         SOCIAL HISTORY  Social History     Socioeconomic History     Marital status:      Spouse name:  ford     Number of children: 6     Years of education: 12     Highest education level: Not on file   Occupational History     Occupation: printing     Employer: RETIRED   Tobacco Use     Smoking status: Former     Smokeless tobacco: Never   Substance and Sexual Activity     Alcohol use: Yes     Comment: rarely     Drug use: No     Sexual activity: Not Currently     Partners: Female   Other Topics Concern     Parent/sibling w/ CABG, MI or angioplasty before 65F 55M? No   Social History Narrative     Not on file     Social Determinants of Health     Financial Resource Strain: Not on file   Food Insecurity: Not on file   Transportation Needs: Not on file   Physical Activity: Not on file   Stress: Not on file   Social Connections: Not on file   Intimate Partner Violence: Not on file   Housing Stability: Not on file       ROS:   Constitutional: No fever, chills, or sweats. No weight gain/loss   ENT: No visual disturbance, ear ache, epistaxis, sore throat  Allergies/Immunologic: Negative  Respiratory: No cough, hemoptysia  Cardiovascular: As per HPI  GI: No nausea, vomiting, hematemesis, melena, or hematochezia  : No urinary frequency, dysuria, or hematuria  Integument: Negative  Psychiatric: Negative  Neuro: Negative  Endocrinology: Negative   Musculoskeletal: Negative  Vascular: No walking impairment, claudication, ischemic rest pain or nonhealing wounds    EXAM:  /68   Pulse 67   Wt 99.3 kg (219 lb)   SpO2 95%   BMI 31.42 kg/m    In general, the patient is a pleasant male in no apparent distress.    HEENT: NC/AT.  PERRLA.  EOMI.  Sclerae white, not injected.  Nares clear.  Pharynx without erythema or exudate.  Dentition intact.    Neck: No adenopathy.  No thyromegaly. Carotids +2/2 bilaterally without bruits.  No jugular venous distension.   Heart: RRR. Normal S1, S2 splits physiologically. No murmur, rub, click, or gallop.   Lungs: CTA.  No ronchi, wheezes, rales.  No dullness to percussion.   Abdomen:  Soft, nontender, nondistended. No organomegaly. No AAA.  No bruits.   Extremities: No clubbing, cyanosis, 1+ edema.  No wounds. +varicose veins signs of chronic venous insufficiency.   Vascular: No bruits are noted.    Labs:  LIPID RESULTS:  Lab Results   Component Value Date    CHOL 136 03/18/2021    HDL 56 03/18/2021    LDL 56 03/18/2021    TRIG 121 03/18/2021    CHOLHDLRATIO 2.6 11/18/2014    NHDL 80 03/18/2021       LIVER ENZYME RESULTS:  Lab Results   Component Value Date    AST 17 07/27/2020    ALT 29 07/27/2020       CBC RESULTS:  Lab Results   Component Value Date    WBC 5.8 07/27/2020    RBC 3.63 (L) 07/27/2020    HGB 12.8 (L) 07/27/2020    HCT 38.3 (L) 07/27/2020     (H) 07/27/2020    MCH 35.3 (H) 07/27/2020    MCHC 33.4 07/27/2020    RDW 13.0 07/27/2020     (L) 07/27/2020       BMP RESULTS:  Lab Results   Component Value Date     02/16/2022     03/18/2021    POTASSIUM 4.8 02/16/2022    POTASSIUM 4.4 03/18/2021    CHLORIDE 104 02/16/2022    CHLORIDE 106 03/18/2021    CO2 27 02/16/2022    CO2 26 03/18/2021    ANIONGAP 4 02/16/2022    ANIONGAP 4 03/18/2021     (H) 02/16/2022    GLC 91 03/18/2021    BUN 33 (H) 02/16/2022    BUN 31 (H) 03/18/2021    CR 1.4 (H) 02/24/2022    CR 1.48 (H) 02/16/2022    CR 1.40 (H) 03/18/2021    GFRESTIMATED 47 (L) 02/24/2022    GFRESTIMATED 44 (L) 03/18/2021    GFRESTBLACK 51 (L) 03/18/2021    BLAISE 8.9 02/16/2022    BLAISE 8.4 (L) 03/18/2021        A1C RESULTS:  Lab Results   Component Value Date    A1C 6.4 (H) 03/18/2021             Thank you for allowing me to participate in the care of your patient.      Sincerely,     Azul Meade MD     [facility]  cc:   Azul Meade MD  [address] [place] [id number]

## 2022-10-31 ENCOUNTER — TELEPHONE (OUTPATIENT)
Dept: CARDIOLOGY | Facility: CLINIC | Age: 87
End: 2022-10-31

## 2022-11-04 ENCOUNTER — ANCILLARY PROCEDURE (OUTPATIENT)
Dept: ULTRASOUND IMAGING | Facility: CLINIC | Age: 87
End: 2022-11-04
Attending: INTERNAL MEDICINE
Payer: COMMERCIAL

## 2022-11-04 DIAGNOSIS — I75.023 BLUE TOE SYNDROME OF BOTH LOWER EXTREMITIES (H): ICD-10-CM

## 2022-11-04 DIAGNOSIS — M31.6: ICD-10-CM

## 2022-11-04 DIAGNOSIS — M79.89 LEG SWELLING: ICD-10-CM

## 2022-11-04 DIAGNOSIS — I73.9 PAD (PERIPHERAL ARTERY DISEASE) (H): ICD-10-CM

## 2022-11-04 DIAGNOSIS — I89.0 LYMPHEDEMA: ICD-10-CM

## 2022-11-04 PROCEDURE — 93922 UPR/L XTREMITY ART 2 LEVELS: CPT | Performed by: RADIOLOGY

## 2022-11-04 PROCEDURE — 93970 EXTREMITY STUDY: CPT | Performed by: RADIOLOGY

## 2022-11-04 PROCEDURE — 93925 LOWER EXTREMITY STUDY: CPT | Performed by: RADIOLOGY

## 2022-11-07 ENCOUNTER — TELEPHONE (OUTPATIENT)
Dept: CARDIOLOGY | Facility: CLINIC | Age: 87
End: 2022-11-07

## 2022-11-07 DIAGNOSIS — I73.9 PVD (PERIPHERAL VASCULAR DISEASE) (H): Primary | ICD-10-CM

## 2022-11-07 NOTE — TELEPHONE ENCOUNTER
11/4/22 venous comp  IMPRESSION:     1. RIGHT LEG:       A. No deep venous thrombosis demonstrated.       B. Great saphenous vein wall thickening from the distal thigh  through proximal calf. Otherwise no superficial venous thrombosis  demonstrated.       C. Common femoral vein incompetent.       D. Great saphenous vein incompetent from the mid thigh through  proximal calf and at the ankle.       E. Small saphenous vein not visualized at the saphenopopliteal  junction or mid calf.       F. Incompetent varicose and  veins as described in the  report.     2. LEFT LEG:       A. No superficial or deep venous thrombosis demonstrated.       B. Common femoral vein incompetent.       C. Great saphenous vein incompetent from the saphenofemoral  junction through distal thigh.       D. Small saphenous vein not visualized at the saphenopopliteal  junction or mid calf.       E. Incompetent varicose vein as described in the report.    11/4/22 US VIOLET  IMPRESSION:  1. RIGHT:       A. Resting VIOLET is non compressible - unchanged.       B. Resting TBI is ABNORMAL, 0.59, previously 0.39.     2. LEFT:       A. Resting VIOLET is non compressible - unchanged.       B. Resting TBI is normal, 0.79, previously 0.47.    11/4/22 Bilateral lower arterial US   IMPRESSION: Left anterior tibial artery disease.    Dr. Meade has virtual visit on 12/19/22 - will route to Dr. Meade to see if she has any recommendation  prior to visit.

## 2022-11-15 NOTE — TELEPHONE ENCOUNTER
Received VM with a call back from patients wife. Tried to call back but phone would keep ringing and then just hang up. Pt is set up to see Dr. Gunter on 12/19/22.

## 2022-11-15 NOTE — TELEPHONE ENCOUNTER
Azul Meade MD  You; [facility] 4 17 hours ago (4:32 PM)     CF  Thanks. I think Dr. Hines or Josse should see him for his venous disease. He may be a good candidate to have some of his reflux fixed.     Best,   Dr. Meade      Referral placed for [facility] [employer]. Tried to call patient to review results. No answer. Left  with team 4 direct number to call back.

## 2022-12-19 NOTE — LETTER
12/19/2022    Arnel ONEAL [facility] [address]  [place] [address]    RE: Bart Wilkersone       Dear Colleague,     I had the pleasure of seeing Bart Blair in the [facility].      Bart is a 89 year old who is being evaluated via a billable video visit.      How would you like to obtain your AVS? MyChart  If the video visit is dropped, the invitation should be resent by: Send to e-mail at: [email or web address]  Will anyone else be joining your video visit? No      PROVIDER NOTES:     This is a pleasant 89 year old male with a history of Parkinson's disease, HTN, CKD III, DM II, peripheral artery disease, chronic LE edema, and anemia who presents for follow up.     During past visits his claudication had been stable and he would walk through discomfort. 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 He had a small contained infrarenal dissection that vascular surgery deemed stable for surveillance.      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 Since this diagnosis had blue toes and ongoing pain and worsening skin changes. Swelling somewhat worsened. He denies chest pain, sob, dizziness. His legs remain however warm and perfused. He had vein insufficiency and thus I referred him for biotab pumps. Since then he has been doing hour sessions and tolerating well without shortness of breath. His leg swelling has made a significant improvement. In addition his ABIS have improved and TBI normal on the left.      CT angiogram reviewed as well as ABIs personally by me in the office:       Impression:  1. Atherosclerotic changes of of the thoracic and abdominal aorta,  with associated calcifications. No mural nodularity or stranding to  reveal a clear inflammatory process. There is a dissection flap within  infrarenal abdominal aortic aneurysm, favored to be  atherosclerotic in  etiology. Normal contrast opacification of associated branching  vessels. No distal extension of the flap. No aneurysmal dilatation of  the thoracoabdominal aorta.  2. Subpleural reticular opacities with a basilar predominance  compatible with interstitial lung disease. Pleural calcifications  likely sequela of asbestos exposure   3. 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  4. Additional nonacute incidental findings as detailed.     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  He has concomitant vein insufficiency that I believe is contributing to his leg symptoms as he does not have severe medium or large vessel occlusions on his imaging or by ABIs. His pain is proximal to the level of infrapopliteal disease he has. His pain is improving with reduction of swelling confirming likely venous disease as culprit. He is tolerating biotab pumps well.      His lower extremity ultrasounds and MRA of the lower extremities show occlusion of the calf arteries with reconstitution distally. Review of the echo and venous lower extremity studies shows that the lower extremity swelling is due to venous insufficiency and not related to heart failure.        Summary:      1. Lymphedema:   -referral was made for pneumatic external compression devices for home use  -stable, follow up symptoms. Discussed if any shortness of breath can decrease duration of sessions down to 30 minutes.      2. PAD with blue toe syndrome: suspect mostly venous, his feet are warmly perfused, TBIs are  not severely reduced  -continue aspirin, pletal  -echocardiogram reviewed, stable  -CT angiogram chest/abdomen/pelvis reviewed  -labs for ESR, CRP every year  -new infrarenal dissection - stable, surveillance by vasc surgery  -repeat abis with arterial duplex once a year     Will have follow up in 4-6 months with chem panel labs.    Azul Meade MD MSC  [facility]    Current Outpatient Medications   Medication     atorvastatin (LIPITOR) 10 MG tablet     blood glucose (NO BRAND SPECIFIED) lancets standard     blood glucose (NO BRAND SPECIFIED) test strip     cilostazol (PLETAL) 50 MG tablet     clopidogrel (PLAVIX) 75 MG tablet     Cyanocobalamin (VITAMIN B-12 CR) 1000 MCG TBCR     diclofenac (VOLTAREN) 1 % topical gel     fluticasone (FLONASE) 50 MCG/ACT nasal spray     folic acid (FOLVITE) 1 MG tablet     furosemide (LASIX) 20 MG tablet     irbesartan (AVAPRO) 75 MG tablet     Multiple Vitamin (MULTI VITAMIN  MENS) TABS     order for DME     order for DME     order for DME     vitamin D3 (CHOLECALCIFEROL) 1000 units (25 mcg) tablet     No current facility-administered medications for this visit.     General:  no apparent distress, normal body habitus, sitting upright.  ENT/Mouth:  membranes moist, no nasal discharge.  Normal head shape, no apparent injury or laceration.  Eyes:  no scleral icterus, normal conjunctivae.  No observed jaundice.  Neck:  no apparent neck swelling.   Chest/Lungs:  No breathing difficulty while speaking.  No audible wheezing.  No cough during conversation.  Cardiovascular:  No obviously elevated jugular venous pressure.  No apparent edema bilaterally in LE.   Abdomen:  no obvious abdominal distention.   Extremities:  no apparent cyanosis.  Skin:  no xanthelasma.  No facial lacerations.  Neurologic:  Normal arm motion bilateral, no tremors.    Psychiatric:  Alert and oriented x3, calm demeanor        Video-Visit Details    Video Start Time: 3:45 PM  Type of service:  Video  Visit  Video End Time: 4:15 PM  Originating Location (pt. Location): HOME        Distant Location (provider location):  HOME    Platform used for Video Visit: CONNOR    This is a pleasant 89 year old male with a history of Parkinson's disease, HTN, CKD III, DM II, peripheral artery disease, chronic LE edema, and anemia who presents for follow up.     During past visits his claudication had been stable and he would walk through discomfort. 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 He had a small contained infrarenal dissection that vascular surgery deemed stable for surveillance.      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 Since this diagnosis had blue toes and ongoing pain and worsening skin changes. Swelling has not worsened. He denies chest pain, sob, dizziness. His legs remain however warm and perfused. He also has vein insufficiency and thus I referred him for biotab pumps. He has not yet received these although a rep came out to his house.      CT angiogram reviewed in office:       Impression:  1. Atherosclerotic changes of of the thoracic and abdominal aorta,  with associated calcifications. No mural nodularity or stranding to  reveal a clear inflammatory process. There is a dissection flap within  infrarenal abdominal aortic aneurysm, favored to be atherosclerotic in  etiology. Normal contrast opacification of associated branching  vessels. No distal extension of the flap. No aneurysmal dilatation of  the thoracoabdominal aorta.  2. Subpleural reticular opacities with a basilar predominance  compatible with interstitial lung disease. Pleural calcifications  likely sequela of asbestos exposure   3. 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  4. Additional  nonacute incidental findings as detailed.     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  He has concomitant vein insufficiency that I believe is contributing to his leg symptoms as he does not have severe medium or large vessel occlusions on his imaging. His pain is proximal to the level of infrapopliteal disease he has.     His lower extremity ultrasounds and MRA of the lower extremities show occlusion of the calf arteries with reconstitution distally.  In 2019 he seemed to be doing better with the addition of cilostazol and with physical therapy.  However, since his diagnosis of Giant Cell he has new symptoms concerning for critical limb ischemia with blue toes and resting leg pain however he has vein insufficiency which may be contributing. He does have Doppler signal to both feet.      As for the lower extremity swelling, this had improved greatly with the lymphedema treatment. Review of the echo and venous lower extremity studies shows that the lower extremity swelling is due to venous insufficiency and not related to heart failure.        Summary:      1. Lymphedema:   -referral was made for pneumatic external compression devices for home use  -venous ultrasound to be repeated      2. PAD with blue toe syndrome: suspect mostly venous, his feet are warmly perfused  -continue aspirin, pletal  -echocardiogram reviewed, stable  -CT angiogram chest/abdomen/pelvis reviewed  -labs for ESR, CRP every year  -new infrarenal dissection - stable, surveillance by [facility] surgery  -repeat abis with arterial duplex     Will follow up with me in 2 months virtual phone visit       Thank you for allowing me to participate  in the care of your patient.      Sincerely,     Azul Meade MD     [facility]  cc:   Azul Meade MD  [address] [place] [id number]

## 2022-12-19 NOTE — PROGRESS NOTES
Bart is a 89 year old who is being evaluated via a billable video visit.      How would you like to obtain your AVS? MyChart  If the video visit is dropped, the invitation should be resent by: Send to e-mail at: [email or web address]  Will anyone else be joining your video visit? No      PROVIDER NOTES:     This is a pleasant 89 year old male with a history of Parkinson's disease, HTN, CKD III, DM II, peripheral artery disease, chronic LE edema, and anemia who presents for follow up.     During past visits his claudication had been stable and he would walk through discomfort. 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 He had a small contained infrarenal dissection that vascular surgery deemed stable for surveillance.      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 Since this diagnosis had blue toes and ongoing pain and worsening skin changes. Swelling somewhat worsened. He denies chest pain, sob, dizziness. His legs remain however warm and perfused. He had vein insufficiency and thus I referred him for biotab pumps. Since then he has been doing hour sessions and tolerating well without shortness of breath. His leg swelling has made a significant improvement. In addition his ABIS have improved and TBI normal on the left.      CT angiogram reviewed as well as ABIs personally by me in the office:       Impression:  1. Atherosclerotic changes of of the thoracic and abdominal aorta,  with associated calcifications. No mural nodularity or stranding to  reveal a clear inflammatory process. There is a dissection flap within  infrarenal abdominal aortic aneurysm, favored to be atherosclerotic in  etiology. Normal contrast opacification of associated branching  vessels. No distal extension of the flap. No aneurysmal dilatation of  the thoracoabdominal aorta.  2. Subpleural reticular opacities with a  basilar predominance  compatible with interstitial lung disease. Pleural calcifications  likely sequela of asbestos exposure   3. 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  4. Additional nonacute incidental findings as detailed.     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  He has concomitant vein insufficiency that I believe is contributing to his leg symptoms as he does not have severe medium or large vessel occlusions on his imaging or by ABIs. His pain is proximal to the level of infrapopliteal disease he has. His pain is improving with reduction of swelling confirming likely venous disease as culprit. He is tolerating biotab pumps well.      His lower extremity ultrasounds and MRA of the lower extremities show occlusion of the calf arteries with reconstitution distally. Review of the echo and venous lower extremity studies shows that the lower extremity swelling is due to venous insufficiency and not related to heart failure.        Summary:      1. Lymphedema:   -referral was made for pneumatic external compression devices for home use  -stable, follow up symptoms. Discussed if any shortness of breath can decrease duration of sessions down to 30 minutes.      2. PAD with blue toe syndrome: suspect mostly venous, his feet are warmly perfused, TBIs are not severely reduced  -continue aspirin, pletal  -echocardiogram reviewed, stable  -CT angiogram chest/abdomen/pelvis reviewed  -labs for ESR, CRP every year  -new infrarenal dissection - stable, surveillance by [facility]  surgery  -repeat abis with arterial duplex once a year     Will have follow up in 4-6 months with chem panel labs.    Azul Meade MD MSC  [facility]    Current Outpatient Medications   Medication     atorvastatin (LIPITOR) 10 MG tablet     blood glucose (NO BRAND SPECIFIED) lancets standard     blood glucose (NO BRAND SPECIFIED) test strip     cilostazol (PLETAL) 50 MG tablet     clopidogrel (PLAVIX) 75 MG tablet     Cyanocobalamin (VITAMIN B-12 CR) 1000 MCG TBCR     diclofenac (VOLTAREN) 1 % topical gel     fluticasone (FLONASE) 50 MCG/ACT nasal spray     folic acid (FOLVITE) 1 MG tablet     furosemide (LASIX) 20 MG tablet     irbesartan (AVAPRO) 75 MG tablet     Multiple Vitamin (MULTI VITAMIN  MENS) TABS     order for DME     order for DME     order for DME     vitamin D3 (CHOLECALCIFEROL) 1000 units (25 mcg) tablet     No current facility-administered medications for this visit.     General:  no apparent distress, normal body habitus, sitting upright.  ENT/Mouth:  membranes moist, no nasal discharge.  Normal head shape, no apparent injury or laceration.  Eyes:  no scleral icterus, normal conjunctivae.  No observed jaundice.  Neck:  no apparent neck swelling.   Chest/Lungs:  No breathing difficulty while speaking.  No audible wheezing.  No cough during conversation.  Cardiovascular:  No obviously elevated jugular venous pressure.  No apparent edema bilaterally in LE.   Abdomen:  no obvious abdominal distention.   Extremities:  no apparent cyanosis.  Skin:  no xanthelasma.  No facial lacerations.  Neurologic:  Normal arm motion bilateral, no tremors.    Psychiatric:  Alert and oriented x3, calm demeanor        Video-Visit Details    Video Start Time: 3:45 PM  Type of service:  Video Visit  Video End Time: 4:15 PM  Originating Location (pt. Location): HOME        Distant Location (provider location):  HOME    Platform used for Video Visit: CELESTEWELL    This is a pleasant 89 year old male with a history  of Parkinson's disease, HTN, CKD III, DM II, peripheral artery disease, chronic LE edema, and anemia who presents for follow up.     During past visits his claudication had been stable and he would walk through discomfort. 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 He had a small contained infrarenal dissection that vascular surgery deemed stable for surveillance.      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 Since this diagnosis had blue toes and ongoing pain and worsening skin changes. Swelling has not worsened. He denies chest pain, sob, dizziness. His legs remain however warm and perfused. He also has vein insufficiency and thus I referred him for biotab pumps. He has not yet received these although a rep came out to his house.      CT angiogram reviewed in office:       Impression:  1. Atherosclerotic changes of of the thoracic and abdominal aorta,  with associated calcifications. No mural nodularity or stranding to  reveal a clear inflammatory process. There is a dissection flap within  infrarenal abdominal aortic aneurysm, favored to be atherosclerotic in  etiology. Normal contrast opacification of associated branching  vessels. No distal extension of the flap. No aneurysmal dilatation of  the thoracoabdominal aorta.  2. Subpleural reticular opacities with a basilar predominance  compatible with interstitial lung disease. Pleural calcifications  likely sequela of asbestos exposure   3. 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  4. Additional nonacute incidental findings as detailed.     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  He has concomitant vein insufficiency that I believe is contributing to his leg symptoms as he does not have severe medium or large vessel occlusions on his imaging. His pain is proximal to the level of infrapopliteal disease he has.     His lower extremity ultrasounds and MRA of the lower extremities show occlusion of the calf arteries with reconstitution distally.  In 2019 he seemed to be doing better with the addition of cilostazol and with physical therapy.  However, since his diagnosis of Giant Cell he has new symptoms concerning for critical limb ischemia with blue toes and resting leg pain however he has vein insufficiency which may be contributing. He does have Doppler signal to both feet.      As for the lower extremity swelling, this had improved greatly with the lymphedema treatment. Review of the echo and venous lower extremity studies shows that the lower extremity swelling is due to venous insufficiency and not related to heart failure.        Summary:      1. Lymphedema:   -referral was made for pneumatic external compression devices for home use  -venous ultrasound to be repeated      2. PAD with blue toe syndrome: suspect mostly venous, his feet are warmly perfused  -continue aspirin, pletal  -echocardiogram reviewed, stable  -CT angiogram chest/abdomen/pelvis reviewed  -labs for ESR, CRP every year  -new infrarenal dissection - stable, surveillance by vasc surgery  -repeat abis with arterial duplex     Will follow up with me in 2 months virtual phone visit

## 2022-12-28 NOTE — TELEPHONE ENCOUNTER
Reviewed with Dr. Meade. Pt on both Plavix and Pletal for his PAD. Pt should remain on both. Pletal for vasodilation and Plavix for blood thinning effect. Called and reviewed with pt and wife who verbalized an understanding.     Edilma Victoria RN

## 2022-12-28 NOTE — TELEPHONE ENCOUNTER
Health Call Center    Phone Message    May a detailed message be left on voicemail: yes     Reason for Call: Medication Question or concern regarding medication   Prescription Clarification  Name of Medication: clopidogrel (PLAVIX) 75 MG tablet  Prescribing Provider: Halina   Ph[facility]: [id number] - [place], [place] - [address] AT [facility] [address]   What on the order needs clarification? Spouse called requesting to speak with his care team. Would like to know if he should be on this medication. Please call spouse back to further discuss, thank you.    Action Taken: Message routed to:  Other: Cardiology    Travel Screening: Not Applicable     Thank you!  [facility]

## 2023-01-01 ENCOUNTER — TELEPHONE (OUTPATIENT)
Dept: CARDIOLOGY | Facility: CLINIC | Age: 88
End: 2023-01-01
Payer: COMMERCIAL

## 2023-01-01 ENCOUNTER — ANCILLARY PROCEDURE (OUTPATIENT)
Dept: ULTRASOUND IMAGING | Facility: CLINIC | Age: 88
End: 2023-01-01
Attending: INTERNAL MEDICINE
Payer: COMMERCIAL

## 2023-01-01 ENCOUNTER — HEALTH MAINTENANCE LETTER (OUTPATIENT)
Age: 88
End: 2023-01-01

## 2023-01-01 ENCOUNTER — HOSPITAL ENCOUNTER (OUTPATIENT)
Dept: CARDIOLOGY | Facility: CLINIC | Age: 88
Discharge: HOME | End: 2023-10-16
Attending: INTERNAL MEDICINE | Admitting: INTERNAL MEDICINE
Payer: COMMERCIAL

## 2023-01-01 ENCOUNTER — LAB (OUTPATIENT)
Dept: LAB | Facility: CLINIC | Age: 88
End: 2023-01-01
Payer: COMMERCIAL

## 2023-01-01 ENCOUNTER — OFFICE VISIT (OUTPATIENT)
Dept: CARDIOLOGY | Facility: CLINIC | Age: 88
End: 2023-01-01
Payer: COMMERCIAL

## 2023-01-01 VITALS
HEART RATE: 105 BPM | SYSTOLIC BLOOD PRESSURE: 119 MMHG | WEIGHT: 213 LBS | BODY MASS INDEX: 30.49 KG/M2 | OXYGEN SATURATION: 95 % | DIASTOLIC BLOOD PRESSURE: 74 MMHG | HEIGHT: 70 IN

## 2023-01-01 DIAGNOSIS — I75.023 BLUE TOE SYNDROME OF BOTH LOWER EXTREMITIES (H): ICD-10-CM

## 2023-01-01 DIAGNOSIS — I89.0 LYMPHEDEMA: ICD-10-CM

## 2023-01-01 DIAGNOSIS — I73.9 PAD (PERIPHERAL ARTERY DISEASE) (H): Primary | ICD-10-CM

## 2023-01-01 DIAGNOSIS — R09.89 OTHER SPECIFIED SYMPTOMS AND SIGNS INVOLVING THE CIRCULATORY AND RESPIRATORY SYSTEMS: ICD-10-CM

## 2023-01-01 DIAGNOSIS — M79.621 PAIN IN BOTH UPPER ARMS: ICD-10-CM

## 2023-01-01 DIAGNOSIS — E11.39 TYPE 2 DIABETES MELLITUS WITH OTHER DIABETIC OPHTHALMIC COMPLICATION (H): ICD-10-CM

## 2023-01-01 DIAGNOSIS — I51.9 HEART DISEASE, UNSPECIFIED: ICD-10-CM

## 2023-01-01 DIAGNOSIS — I73.9 PVD (PERIPHERAL VASCULAR DISEASE) (H): ICD-10-CM

## 2023-01-01 DIAGNOSIS — M79.89 LEG SWELLING: ICD-10-CM

## 2023-01-01 DIAGNOSIS — R06.02 SHORTNESS OF BREATH: ICD-10-CM

## 2023-01-01 DIAGNOSIS — I73.9 PAD (PERIPHERAL ARTERY DISEASE) (H): ICD-10-CM

## 2023-01-01 DIAGNOSIS — M79.621 PAIN IN BOTH UPPER ARMS: Primary | ICD-10-CM

## 2023-01-01 DIAGNOSIS — M79.622 PAIN IN BOTH UPPER ARMS: Primary | ICD-10-CM

## 2023-01-01 DIAGNOSIS — M79.622 PAIN IN BOTH UPPER ARMS: ICD-10-CM

## 2023-01-01 LAB
CRP SERPL-MCNC: 3.05 MG/L
ERYTHROCYTE [SEDIMENTATION RATE] IN BLOOD BY WESTERGREN METHOD: 42 MM/HR (ref 0–20)
LVEF ECHO: NORMAL

## 2023-01-01 PROCEDURE — 99215 OFFICE O/P EST HI 40 MIN: CPT | Performed by: INTERNAL MEDICINE

## 2023-01-01 PROCEDURE — 86140 C-REACTIVE PROTEIN: CPT | Performed by: INTERNAL MEDICINE

## 2023-01-01 PROCEDURE — 36415 COLL VENOUS BLD VENIPUNCTURE: CPT | Performed by: INTERNAL MEDICINE

## 2023-01-01 PROCEDURE — 85652 RBC SED RATE AUTOMATED: CPT | Performed by: INTERNAL MEDICINE

## 2023-01-01 PROCEDURE — 93925 LOWER EXTREMITY STUDY: CPT | Performed by: RADIOLOGY

## 2023-01-01 PROCEDURE — 93306 TTE W/DOPPLER COMPLETE: CPT

## 2023-01-01 PROCEDURE — 93930 UPPER EXTREMITY STUDY: CPT | Performed by: RADIOLOGY

## 2023-01-01 PROCEDURE — 93306 TTE W/DOPPLER COMPLETE: CPT | Mod: 26 | Performed by: INTERNAL MEDICINE

## 2023-01-01 PROCEDURE — 93922 UPR/L XTREMITY ART 2 LEVELS: CPT | Performed by: RADIOLOGY

## 2023-01-01 RX ORDER — LANCETS
EACH MISCELLANEOUS
OUTPATIENT
Start: 2023-01-01

## 2023-04-11 NOTE — TELEPHONE ENCOUNTER
Reviewed last OV with Dr. Meade on 12/29/22   -labs for ESR, CRP every year  -new infrarenal dissection - stable, surveillance by vasc surgery  -repeat abis with arterial duplex once a year     Will have follow up in 4-6 months with chem panel labs.      Appears patient is due for ESR, CRP, BMP and follow up. Orders placed. Spoke with patients wife and informed her that patient is due for this and will send to scheduling to help facilitate.

## 2023-04-11 NOTE — TELEPHONE ENCOUNTER
[facility] Call Center    Phone Message    May a detailed message be left on voicemail: yes     Reason for Call: Other: Pt cancelled his vein clinic appt. Does Dr. Meade want to see him in clinic? Please call pt spouse to discuss. Thank you     Action Taken: Message routed to:  Other: Cardiology    Travel Screening: Not Applicable       Thank you!  [facility]

## 2023-06-01 NOTE — TELEPHONE ENCOUNTER
RN will send to Dr. Meade for FYI update. Form is on her desk for signature. Maritza from [employer] updated that she will be back in office next week, but she is out of the office this week.

## 2023-06-01 NOTE — TELEPHONE ENCOUNTER
[facility] Call [facility]    Phone Message    May a detailed message be left on voicemail: yes     Reason for Call: Form or Letter   Type or form/letter needing completion: [employer] compliance form faxed to the [facility] for Dr Meade to complete and send back  Provider: Dr Meade  Date form needed: Sooner the better as form was due a week ago   Once completed: Fax form to: [employer] Fax # [phone]     If the [employer] does not get this form back then the patient may have to pay a portion of the cost of the pump    Action Taken: Other: Cardiology    Travel Screening: Not Applicable     Thank you!  [facility]

## 2023-06-13 NOTE — TELEPHONE ENCOUNTER
RN called patient's wife and updated patient that we faxed the required forms last week to Maritza from [facility]. RN advised patient's wife that we would connect with Maritza to inquire if she needs us to re-fax the forms. Patient's wife verbalized understanding and has no further questions at this time.

## 2023-06-13 NOTE — TELEPHONE ENCOUNTER
[facility] Call [facility]    Phone Message    May a detailed message be left on voicemail: yes     Reason for Call: Other:     Pt's wife is requesting a call back ASAP to discuss paperwork they need sent to insurance regarding the leg compression unit.  She statted they were billed because insurance did not get info needed.    Action Taken: Other: cardio    Travel Screening: Not Applicable     Thank you!  [facility]

## 2023-06-23 NOTE — LETTER
6/23/2023    Arnel Oneill  [address]  [facility] [address]    RE: Bart Rossy       Dear Colleague,     I had the pleasure of seeing Bart Blair in the [facility].           Vascular Cardiology       HPI:       This is a pleasant 89 year old male with a history of Parkinson's disease, HTN, CKD III, DM II, peripheral artery disease, chronic LE edema, and anemia who presents for follow up.     During past visits his claudication had been stable and he would walk through discomfort. 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 He had a small contained infrarenal dissection that vascular surgery deemed stable for surveillance.      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 Since this diagnosis had blue toes and ongoing pain and worsening skin changes. Swelling somewhat worsened. He denies chest pain, sob, dizziness. His legs remain however warm and perfused. He had vein insufficiency and thus I referred him for biotab pumps. Since then he has been doing hour sessions and tolerating well without shortness of breath. His leg swelling has made a significant improvement. In addition his ABIS have improved and TBI normal on the left.      CT angiogram reviewed as well as ABIs personally by me in the office:       Impression:  1. Atherosclerotic changes of of the thoracic and abdominal aorta,  with associated calcifications. No mural nodularity or stranding to  reveal a clear inflammatory process. There is a dissection flap within  infrarenal abdominal aortic aneurysm, favored to be atherosclerotic in  etiology. Normal contrast opacification of associated branching  vessels. No distal extension of the flap. No aneurysmal dilatation of  the thoracoabdominal aorta.  2. Subpleural reticular opacities with a basilar predominance  compatible with interstitial lung disease. Pleural  calcifications  likely sequela of asbestos exposure   3. 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  4. Additional nonacute incidental findings as detailed.    Interval history: has a bit more toe pain than prior. Warm feet however. No calf cramping. Is getting pneumatic compression about 4 times a week, he says it feels good, not pain inducing. They have on a relatively low pressure.    On ROS claims his arms and hands and fingers intermittently cramping and having pain. Also pain in neck.     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  He has concomitant vein insufficiency that I believe is contributing to his leg symptoms as he does not have severe medium or large vessel occlusions on his imaging or by ABIs. TBIS abnormal but on exam he has perfusion, no gangrene and warm to touch. Pain seems to improve with reduction of swelling thus deemed safe for compression.     His lower extremity ultrasounds and MRA of the lower extremities show occlusion of the calf arteries with reconstitution distally. Review of the echo and venous lower extremity studies shows that the lower extremity swelling is due to venous insufficiency and not related to heart failure. Echocardiogram has been stable.         Summary:      1. Lymphedema:   -referral was made for pneumatic external compression devices for home use: doing well      2. PAD with blue toe syndrome: suspect mostly venous, his feet are warmly perfused, TBIs are not severely reduced but will recheck yearly   -continue aspirin,  pletal  -CT angiogram chest/abdomen/pelvis reviewed  -labs for ESR, CRP every year  -new infrarenal dissection - stable, surveillance by [facility] surgery  -repeat abis with arterial duplex once a year with arterial duplex while we are doing compression    3. Wheezing on today's exam  -follow up echocardiogram     4. Arm pain/ hand pain:  -will exclude subclavian stenosis with arterial ultrasound  -possible neurogenic from cervical spine       Azul Meade MD MSC  [facility]        PAST MEDICAL HISTORY  Past Medical History:   Diagnosis Date    Anemia     Anemia due to blood loss, acute     CKD (chronic kidney disease) stage 3, GFR 30-59 ml/min (H) 5/31/2010    Essential hypertension, benign     Other and unspecified hyperlipidemia     Other and unspecified hyperlipidemia     Other specified disorder of skin     Pain in joint, shoulder region     Parkinson disease (H) 9/2/2010    Polyp of vocal cord or larynx     Retinal ischemia     right sided thrombotic plaque    Rosacea     Type II or unspecified type diabetes mellitus without mention of complication, not stated as uncontrolled        CURRENT MEDICATIONS  Current Outpatient Medications   Medication Sig Dispense Refill    atorvastatin (LIPITOR) 10 MG tablet Take 1 tablet (10 mg) by mouth daily 90 tablet 0    blood glucose (NO BRAND SPECIFIED) lancets standard Use to test blood sugar one time daily or as directed. 100 each 1    blood glucose (NO BRAND SPECIFIED) test strip Use to test blood sugar one times daily or as directed. 100 strip 0    cilostazol (PLETAL) 50 MG tablet Take 1 tablet (50 mg) by mouth 2 times daily Take on an empty stomach. 180 tablet 3    clopidogrel (PLAVIX) 75 MG tablet Take 1 tablet (75 mg) by mouth daily 90 tablet 3    Cyanocobalamin (VITAMIN B-12 CR) 1000 MCG TBCR TAKE ONE TABLET BY MOUTH EVERY DAY 60 tablet 4    diclofenac (VOLTAREN) 1 % topical gel APPLY 4 GRAMS TO KNEES OR 2 GRAMS TO HANDS FOUR TIMES A DAY USING ENCLOSED  DOSING CARD 300 g 3    fluticasone (FLONASE) 50 MCG/ACT nasal spray Spray 1-2 sprays into both nostrils daily as needed       folic acid (FOLVITE) 1 MG tablet Take 1 mg by mouth daily      furosemide (LASIX) 20 MG tablet Take 1 tablet (20 mg) by mouth daily 90 tablet 3    irbesartan (AVAPRO) 75 MG tablet TAKE 1/2 TABLET(37.5 MG) BY MOUTH AT BEDTIME 45 tablet 3    Multiple Vitamin (MULTI VITAMIN  MENS) TABS Take  by mouth. Takes one daily        order for DME Glucometer, brand as covered by insurance. 1 each 0    order for DME Test strips for pt's glucometer, brand as covered by insurance. Test daily and prn. 100 each 4    order for DME Lancets.  Daily and prn. 100 each 4    vitamin D3 (CHOLECALCIFEROL) 1000 units (25 mcg) tablet Take 1 tablet (1,000 Units) by mouth at bedtime         PAST SURGICAL HISTORY:  Past Surgical History:   Procedure Laterality Date    ARTHROPLASTY KNEE  1/31/2012    Procedure:ARTHROPLASTY KNEE; LEFT TOTAL KNEE ARTHROPLASTY ([employer])^; Surgeon:SKIP FAIR; Location: OR    ARTHROSCOPY SHOULDER, OPEN ROTATOR CUFF REPAIR, COMBINED  4/24/2012    Procedure:COMBINED ARTHROSCOPY SHOULDER, OPEN ROTATOR CUFF REPAIR; RIGHT SHOULDER ARTHROSCOPY OPEN ROTATOR CUFF DEBRIDEMENT, SUBCROMIAL DECOMPRESSION, AND BICEPS TENODESIS REPAIR; Surgeon:SKIP FAIR; Location: [place]    CL AFF SURGICAL PATHOLOGY  6-    Dr. Bowers; left hand    ENT SURGERY      INCISE FINGER TENDON SHEATH  2008    Dr. Bowers; right AND LEFT hand    THROAT SURGERY      vocal cord polyps       ALLERGIES     Allergies   Allergen Reactions    No Known Drug Allergy        FAMILY HISTORY  Family History   Problem Relation Age of Onset    Family History Negative Other         unknown family history per patient         SOCIAL HISTORY  Social History     Socioeconomic History    Marital status:      Spouse name: ford    Number of children: 6    Years of education: 12    Highest education level: Not on file   Occupational  "History    Occupation: [employer]     Employer: RETIRED   Tobacco Use    Smoking status: Former    Smokeless tobacco: Never   Substance and Sexual Activity    Alcohol use: Yes     Comment: rarely    Drug use: No    Sexual activity: Not Currently     Partners: Female   Other Topics Concern    Parent/sibling w/ CABG, MI or angioplasty before 65F 55M? No   Social History Narrative    Not on file     Social Determinants of Health     Financial Resource Strain: Not on file   Food Insecurity: Not on file   Transportation Needs: Not on file   Physical Activity: Not on file   Stress: Not on file   Social Connections: Not on file   Intimate Partner Violence: Not on file   Housing Stability: Not on file       ROS:   Constitutional: No fever, chills, or sweats. No weight gain/loss   ENT: No visual disturbance, ear ache, epistaxis, sore throat  Allergies/Immunologic: Negative  Respiratory: No cough, hemoptysia  Cardiovascular: As per HPI  GI: No nausea, vomiting, hematemesis, melena, or hematochezia  : No urinary frequency, dysuria, or hematuria  Integument: Negative  Psychiatric: Negative  Neuro: Negative  Endocrinology: Negative   Musculoskeletal: Negative  Vascular: No walking impairment, claudication, ischemic rest pain or nonhealing wounds    EXAM:  /74   Pulse 105   Ht 1.778 m (5' 10\")   Wt 96.6 kg (213 lb)   SpO2 95%   BMI 30.56 kg/m    In general, the patient is a pleasant male in no apparent distress.    HEENT: NC/AT.  PERRLA.  EOMI.  Sclerae white, not injected.   Neck: No adenopathy.  No thyromegaly. Carotids +2/2 bilaterally without bruits.  No jugular venous distension.   Heart: RRR. Normal S1, S2 splits physiologically. No murmur, rub, click, or gallop.   Lungs: CTA.  No ronchi, wheezes, rales.  No dullness to percussion.   Abdomen: Soft, nontender, nondistended.   Extremities: No clubbing, cyanosis, or edema.   Vascular: No bruits are noted.    Labs:  LIPID RESULTS:  Lab Results   Component Value Date "    CHOL 136 03/18/2021    HDL 56 03/18/2021    LDL 56 03/18/2021    TRIG 121 03/18/2021    CHOLHDLRATIO 2.6 11/18/2014    NHDL 80 03/18/2021       LIVER ENZYME RESULTS:  Lab Results   Component Value Date    AST 17 07/27/2020    ALT 29 07/27/2020       CBC RESULTS:  Lab Results   Component Value Date    WBC 5.8 07/27/2020    RBC 3.63 (L) 07/27/2020    HGB 12.8 (L) 07/27/2020    HCT 38.3 (L) 07/27/2020     (H) 07/27/2020    MCH 35.3 (H) 07/27/2020    MCHC 33.4 07/27/2020    RDW 13.0 07/27/2020     (L) 07/27/2020       BMP RESULTS:  Lab Results   Component Value Date     02/16/2022     03/18/2021    POTASSIUM 4.8 02/16/2022    POTASSIUM 4.4 03/18/2021    CHLORIDE 104 02/16/2022    CHLORIDE 106 03/18/2021    CO2 27 02/16/2022    CO2 26 03/18/2021    ANIONGAP 4 02/16/2022    ANIONGAP 4 03/18/2021     (H) 02/16/2022    GLC 91 03/18/2021    BUN 33 (H) 02/16/2022    BUN 31 (H) 03/18/2021    CR 1.4 (H) 02/24/2022    CR 1.48 (H) 02/16/2022    CR 1.40 (H) 03/18/2021    GFRESTIMATED 47 (L) 02/24/2022    GFRESTIMATED 44 (L) 03/18/2021    GFRESTBLACK 51 (L) 03/18/2021    BLAISE 8.9 02/16/2022    BLAISE 8.4 (L) 03/18/2021        A1C RESULTS:  Lab Results   Component Value Date    A1C 6.4 (H) 03/18/2021           Thank you for allowing me to participate in the care of your patient.      Sincerely,     Azul Meade MD     [facility]  cc:   Azul Meade MD  [address] [place] [id number]

## 2023-06-23 NOTE — PATIENT INSTRUCTIONS
Echocardiogram  Upper and lower extremity arterial duplex ultrasound  ABIs of the legs   Follow up in 6 months

## 2023-06-23 NOTE — PROGRESS NOTES
Vascular Cardiology       HPI:       This is a pleasant 89 year old male with a history of Parkinson's disease, HTN, CKD III, DM II, peripheral artery disease, chronic LE edema, and anemia who presents for follow up.     During past visits his claudication had been stable and he would walk through discomfort. 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 He had a small contained infrarenal dissection that vascular surgery deemed stable for surveillance.      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 Since this diagnosis had blue toes and ongoing pain and worsening skin changes. Swelling somewhat worsened. He denies chest pain, sob, dizziness. His legs remain however warm and perfused. He had vein insufficiency and thus I referred him for biotab pumps. Since then he has been doing hour sessions and tolerating well without shortness of breath. His leg swelling has made a significant improvement. In addition his ABIS have improved and TBI normal on the left.      CT angiogram reviewed as well as ABIs personally by me in the office:       Impression:  1. Atherosclerotic changes of of the thoracic and abdominal aorta,  with associated calcifications. No mural nodularity or stranding to  reveal a clear inflammatory process. There is a dissection flap within  infrarenal abdominal aortic aneurysm, favored to be atherosclerotic in  etiology. Normal contrast opacification of associated branching  vessels. No distal extension of the flap. No aneurysmal dilatation of  the thoracoabdominal aorta.  2. Subpleural reticular opacities with a basilar predominance  compatible with interstitial lung disease. Pleural calcifications  likely sequela of asbestos exposure   3. Mild-moderate stenosis of the origins of the celiac trunk, superior  mesenteric artery, bilateral renal and accessory renal arteries,  as  well as inferior mesenteric artery.  4. Additional nonacute incidental findings as detailed.    Interval history: has a bit more toe pain than prior. Warm feet however. No calf cramping. Is getting pneumatic compression about 4 times a week, he says it feels good, not pain inducing. They have on a relatively low pressure.    On ROS claims his arms and hands and fingers intermittently cramping and having pain. Also pain in neck.     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  He has concomitant vein insufficiency that I believe is contributing to his leg symptoms as he does not have severe medium or large vessel occlusions on his imaging or by ABIs. TBIS abnormal but on exam he has perfusion, no gangrene and warm to touch. Pain seems to improve with reduction of swelling thus deemed safe for compression.     His lower extremity ultrasounds and MRA of the lower extremities show occlusion of the calf arteries with reconstitution distally. Review of the echo and venous lower extremity studies shows that the lower extremity swelling is due to venous insufficiency and not related to heart failure. Echocardiogram has been stable.         Summary:      1. Lymphedema:   -referral was made for pneumatic external compression devices for home use: doing well      2. PAD with blue toe syndrome: suspect mostly venous, his feet are warmly perfused, TBIs are not severely reduced but will recheck yearly   -continue aspirin, pletal  -CT angiogram chest/abdomen/pelvis reviewed  -labs for ESR, CRP every year  -new infrarenal dissection - stable, surveillance by [facility] surgery  -repeat abis with arterial duplex once a year with  arterial duplex while we are doing compression    3. Wheezing on today's exam  -follow up echocardiogram     4. Arm pain/ hand pain:  -will exclude subclavian stenosis with arterial ultrasound  -possible neurogenic from cervical spine       Azul Meade MD MSC  [facility]        PAST MEDICAL HISTORY  Past Medical History:   Diagnosis Date     Anemia      Anemia due to blood loss, acute      CKD (chronic kidney disease) stage 3, GFR 30-59 ml/min (H) 5/31/2010     Essential hypertension, benign      Other and unspecified hyperlipidemia      Other and unspecified hyperlipidemia      Other specified disorder of skin      Pain in joint, shoulder region      Parkinson disease (H) 9/2/2010     Polyp of vocal cord or larynx      Retinal ischemia     right sided thrombotic plaque     Rosacea      Type II or unspecified type diabetes mellitus without mention of complication, not stated as uncontrolled        CURRENT MEDICATIONS  Current Outpatient Medications   Medication Sig Dispense Refill     atorvastatin (LIPITOR) 10 MG tablet Take 1 tablet (10 mg) by mouth daily 90 tablet 0     blood glucose (NO BRAND SPECIFIED) lancets standard Use to test blood sugar one time daily or as directed. 100 each 1     blood glucose (NO BRAND SPECIFIED) test strip Use to test blood sugar one times daily or as directed. 100 strip 0     cilostazol (PLETAL) 50 MG tablet Take 1 tablet (50 mg) by mouth 2 times daily Take on an empty stomach. 180 tablet 3     clopidogrel (PLAVIX) 75 MG tablet Take 1 tablet (75 mg) by mouth daily 90 tablet 3     Cyanocobalamin (VITAMIN B-12 CR) 1000 MCG TBCR TAKE ONE TABLET BY MOUTH EVERY DAY 60 tablet 4     diclofenac (VOLTAREN) 1 % topical gel APPLY 4 GRAMS TO KNEES OR 2 GRAMS TO HANDS FOUR TIMES A DAY USING ENCLOSED DOSING CARD 300 g 3     fluticasone (FLONASE) 50 MCG/ACT nasal spray Spray 1-2 sprays into both nostrils daily as needed        folic acid (FOLVITE) 1 MG tablet Take 1 mg by mouth daily        furosemide (LASIX) 20 MG tablet Take 1 tablet (20 mg) by mouth daily 90 tablet 3     irbesartan (AVAPRO) 75 MG tablet TAKE 1/2 TABLET(37.5 MG) BY MOUTH AT BEDTIME 45 tablet 3     Multiple Vitamin (MULTI VITAMIN  MENS) TABS Take  by mouth. Takes one daily         order for DME Glucometer, brand as covered by insurance. 1 each 0     order for DME Test strips for pt's glucometer, brand as covered by insurance. Test daily and prn. 100 each 4     order for DME Lancets.  Daily and prn. 100 each 4     vitamin D3 (CHOLECALCIFEROL) 1000 units (25 mcg) tablet Take 1 tablet (1,000 Units) by mouth at bedtime         PAST SURGICAL HISTORY:  Past Surgical History:   Procedure Laterality Date     ARTHROPLASTY KNEE  1/31/2012    Procedure:ARTHROPLASTY KNEE; LEFT TOTAL KNEE ARTHROPLASTY ([employer])^; Surgeon:SKIP FAIR; Location: OR     ARTHROSCOPY SHOULDER, OPEN ROTATOR CUFF REPAIR, COMBINED  4/24/2012    Procedure:COMBINED ARTHROSCOPY SHOULDER, OPEN ROTATOR CUFF REPAIR; RIGHT SHOULDER ARTHROSCOPY OPEN ROTATOR CUFF DEBRIDEMENT, SUBCROMIAL DECOMPRESSION, AND BICEPS TENODESIS REPAIR; Surgeon:SKIP FAIR; Location: [place]     CL AFF SURGICAL PATHOLOGY  6-    Dr. Bowers; left hand     ENT SURGERY       INCISE FINGER TENDON SHEATH  2008    Dr. Bowers; right AND LEFT hand     THROAT SURGERY      vocal cord polyps       ALLERGIES     Allergies   Allergen Reactions     No Known Drug Allergy        FAMILY HISTORY  Family History   Problem Relation Age of Onset     Family History Negative Other         unknown family history per patient         SOCIAL HISTORY  Social History     Socioeconomic History     Marital status:      Spouse name: ford     Number of children: 6     Years of education: 12     Highest education level: Not on file   Occupational History     Occupation: printing     Employer: RETIRED   Tobacco Use     Smoking status: Former     Smokeless tobacco: Never   Substance and Sexual Activity     Alcohol  "use: Yes     Comment: rarely     Drug use: No     Sexual activity: Not Currently     Partners: Female   Other Topics Concern     Parent/sibling w/ CABG, MI or angioplasty before 65F 55M? No   Social History Narrative     Not on file     Social Determinants of Health     Financial Resource Strain: Not on file   Food Insecurity: Not on file   Transportation Needs: Not on file   Physical Activity: Not on file   Stress: Not on file   Social Connections: Not on file   Intimate Partner Violence: Not on file   Housing Stability: Not on file       ROS:   Constitutional: No fever, chills, or sweats. No weight gain/loss   ENT: No visual disturbance, ear ache, epistaxis, sore throat  Allergies/Immunologic: Negative  Respiratory: No cough, hemoptysia  Cardiovascular: As per HPI  GI: No nausea, vomiting, hematemesis, melena, or hematochezia  : No urinary frequency, dysuria, or hematuria  Integument: Negative  Psychiatric: Negative  Neuro: Negative  Endocrinology: Negative   Musculoskeletal: Negative  Vascular: No walking impairment, claudication, ischemic rest pain or nonhealing wounds    EXAM:  /74   Pulse 105   Ht 1.778 m (5' 10\")   Wt 96.6 kg (213 lb)   SpO2 95%   BMI 30.56 kg/m    In general, the patient is a pleasant male in no apparent distress.    HEENT: NC/AT.  PERRLA.  EOMI.  Sclerae white, not injected.   Neck: No adenopathy.  No thyromegaly. Carotids +2/2 bilaterally without bruits.  No jugular venous distension.   Heart: RRR. Normal S1, S2 splits physiologically. No murmur, rub, click, or gallop.   Lungs: CTA.  No ronchi, wheezes, rales.  No dullness to percussion.   Abdomen: Soft, nontender, nondistended.   Extremities: No clubbing, cyanosis, or edema.   Vascular: No bruits are noted.    Labs:  LIPID RESULTS:  Lab Results   Component Value Date    CHOL 136 03/18/2021    HDL 56 03/18/2021    LDL 56 03/18/2021    TRIG 121 03/18/2021    CHOLHDLRATIO 2.6 11/18/2014    NHDL 80 03/18/2021       LIVER ENZYME " RESULTS:  Lab Results   Component Value Date    AST 17 07/27/2020    ALT 29 07/27/2020       CBC RESULTS:  Lab Results   Component Value Date    WBC 5.8 07/27/2020    RBC 3.63 (L) 07/27/2020    HGB 12.8 (L) 07/27/2020    HCT 38.3 (L) 07/27/2020     (H) 07/27/2020    MCH 35.3 (H) 07/27/2020    MCHC 33.4 07/27/2020    RDW 13.0 07/27/2020     (L) 07/27/2020       BMP RESULTS:  Lab Results   Component Value Date     02/16/2022     03/18/2021    POTASSIUM 4.8 02/16/2022    POTASSIUM 4.4 03/18/2021    CHLORIDE 104 02/16/2022    CHLORIDE 106 03/18/2021    CO2 27 02/16/2022    CO2 26 03/18/2021    ANIONGAP 4 02/16/2022    ANIONGAP 4 03/18/2021     (H) 02/16/2022    GLC 91 03/18/2021    BUN 33 (H) 02/16/2022    BUN 31 (H) 03/18/2021    CR 1.4 (H) 02/24/2022    CR 1.48 (H) 02/16/2022    CR 1.40 (H) 03/18/2021    GFRESTIMATED 47 (L) 02/24/2022    GFRESTIMATED 44 (L) 03/18/2021    GFRESTBLACK 51 (L) 03/18/2021    LBAISE 8.9 02/16/2022    BLAISE 8.4 (L) 03/18/2021        A1C RESULTS:  Lab Results   Component Value Date    A1C 6.4 (H) 03/18/2021

## 2023-10-09 NOTE — TELEPHONE ENCOUNTER
Lower extremity arterial US results noted:    FINDINGS:  RIGHT:       Brachial: 131 mmHg       Ankle (PT): > 254 mmHg - non compressible        Ankle (DP): > 254 mmHg - non compressible        1st Digit: 84 mmHg          VIOLET: non compressible       TBI: 0.64          1st Digit PPG: Normal          External iliac artery: 75/0 cm/s, triphasic       Common femoral artery: 76/0 cm/s, triphasic       Profundus femoral artery: 87/0 cm/s, triphasic          Superficial femoral artery, proximal: 58/0 cm/s, triphasic       Superficial femoral artery, mid: 111/0 cm/s, triphasic       Superficial femoral artery, distal: 51/0 cm/s, triphasic          Popliteal artery, proximal: 57/0 cm/s, triphasic       Popliteal artery, distal: 60/0 cm/s, triphasic          Posterior tibial artery, ankle: 22/0 cm/s, triphasic       Anterior tibial artery, ankle: 80/0 cm/s, triphasic       Dorsalis pedis artery: 94/0 cm/s, triphasic     LEFT:       Brachial: 129 mmHg       Ankle (PT): > 254 mmHg - non compressible        Ankle (DP): > 254 mmHg - non compressible        1st Digit: 85 mmHg          VIOLET: non compressible       TBI: 0.65          1st Digit PPG: Normal          External iliac artery: 88/0 cm/s, triphasic       Common femoral artery: 66/0 cm/s, triphasic       Profundus femoral artery: 62/0 cm/s, triphasic          Superficial femoral artery, proximal: 53/0 cm/s, triphasic       Superficial femoral artery, mid: 75/0 cm/s, triphasic       Superficial femoral artery, distal: 53/0 cm/s, triphasic          Popliteal artery, proximal: 41/0 cm/s, triphasic       Popliteal artery, distal: 43/0 cm/s, triphasic          Posterior tibial artery, ankle: occluded       Anterior tibial artery, ankle: 115/0 cm/s, triphasic       Dorsalis pedis artery: 50/0 cm/s, triphasic    US VIOLET no exercise results noted:    IMPRESSION:  1. RIGHT:       A. Resting VIOLET is non compressible.       B. Resting TBI is ABNORMAL, 0.64.       C. Negative duplex  arterial ultrasound     2. LEFT:       A. Resting VIOLET is non compressible.       B. Resting TBI is ABNORMAL, 0.65.       C. 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    US upper extremity arterial US results noted:    FINDINGS:   RIGHT:  Vertebral artery: 37/12 cm/s, antegrade     Innominate artery: 77/0 cm/s, triphasic     Subclavian artery, proximal: 93/0 cm/s, triphasic  Subclavian artery, mid: 87/0 cm/s, triphasic  Subclavian artery, distal: 81/0 cm/s, triphasic     Axillary artery: 60/0 cm/s, triphasic     Brachial artery, proximal: 82/0 cm/s, triphasic  Brachial artery, mid: 88/0 cm/s, triphasic  Brachial artery, distal: 74/0 cm/s, triphasic     Radial artery, proximal: 91/0 cm/s, biphasic  Radial artery, mid: 57/0 cm/s, triphasic  Radial artery, distal: 58/0 cm/s, triphasic     Ulnar artery, proximal: 54/0 cm/s, triphasic  Ulnar artery, mid: 24/0 cm/s, triphasic  Ulnar artery, distal: 27/0 cm/s, biphasic     LEFT:  Vertebral artery: 30/7 cm/s, antegrade     Subclavian artery, proximal: 87/0 cm/s, triphasic  Subclavian artery, mid: 76/0 cm/s, triphasic  Subclavian artery, distal: 69/0 cm/s, triphasic     Axillary artery: 52/0 cm/s, triphasic     Brachial artery, proximal: 80/0 cm/s, triphasic  Brachial artery, mid: 87/0 cm/s, triphasic  Brachial artery, distal: 42/0 cm/s, triphasic     Radial artery, proximal: 58/0 cm/s, triphasic  Radial artery, mid: 98/0 cm/s, triphasic  Radial artery, distal: 97/0 cm/s, triphasic     Ulnar artery, proximal: 42/0 cm/s, triphasic  Ulnar artery, mid: 28/0 cm/s, triphasic  Ulnar artery, distal: 29/0 cm/s, biphasic                                                                      IMPRESSION: Negative study.     MELINDA VILLARREAL MD     Echo done 10/16/23 for wheezing:   Interpretation Summary     Left ventricular systolic function is normal. The visual ejection fraction is  55-60%. Septal motion is consistent with conduction abnormality.  Right ventricular global function is  normal.  There is mild to moderate mitral annular calcification. There is mild (1+)  mitral regurgitation.  Aortic valve morphology is not well visualized, however it is mildly  calcified.  IVC diameter <2.1 cm collapsing >50% with sniff suggests a normal RA pressure  of 3 mmHg.  There is no pericardial effusion.

## 2024-01-07 ENCOUNTER — HEALTH MAINTENANCE LETTER (OUTPATIENT)
Age: 89
End: 2024-01-07

## 2024-06-17 PROBLEM — Z71.89 ADVANCED DIRECTIVES, COUNSELING/DISCUSSION: Status: RESOLVED | Noted: 2017-09-13 | Resolved: 2024-06-17

## 2025-01-02 NOTE — PROGRESS NOTES
Subjective     Bart Blair is a 89 year old male who presents to clinic today for the following health issues:    HPI         Left ear plugged       Review of Systems    plugged for a week    Occasionally uses q tips       Objective    /66 (BP Location: Left arm, Patient Position: Chair, Cuff Size: Adult Regular)   Pulse 112   Temp 98.3  F (36.8  C) (Oral)   Wt 94.3 kg (208 lb)   BMI 33.57 kg/m    Body mass index is 33.57 kg/m .  Physical Exam   Full physical not done     Mentation and affect are fine    No tremor of speech or extremity    Patient has moderated amount  Of cerumen present bilaterally  Not much tympanic membrane seen initially    Patient  Wanted this out  With consent used gentle irrigation and curette to remove the cerumen  Good results  No complications  Patient felt better after   Tympanic membranes and canals fine                  ASSESSMENT / PLAN:  (H61.23) Bilateral impacted cerumen  (primary encounter diagnosis)  Comment: removed here   Plan: REMOVE IMPACTED CERUMEN        Follow up prn       I reviewed the patient's medications, allergies, medical history, family history, and social history.    Lior Levy MD           What Type Of Note Output Would You Prefer (Optional)?: Standard Output How Severe Are Your Spot(S)?: moderate Have Your Spot(S) Been Treated In The Past?: has not been treated Hpi Title: Evaluation of Skin Lesions